# Patient Record
Sex: FEMALE | Race: BLACK OR AFRICAN AMERICAN | NOT HISPANIC OR LATINO | Employment: FULL TIME | URBAN - METROPOLITAN AREA
[De-identification: names, ages, dates, MRNs, and addresses within clinical notes are randomized per-mention and may not be internally consistent; named-entity substitution may affect disease eponyms.]

---

## 2017-01-09 ENCOUNTER — OFFICE VISIT (OUTPATIENT)
Dept: ENDOCRINOLOGY | Age: 57
End: 2017-01-09

## 2017-01-09 VITALS
WEIGHT: 183.4 LBS | SYSTOLIC BLOOD PRESSURE: 130 MMHG | DIASTOLIC BLOOD PRESSURE: 80 MMHG | OXYGEN SATURATION: 95 % | HEIGHT: 63 IN | BODY MASS INDEX: 32.5 KG/M2

## 2017-01-09 DIAGNOSIS — E55.9 VITAMIN D DEFICIENCY: ICD-10-CM

## 2017-01-09 DIAGNOSIS — I10 ESSENTIAL HYPERTENSION: ICD-10-CM

## 2017-01-09 DIAGNOSIS — E83.52 HYPERCALCEMIA: Primary | ICD-10-CM

## 2017-01-09 DIAGNOSIS — E78.5 HYPERLIPIDEMIA, UNSPECIFIED HYPERLIPIDEMIA TYPE: ICD-10-CM

## 2017-01-09 PROCEDURE — 99214 OFFICE O/P EST MOD 30 MIN: CPT | Performed by: INTERNAL MEDICINE

## 2017-01-09 NOTE — MR AVS SNAPSHOT
Kristina Kang   1/9/2017 2:30 PM   Office Visit    Dept Phone:  [phone]   Encounter #:  [id number]    Provider:  Conor Avendaño MD   Department:  [facility] ENDOCRINOLOGY                Your Full Care Plan              Today's Medication Changes          These changes are accurate as of: 1/9/17  4:04 PM.  If you have any questions, ask your nurse or doctor.               Stop taking medication(s)listed here:     estradiol 10 MCG tablet vaginal tablet   Commonly known as:  VAGIFEM   Stopped by:  Conor Avendaño MD                      Your Updated Medication List          This list is accurate as of: 1/9/17  4:04 PM.  Always use your most recent med list.                aspirin 81 MG tablet   Take 1 tablet by mouth daily.       atorvastatin 40 MG tablet   Commonly known as:  LIPITOR   Take 0.5 tablets by mouth Daily. One PO daily for cholesterol       cholecalciferol 400 UNITS tablet   Commonly known as:  VITAMIN D3       fluticasone 50 MCG/ACT nasal spray   Commonly known as:  FLONASE   2 sprays into each nostril Daily.       furosemide 20 MG tablet   Commonly known as:  LASIX   Take 1 tablet by mouth daily.       lisinopril 10 MG tablet   Commonly known as:  PRINIVIL,ZESTRIL   Take 1 tablet by mouth Daily. For BP               We Performed the Following     Calcium, Ionized     Calcium, Urine, 24 Hour     Comprehensive Metabolic Panel     Creatinine, Urine, 24 Hour     Lipid Panel     Phosphorus, 24 Hour Urine     Phosphorus     PTH, Intact     PTH-related Peptide     Vitamin D 25 Hydroxy       You Were Diagnosed With        Codes Comments    Hypercalcemia    -  Primary ICD-10-CM: E83.52  ICD-9-CM: 275.42     Hyperlipidemia, unspecified hyperlipidemia type     ICD-10-CM: E78.5  ICD-9-CM: 272.4     Vitamin D deficiency     ICD-10-CM: E55.9  ICD-9-CM: 268.9     Essential hypertension     ICD-10-CM: I10  ICD-9-CM: 401.9       Instructions     None    Patient  "Instructions History      Upcoming Appointments     Visit Type Date Time Department    OFFICE VISIT 1/9/2017  2:30 PM [facility]    OFFICE VISIT 7/10/2017  3:00 PM [facility]      [employer] Signup     Our records indicate that you have an active [facility] [employer] account.    You can view your After Visit Summary by going to [employer] and logging in with your [employer] username and password.  If you don't have a [employer] username and password but a parent or guardian has access to your record, the parent or guardian should login with their own [employer] username and password and access your record to view the After Visit Summary.    If you have questions, you can email [email or web address] or call [phone] to talk to our [employer] staff.  Remember, [employer] is NOT to be used for urgent needs.  For medical emergencies, dial 911.               Other Info from Your Visit           Your Appointments     Jul 10, 2017  3:00 PM EDT   Office Visit with Conor Avendaño MD   [facility] (--)    [address] [facility] [place] [id number] [phone]           Arrive 15 minutes prior to appointment.              Allergies     No Known Allergies      Reason for Visit     Abnormal Calcium           Vital Signs     Blood Pressure Height Weight Oxygen Saturation Body Mass Index Smoking Status    130/80 (BP Location: Right arm, Patient Position: Sitting, Cuff Size: Large Adult) 63\" (160 cm) 183 lb 6.4 oz (83.2 kg) 95% 32.49 kg/m2 Former Smoker      Problems and Diagnoses Noted     High blood pressure    Serum calcium elevated    High cholesterol or triglycerides    Vitamin D deficiency        "

## 2017-01-09 NOTE — PROGRESS NOTES
Subjective   Kristina Kang is a 56 y.o. female.     HPI Comments: Hypercalcemia.     Patient is a 56-year-old female who was has been lost to followup since 3/16. Her serum calcium has ranged from 10.7-11.2 with the upper limit of normal of 10.5 mg per DL.  She had a bone density done at [facility] in December 2015 which showed osteopenia.     She has history of vitamin D deficiency and has been taking vitamin D 800 units/day.  She has no previous history of kidney stones, peptic ulcer, or depression. She denies muscle weakness.     There is no family history of hypercalcemia or nephrolithiasis.     She has history of hypertension and has been on furosemide 20  mg once a day and lisinopril 10 mg once a day. She denies any history of heart attack or stroke.  Nice chest pain, shortness of breath or pedal edema.     She has hyperlipidemia and is on Lipitor 40 mg once a day.  She denies any myalgia.. She has no history of diabetes mellitus.    She had an episode of transient visual loss on the right eye in July 2016 thought to be due to amaurosis fugax.  Carotid ultrasound was normal.  She is on aspirin and Lipitor.    The following portions of the patient's history were reviewed and updated as appropriate: allergies, current medications, past family history, past medical history, past social history, past surgical history and problem list.    Review of Systems   Constitutional: Negative.    HENT: Negative.    Eyes: Negative.    Respiratory: Negative.    Cardiovascular: Negative.    Gastrointestinal: Negative.    Endocrine: Negative.    Genitourinary: Negative.    Musculoskeletal: Negative.    Skin: Negative.    Allergic/Immunologic: Negative.    Neurological: Negative.    Hematological: Bruises/bleeds easily.   Psychiatric/Behavioral: Negative.        Objective      Vitals:    01/09/17 1501   BP: 130/80   BP Location: Right arm   Patient Position: Sitting   Cuff Size: Large Adult   SpO2: 95%   Weight: 183 lb  "6.4 oz (83.2 kg)   Height: 63\" (160 cm)     Physical Exam   Constitutional: She is oriented to person, place, and time. She appears well-developed and well-nourished. No distress.   HENT:   Head: Normocephalic.   Nose: Nose normal.   Mouth/Throat: No oropharyngeal exudate.   Eyes: Conjunctivae and EOM are normal. Right eye exhibits no discharge. Left eye exhibits no discharge. No scleral icterus.   Neck: Normal range of motion. Neck supple. No JVD present. No tracheal deviation present. No thyromegaly present.   Cardiovascular: Normal rate, regular rhythm, normal heart sounds and intact distal pulses.  Exam reveals no friction rub.    No murmur heard.  Pulmonary/Chest: Effort normal and breath sounds normal. No respiratory distress. She has no wheezes. She has no rales. She exhibits no tenderness.   Abdominal: Soft. Bowel sounds are normal. She exhibits no distension and no mass. There is no tenderness.   Musculoskeletal: Normal range of motion. She exhibits no edema, tenderness or deformity.   Lymphadenopathy:     She has no cervical adenopathy.   Neurological: She is alert and oriented to person, place, and time. She displays normal reflexes. No cranial nerve deficit.   Skin: Skin is warm and dry. No rash noted. No erythema. No pallor.   Psychiatric: She has a normal mood and affect. Her behavior is normal.     Results Encounter on 11/09/2016   Component Date Value Ref Range Status   • Glucose 11/17/2016 90  65 - 99 mg/dL Final   • BUN 11/17/2016 12  6 - 20 mg/dL Final   • Creatinine 11/17/2016 0.89  0.57 - 1.00 mg/dL Final   • eGFR Non  Am 11/17/2016 66  >60 mL/min/1.73 Final   • eGFR African Am 11/17/2016 80  >60 mL/min/1.73 Final   • BUN/Creatinine Ratio 11/17/2016 13.5  7.0 - 25.0 Final   • Sodium 11/17/2016 144  136 - 145 mmol/L Final   • Potassium 11/17/2016 4.1  3.5 - 5.2 mmol/L Final   • Chloride 11/17/2016 101  98 - 107 mmol/L Final   • Total CO2 11/17/2016 28.6  22.0 - 29.0 mmol/L Final   • " Calcium 11/17/2016 10.8* 8.6 - 10.5 mg/dL Final   • Total Protein 11/17/2016 7.5  6.0 - 8.5 g/dL Final   • Albumin 11/17/2016 4.70  3.50 - 5.20 g/dL Final   • Globulin 11/17/2016 2.8  gm/dL Final   • A/G Ratio 11/17/2016 1.7  g/dL Final   • Total Bilirubin 11/17/2016 0.3  0.1 - 1.2 mg/dL Final   • Alkaline Phosphatase 11/17/2016 93  39 - 117 U/L Final   • AST (SGOT) 11/17/2016 15  1 - 32 U/L Final   • ALT (SGPT) 11/17/2016 14  1 - 33 U/L Final   • Total Cholesterol 11/17/2016 126  0 - 200 mg/dL Final   • Triglycerides 11/17/2016 118  0 - 150 mg/dL Final   • HDL Cholesterol 11/17/2016 40  40 - 60 mg/dL Final   • VLDL Cholesterol 11/17/2016 23.6  5 - 40 mg/dL Final   • LDL Cholesterol  11/17/2016 62  0 - 100 mg/dL Final     Assessment/Plan   Kristina was seen today for abnormal calcium.    Diagnoses and all orders for this visit:    Hypercalcemia  -     Comprehensive Metabolic Panel  -     Vitamin D 25 Hydroxy  -     Phosphorus, 24 Hour Urine  -     Calcium, Urine, 24 Hour  -     Creatinine, Urine, 24 Hour  -     PTH, Intact  -     PTH-related Peptide  -     Calcium, Ionized  -     Phosphorus    Hyperlipidemia, unspecified hyperlipidemia type  -     Lipid Panel    Vitamin D deficiency    Essential hypertension      Patient has mild hypercalcemia most likely due to primary hyperparathyroidism  Recheck PTH, PTH RP and vitamin D levels.  Obtain 24-hour urine collection for calcium, phosphorus, and creatinine.  Continue furosemide and lisinopril.  Continue vitamin D 800 units per day.  Repeat bone density later this year.  Continue Lipitor 40 mg once a day.    Send copy of my notes and labs to Joanne VARGAS.    Follow-up in 6 months

## 2017-01-09 NOTE — LETTER
January 9, 2017     Joanne Maurice PA-C  [id number] [facility]  [id number]  [facility] [address]    Patient: Kristina Kang   YOB: 1960   Date of Visit: 1/9/2017       Dear Dr. Josafat PA-C:    Thank you for referring Kristina Kang to me for evaluation. Below are the relevant portions of my assessment and plan of care.    If you have questions, please do not hesitate to call me. I look forward to following Kristina along with you.         Sincerely,        Conor Avendaño MD        CC: No Recipients  Conor Avendaño MD  1/9/2017  5:22 PM  Sign at close encounter  Subjective   Kristina Kang is a 56 y.o. female.     HPI Comments: Hypercalcemia.     Patient is a 56-year-old female who was has been lost to followup since 3/16. Her serum calcium has ranged from 10.7-11.2 with the upper limit of normal of 10.5 mg per DL.  She had a bone density done at [facility] in December 2015 which showed osteopenia.     She has history of vitamin D deficiency and has been taking vitamin D 800 units/day.  She has no previous history of kidney stones, peptic ulcer, or depression. She denies muscle weakness.     There is no family history of hypercalcemia or nephrolithiasis.     She has history of hypertension and has been on furosemide 20  mg once a day and lisinopril 10 mg once a day. She denies any history of heart attack or stroke.  Nice chest pain, shortness of breath or pedal edema.     She has hyperlipidemia and is on Lipitor 40 mg once a day.  She denies any myalgia.. She has no history of diabetes mellitus.    She had an episode of transient visual loss on the right eye in July 2016 thought to be due to amaurosis fugax.  Carotid ultrasound was normal.  She is on aspirin and Lipitor.    The following portions of the patient's history were reviewed and updated as appropriate: allergies, current medications, past family history, past medical history, past social history, past surgical history and  "problem list.    Review of Systems   Constitutional: Negative.    HENT: Negative.    Eyes: Negative.    Respiratory: Negative.    Cardiovascular: Negative.    Gastrointestinal: Negative.    Endocrine: Negative.    Genitourinary: Negative.    Musculoskeletal: Negative.    Skin: Negative.    Allergic/Immunologic: Negative.    Neurological: Negative.    Hematological: Bruises/bleeds easily.   Psychiatric/Behavioral: Negative.        Objective      Vitals:    01/09/17 1501   BP: 130/80   BP Location: Right arm   Patient Position: Sitting   Cuff Size: Large Adult   SpO2: 95%   Weight: 183 lb 6.4 oz (83.2 kg)   Height: 63\" (160 cm)     Physical Exam   Constitutional: She is oriented to person, place, and time. She appears well-developed and well-nourished. No distress.   HENT:   Head: Normocephalic.   Nose: Nose normal.   Mouth/Throat: No oropharyngeal exudate.   Eyes: Conjunctivae and EOM are normal. Right eye exhibits no discharge. Left eye exhibits no discharge. No scleral icterus.   Neck: Normal range of motion. Neck supple. No JVD present. No tracheal deviation present. No thyromegaly present.   Cardiovascular: Normal rate, regular rhythm, normal heart sounds and intact distal pulses.  Exam reveals no friction rub.    No murmur heard.  Pulmonary/Chest: Effort normal and breath sounds normal. No respiratory distress. She has no wheezes. She has no rales. She exhibits no tenderness.   Abdominal: Soft. Bowel sounds are normal. She exhibits no distension and no mass. There is no tenderness.   Musculoskeletal: Normal range of motion. She exhibits no edema, tenderness or deformity.   Lymphadenopathy:     She has no cervical adenopathy.   Neurological: She is alert and oriented to person, place, and time. She displays normal reflexes. No cranial nerve deficit.   Skin: Skin is warm and dry. No rash noted. No erythema. No pallor.   Psychiatric: She has a normal mood and affect. Her behavior is normal.     Results Encounter on " 11/09/2016   Component Date Value Ref Range Status   • Glucose 11/17/2016 90  65 - 99 mg/dL Final   • BUN 11/17/2016 12  6 - 20 mg/dL Final   • Creatinine 11/17/2016 0.89  0.57 - 1.00 mg/dL Final   • eGFR Non  Am 11/17/2016 66  >60 mL/min/1.73 Final   • eGFR African Am 11/17/2016 80  >60 mL/min/1.73 Final   • BUN/Creatinine Ratio 11/17/2016 13.5  7.0 - 25.0 Final   • Sodium 11/17/2016 144  136 - 145 mmol/L Final   • Potassium 11/17/2016 4.1  3.5 - 5.2 mmol/L Final   • Chloride 11/17/2016 101  98 - 107 mmol/L Final   • Total CO2 11/17/2016 28.6  22.0 - 29.0 mmol/L Final   • Calcium 11/17/2016 10.8* 8.6 - 10.5 mg/dL Final   • Total Protein 11/17/2016 7.5  6.0 - 8.5 g/dL Final   • Albumin 11/17/2016 4.70  3.50 - 5.20 g/dL Final   • Globulin 11/17/2016 2.8  gm/dL Final   • A/G Ratio 11/17/2016 1.7  g/dL Final   • Total Bilirubin 11/17/2016 0.3  0.1 - 1.2 mg/dL Final   • Alkaline Phosphatase 11/17/2016 93  39 - 117 U/L Final   • AST (SGOT) 11/17/2016 15  1 - 32 U/L Final   • ALT (SGPT) 11/17/2016 14  1 - 33 U/L Final   • Total Cholesterol 11/17/2016 126  0 - 200 mg/dL Final   • Triglycerides 11/17/2016 118  0 - 150 mg/dL Final   • HDL Cholesterol 11/17/2016 40  40 - 60 mg/dL Final   • VLDL Cholesterol 11/17/2016 23.6  5 - 40 mg/dL Final   • LDL Cholesterol  11/17/2016 62  0 - 100 mg/dL Final     Assessment/Plan   Kristina was seen today for abnormal calcium.    Diagnoses and all orders for this visit:    Hypercalcemia  -     Comprehensive Metabolic Panel  -     Vitamin D 25 Hydroxy  -     Phosphorus, 24 Hour Urine  -     Calcium, Urine, 24 Hour  -     Creatinine, Urine, 24 Hour  -     PTH, Intact  -     PTH-related Peptide  -     Calcium, Ionized  -     Phosphorus    Hyperlipidemia, unspecified hyperlipidemia type  -     Lipid Panel    Vitamin D deficiency    Essential hypertension      Patient has mild hypercalcemia most likely due to primary hyperparathyroidism  Recheck PTH, PTH RP and vitamin D levels.  Obtain  24-hour urine collection for calcium, phosphorus, and creatinine.  Continue furosemide and lisinopril.  Continue vitamin D 800 units per day.  Repeat bone density later this year.  Continue Lipitor 40 mg once a day.    Send copy of my notes and labs to Joanne VARGAS.    Follow-up in 6 months

## 2017-01-20 LAB
25(OH)D3+25(OH)D2 SERPL-MCNC: 28 NG/ML (ref 30–100)
ALBUMIN SERPL-MCNC: 4.9 G/DL (ref 3.5–5.2)
ALBUMIN/GLOB SERPL: 1.9 G/DL
ALP SERPL-CCNC: 89 U/L (ref 39–117)
ALT SERPL-CCNC: 11 U/L (ref 1–33)
AST SERPL-CCNC: 15 U/L (ref 1–32)
BILIRUB SERPL-MCNC: 0.4 MG/DL (ref 0.1–1.2)
BUN SERPL-MCNC: 12 MG/DL (ref 6–20)
BUN/CREAT SERPL: 12 (ref 7–25)
CA-I SERPL ISE-MCNC: 6 MG/DL (ref 4.5–5.6)
CALCIUM 24H UR-MCNC: 5.2 MG/DL
CALCIUM 24H UR-MRATE: 31.2 MG/24 HR (ref 100–300)
CALCIUM SERPL-MCNC: 11 MG/DL (ref 8.6–10.5)
CHLORIDE SERPL-SCNC: 103 MMOL/L (ref 98–107)
CHOLEST SERPL-MCNC: 144 MG/DL (ref 0–200)
CO2 SERPL-SCNC: 28.3 MMOL/L (ref 22–29)
CREAT 24H UR-MRATE: 1.1 G/24 HR (ref 0.7–1.6)
CREAT SERPL-MCNC: 1 MG/DL (ref 0.57–1)
CREAT UR-MCNC: 182.7 MG/DL
GLOBULIN SER CALC-MCNC: 2.6 GM/DL
GLUCOSE SERPL-MCNC: 121 MG/DL (ref 65–99)
HDLC SERPL-MCNC: 38 MG/DL (ref 40–60)
LDLC SERPL CALC-MCNC: 88 MG/DL (ref 0–100)
PHOSPHATE 24H UR-MRATE: 539.4 MG/24 HR (ref 400–1300)
PHOSPHATE SERPL-MCNC: 3.1 MG/DL (ref 2.5–4.5)
PHOSPHATE UR-MCNC: 89.9 MG/DL
POTASSIUM SERPL-SCNC: 4.4 MMOL/L (ref 3.5–5.2)
PROT SERPL-MCNC: 7.5 G/DL (ref 6–8.5)
PTH RELATED PROT SERPL-SCNC: <1.1 PMOL/L
PTH-INTACT SERPL-MCNC: 42 PG/ML (ref 15–65)
SODIUM SERPL-SCNC: 145 MMOL/L (ref 136–145)
TRIGL SERPL-MCNC: 91 MG/DL (ref 0–150)
VLDLC SERPL CALC-MCNC: 18.2 MG/DL (ref 5–40)

## 2017-01-25 RX ORDER — MELATONIN
1000 DAILY
Start: 2017-01-25 | End: 2017-11-28 | Stop reason: SDUPTHER

## 2017-02-28 ENCOUNTER — RESULTS ENCOUNTER (OUTPATIENT)
Dept: FAMILY MEDICINE CLINIC | Facility: CLINIC | Age: 57
End: 2017-02-28

## 2017-02-28 DIAGNOSIS — B19.20 HEPATITIS C VIRUS INFECTION, UNSPECIFIED CHRONICITY: ICD-10-CM

## 2017-02-28 DIAGNOSIS — E78.2 MIXED HYPERLIPIDEMIA: ICD-10-CM

## 2017-04-11 DIAGNOSIS — R68.89 ABNORMAL ENDOCRINE LABORATORY TEST FINDING: ICD-10-CM

## 2017-04-11 RX ORDER — FUROSEMIDE 20 MG/1
TABLET ORAL
Qty: 90 TABLET | Refills: 1 | Status: SHIPPED | OUTPATIENT
Start: 2017-04-11 | End: 2017-10-26 | Stop reason: SDUPTHER

## 2017-07-10 ENCOUNTER — OFFICE VISIT (OUTPATIENT)
Dept: ENDOCRINOLOGY | Age: 57
End: 2017-07-10

## 2017-07-10 VITALS
BODY MASS INDEX: 31.18 KG/M2 | DIASTOLIC BLOOD PRESSURE: 66 MMHG | OXYGEN SATURATION: 97 % | SYSTOLIC BLOOD PRESSURE: 120 MMHG | HEART RATE: 64 BPM | WEIGHT: 176 LBS | HEIGHT: 63 IN

## 2017-07-10 DIAGNOSIS — E83.52 HYPERCALCEMIA: Primary | ICD-10-CM

## 2017-07-10 DIAGNOSIS — E55.9 VITAMIN D DEFICIENCY: ICD-10-CM

## 2017-07-10 DIAGNOSIS — E78.5 HYPERLIPIDEMIA, UNSPECIFIED HYPERLIPIDEMIA TYPE: ICD-10-CM

## 2017-07-10 DIAGNOSIS — I10 ESSENTIAL HYPERTENSION: ICD-10-CM

## 2017-07-10 PROCEDURE — 99214 OFFICE O/P EST MOD 30 MIN: CPT | Performed by: INTERNAL MEDICINE

## 2017-07-10 NOTE — PROGRESS NOTES
Subjective   Kristina Kang is a 56 y.o. female.     HPI Comments: F/u for hyperlipidemia, hypercalcemia     Hyperlipidemia        Patient is a 56-year-old female who was has been lost to followup since 3/16. Her serum calcium has ranged from 10.7-11.2 with the upper limit of normal of 10.5 mg per DL. She had a bone density done at [facility] [place] in December 2015 which showed osteopenia.    She has history of vitamin D deficiency and has been taking vitamin D 800 units/day.  She has no previous history of kidney stones, peptic ulcer, or depression. She denies muscle weakness.      There is no family history of hypercalcemia or nephrolithiasis.    Workup done in January 2017 are as follows: Serum calcium at 11.0 mg per DL.  Intact PTH is normal at 42 pg per mL.  Undetectable PTH RP.  25-hydroxy vitamin D is 28 ng per mL.  24 urine calcium is low at 31.2 mg      She has history of hypertension and has been on furosemide 20  mg once a day and lisinopril 10 mg once a day. She denies any history of heart attack or stroke. No chest pain, shortness of breath or pedal edema.      She has hyperlipidemia and is on Lipitor 20 mg once a day. She denies any myalgia.. She has no history of diabetes mellitus.     She had an episode of transient visual loss on the right eye in July 2016 thought to be due to amaurosis fugax. Carotid ultrasound was normal. She is on aspirin and Lipitor.    The following portions of the patient's history were reviewed and updated as appropriate: allergies, current medications, past family history, past medical history, past social history, past surgical history and problem list.    Review of Systems   Constitutional: Negative.    HENT: Negative.    Eyes: Negative.    Respiratory: Negative.    Cardiovascular: Positive for leg swelling ( achy calves ).   Gastrointestinal: Negative.    Endocrine: Negative.    Genitourinary: Negative.    Musculoskeletal: Negative.    Skin: Negative.   "  Allergic/Immunologic: Negative.    Neurological: Negative.    Hematological: Bruises/bleeds easily ( bruise ).   Psychiatric/Behavioral: Negative.        Objective      Vitals:    07/10/17 1631   BP: 120/66   BP Location: Right arm   Patient Position: Sitting   Cuff Size: Large Adult   Pulse: 64   SpO2: 97%   Weight: 176 lb (79.8 kg)   Height: 63\" (160 cm)     Physical Exam  Office Visit on 01/09/2017   Component Date Value Ref Range Status   • Glucose 01/16/2017 121* 65 - 99 mg/dL Final   • BUN 01/16/2017 12  6 - 20 mg/dL Final   • Creatinine 01/16/2017 1.00  0.57 - 1.00 mg/dL Final   • eGFR Non African Am 01/16/2017 57* >60 mL/min/1.73 Final   • eGFR African Am 01/16/2017 70  >60 mL/min/1.73 Final   • BUN/Creatinine Ratio 01/16/2017 12.0  7.0 - 25.0 Final   • Sodium 01/16/2017 145  136 - 145 mmol/L Final   • Potassium 01/16/2017 4.4  3.5 - 5.2 mmol/L Final   • Chloride 01/16/2017 103  98 - 107 mmol/L Final   • Total CO2 01/16/2017 28.3  22.0 - 29.0 mmol/L Final   • Calcium 01/16/2017 11.0* 8.6 - 10.5 mg/dL Final   • Total Protein 01/16/2017 7.5  6.0 - 8.5 g/dL Final   • Albumin 01/16/2017 4.90  3.50 - 5.20 g/dL Final   • Globulin 01/16/2017 2.6  gm/dL Final   • A/G Ratio 01/16/2017 1.9  g/dL Final   • Total Bilirubin 01/16/2017 0.4  0.1 - 1.2 mg/dL Final   • Alkaline Phosphatase 01/16/2017 89  39 - 117 U/L Final   • AST (SGOT) 01/16/2017 15  1 - 32 U/L Final   • ALT (SGPT) 01/16/2017 11  1 - 33 U/L Final   • Total Cholesterol 01/16/2017 144  0 - 200 mg/dL Final   • Triglycerides 01/16/2017 91  0 - 150 mg/dL Final   • HDL Cholesterol 01/16/2017 38* 40 - 60 mg/dL Final   • VLDL Cholesterol 01/16/2017 18.2  5 - 40 mg/dL Final   • LDL Cholesterol  01/16/2017 88  0 - 100 mg/dL Final   • 25 Hydroxy, Vitamin D 01/16/2017 28.0* 30.0 - 100.0 ng/mL Final    Comment: Reference Range for Total Vitamin D 25(OH)  Deficiency    <20.0 ng/mL  Insufficiency 21-29 ng/mL  Sufficiency    ng/mL  Toxicity      >100 ng/ml      "   • PTH, Intact 01/16/2017 42  15 - 65 pg/mL Final   • PTH-Related Protein 01/16/2017 <1.1  pmol/L Final    Comment: Reference Range:  All Ages: <2.0  The PTHrP assay should not be used to exclude cancer or  screen tumor patients for humoral hypercalcemia of  malignancy (HHM). The results should always be assessed in  conjunction with the patient's medical history, clinical  examination, and other findings. If test results are  clinically discordant, please contact the laboratory.     • Ionized Calcium 01/16/2017 6.0* 4.5 - 5.6 mg/dL Final   • Phosphorus 01/16/2017 3.1  2.5 - 4.5 mg/dL Final   • Creatinine, Urine 01/16/2017 182.7  mg/dL Final   • Creatinine, 24H  01/16/2017 1.10  0.70 - 1.60 g/24 hr Final   • Phosphorus, urine 01/16/2017 89.9  Not Estab. mg/dL Final   • Phosphoserine,24hr,Urine 01/16/2017 539.4  400.0 - 1300.0 mg/24 hr Final   • Calcium, Urine 01/16/2017 5.2  Not Estab. mg/dL Final   • Calcium, Ur, Timed 01/16/2017 31.2* 100.0 - 300.0 mg/24 hr Final     Assessment/Plan   Kristina was seen today for abnormal calcium and hyperlipidemia.    Diagnoses and all orders for this visit:    Hypercalcemia  -     Comprehensive Metabolic Panel  -     Vitamin D 25 Hydroxy  -     PTH, Intact  -     Phosphorus    Vitamin D deficiency  -     Comprehensive Metabolic Panel  -     Vitamin D 25 Hydroxy  -     PTH, Intact    Hyperlipidemia, unspecified hyperlipidemia type  -     Lipid Panel    Essential hypertension      Patient has hypocalciuria despite being on furosemide and this may be related to vitamin D deficiency.  She has no family history of hypercalcemia.  Continue vitamin D 1000 units per day.  Check vitamin D levels and if normal repeat 24-hour urine collection for calcium, creatinine and phosphorus.  Continue Lipitor per PCP.  Continue lisinopril 10 mg once a day and furosemide 20 mg once a day.    Send copy of my note and labs to Joanne VARGAS    RTC 4 mos.

## 2017-07-17 LAB
25(OH)D3+25(OH)D2 SERPL-MCNC: 23.2 NG/ML (ref 30–100)
ALBUMIN SERPL-MCNC: 4.7 G/DL (ref 3.5–5.5)
ALBUMIN/GLOB SERPL: 1.8 {RATIO} (ref 1.2–2.2)
ALP SERPL-CCNC: 97 IU/L (ref 39–117)
ALT SERPL-CCNC: 12 IU/L (ref 0–32)
AST SERPL-CCNC: 12 IU/L (ref 0–40)
BILIRUB SERPL-MCNC: 0.4 MG/DL (ref 0–1.2)
BUN SERPL-MCNC: 12 MG/DL (ref 6–24)
BUN/CREAT SERPL: 14 (ref 9–23)
CALCIUM SERPL-MCNC: 10.7 MG/DL (ref 8.7–10.2)
CHLORIDE SERPL-SCNC: 105 MMOL/L (ref 96–106)
CHOLEST SERPL-MCNC: 123 MG/DL (ref 100–199)
CO2 SERPL-SCNC: 26 MMOL/L (ref 18–29)
CREAT SERPL-MCNC: 0.86 MG/DL (ref 0.57–1)
GLOBULIN SER CALC-MCNC: 2.6 G/DL (ref 1.5–4.5)
GLUCOSE SERPL-MCNC: 87 MG/DL (ref 65–99)
HDLC SERPL-MCNC: 37 MG/DL
LDLC SERPL CALC-MCNC: 65 MG/DL (ref 0–99)
PHOSPHATE SERPL-MCNC: 3.2 MG/DL (ref 2.5–4.5)
POTASSIUM SERPL-SCNC: 4 MMOL/L (ref 3.5–5.2)
PROT SERPL-MCNC: 7.3 G/DL (ref 6–8.5)
PTH-INTACT SERPL-MCNC: 56 PG/ML (ref 15–65)
SODIUM SERPL-SCNC: 145 MMOL/L (ref 134–144)
TRIGL SERPL-MCNC: 103 MG/DL (ref 0–149)
VLDLC SERPL CALC-MCNC: 21 MG/DL (ref 5–40)

## 2017-10-12 DIAGNOSIS — E55.9 VITAMIN D DEFICIENCY: ICD-10-CM

## 2017-10-12 DIAGNOSIS — I10 ESSENTIAL HYPERTENSION: Primary | ICD-10-CM

## 2017-10-12 DIAGNOSIS — B19.20 HEPATITIS C VIRUS INFECTION WITHOUT HEPATIC COMA, UNSPECIFIED CHRONICITY: ICD-10-CM

## 2017-10-12 DIAGNOSIS — E78.5 HYPERLIPIDEMIA, UNSPECIFIED HYPERLIPIDEMIA TYPE: ICD-10-CM

## 2017-10-26 ENCOUNTER — TRANSCRIBE ORDERS (OUTPATIENT)
Dept: ADMINISTRATIVE | Facility: HOSPITAL | Age: 57
End: 2017-10-26

## 2017-10-26 DIAGNOSIS — I10 ESSENTIAL HYPERTENSION: ICD-10-CM

## 2017-10-26 DIAGNOSIS — Z12.31 SCREENING MAMMOGRAM, ENCOUNTER FOR: Primary | ICD-10-CM

## 2017-10-26 DIAGNOSIS — R68.89 ABNORMAL ENDOCRINE LABORATORY TEST FINDING: ICD-10-CM

## 2017-10-26 RX ORDER — ATORVASTATIN CALCIUM 40 MG/1
20 TABLET, FILM COATED ORAL DAILY
Qty: 30 TABLET | Refills: 0 | Status: SHIPPED | OUTPATIENT
Start: 2017-10-26 | End: 2017-12-01 | Stop reason: SDUPTHER

## 2017-10-26 RX ORDER — FUROSEMIDE 20 MG/1
TABLET ORAL
Qty: 90 TABLET | Refills: 1 | Status: SHIPPED | OUTPATIENT
Start: 2017-10-26 | End: 2018-03-23 | Stop reason: SDUPTHER

## 2017-11-02 ENCOUNTER — OFFICE VISIT (OUTPATIENT)
Dept: FAMILY MEDICINE CLINIC | Facility: CLINIC | Age: 57
End: 2017-11-02

## 2017-11-02 VITALS
OXYGEN SATURATION: 98 % | DIASTOLIC BLOOD PRESSURE: 82 MMHG | HEIGHT: 63 IN | BODY MASS INDEX: 29.59 KG/M2 | TEMPERATURE: 98.5 F | SYSTOLIC BLOOD PRESSURE: 123 MMHG | WEIGHT: 167 LBS | HEART RATE: 78 BPM | RESPIRATION RATE: 16 BRPM

## 2017-11-02 DIAGNOSIS — J06.9 ACUTE URI: Primary | ICD-10-CM

## 2017-11-02 PROCEDURE — 99213 OFFICE O/P EST LOW 20 MIN: CPT | Performed by: NURSE PRACTITIONER

## 2017-11-02 RX ORDER — PREDNISONE 20 MG/1
20 TABLET ORAL 2 TIMES DAILY
Qty: 10 TABLET | Refills: 0 | Status: SHIPPED | OUTPATIENT
Start: 2017-11-02 | End: 2017-11-10

## 2017-11-02 NOTE — PROGRESS NOTES
Subjective   Kristina Kang is a 56 y.o. female.     History of Present Illness   Kristina Kang 56 y.o. female who presents for evaluation of sinus pressure and congestion, cough. Symptoms include ear pressure, congestion, nasal blockage and dry cough.  Onset of symptoms was 1 week ago, gradually worsening since that time. Patient denies shortness of breath, wheezing, fever.   Evaluation to date: none Treatment to date:  OTC oral decongestants and OTC cough suppressant.     The following portions of the patient's history were reviewed and updated as appropriate: allergies, current medications, past family history, past medical history, past social history, past surgical history and problem list.    Review of Systems   Constitutional: Negative for chills and fever.   HENT: Positive for ear pain, postnasal drip, sinus pressure and sore throat. Negative for congestion.    Respiratory: Positive for cough.        Objective   Physical Exam   Constitutional: She is oriented to person, place, and time. She appears well-developed and well-nourished.   Neck: Carotid bruit is not present. No thyroid mass present.   Cardiovascular: Normal rate and regular rhythm.    Pulmonary/Chest: Effort normal and breath sounds normal.   Neurological: She is alert and oriented to person, place, and time.   Psychiatric: She has a normal mood and affect. Judgment normal.   Nursing note and vitals reviewed.      Assessment/Plan   Kristina was seen today for sore throat, cough and earache.    Diagnoses and all orders for this visit:    Acute URI  -     predniSONE (DELTASONE) 20 MG tablet; Take 1 tablet by mouth 2 (Two) Times a Day.

## 2017-11-10 ENCOUNTER — OFFICE VISIT (OUTPATIENT)
Dept: ENDOCRINOLOGY | Age: 57
End: 2017-11-10

## 2017-11-10 VITALS
HEART RATE: 77 BPM | WEIGHT: 174 LBS | SYSTOLIC BLOOD PRESSURE: 126 MMHG | BODY MASS INDEX: 30.83 KG/M2 | OXYGEN SATURATION: 94 % | DIASTOLIC BLOOD PRESSURE: 64 MMHG | HEIGHT: 63 IN

## 2017-11-10 DIAGNOSIS — E78.5 HYPERLIPIDEMIA, UNSPECIFIED HYPERLIPIDEMIA TYPE: ICD-10-CM

## 2017-11-10 DIAGNOSIS — I10 ESSENTIAL HYPERTENSION: ICD-10-CM

## 2017-11-10 DIAGNOSIS — E55.9 VITAMIN D DEFICIENCY: ICD-10-CM

## 2017-11-10 DIAGNOSIS — E83.52 HYPERCALCEMIA: Primary | ICD-10-CM

## 2017-11-10 PROCEDURE — 99214 OFFICE O/P EST MOD 30 MIN: CPT | Performed by: INTERNAL MEDICINE

## 2017-11-10 NOTE — PROGRESS NOTES
Subjective   Kristina Kang is a 56 y.o. female.     HPI Comments: F/u for hypercalcemia hyperlipidemia     Hyperlipidemia        Patient is a 56-year-old female who came in for follow-up.   Her serum calcium has ranged from 10.7-11.2 with the upper limit of normal of 10.5 mg per DL. She had a bone density done at [facility] in December 2015 which showed osteopenia.     She has history of vitamin D deficiency and has been taking vitamin D 1000 units/day.  She has no previous history of kidney stones, peptic ulcer, or depression. She denies muscle weakness.      There is no family history of hypercalcemia or nephrolithiasis.     Workup done in January 2017 are as follows: Serum calcium at 11.0 mg per DL.  Intact PTH is normal at 42 pg per mL.  Undetectable PTH RP.  25-hydroxy vitamin D is 28 ng per mL.  24 urine calcium is low at 31.2 mg      She has history of hypertension and has been on furosemide 20  mg once a day and lisinopril 10 mg once a day. She denies any history of heart attack or stroke. No chest pain, shortness of breath or pedal edema.      She has hyperlipidemia and is on Lipitor 20 mg once a day. She denies any myalgia.. She has no history of diabetes mellitus.      She had an episode of transient visual loss on the right eye in July 2016 thought to be due to amaurosis fugax. Carotid ultrasound was normal. She is on aspirin and Lipitor.  She has no recurrence of visual loss since.      The following portions of the patient's history were reviewed and updated as appropriate: allergies, current medications, past family history, past medical history, past social history, past surgical history and problem list.    Review of Systems   Constitutional: Negative.    HENT: Negative.    Eyes: Negative.    Respiratory: Negative.    Cardiovascular: Negative.    Gastrointestinal: Negative.    Endocrine: Negative.    Genitourinary: Negative.    Musculoskeletal: Negative.    Skin: Negative.   "  Allergic/Immunologic: Negative.    Neurological: Negative.    Hematological: Bruises/bleeds easily (bruise ).   Psychiatric/Behavioral: Negative.        Objective      Vitals:    11/10/17 1421   BP: 126/64   BP Location: Right arm   Patient Position: Sitting   Cuff Size: Large Adult   Pulse: 77   SpO2: 94%   Weight: 174 lb (78.9 kg)   Height: 63\" (160 cm)     Physical Exam   Constitutional: She is oriented to person, place, and time. She appears well-developed and well-nourished. No distress.   HENT:   Head: Normocephalic.   Nose: Nose normal.   Mouth/Throat: No oropharyngeal exudate.   Eyes: Conjunctivae and EOM are normal. Right eye exhibits no discharge. Left eye exhibits no discharge. No scleral icterus.   Neck: Neck supple. No JVD present. No tracheal deviation present. No thyromegaly present.   Cardiovascular: Normal rate, regular rhythm, normal heart sounds and intact distal pulses.  Exam reveals no friction rub.    No murmur heard.  Pulmonary/Chest: Effort normal and breath sounds normal. No respiratory distress. She has no wheezes. She has no rales.   Abdominal: Soft. Bowel sounds are normal. She exhibits no distension and no mass. There is no tenderness.   Musculoskeletal: Normal range of motion. She exhibits no edema, tenderness or deformity.   Lymphadenopathy:     She has no cervical adenopathy.   Neurological: She is alert and oriented to person, place, and time. She has normal reflexes. She displays normal reflexes. No cranial nerve deficit. Coordination normal.   Skin: Skin is warm and dry. No rash noted. No erythema. No pallor.   Psychiatric: She has a normal mood and affect. Her behavior is normal.     Office Visit on 07/10/2017   Component Date Value Ref Range Status   • Glucose 07/15/2017 87  65 - 99 mg/dL Final   • BUN 07/15/2017 12  6 - 24 mg/dL Final   • Creatinine 07/15/2017 0.86  0.57 - 1.00 mg/dL Final   • eGFR Non  Am 07/15/2017 76  >59 mL/min/1.73 Final   • eGFR African Am " 07/15/2017 87  >59 mL/min/1.73 Final   • BUN/Creatinine Ratio 07/15/2017 14  9 - 23 Final   • Sodium 07/15/2017 145* 134 - 144 mmol/L Final   • Potassium 07/15/2017 4.0  3.5 - 5.2 mmol/L Final   • Chloride 07/15/2017 105  96 - 106 mmol/L Final   • Total CO2 07/15/2017 26  18 - 29 mmol/L Final   • Calcium 07/15/2017 10.7* 8.7 - 10.2 mg/dL Final   • Total Protein 07/15/2017 7.3  6.0 - 8.5 g/dL Final   • Albumin 07/15/2017 4.7  3.5 - 5.5 g/dL Final   • Globulin 07/15/2017 2.6  1.5 - 4.5 g/dL Final   • A/G Ratio 07/15/2017 1.8  1.2 - 2.2 Final   • Total Bilirubin 07/15/2017 0.4  0.0 - 1.2 mg/dL Final   • Alkaline Phosphatase 07/15/2017 97  39 - 117 IU/L Final   • AST (SGOT) 07/15/2017 12  0 - 40 IU/L Final   • ALT (SGPT) 07/15/2017 12  0 - 32 IU/L Final   • 25 Hydroxy, Vitamin D 07/15/2017 23.2* 30.0 - 100.0 ng/mL Final    Comment: Vitamin D deficiency has been defined by the [place] [employer] and an Endocrine Society practice guideline as a  level of serum 25-OH vitamin D less than 20 ng/mL (1,2).  The Endocrine Society went on to further define vitamin D  insufficiency as a level between 21 and 29 ng/mL (2).  1. IOM ([place] [employer]). 2010. Dietary reference     intakes for calcium and D. [place]: The     National Academies Press.  2. Jerod MF, Rafa NC, Eli-Kishore HILTON, et al.     Evaluation, treatment, and prevention of vitamin D     deficiency: an Endocrine Society clinical practice     guideline. JCEM. 2011 Jul; 96(7):1911-30.     • PTH, Intact 07/15/2017 56  15 - 65 pg/mL Final   • Total Cholesterol 07/15/2017 123  100 - 199 mg/dL Final   • Triglycerides 07/15/2017 103  0 - 149 mg/dL Final   • HDL Cholesterol 07/15/2017 37* >39 mg/dL Final   • VLDL Cholesterol 07/15/2017 21  5 - 40 mg/dL Final   • LDL Cholesterol  07/15/2017 65  0 - 99 mg/dL Final   • Phosphorus 07/15/2017 3.2  2.5 - 4.5 mg/dL Final     Assessment/Plan   Kristina was seen today for abnormal calcium and  hyperlipidemia.    Diagnoses and all orders for this visit:    Hypercalcemia  -     Comprehensive Metabolic Panel  -     Vitamin D 25 Hydroxy  -     PTH, Intact  -     Phosphorus    Essential hypertension  -     Comprehensive Metabolic Panel    Hyperlipidemia, unspecified hyperlipidemia type  -     Comprehensive Metabolic Panel  -     Lipid Panel  -     TSH  -     T4, Free    Vitamin D deficiency  -     Comprehensive Metabolic Panel  -     Vitamin D 25 Hydroxy      Check CMP, intact PTH, phosphorus, and vitamin D.  Consider repeating 24 urine calcium after vitamin D normalizes.  Continue furosemide and lisinopril.  Continue Lipitor 20 mg once a day and aspirin.    RTC 4 mos.    Send copy of my notes and labs to Joanne VARGAS.

## 2017-11-11 LAB
25(OH)D3+25(OH)D2 SERPL-MCNC: 25 NG/ML (ref 30–100)
ALBUMIN SERPL-MCNC: 4.4 G/DL (ref 3.5–5.2)
ALBUMIN/GLOB SERPL: 1.5 G/DL
ALP SERPL-CCNC: 101 U/L (ref 39–117)
ALT SERPL-CCNC: 10 U/L (ref 1–33)
AST SERPL-CCNC: 13 U/L (ref 1–32)
BILIRUB SERPL-MCNC: <0.2 MG/DL (ref 0.1–1.2)
BUN SERPL-MCNC: 16 MG/DL (ref 6–20)
BUN/CREAT SERPL: 16.5 (ref 7–25)
CALCIUM SERPL-MCNC: 10.4 MG/DL (ref 8.6–10.5)
CHLORIDE SERPL-SCNC: 105 MMOL/L (ref 98–107)
CHOLEST SERPL-MCNC: 134 MG/DL (ref 0–200)
CO2 SERPL-SCNC: 26.7 MMOL/L (ref 22–29)
CREAT SERPL-MCNC: 0.97 MG/DL (ref 0.57–1)
GFR SERPLBLD CREATININE-BSD FMLA CKD-EPI: 59 ML/MIN/1.73
GFR SERPLBLD CREATININE-BSD FMLA CKD-EPI: 72 ML/MIN/1.73
GLOBULIN SER CALC-MCNC: 2.9 GM/DL
GLUCOSE SERPL-MCNC: 100 MG/DL (ref 65–99)
HDLC SERPL-MCNC: 33 MG/DL (ref 40–60)
LDLC SERPL CALC-MCNC: 68 MG/DL (ref 0–100)
PHOSPHATE SERPL-MCNC: 3.3 MG/DL (ref 2.5–4.5)
POTASSIUM SERPL-SCNC: 4 MMOL/L (ref 3.5–5.2)
PROT SERPL-MCNC: 7.3 G/DL (ref 6–8.5)
PTH-INTACT SERPL-MCNC: 43 PG/ML (ref 15–65)
SODIUM SERPL-SCNC: 145 MMOL/L (ref 136–145)
T4 FREE SERPL-MCNC: 1 NG/DL (ref 0.93–1.7)
TRIGL SERPL-MCNC: 167 MG/DL (ref 0–150)
TSH SERPL DL<=0.005 MIU/L-ACNC: 0.44 MIU/ML (ref 0.27–4.2)
VLDLC SERPL CALC-MCNC: 33.4 MG/DL (ref 5–40)

## 2017-11-13 RX ORDER — LISINOPRIL 10 MG/1
TABLET ORAL
Qty: 30 TABLET | Refills: 0 | Status: SHIPPED | OUTPATIENT
Start: 2017-11-13 | End: 2017-12-01 | Stop reason: SDUPTHER

## 2017-11-28 RX ORDER — CHOLECALCIFEROL (VITAMIN D3) 125 MCG
2000 CAPSULE ORAL DAILY
Start: 2017-11-28

## 2017-12-01 ENCOUNTER — OFFICE VISIT (OUTPATIENT)
Dept: FAMILY MEDICINE CLINIC | Facility: CLINIC | Age: 57
End: 2017-12-01

## 2017-12-01 VITALS
HEART RATE: 73 BPM | HEIGHT: 63 IN | SYSTOLIC BLOOD PRESSURE: 120 MMHG | OXYGEN SATURATION: 99 % | BODY MASS INDEX: 31.18 KG/M2 | RESPIRATION RATE: 16 BRPM | TEMPERATURE: 98.8 F | DIASTOLIC BLOOD PRESSURE: 74 MMHG | WEIGHT: 176 LBS

## 2017-12-01 DIAGNOSIS — J30.2 CHRONIC SEASONAL ALLERGIC RHINITIS, UNSPECIFIED TRIGGER: ICD-10-CM

## 2017-12-01 DIAGNOSIS — G89.29 CHRONIC RIGHT-SIDED LOW BACK PAIN WITHOUT SCIATICA: ICD-10-CM

## 2017-12-01 DIAGNOSIS — E78.2 MIXED HYPERLIPIDEMIA: ICD-10-CM

## 2017-12-01 DIAGNOSIS — M54.50 CHRONIC RIGHT-SIDED LOW BACK PAIN WITHOUT SCIATICA: ICD-10-CM

## 2017-12-01 DIAGNOSIS — Z00.00 ROUTINE GENERAL MEDICAL EXAMINATION AT A HEALTH CARE FACILITY: Primary | ICD-10-CM

## 2017-12-01 DIAGNOSIS — I10 ESSENTIAL HYPERTENSION: ICD-10-CM

## 2017-12-01 DIAGNOSIS — B19.20 HEPATITIS C VIRUS INFECTION WITHOUT HEPATIC COMA, UNSPECIFIED CHRONICITY: ICD-10-CM

## 2017-12-01 DIAGNOSIS — Z78.0 POST-MENOPAUSAL: ICD-10-CM

## 2017-12-01 PROBLEM — M85.852 OSTEOPENIA OF BOTH HIPS: Status: ACTIVE | Noted: 2017-12-01

## 2017-12-01 PROBLEM — M85.851 OSTEOPENIA OF BOTH HIPS: Status: ACTIVE | Noted: 2017-12-01

## 2017-12-01 LAB
BASOPHILS # BLD AUTO: 0.03 10*3/MM3 (ref 0–0.2)
BASOPHILS NFR BLD AUTO: 0.3 % (ref 0–1.5)
EOSINOPHIL # BLD AUTO: 0.35 10*3/MM3 (ref 0–0.7)
EOSINOPHIL NFR BLD AUTO: 3.6 % (ref 0.3–6.2)
ERYTHROCYTE [DISTWIDTH] IN BLOOD BY AUTOMATED COUNT: 14.9 % (ref 11.7–13)
HCT VFR BLD AUTO: 40.3 % (ref 35.6–45.5)
HGB BLD-MCNC: 12.4 G/DL (ref 11.9–15.5)
IMM GRANULOCYTES # BLD: 0 10*3/MM3 (ref 0–0.03)
IMM GRANULOCYTES NFR BLD: 0 % (ref 0–0.5)
LYMPHOCYTES # BLD AUTO: 3.08 10*3/MM3 (ref 0.9–4.8)
LYMPHOCYTES NFR BLD AUTO: 31.5 % (ref 19.6–45.3)
MCH RBC QN AUTO: 29.3 PG (ref 26.9–32)
MCHC RBC AUTO-ENTMCNC: 30.8 G/DL (ref 32.4–36.3)
MCV RBC AUTO: 95.3 FL (ref 80.5–98.2)
MONOCYTES # BLD AUTO: 0.42 10*3/MM3 (ref 0.2–1.2)
MONOCYTES NFR BLD AUTO: 4.3 % (ref 5–12)
NEUTROPHILS # BLD AUTO: 5.9 10*3/MM3 (ref 1.9–8.1)
NEUTROPHILS NFR BLD AUTO: 60.3 % (ref 42.7–76)
PLATELET # BLD AUTO: 233 10*3/MM3 (ref 140–500)
RBC # BLD AUTO: 4.23 10*6/MM3 (ref 3.9–5.2)
WBC # BLD AUTO: 9.78 10*3/MM3 (ref 4.5–10.7)

## 2017-12-01 PROCEDURE — 99214 OFFICE O/P EST MOD 30 MIN: CPT | Performed by: PHYSICIAN ASSISTANT

## 2017-12-01 PROCEDURE — 99396 PREV VISIT EST AGE 40-64: CPT | Performed by: PHYSICIAN ASSISTANT

## 2017-12-01 PROCEDURE — 72110 X-RAY EXAM L-2 SPINE 4/>VWS: CPT | Performed by: PHYSICIAN ASSISTANT

## 2017-12-01 PROCEDURE — 93000 ELECTROCARDIOGRAM COMPLETE: CPT | Performed by: PHYSICIAN ASSISTANT

## 2017-12-01 RX ORDER — LISINOPRIL 10 MG/1
10 TABLET ORAL DAILY
Qty: 90 TABLET | Refills: 3 | Status: SHIPPED | OUTPATIENT
Start: 2017-12-01 | End: 2018-11-29 | Stop reason: SDUPTHER

## 2017-12-01 RX ORDER — ATORVASTATIN CALCIUM 40 MG/1
20 TABLET, FILM COATED ORAL DAILY
Qty: 45 TABLET | Refills: 3 | Status: SHIPPED | OUTPATIENT
Start: 2017-12-01 | End: 2018-11-29 | Stop reason: SDUPTHER

## 2017-12-01 RX ORDER — FLUTICASONE PROPIONATE 50 MCG
2 SPRAY, SUSPENSION (ML) NASAL DAILY
Qty: 3 BOTTLE | Refills: 3 | Status: SHIPPED | OUTPATIENT
Start: 2017-12-01 | End: 2018-06-07 | Stop reason: SDUPTHER

## 2017-12-01 NOTE — PROGRESS NOTES
Procedure     ECG 12 Lead  Date/Time: 12/1/2017 9:12 AM  Performed by: BRYAN PINEDA  Authorized by: BRYAN PINEDA   Comparison: compared with previous ECG from 11/20/2016  Similar to previous ECG  Rhythm: sinus rhythm  Rate: normal  ST Segments: ST segments normal  T Waves: T waves normal  QRS axis: normal  Other: no other findings  Clinical impression: normal ECG  Comments: EKG Interpretation Report    Heart rate:    72 beats/min, CA interval:  166 msec, QRS duration:  88 msec  QTu:390 msec, QTc:  427 msec

## 2017-12-01 NOTE — PATIENT INSTRUCTIONS
Low glycemic index diet  Exercise 30 minutes most days of the week  Make sure you get results on any labs or tests we ordered today  We discussed medications and how to take them as prescribed  Sleep 6-8 hours each night if possible  If you have not signed up for [facility], please activate your code ASAP from your After Visit Summary today    LDL goal <100  LDL goal if heart disease <70  HDL goal >60  Triglyceride goal <150  BP goal =<130/80  Fasting glucose <100      Piriformis Syndrome With Rehab  Piriformis syndrome is a condition the affects the nervous system in the area of the hip, and is characterized by pain and possibly a loss of feeling in the backside (posterior) thigh that may extend down the entire length of the leg. The symptoms are caused by an increase in pressure on the sciatic nerve by the piriformis muscle, which is on the back of the hip and is responsible for externally rotating the hip. The sciatic nerve and its branches connect to much of the leg. Normally the sciatic nerve runs between the piriformis muscle and other muscles. However, in certain individuals the nerve runs through the muscle, which causes an increase in pressure on the nerve and results in the symptoms of piriformis syndrome.  SYMPTOMS   · Pain, tingling, numbness, or burning in the back of the thigh that may also extend down the entire leg.  · Occasionally, tenderness in the buttock.  · Loss of function of the leg.  · Pain that worsens when using the piriformis muscle (running, jumping, or stairs).  · Pain that increases with prolonged sitting.  · Pain that is lessened by lying flat on the back.  CAUSES   · Piriformis syndrome is the result of an increase in pressure placed on the sciatic nerve. Oftentimes, piriformis syndrome is an overuse injury.  · Stress placed on the nerve from a sudden increase in the intensity, frequency, or duration of training.  · Compensation of other extremity injuries.  RISK INCREASES  WITH:  · Sports that involve the piriformis muscle (running, walking, or jumping).  · You are born with (congenital) a defect in which the sciatic nerve passes through the muscle.  PREVENTION  · Warm up and stretch properly before activity.  · Allow for adequate recovery between workouts.  · Maintain physical fitness:    Strength, flexibility, and endurance.    Cardiovascular fitness.  PROGNOSIS   If treated properly, the symptoms of piriformis syndrome usually resolve in 2 to 6 weeks.  RELATED COMPLICATIONS   · Persistent and possibly permanent pain and numbness in the lower extremity.  · Weakness of the extremity that may progress to disability and inability to compete.  TREATMENT   The most effective treatment for piriformis syndrome is rest from any activities that aggravate the symptoms. Ice and pain medication may help reduce pain and inflammation. The use of strengthening and stretching exercises may help reduce pain with activity. These exercises may be performed at home or with a therapist. A referral to a therapist may be given for further evaluation and treatment, such as ultrasound. Corticosteroid injections may be given to reduce inflammation that is causing pressure to be placed on the sciatic nerve. If nonsurgical (conservative) treatment is unsuccessful, then surgery may be recommended.   MEDICATION   · If pain medication is necessary, then nonsteroidal anti-inflammatory medications, such as aspirin and ibuprofen, or other minor pain relievers, such as acetaminophen, are often recommended.  · Do not take pain medication for 7 days before surgery.  · Prescription pain relievers may be given if deemed necessary by your caregiver. Use only as directed and only as much as you need.  · Corticosteroid injections may be given by your caregiver. These injections should be reserved for the most serious cases, because they may only be given a certain number of times.  HEAT AND COLD:   · Cold treatment (icing)  relieves pain and reduces inflammation. Cold treatment should be applied for 10 to 15 minutes every 2 to 3 hours for inflammation and pain and immediately after any activity that aggravates your symptoms. Use ice packs or massage the area with a piece of ice (ice massage).  · Heat treatment may be used prior to performing the stretching and strengthening activities prescribed by your caregiver, physical therapist, or . Use a heat pack or soak the injury in warm water.  SEEK IMMEDIATE MEDICAL CARE IF:  · Treatment seems to offer no benefit, or the condition worsens.  · Any medications produce adverse side effects.  EXERCISES  RANGE OF MOTION (ROM) AND STRETCHING EXERCISES - Piriformis Syndrome  These exercises may help you when beginning to rehabilitate your injury. Your symptoms may resolve with or without further involvement from your physician, physical therapist, or . While completing these exercises, remember:   · Restoring tissue flexibility helps normal motion to return to the joints. This allows healthier, less painful movement and activity.  · An effective stretch should be held for at least 30 seconds.  · A stretch should never be painful. You should only feel a gentle lengthening or release in the stretched tissue.  STRETCH - Hip Rotators  · Lie on your back on a firm surface. Grasp your right / left knee with your right / left hand and your ankle with your opposite hand.  · Keeping your hips and shoulders firmly planted, gently pull your right / left knee and rotate your lower leg toward your opposite shoulder until you feel a stretch in your buttocks.  · Hold this stretch for __________ seconds.  Repeat this stretch __________ times. Complete this stretch __________ times per day.  STRETCH - Iliotibial Band  · On the floor or bed, lie on your side so your right / left leg is on top. Bend your knee and grab your ankle.  · Slowly bring your knee back so that your thigh is in  line with your trunk. Keep your heel at your buttocks and gently arch your back so your head, shoulders, and hips line up.  · Slowly lower your leg so that your knee approaches the floor/bed until you feel a gentle stretch on the outside of your right / left thigh. If you do not feel a stretch and your knee will not fall farther, place the heel of your opposite foot on top of your knee and pull your thigh down farther.  · Hold this stretch for __________ seconds.  Repeat __________ times. Complete __________ times per day.  STRENGTHENING EXERCISES - Piriformis Syndrome   These are some of the caregiver again or until your symptoms are resolved. Remember:   · Strong muscles with good endurance tolerate stress better.  · Do the exercises as initially prescribed by your caregiver. Progress slowly with each exercise, gradually increasing the number of repetitions and weight used under their guidance.  STRENGTH - Hip Abductors, Straight Leg Raises  Be aware of your form throughout the entire exercise so that you exercise the correct muscles. Sloppy form means that you are not strengthening the correct muscles.  · Lie on your side so that your head, shoulders, knee, and hip line up. You may bend your lower knee to help maintain your balance. Your right / left leg should be on top.  · Roll your hips slightly forward, so that your hips are stacked directly over each other and your right / left knee is facing forward.  · Lift your top leg up 4-6 inches, leading with your heel. Be sure that your foot does not drift forward or that your knee does not roll toward the ceiling.  · Hold this position for __________ seconds. You should feel the muscles in your outer hip lifting (you may not notice this until your leg begins to tire).  · Slowly lower your leg to the starting position. Allow the muscles to fully relax before beginning the next repetition.  Repeat __________ times. Complete this exercise __________ times per day.  "  STRENGTH - Hip Abductors, Quadruped  · On a firm, lightly padded surface, position yourself on your hands and knees. Your hands should be directly below your shoulders and your knees should be directly below your hips.  · Keeping your right / left knee bent, lift your leg out to the side. Keep your legs level and in line with your shoulders.  · Position yourself on your hands and knees.  · Hold for __________ seconds.  · Keeping your trunk steady and your hips level, slowly lower your leg to the starting position.  Repeat __________ times. Complete this exercise __________ times per day.   STRENGTH - Hip Abductors, Standing  · Tie one end of a rubber exercise band/tubing to a secure surface (table, pole) and tie a loop at the other end.  · Place the loop around your right / left ankle. Keeping your ankle with the band directly opposite of the secured end, step away until there is tension in the tube/band.  · Hold onto a chair as needed for balance.  · Keeping your back upright, your shoulders over your hips, and your toes pointing forward, lift your right / left leg out to your side. Be sure to lift your leg with your hip muscles. Do not \"throw\" your leg or tip your body to lift your leg.  · Slowly and with control, return to the starting position.  Repeat exercise __________ times. Complete this exercise __________ times per day.      This information is not intended to replace advice given to you by your health care provider. Make sure you discuss any questions you have with your health care provider.     Document Released: 12/18/2006 Document Revised: 05/03/2016 Document Reviewed: 11/29/2016  [employer] Interactive Patient Education ©2017 [employer]    "

## 2017-12-01 NOTE — PROGRESS NOTES
Subjective   Kristina Kang is a 56 y.o. female.     History of Present Illness   Kristina Kang 56 y.o. female who presents for an Annual Wellness Visit.  she has a history of   Patient Active Problem List   Diagnosis   • Degeneration of lumbar intervertebral disc   • Hepatitis C   • Essential hypertension   • Hyperlipidemia   • Vitamin D deficiency   • Osteoarthritis   • Episode of syncope   • Hypercalcemia   • Amaurosis fugax of right eye   • Osteopenia of both hips   • Chronic seasonal allergic rhinitis   .  she has been feeling well.  Labs results discussed in detail with the patient.  Plan to update vaccines if needed today.  I  reviewed health maintenance with her as part of my preventative care plan.    Health Habits:  Dental Exam. up to date  Eye Exam. up to date  Exercise: 5 times/week.  Current exercise activities include: walking    Kristina Kang 56 y.o. female who presents today for routine follow up check and medication refills.  she has a history of   Patient Active Problem List   Diagnosis   • Degeneration of lumbar intervertebral disc   • Hepatitis C   • Essential hypertension   • Hyperlipidemia   • Vitamin D deficiency   • Osteoarthritis   • Episode of syncope   • Hypercalcemia   • Amaurosis fugax of right eye   • Osteopenia of both hips   • Chronic seasonal allergic rhinitis   .  Since the last visit, she has overall felt well.  She has Hypertenision and is well controlled on medication and Seasonal allergies and is doing well on their medication PRN.  she has been compliant with current medications have reviewed them.  The patient denies medication side effects.    Results for orders placed or performed in visit on 11/10/17   Comprehensive Metabolic Panel   Result Value Ref Range    Glucose 100 (H) 65 - 99 mg/dL    BUN 16 6 - 20 mg/dL    Creatinine 0.97 0.57 - 1.00 mg/dL    eGFR Non African Am 59 (L) >60 mL/min/1.73    eGFR African Am 72 >60 mL/min/1.73    BUN/Creatinine Ratio 16.5 7.0  - 25.0    Sodium 145 136 - 145 mmol/L    Potassium 4.0 3.5 - 5.2 mmol/L    Chloride 105 98 - 107 mmol/L    Total CO2 26.7 22.0 - 29.0 mmol/L    Calcium 10.4 8.6 - 10.5 mg/dL    Total Protein 7.3 6.0 - 8.5 g/dL    Albumin 4.40 3.50 - 5.20 g/dL    Globulin 2.9 gm/dL    A/G Ratio 1.5 g/dL    Total Bilirubin <0.2 0.1 - 1.2 mg/dL    Alkaline Phosphatase 101 39 - 117 U/L    AST (SGOT) 13 1 - 32 U/L    ALT (SGPT) 10 1 - 33 U/L   Lipid Panel   Result Value Ref Range    Total Cholesterol 134 0 - 200 mg/dL    Triglycerides 167 (H) 0 - 150 mg/dL    HDL Cholesterol 33 (L) 40 - 60 mg/dL    VLDL Cholesterol 33.4 5 - 40 mg/dL    LDL Cholesterol  68 0 - 100 mg/dL   TSH   Result Value Ref Range    TSH 0.437 0.270 - 4.200 mIU/mL   T4, Free   Result Value Ref Range    Free T4 1.00 0.93 - 1.70 ng/dL   Vitamin D 25 Hydroxy   Result Value Ref Range    25 Hydroxy, Vitamin D 25.0 (L) 30.0 - 100.0 ng/mL   PTH, Intact   Result Value Ref Range    PTH, Intact 43 15 - 65 pg/mL   Phosphorus   Result Value Ref Range    Phosphorus 3.3 2.5 - 4.5 mg/dL     She is off estrogen cream; hyst and has one ovary  She is seeing DR Avendaño and monitoring her calcium closely and labs with this for Parathyroid.  He did update all labs I wanted but no CBC.  I know he has her on Lasix and stopped HCTZ    Hx Hep C and normal LFTs since tx with DR Null  I will need CBC and don't see one.  Right foot pain at times and can hurt to weight bear  Can see podiatry    Kristina A Kang 56 y.o. female presents for evaluation and treatment of  low back pain. The patient first noted symptoms several years ago. It was not related to none recalled by the patient.  The pain intensity is mild and moderate , and is located right side  and piriformis area. The pain is described as aching and occurs constantly. The symptoms are progressive. Symptoms are exacerbated by light exertion and prolonged sitting, standing, and laying. Factors which relieve the pain include topical pain  therapy. Other associated symptoms include denies pain radiating down right leg, denies pain radiating down left leg, denies motor loss, denies sensory loss, denies burning in legs, denies N/T in legs, denies weakness in legs, denies loss of muscle tone and denies loss of bowel or bladder control. Previous history of symptoms: the problem is long-standing. Treatment efforts have included heat and topical pain therapy , with and without relief.    X-Ray  Interpretation report in house X-rays that I personally viewed    Relevant Clinical Issues/Diagnoses/Indications:  Right lumbar pain        Clinical Findings:  Mild DDD changes          Comparative Data:  none          Date of Previous X-ray:  Change on current X-ray      The following portions of the patient's history were reviewed and updated as appropriate: allergies, current medications, past family history, past medical history, past social history, past surgical history and problem list.    Review of Systems   Constitutional: Negative for activity change, appetite change and unexpected weight change.   HENT: Negative for nosebleeds and trouble swallowing.    Eyes: Negative for pain and visual disturbance.   Respiratory: Negative for chest tightness, shortness of breath and wheezing.    Cardiovascular: Negative for chest pain and palpitations.   Gastrointestinal: Negative for abdominal pain and blood in stool.   Endocrine: Negative.    Genitourinary: Negative for difficulty urinating and hematuria.   Musculoskeletal: Negative for joint swelling.   Skin: Negative for color change and rash.   Allergic/Immunologic: Negative.    Neurological: Negative for syncope and speech difficulty.   Hematological: Negative for adenopathy.   Psychiatric/Behavioral: Negative for agitation and confusion.   All other systems reviewed and are negative.      Objective   Physical Exam   Constitutional: She is oriented to person, place, and time. She appears well-developed and  well-nourished. No distress.   HENT:   Head: Normocephalic and atraumatic. Hair is normal.   Right Ear: Hearing, tympanic membrane, external ear and ear canal normal. No drainage. No decreased hearing is noted.   Left Ear: Hearing, tympanic membrane, external ear and ear canal normal. No decreased hearing is noted.   Nose: Nose normal. No nasal deformity.   Mouth/Throat: Oropharynx is clear and moist. No oropharyngeal exudate.   Eyes: Conjunctivae, EOM and lids are normal. Pupils are equal, round, and reactive to light. Lids are everted and swept, no foreign bodies found. Right eye exhibits no discharge. Left eye exhibits no discharge.   Fundoscopic exam:       The right eye shows red reflex.        The left eye shows red reflex.   Neck: Normal range of motion. Neck supple. No JVD present. No tracheal deviation present. No thyromegaly present.   Cardiovascular: Normal rate, regular rhythm, normal heart sounds, intact distal pulses and normal pulses.  Exam reveals no gallop and no friction rub.    No murmur heard.  Pulmonary/Chest: Effort normal and breath sounds normal. No respiratory distress. She has no wheezes. She has no rales. She exhibits no tenderness.   3rd nipple LUQ abd;  Same bilat accessory breast tissue in axilla   Abdominal: Soft. Bowel sounds are normal. She exhibits no distension and no mass. There is no tenderness. There is no rebound and no guarding. No hernia.   Musculoskeletal: Normal range of motion. She exhibits no edema, tenderness or deformity.   ROM pain right piriformis   Lymphadenopathy:     She has no cervical adenopathy.        Right: No inguinal adenopathy present.        Left: No inguinal adenopathy present.   Neurological: She is alert and oriented to person, place, and time. She has normal reflexes. She displays normal reflexes. No cranial nerve deficit (2-12). She exhibits normal muscle tone. Coordination normal.   Skin: Skin is warm and dry. No rash noted. She is not diaphoretic.  No erythema.   Sebaceous cyst right temporal area <pea size and no change; NT  Tattoos right wrist and on upper and lower back   Psychiatric: She has a normal mood and affect. Her behavior is normal. Judgment and thought content normal.   Nursing note and vitals reviewed.      Assessment/Plan   Kristina was seen today for annual exam.    Diagnoses and all orders for this visit:    Routine general medical examination at a health care facility  -     ECG 12 Lead    Essential hypertension  -     atorvastatin (LIPITOR) 40 MG tablet; Take 0.5 tablets by mouth Daily. For cholesterol    Hepatitis C virus infection without hepatic coma, unspecified chronicity    Post-menopausal  -     DEXA Bone Density Axial; Future    Mixed hyperlipidemia    Chronic seasonal allergic rhinitis, unspecified trigger    Chronic right-sided low back pain without sciatica    Other orders  -     lisinopril (PRINIVIL,ZESTRIL) 10 MG tablet; Take 1 tablet by mouth Daily. For BP  -     fluticasone (FLONASE) 50 MCG/ACT nasal spray; 2 sprays into each nostril Daily.

## 2017-12-11 ENCOUNTER — TREATMENT (OUTPATIENT)
Dept: PHYSICAL THERAPY | Facility: CLINIC | Age: 57
End: 2017-12-11

## 2017-12-11 DIAGNOSIS — M54.50 MIDLINE LOW BACK PAIN WITHOUT SCIATICA, UNSPECIFIED CHRONICITY: Primary | ICD-10-CM

## 2017-12-11 DIAGNOSIS — R26.2 DIFFICULTY WALKING: ICD-10-CM

## 2017-12-11 PROCEDURE — 97161 PT EVAL LOW COMPLEX 20 MIN: CPT | Performed by: PHYSICAL THERAPIST

## 2017-12-11 PROCEDURE — 97110 THERAPEUTIC EXERCISES: CPT | Performed by: PHYSICAL THERAPIST

## 2017-12-11 NOTE — PROGRESS NOTES
Physical Therapy Initial Evaluation and Plan of Care    Patient: Kristina Kang   : 1960  Diagnosis/ICD-10 Code:  Midline low back pain without sciatica, unspecified chronicity [M54.5]  Referring practitioner: Joanne Maurice PA-C  Past Medical History Reviewed: 2017    PLOF: Independent and working full time     Subjective Evaluation    History of Present Illness  Date of onset: 2017  Mechanism of injury: I am having some back pain. When I walk there is a pain that radiates all the way down my foot and I have to stop. I feel like it goes down the side and back of my right leg. Lying down at night bothers me the most and rotating while lying down. The pain is always there in the low back and in the bottom and it switches sides. I can't figure out what triggers it.   This has been going on for a couple years, but it is much worse now.   I have had sciatic nerve pain on the left in the past.         Patient Occupation: [employer] and pain doesn't bother me at work Pain  Current pain ratin  At worst pain ratin  Location: low back and buttocks  Relieving factors: heat and medications (heating pad seems to help, sometimes an Aleve)  Exacerbated by: lying down, lifting and on my feet a lot.  Progression: no change (very gradually getting worse)    Social Support  Lives in: multiple-level home    Diagnostic Tests  Abnormal x-ray: mild arthritic changes.             Objective       Tenderness     Additional Tenderness Details  (B) SI joint    Active Range of Motion     Lumbar   Normal active range of motion    Strength/Myotome Testing     Left Hip   Planes of Motion   Flexion: 5  Abduction: 4+    Right Hip   Planes of Motion   Flexion: 5  Abduction: 4+    Left Knee   Flexion: 5  Extension: 5    Right Knee   Flexion: 5  Extension: 5    Left Ankle/Foot   Dorsiflexion: 5    Right Ankle/Foot   Dorsiflexion: 5    Tests       Thoracic   Negative slump.     Lumbar     Left   Negative crossed SLR and passive  SLR.     Right   Negative crossed SLR and passive SLR.     Left Pelvic Girdle/Sacrum   Negative: sacrum compression and gapping.     Right Pelvic Girdle/Sacrum   Positive: sacral spring.   Negative: sacrum compression and gapping.     Additional Tests Details  (+) FABERS on (R)      Ambulation     Comments   Good gait mechanics    Functional Assessment     Single Leg Stance   Left: 8 seconds  Right: 8 seconds    General Comments     Spine Comments  (R) anterior innominate and (R) pelvic outflare         Assessment & Plan     Assessment  Impairments: abnormal gait, abnormal or restricted ROM, impaired balance, impaired physical strength and pain with function  Assessment details: Pt has SI joint dysfunction and core/hip weakness resulting in decreased ability to stand, ambulate and comfort with ADL's. Pt would benefit from PT services to address these deficits  Prognosis: good  Functional Limitations: uncomfortable because of pain  Goals  Plan Goals: SHORT TERM GOALS:    3-4 weeks   1. Pt will be compliant with HEP.   2. Pt will report no radicular sx's into R buttocks, groin, or lower extremity.  3. Pt will demonstrate 5/5 MMT or greater in (R) LE's in order to improve gait mechanics and protect lumbar spine.  4. Pt will have normal pelvic and sacral alignment and negative SI joint special test findings.    LONG TERM GOALS:   6-8 weeks  1. Pt will score 15% or less on Back Index.  2. Pt will report minimal-no tenderness over (R) SI joint  3. Pt will report pain as 3/10 or less with all functional activities.        Plan  Therapy options: will be seen for skilled physical therapy services  Planned modality interventions: cryotherapy, electrical stimulation/Russian stimulation, iontophoresis, TENS, thermotherapy (hydrocollator packs), traction and ultrasound  Planned therapy interventions: abdominal trunk stabilization, balance/weight-bearing training, body mechanics training, flexibility, functional ROM exercises,  gait training, home exercise program, joint mobilization, manual therapy, neuromuscular re-education, soft tissue mobilization, spinal/joint mobilization, strengthening, stretching, therapeutic activities and postural training  Frequency: 2x week  Duration in weeks: 5  Treatment plan discussed with: patient        Manual Therapy:    2     mins  27455;  Therapeutic Exercise:    12     mins  63966;     Neuromuscular Michael:    -    mins  68062;    Therapeutic Activity:     -     mins  74084;     Gait Training:      -     mins  63732;     Ultrasound:     -     mins  68142;    Electrical Stimulation:    -     mins  74801 ( );  Dry Needling     -     mins self-pay    Timed Treatment:   14   mins   Total Treatment:     50   mins    PT SIGNATURE: Rayna Duarte, SALMA   DATE TREATMENT INITIATED: 12/11/2017    Initial Certification  Certification Period: 3/11/2018  I certify that the therapy services are furnished while this patient is under my care.  The services outlined above are required by this patient, and will be reviewed every 90 days.     PHYSICIAN: Joanne Maurice PA-C      DATE:     Please sign and return via fax to [phone].. Thank you, [facility].

## 2017-12-15 ENCOUNTER — TREATMENT (OUTPATIENT)
Dept: PHYSICAL THERAPY | Facility: CLINIC | Age: 57
End: 2017-12-15

## 2017-12-15 DIAGNOSIS — M54.50 MIDLINE LOW BACK PAIN WITHOUT SCIATICA, UNSPECIFIED CHRONICITY: Primary | ICD-10-CM

## 2017-12-15 DIAGNOSIS — R26.2 DIFFICULTY WALKING: ICD-10-CM

## 2017-12-15 PROCEDURE — 97140 MANUAL THERAPY 1/> REGIONS: CPT | Performed by: PHYSICAL THERAPIST

## 2017-12-15 PROCEDURE — 97110 THERAPEUTIC EXERCISES: CPT | Performed by: PHYSICAL THERAPIST

## 2017-12-15 PROCEDURE — 97035 APP MDLTY 1+ULTRASOUND EA 15: CPT | Performed by: PHYSICAL THERAPIST

## 2017-12-15 NOTE — PROGRESS NOTES
Physical Therapy Daily Progress Note  Visit: 2    Kristina Kang reports: I was sore after last visit, but actually my right leg feels much better now and my left leg is bothering me a little, especially in my buttocks area    Subjective     Objective   See Exercise, Manual, and Modality Logs for complete treatment.       Assessment & Plan     Assessment  Assessment details: Pt tolerated treatment well. Added (L) PINN self correction for home if she continues to have the L SI joint pain. Reported relief after ultrasound    Plan  Plan details: Progress per POC        Manual Therapy:    8     mins  19157;  Therapeutic Exercise:    25     mins  08546;     Neuromuscular Michael:    -    mins  41569;    Therapeutic Activity:     -     mins  10665;     Gait Training:      -     mins  22480;     Ultrasound:     10     mins  06445;    Electrical Stimulation:    -     mins  03729 ( );  Dry Needling     -     mins self-pay    Timed Treatment:   43   mins   Total Treatment:     55   mins    SALMA Castañeda License #: [id number]    Physical Therapist

## 2017-12-16 ENCOUNTER — HOSPITAL ENCOUNTER (OUTPATIENT)
Dept: MAMMOGRAPHY | Facility: HOSPITAL | Age: 57
Discharge: HOME | End: 2017-12-16
Admitting: PHYSICIAN ASSISTANT

## 2017-12-16 DIAGNOSIS — Z12.31 SCREENING MAMMOGRAM, ENCOUNTER FOR: ICD-10-CM

## 2017-12-16 PROCEDURE — G0202 SCR MAMMO BI INCL CAD: HCPCS

## 2017-12-18 ENCOUNTER — TREATMENT (OUTPATIENT)
Dept: PHYSICAL THERAPY | Facility: CLINIC | Age: 57
End: 2017-12-18

## 2017-12-18 DIAGNOSIS — R26.2 DIFFICULTY WALKING: ICD-10-CM

## 2017-12-18 DIAGNOSIS — M54.50 MIDLINE LOW BACK PAIN WITHOUT SCIATICA, UNSPECIFIED CHRONICITY: Primary | ICD-10-CM

## 2017-12-18 PROCEDURE — 97035 APP MDLTY 1+ULTRASOUND EA 15: CPT | Performed by: PHYSICAL THERAPIST

## 2017-12-18 PROCEDURE — 97110 THERAPEUTIC EXERCISES: CPT | Performed by: PHYSICAL THERAPIST

## 2017-12-18 NOTE — PROGRESS NOTES
Physical Therapy Daily Progress Note  Visit: 3    Kristina Kang reports: I am having no pain on my right side, still having a little twinge of pain on the left side, but it is better    Subjective     Objective   See Exercise, Manual, and Modality Logs for complete treatment.       Assessment & Plan     Assessment  Assessment details: Pt tolerated treatment well. Pelvic alignment looked good today and pt was able to progress with standing exercises with minimal-no increase in sx's    Plan  Plan details: Progress per POC        Manual Therapy:    -     mins  76740;  Therapeutic Exercise:    29     mins  88203;     Neuromuscular Michael:    4    mins  62812;    Therapeutic Activity:     -     mins  05779;     Gait Training:      -     mins  80477;     Ultrasound:     10     mins  48726;    Electrical Stimulation:    -     mins  95142 ( );  Dry Needling     -     mins self-pay    Timed Treatment:   43   mins   Total Treatment:     60   mins    Rayna Duarte PT  [place] License #: [id number]    Physical Therapist

## 2017-12-20 ENCOUNTER — TREATMENT (OUTPATIENT)
Dept: PHYSICAL THERAPY | Facility: CLINIC | Age: 57
End: 2017-12-20

## 2017-12-20 DIAGNOSIS — M54.50 MIDLINE LOW BACK PAIN WITHOUT SCIATICA, UNSPECIFIED CHRONICITY: Primary | ICD-10-CM

## 2017-12-20 DIAGNOSIS — R26.2 DIFFICULTY WALKING: ICD-10-CM

## 2017-12-20 PROCEDURE — 97110 THERAPEUTIC EXERCISES: CPT | Performed by: PHYSICAL THERAPIST

## 2017-12-20 PROCEDURE — 97112 NEUROMUSCULAR REEDUCATION: CPT | Performed by: PHYSICAL THERAPIST

## 2017-12-20 NOTE — PROGRESS NOTES
Physical Therapy Daily Progress Note  Visit: 4    Kristina Kang reports: I am actually feeling pretty good.    Subjective     Objective   See Exercise, Manual, and Modality Logs for complete treatment.       Assessment & Plan     Assessment  Assessment details: Pt tolerated treatment well. Added standing balance activities    Plan  Plan details: Progress per POC        Manual Therapy:    2     mins  16031;  Therapeutic Exercise:    30     mins  63956;     Neuromuscular Michael:    10    mins  44514;    Therapeutic Activity:     -     mins  29310;     Gait Training:      -     mins  66884;     Ultrasound:     -     mins  03548;    Electrical Stimulation:    --     mins  86826 ( );  Dry Needling     -     mins self-pay    Timed Treatment:   42   mins   Total Treatment:     65   mins    Rayna Duarte PT  [place] License #: [id number]    Physical Therapist

## 2017-12-29 ENCOUNTER — HOSPITAL ENCOUNTER (OUTPATIENT)
Dept: BONE DENSITY | Facility: HOSPITAL | Age: 57
Discharge: HOME | End: 2017-12-29
Admitting: PHYSICIAN ASSISTANT

## 2017-12-29 ENCOUNTER — TREATMENT (OUTPATIENT)
Dept: PHYSICAL THERAPY | Facility: CLINIC | Age: 57
End: 2017-12-29

## 2017-12-29 DIAGNOSIS — M54.50 MIDLINE LOW BACK PAIN WITHOUT SCIATICA, UNSPECIFIED CHRONICITY: Primary | ICD-10-CM

## 2017-12-29 DIAGNOSIS — Z78.0 POST-MENOPAUSAL: ICD-10-CM

## 2017-12-29 DIAGNOSIS — R26.2 DIFFICULTY WALKING: ICD-10-CM

## 2017-12-29 PROCEDURE — 97112 NEUROMUSCULAR REEDUCATION: CPT | Performed by: PHYSICAL THERAPIST

## 2017-12-29 PROCEDURE — 77080 DXA BONE DENSITY AXIAL: CPT

## 2017-12-29 PROCEDURE — 97110 THERAPEUTIC EXERCISES: CPT | Performed by: PHYSICAL THERAPIST

## 2017-12-29 NOTE — PROGRESS NOTES
Physical Therapy Daily Progress Note  Visit: 5    Kristina Kang reports: Had a little bit of groin pain when I was driving and a little bit of back pain yesterday.    Subjective     Objective   See Exercise, Manual, and Modality Logs for complete treatment.       Assessment & Plan     Assessment  Assessment details: Noted R JENN moderate today and corrected with MET. Educated pt to perform self correction knee push at home on right. Discussed continuing therapy for 1x/wk for the next 2-3 weeks    Plan  Plan details: Continue 1x/wk for 2-3 weeks as needed        Manual Therapy:    5     mins  78668;  Therapeutic Exercise:    30     mins  86725;     Neuromuscular Michael:    15    mins  41103;    Therapeutic Activity:     -     mins  12567;     Gait Training:      -     mins  87208;     Ultrasound:     -     mins  73572;    Electrical Stimulation:    -     mins  49585 ( );  Dry Needling     -     mins self-pay    Timed Treatment:   50   mins   Total Treatment:     70   mins    Rayna Duarte PT  [place] License #: [id number]    Physical Therapist

## 2018-01-04 ENCOUNTER — TREATMENT (OUTPATIENT)
Dept: PHYSICAL THERAPY | Facility: CLINIC | Age: 58
End: 2018-01-04

## 2018-01-04 DIAGNOSIS — R26.2 DIFFICULTY WALKING: ICD-10-CM

## 2018-01-04 DIAGNOSIS — M54.50 MIDLINE LOW BACK PAIN WITHOUT SCIATICA, UNSPECIFIED CHRONICITY: Primary | ICD-10-CM

## 2018-01-04 PROCEDURE — 97112 NEUROMUSCULAR REEDUCATION: CPT | Performed by: PHYSICAL THERAPIST

## 2018-01-04 PROCEDURE — 97110 THERAPEUTIC EXERCISES: CPT | Performed by: PHYSICAL THERAPIST

## 2018-01-04 NOTE — PROGRESS NOTES
Physical Therapy Daily Progress Note  Visit: 6    Kristina Kang reports: Feeling good today. My left hip is a little bit sore, but I have been going to the gym and exercising    Subjective     Objective   See Exercise, Manual, and Modality Logs for complete treatment.       Assessment & Plan     Assessment  Assessment details: Pt doing very well. Able to progress with balance and strength exercises    Plan  Plan details: Progress per POC        Manual Therapy:    3     mins  28903;  Therapeutic Exercise:    30     mins  66675;     Neuromuscular Michael:    10    mins  02038;    Therapeutic Activity:     -     mins  69990;     Gait Training:      -     mins  30079;     Ultrasound:     -     mins  08394;    Electrical Stimulation:    -     mins  51492 ( );  Dry Needling     -     mins self-pay    Timed Treatment:   43   mins   Total Treatment:     60   mins    Rayna Duarte PT  [place] License #: [id number]    Physical Therapist

## 2018-01-11 ENCOUNTER — TREATMENT (OUTPATIENT)
Dept: PHYSICAL THERAPY | Facility: CLINIC | Age: 58
End: 2018-01-11

## 2018-01-11 DIAGNOSIS — M54.50 MIDLINE LOW BACK PAIN WITHOUT SCIATICA, UNSPECIFIED CHRONICITY: Primary | ICD-10-CM

## 2018-01-11 DIAGNOSIS — R26.2 DIFFICULTY WALKING: ICD-10-CM

## 2018-01-11 PROCEDURE — 97140 MANUAL THERAPY 1/> REGIONS: CPT | Performed by: PHYSICAL THERAPIST

## 2018-01-11 PROCEDURE — 97110 THERAPEUTIC EXERCISES: CPT | Performed by: PHYSICAL THERAPIST

## 2018-01-11 NOTE — PROGRESS NOTES
Physical Therapy Daily Progress Note  Visit: 7    Kristina Kang reports: My back is feeling good, but my right lateral hip and lateral lower leg is bothering me.     Subjective     Objective   See Exercise, Manual, and Modality Logs for complete treatment.       Assessment & Plan     Assessment  Assessment details: Pt had some pain and tightness in outside of hip and knee that seemed to be related to IT band. Due to her recent increase in exercise we reviewed how to increase exercises appropriately and educated her on stretches to perform to prevent tightness around the hip and lower leg    Plan  Plan details: Progress per POC        Manual Therapy:    10     mins  05418;  Therapeutic Exercise:    25     mins  75627;     Neuromuscular Michael:    -    mins  66330;    Therapeutic Activity:     -     mins  25489;     Gait Training:      -     mins  14125;     Ultrasound:     -     mins  17659;    Electrical Stimulation:    --     mins  34171 ( );  Dry Needling     -     mins self-pay    Timed Treatment:   35   mins   Total Treatment:     50   mins    Rayna Duarte PT  [place] License #: [id number]    Physical Therapist

## 2018-01-18 ENCOUNTER — TREATMENT (OUTPATIENT)
Dept: PHYSICAL THERAPY | Facility: CLINIC | Age: 58
End: 2018-01-18

## 2018-01-18 DIAGNOSIS — R26.2 DIFFICULTY WALKING: ICD-10-CM

## 2018-01-18 DIAGNOSIS — M54.50 MIDLINE LOW BACK PAIN WITHOUT SCIATICA, UNSPECIFIED CHRONICITY: Primary | ICD-10-CM

## 2018-01-18 PROCEDURE — 97112 NEUROMUSCULAR REEDUCATION: CPT | Performed by: PHYSICAL THERAPIST

## 2018-01-18 PROCEDURE — 97110 THERAPEUTIC EXERCISES: CPT | Performed by: PHYSICAL THERAPIST

## 2018-01-18 NOTE — PROGRESS NOTES
Physical Therapy Daily Progress Note  Visit: 8    Kristina Kang reports: I am feeling good. No back pain and the side of my leg pain is gone too. Pain reported as a 2/10 on average mostly when she first gets up in the morning    Subjective     Objective       Strength/Myotome Testing     Left Hip   Planes of Motion   Abduction: 5    Right Hip   Planes of Motion   Abduction: 5     See Exercise, Manual, and Modality Logs for complete treatment.       Assessment & Plan     Assessment  Assessment details: Pt did very well with therapy. Pt has good strength, core stability and controlled pain. Educated to ease back into her normal exercise routine at home. Pt has met all goals and is well educated on HEP. Pt has no further questions or concerns regarding therapy at this time.     Plan  Plan details: DC at this time         1. Pt will be compliant with HEP. (MET)  2. Pt will report no radicular sx's into R buttocks, groin, or lower extremity. (MET)  3. Pt will demonstrate 5/5 MMT or greater in (R) LE's in order to improve gait mechanics and protect lumbar spine. (MET)  4. Pt will have normal pelvic and sacral alignment and negative SI joint special test findings. (MET)    LONG TERM GOALS:   6-8 weeks  1. Pt will score 15% or less on Back Index. (MET)  2. Pt will report minimal-no tenderness over (R) SI joint (MET)  3. Pt will report pain as 3/10 or less with all functional activities. (MET)    Manual Therapy:    4     mins  45677;  Therapeutic Exercise:    30     mins  44429;     Neuromuscular Michael:    10    mins  91110;    Therapeutic Activity:     -     mins  34627;     Gait Training:      -     mins  94635;     Ultrasound:     -     mins  73993;    Electrical Stimulation:    -     mins  08280 ( );  Dry Needling     -     mins self-pay    Timed Treatment:   44   mins   Total Treatment:     55   mins    Rayna Duarte PT  [place] License #: [id number]    Physical Therapist

## 2018-03-23 ENCOUNTER — OFFICE VISIT (OUTPATIENT)
Dept: ENDOCRINOLOGY | Age: 58
End: 2018-03-23

## 2018-03-23 VITALS
WEIGHT: 174.6 LBS | OXYGEN SATURATION: 99 % | HEART RATE: 76 BPM | DIASTOLIC BLOOD PRESSURE: 80 MMHG | HEIGHT: 63 IN | BODY MASS INDEX: 30.94 KG/M2 | SYSTOLIC BLOOD PRESSURE: 136 MMHG

## 2018-03-23 DIAGNOSIS — R68.89 ABNORMAL ENDOCRINE LABORATORY TEST FINDING: ICD-10-CM

## 2018-03-23 DIAGNOSIS — E55.9 VITAMIN D DEFICIENCY: ICD-10-CM

## 2018-03-23 DIAGNOSIS — I10 ESSENTIAL HYPERTENSION: ICD-10-CM

## 2018-03-23 DIAGNOSIS — M85.851 OSTEOPENIA OF BOTH HIPS: ICD-10-CM

## 2018-03-23 DIAGNOSIS — E78.5 HYPERLIPIDEMIA, UNSPECIFIED HYPERLIPIDEMIA TYPE: ICD-10-CM

## 2018-03-23 DIAGNOSIS — E83.52 HYPERCALCEMIA: Primary | ICD-10-CM

## 2018-03-23 DIAGNOSIS — M85.852 OSTEOPENIA OF BOTH HIPS: ICD-10-CM

## 2018-03-23 PROCEDURE — 99214 OFFICE O/P EST MOD 30 MIN: CPT | Performed by: INTERNAL MEDICINE

## 2018-03-23 RX ORDER — FUROSEMIDE 20 MG/1
20 TABLET ORAL DAILY
Qty: 90 TABLET | Refills: 2 | Status: SHIPPED | OUTPATIENT
Start: 2018-03-23 | End: 2019-01-27 | Stop reason: SDUPTHER

## 2018-03-23 NOTE — PROGRESS NOTES
Luis Kang is a 57 y.o. female.     F/u for hypercalcemia, hyperlipidemia       Hyperlipidemia        Patient is a 57-year-old female who came in for follow-up.   Her serum calcium has ranged from 10.7-11.2 with the upper limit of normal of 10.5 mg per DL. She had a bone density done at [facility] in December 2015 which showed osteopenia.  Follow-up bone density done in December 2017 showed stable osteopenia compared to 2015.     She has history of vitamin D deficiency and has been taking vitamin D 2000 units/day.  She has no previous history of kidney stones, peptic ulcer, or depression. She denies muscle weakness.      There is no family history of hypercalcemia or nephrolithiasis.     Workup done in January 2017 are as follows: Serum calcium at 11.0 mg per DL.  Intact PTH is normal at 42 pg per mL.  Undetectable PTH RP.  25-hydroxy vitamin D is 28 ng per mL.  24 urine calcium is low at 31.2 mg      She has history of hypertension and has been on furosemide 20  mg once a day and lisinopril 10 mg once a day. She denies any history of heart attack or stroke. No chest pain, shortness of breath or pedal edema.      She has hyperlipidemia and is on Lipitor 20 mg once a day. She denies any myalgia.. She has no history of diabetes mellitus.      She had an episode of transient visual loss on the right eye in July 2016 thought to be due to amaurosis fugax. Carotid ultrasound was normal. She is on aspirin and Lipitor.  She has no recurrence of visual loss since.     The following portions of the patient's history were reviewed and updated as appropriate: allergies, current medications, past family history, past medical history, past social history, past surgical history and problem list.    Review of Systems   Constitutional: Negative.    HENT: Negative.    Eyes: Negative.    Respiratory: Negative.    Cardiovascular: Negative.    Gastrointestinal: Negative.    Endocrine: Negative.   "  Genitourinary: Negative.    Musculoskeletal: Negative.    Skin: Negative.    Allergic/Immunologic: Negative.    Neurological: Negative.    Hematological: Negative.    Psychiatric/Behavioral: Negative.        Objective      Vitals:    03/23/18 1444   BP: 136/80   BP Location: Right arm   Patient Position: Sitting   Cuff Size: Large Adult   Pulse: 76   SpO2: 99%   Weight: 79.2 kg (174 lb 9.6 oz)   Height: 160 cm (62.99\")     Physical Exam   Constitutional: She is oriented to person, place, and time. She appears well-developed and well-nourished. No distress.   HENT:   Head: Normocephalic.   Right Ear: External ear normal.   Left Ear: External ear normal.   Nose: Nose normal.   Mouth/Throat: No oropharyngeal exudate.   Eyes: Conjunctivae and EOM are normal. Right eye exhibits no discharge. Left eye exhibits no discharge. No scleral icterus.   Neck: Neck supple. No JVD present. No tracheal deviation present. No thyromegaly present.   Cardiovascular: Normal rate, regular rhythm, normal heart sounds and intact distal pulses.  Exam reveals no gallop and no friction rub.    No murmur heard.  Pulmonary/Chest: Effort normal and breath sounds normal. No respiratory distress. She has no wheezes. She has no rales. She exhibits no tenderness.   Abdominal: Soft. Bowel sounds are normal. She exhibits no distension and no mass. There is no tenderness. There is no guarding.   Musculoskeletal: Normal range of motion. She exhibits no edema, tenderness or deformity.   Lymphadenopathy:     She has no cervical adenopathy.   Neurological: She is alert and oriented to person, place, and time.   Skin: Skin is warm and dry. No rash noted. She is not diaphoretic. No erythema. No pallor.   Psychiatric: She has a normal mood and affect. Her behavior is normal.     Office Visit on 12/01/2017   Component Date Value Ref Range Status   • WBC 12/01/2017 9.78  4.50 - 10.70 10*3/mm3 Final   • RBC 12/01/2017 4.23  3.90 - 5.20 10*6/mm3 Final   • " Hemoglobin 12/01/2017 12.4  11.9 - 15.5 g/dL Final   • Hematocrit 12/01/2017 40.3  35.6 - 45.5 % Final   • MCV 12/01/2017 95.3  80.5 - 98.2 fL Final   • MCH 12/01/2017 29.3  26.9 - 32.0 pg Final   • MCHC 12/01/2017 30.8* 32.4 - 36.3 g/dL Final   • RDW 12/01/2017 14.9* 11.7 - 13.0 % Final   • Platelets 12/01/2017 233  140 - 500 10*3/mm3 Final   • Neutrophil Rel % 12/01/2017 60.3  42.7 - 76.0 % Final   • Lymphocyte Rel % 12/01/2017 31.5  19.6 - 45.3 % Final   • Monocyte Rel % 12/01/2017 4.3* 5.0 - 12.0 % Final   • Eosinophil Rel % 12/01/2017 3.6  0.3 - 6.2 % Final   • Basophil Rel % 12/01/2017 0.3  0.0 - 1.5 % Final   • Neutrophils Absolute 12/01/2017 5.90  1.90 - 8.10 10*3/mm3 Final   • Lymphocytes Absolute 12/01/2017 3.08  0.90 - 4.80 10*3/mm3 Final   • Monocytes Absolute 12/01/2017 0.42  0.20 - 1.20 10*3/mm3 Final   • Eosinophils Absolute 12/01/2017 0.35  0.00 - 0.70 10*3/mm3 Final   • Basophils Absolute 12/01/2017 0.03  0.00 - 0.20 10*3/mm3 Final   • Immature Granulocyte Rel % 12/01/2017 0.0  0.0 - 0.5 % Final   • Immature Grans Absolute 12/01/2017 0.00  0.00 - 0.03 10*3/mm3 Final     Assessment/Plan   Kristina was seen today for abnormal calcium and hyperlipidemia.    Diagnoses and all orders for this visit:    Hypercalcemia  -     Comprehensive Metabolic Panel  -     Vitamin D 25 Hydroxy  -     TSH  -     T4, Free  -     PTH, Intact    Vitamin D deficiency  -     Comprehensive Metabolic Panel  -     Vitamin D 25 Hydroxy  -     TSH  -     T4, Free    Osteopenia of both hips  -     Comprehensive Metabolic Panel  -     Vitamin D 25 Hydroxy  -     PTH, Intact    Essential hypertension  -     Comprehensive Metabolic Panel    Hyperlipidemia, unspecified hyperlipidemia type  -     Comprehensive Metabolic Panel  -     Lipid Panel      Check calcium, vitamin D, and PTH.    Continue vitamin D 2000 units per day.  Consider repeat 24-hour urine for calcium after vitamin D normalizes.  Continue furosemide and  lisinopril.  Continue Lipitor 20 mg once a day.  Continue aspirin.  Continue regular exercise.    RTC 4 mos.    Send copy of my note and labs to Joanne VARGAS

## 2018-03-24 LAB
25(OH)D3+25(OH)D2 SERPL-MCNC: 44.2 NG/ML (ref 30–100)
ALBUMIN SERPL-MCNC: 4.7 G/DL (ref 3.5–5.2)
ALBUMIN/GLOB SERPL: 1.6 G/DL
ALP SERPL-CCNC: 97 U/L (ref 39–117)
ALT SERPL-CCNC: 20 U/L (ref 1–33)
AST SERPL-CCNC: 22 U/L (ref 1–32)
BILIRUB SERPL-MCNC: 0.3 MG/DL (ref 0.1–1.2)
BUN SERPL-MCNC: 14 MG/DL (ref 6–20)
BUN/CREAT SERPL: 16.7 (ref 7–25)
CALCIUM SERPL-MCNC: 10.7 MG/DL (ref 8.6–10.5)
CHLORIDE SERPL-SCNC: 105 MMOL/L (ref 98–107)
CHOLEST SERPL-MCNC: 134 MG/DL (ref 0–200)
CO2 SERPL-SCNC: 25.4 MMOL/L (ref 22–29)
CREAT SERPL-MCNC: 0.84 MG/DL (ref 0.57–1)
GFR SERPLBLD CREATININE-BSD FMLA CKD-EPI: 70 ML/MIN/1.73
GFR SERPLBLD CREATININE-BSD FMLA CKD-EPI: 85 ML/MIN/1.73
GLOBULIN SER CALC-MCNC: 3 GM/DL
GLUCOSE SERPL-MCNC: 92 MG/DL (ref 65–99)
HDLC SERPL-MCNC: 39 MG/DL (ref 40–60)
INTERPRETATION: NORMAL
LDLC SERPL CALC-MCNC: 76 MG/DL (ref 0–100)
POTASSIUM SERPL-SCNC: 4.2 MMOL/L (ref 3.5–5.2)
PROT SERPL-MCNC: 7.7 G/DL (ref 6–8.5)
PTH-INTACT SERPL-MCNC: 31 PG/ML (ref 15–65)
SODIUM SERPL-SCNC: 143 MMOL/L (ref 136–145)
T4 FREE SERPL-MCNC: 1.08 NG/DL (ref 0.93–1.7)
TRIGL SERPL-MCNC: 95 MG/DL (ref 0–150)
TSH SERPL DL<=0.005 MIU/L-ACNC: 0.48 MIU/ML (ref 0.27–4.2)
VLDLC SERPL CALC-MCNC: 19 MG/DL (ref 5–40)

## 2018-03-29 DIAGNOSIS — E83.52 SERUM CALCIUM ELEVATED: Primary | ICD-10-CM

## 2018-04-04 LAB
CREAT 24H UR-MRATE: 1 G/24 HR (ref 0.7–1.6)
CREAT UR-MCNC: 100.2 MG/DL
INTERPRETATION UR IFE-IMP: NORMAL
PHOSPHATE 24H UR-MRATE: 446 MG/24 HR (ref 400–1300)
PHOSPHATE UR-MCNC: 44.6 MG/DL

## 2018-04-05 ENCOUNTER — RESULTS ENCOUNTER (OUTPATIENT)
Dept: ENDOCRINOLOGY | Age: 58
End: 2018-04-05

## 2018-04-05 DIAGNOSIS — E83.52 SERUM CALCIUM ELEVATED: ICD-10-CM

## 2018-04-05 LAB
Lab: NORMAL
Lab: NORMAL

## 2018-04-06 LAB
CALCIUM 24H UR-MCNC: 5.7 MG/DL
CALCIUM 24H UR-MRATE: 57 MG/24 HR (ref 100–300)
WRITTEN AUTHORIZATION: NORMAL

## 2018-06-07 RX ORDER — FLUTICASONE PROPIONATE 50 MCG
2 SPRAY, SUSPENSION (ML) NASAL DAILY
Qty: 48 ML | Refills: 2 | Status: SHIPPED | OUTPATIENT
Start: 2018-06-07 | End: 2019-04-08 | Stop reason: SDUPTHER

## 2018-08-03 ENCOUNTER — LAB (OUTPATIENT)
Dept: ENDOCRINOLOGY | Age: 58
End: 2018-08-03

## 2018-08-03 DIAGNOSIS — E78.49 OTHER HYPERLIPIDEMIA: Primary | ICD-10-CM

## 2018-08-03 DIAGNOSIS — R68.89 ABNORMAL ENDOCRINE LABORATORY TEST FINDING: ICD-10-CM

## 2018-08-03 DIAGNOSIS — E83.52 SERUM CALCIUM ELEVATED: ICD-10-CM

## 2018-08-03 DIAGNOSIS — E55.9 VITAMIN D DEFICIENCY: ICD-10-CM

## 2018-08-03 DIAGNOSIS — I10 ESSENTIAL HYPERTENSION: ICD-10-CM

## 2018-08-04 LAB
25(OH)D3+25(OH)D2 SERPL-MCNC: 37.1 NG/ML (ref 30–100)
ALBUMIN SERPL-MCNC: 4.6 G/DL (ref 3.5–5.2)
ALBUMIN/GLOB SERPL: 1.8 G/DL
ALP SERPL-CCNC: 92 U/L (ref 39–117)
ALT SERPL-CCNC: 12 U/L (ref 1–33)
AST SERPL-CCNC: 15 U/L (ref 1–32)
BILIRUB SERPL-MCNC: 0.3 MG/DL (ref 0.1–1.2)
BUN SERPL-MCNC: 10 MG/DL (ref 6–20)
BUN/CREAT SERPL: 10.9 (ref 7–25)
CALCIUM SERPL-MCNC: 10.8 MG/DL (ref 8.6–10.5)
CHLORIDE SERPL-SCNC: 103 MMOL/L (ref 98–107)
CO2 SERPL-SCNC: 26.4 MMOL/L (ref 22–29)
CREAT SERPL-MCNC: 0.92 MG/DL (ref 0.57–1)
GLOBULIN SER CALC-MCNC: 2.5 GM/DL
GLUCOSE SERPL-MCNC: 105 MG/DL (ref 65–99)
POTASSIUM SERPL-SCNC: 4.1 MMOL/L (ref 3.5–5.2)
PROT SERPL-MCNC: 7.1 G/DL (ref 6–8.5)
PTH-INTACT SERPL-MCNC: 50 PG/ML (ref 15–65)
SODIUM SERPL-SCNC: 142 MMOL/L (ref 136–145)

## 2018-08-10 ENCOUNTER — OFFICE VISIT (OUTPATIENT)
Dept: ENDOCRINOLOGY | Age: 58
End: 2018-08-10

## 2018-08-10 VITALS
SYSTOLIC BLOOD PRESSURE: 124 MMHG | WEIGHT: 162 LBS | HEIGHT: 64 IN | HEART RATE: 72 BPM | DIASTOLIC BLOOD PRESSURE: 70 MMHG | OXYGEN SATURATION: 99 % | BODY MASS INDEX: 27.66 KG/M2

## 2018-08-10 DIAGNOSIS — E83.52 HYPERCALCEMIA: ICD-10-CM

## 2018-08-10 DIAGNOSIS — M85.852 OSTEOPENIA OF BOTH HIPS: ICD-10-CM

## 2018-08-10 DIAGNOSIS — I10 ESSENTIAL HYPERTENSION: ICD-10-CM

## 2018-08-10 DIAGNOSIS — E78.5 HYPERLIPIDEMIA, UNSPECIFIED HYPERLIPIDEMIA TYPE: ICD-10-CM

## 2018-08-10 DIAGNOSIS — M85.851 OSTEOPENIA OF BOTH HIPS: ICD-10-CM

## 2018-08-10 DIAGNOSIS — E83.52 HYPOCALCIURIC HYPERCALCEMIA: Primary | ICD-10-CM

## 2018-08-10 DIAGNOSIS — E55.9 VITAMIN D DEFICIENCY: ICD-10-CM

## 2018-08-10 PROCEDURE — 99214 OFFICE O/P EST MOD 30 MIN: CPT | Performed by: INTERNAL MEDICINE

## 2018-08-10 NOTE — PROGRESS NOTES
Luis Kang is a 57 y.o. female.     F/u for hypercalcemia, hyperlipidemia       Abnormal Lab     Hyperlipidemia        Patient is a 57-year-old female who came in for follow-up.   Her serum calcium has ranged from 10.7-11.2 with the upper limit of normal of 10.5 mg per DL. She had a bone density done at [facility] in December 2015 which showed osteopenia.  Follow-up bone density done in December 2017 showed stable osteopenia compared to 2015.       She has history of vitamin D deficiency and has been taking vitamin D 2000 units/day.  She has no previous history of kidney stones, peptic ulcer, or depression. She denies muscle weakness.  5 hydroxy vitamin D levels done in August 2018 is normal at 37.1 ng per mL.      There is no family history of hypercalcemia or nephrolithiasis.     Workup done in January 2017 are as follows: Serum calcium at 11.0 mg per DL.  Intact PTH is normal at 42 pg per mL.  Undetectable PTH RP.  25-hydroxy vitamin D is 28 ng per mL.  24 urine calcium is low at 31.2 mg    Repeat 24-hour urine collection done after vitamin D repletion in 4/18 showed low calcium at 57 mg per 24 hours.      She has history of hypertension and has been on furosemide 20  mg once a day and lisinopril 10 mg once a day. She denies any history of heart attack or stroke. No chest pain, shortness of breath or pedal edema.      She has hyperlipidemia and is on Lipitor 20 mg once a day. She denies any myalgia.. She has no history of diabetes mellitus.      She had an episode of transient visual loss on the right eye in July 2016 thought to be due to amaurosis fugax. Carotid ultrasound was normal. She is on aspirin and Lipitor.  She has no recurrence of visual loss since.      The following portions of the patient's history were reviewed and updated as appropriate: allergies, current medications, past family history, past medical history, past social history, past surgical history and problem  list.    Review of Systems   Constitutional: Negative.    HENT: Negative.    Eyes: Negative.    Respiratory: Negative.    Cardiovascular: Negative.    Gastrointestinal: Negative.    Endocrine: Negative.    Genitourinary: Negative.    Musculoskeletal: Negative.    Skin: Negative.    Allergic/Immunologic: Negative.    Neurological: Negative.    Hematological: Bruises/bleeds easily.   Psychiatric/Behavioral: Negative.        Objective   Physical Exam   Constitutional: She is oriented to person, place, and time. She appears well-developed and well-nourished. No distress.   HENT:   Head: Normocephalic.   Mouth/Throat: No oropharyngeal exudate.   Eyes: Conjunctivae and EOM are normal. Right eye exhibits no discharge. Left eye exhibits no discharge. No scleral icterus.   Neck: Neck supple. No JVD present. No tracheal deviation present. No thyromegaly present.   Cardiovascular: Normal rate, regular rhythm, normal heart sounds and intact distal pulses.  Exam reveals no gallop and no friction rub.    No murmur heard.  Pulmonary/Chest: Effort normal and breath sounds normal. No respiratory distress. She has no wheezes. She has no rales. She exhibits no tenderness.   Abdominal: Soft. Bowel sounds are normal. She exhibits no distension and no mass. There is no tenderness. There is no guarding.   Musculoskeletal: Normal range of motion. She exhibits no edema, tenderness or deformity.   Lymphadenopathy:     She has no cervical adenopathy.   Neurological: She is alert and oriented to person, place, and time. She displays normal reflexes.   Skin: Skin is warm and dry. No erythema. No pallor.   Psychiatric: She has a normal mood and affect. Her behavior is normal.     Orders Only on 08/03/2018   Component Date Value Ref Range Status   • Glucose 08/03/2018 105* 65 - 99 mg/dL Final   • BUN 08/03/2018 10  6 - 20 mg/dL Final   • Creatinine 08/03/2018 0.92  0.57 - 1.00 mg/dL Final   • eGFR Non  Am 08/03/2018 63  >60 mL/min/1.73  Final   • eGFR  Am 08/03/2018 76  >60 mL/min/1.73 Final   • BUN/Creatinine Ratio 08/03/2018 10.9  7.0 - 25.0 Final   • Sodium 08/03/2018 142  136 - 145 mmol/L Final   • Potassium 08/03/2018 4.1  3.5 - 5.2 mmol/L Final   • Chloride 08/03/2018 103  98 - 107 mmol/L Final   • Total CO2 08/03/2018 26.4  22.0 - 29.0 mmol/L Final   • Calcium 08/03/2018 10.8* 8.6 - 10.5 mg/dL Final   • Total Protein 08/03/2018 7.1  6.0 - 8.5 g/dL Final   • Albumin 08/03/2018 4.60  3.50 - 5.20 g/dL Final   • Globulin 08/03/2018 2.5  gm/dL Final   • A/G Ratio 08/03/2018 1.8  g/dL Final   • Total Bilirubin 08/03/2018 0.3  0.1 - 1.2 mg/dL Final   • Alkaline Phosphatase 08/03/2018 92  39 - 117 U/L Final   • AST (SGOT) 08/03/2018 15  1 - 32 U/L Final   • ALT (SGPT) 08/03/2018 12  1 - 33 U/L Final   • 25 Hydroxy, Vitamin D 08/03/2018 37.1  30.0 - 100.0 ng/ml Final    Comment: Reference Range for Total Vitamin D 25(OH)  Deficiency    <20.0 ng/mL  Insufficiency 21-29 ng/mL  Sufficiency    ng/mL  Toxicity      >100 ng/ml        • PTH, Intact 08/03/2018 50  15 - 65 pg/mL Final     Assessment/Plan   Kristina was seen today for abnormal lab and hyperlipidemia.    Diagnoses and all orders for this visit:    Hypocalciuric hypercalcemia    Hypercalcemia    Vitamin D deficiency    Osteopenia of both hips    Hyperlipidemia, unspecified hyperlipidemia type    Essential hypertension      Continue vitamin D 2000 units per day.  Continue regular exercise.  Repeat bone density in December 2019.  Continue Lipitor 20 mg once a day.  Continue furosemide 20 mg once a day and lisinopril 10 mg once a period  Continue aspirin.    Send copy of my notes and labs to Joanne VARGAS    RTC 4 mos.

## 2018-09-05 ENCOUNTER — OFFICE (AMBULATORY)
Dept: URBAN - METROPOLITAN AREA CLINIC 75 | Facility: CLINIC | Age: 58
End: 2018-09-05

## 2018-09-05 VITALS
WEIGHT: 159 LBS | HEART RATE: 76 BPM | HEIGHT: 63 IN | DIASTOLIC BLOOD PRESSURE: 80 MMHG | SYSTOLIC BLOOD PRESSURE: 124 MMHG

## 2018-09-05 DIAGNOSIS — B18.2 CHRONIC VIRAL HEPATITIS C: ICD-10-CM

## 2018-09-05 DIAGNOSIS — Z86.010 PERSONAL HISTORY OF COLONIC POLYPS: ICD-10-CM

## 2018-09-05 PROCEDURE — 99203 OFFICE O/P NEW LOW 30 MIN: CPT | Performed by: INTERNAL MEDICINE

## 2018-10-04 VITALS
DIASTOLIC BLOOD PRESSURE: 52 MMHG | DIASTOLIC BLOOD PRESSURE: 84 MMHG | SYSTOLIC BLOOD PRESSURE: 132 MMHG | RESPIRATION RATE: 20 BRPM | HEART RATE: 77 BPM | RESPIRATION RATE: 21 BRPM | OXYGEN SATURATION: 93 % | SYSTOLIC BLOOD PRESSURE: 78 MMHG | SYSTOLIC BLOOD PRESSURE: 133 MMHG | OXYGEN SATURATION: 94 % | WEIGHT: 159 LBS | HEART RATE: 69 BPM | DIASTOLIC BLOOD PRESSURE: 44 MMHG | OXYGEN SATURATION: 98 % | HEART RATE: 79 BPM | TEMPERATURE: 98.4 F | DIASTOLIC BLOOD PRESSURE: 46 MMHG | HEART RATE: 75 BPM | HEART RATE: 61 BPM | SYSTOLIC BLOOD PRESSURE: 136 MMHG | RESPIRATION RATE: 13 BRPM | HEIGHT: 63 IN | OXYGEN SATURATION: 96 % | HEART RATE: 63 BPM | DIASTOLIC BLOOD PRESSURE: 66 MMHG | HEART RATE: 73 BPM | DIASTOLIC BLOOD PRESSURE: 43 MMHG | DIASTOLIC BLOOD PRESSURE: 51 MMHG | RESPIRATION RATE: 25 BRPM | SYSTOLIC BLOOD PRESSURE: 83 MMHG | SYSTOLIC BLOOD PRESSURE: 81 MMHG | SYSTOLIC BLOOD PRESSURE: 87 MMHG | HEART RATE: 70 BPM | DIASTOLIC BLOOD PRESSURE: 53 MMHG | DIASTOLIC BLOOD PRESSURE: 49 MMHG | DIASTOLIC BLOOD PRESSURE: 32 MMHG | SYSTOLIC BLOOD PRESSURE: 89 MMHG | DIASTOLIC BLOOD PRESSURE: 79 MMHG | OXYGEN SATURATION: 100 % | SYSTOLIC BLOOD PRESSURE: 91 MMHG | SYSTOLIC BLOOD PRESSURE: 90 MMHG | TEMPERATURE: 97.8 F | HEART RATE: 74 BPM | DIASTOLIC BLOOD PRESSURE: 47 MMHG | SYSTOLIC BLOOD PRESSURE: 97 MMHG | SYSTOLIC BLOOD PRESSURE: 140 MMHG | OXYGEN SATURATION: 97 % | OXYGEN SATURATION: 95 % | DIASTOLIC BLOOD PRESSURE: 41 MMHG | RESPIRATION RATE: 22 BRPM | HEART RATE: 68 BPM | RESPIRATION RATE: 9 BRPM | DIASTOLIC BLOOD PRESSURE: 60 MMHG | RESPIRATION RATE: 149 BRPM | HEART RATE: 85 BPM | RESPIRATION RATE: 23 BRPM | SYSTOLIC BLOOD PRESSURE: 79 MMHG | RESPIRATION RATE: 14 BRPM | SYSTOLIC BLOOD PRESSURE: 77 MMHG | RESPIRATION RATE: 11 BRPM

## 2018-10-08 ENCOUNTER — OFFICE (AMBULATORY)
Dept: URBAN - METROPOLITAN AREA PATHOLOGY 4 | Facility: PATHOLOGY | Age: 58
End: 2018-10-08

## 2018-10-08 ENCOUNTER — AMBULATORY SURGICAL CENTER (AMBULATORY)
Dept: URBAN - METROPOLITAN AREA SURGERY 17 | Facility: SURGERY | Age: 58
End: 2018-10-08
Payer: COMMERCIAL

## 2018-10-08 DIAGNOSIS — K63.5 POLYP OF COLON: ICD-10-CM

## 2018-10-08 DIAGNOSIS — D12.0 BENIGN NEOPLASM OF CECUM: ICD-10-CM

## 2018-10-08 DIAGNOSIS — Z86.010 PERSONAL HISTORY OF COLONIC POLYPS: ICD-10-CM

## 2018-10-08 DIAGNOSIS — D12.3 BENIGN NEOPLASM OF TRANSVERSE COLON: ICD-10-CM

## 2018-10-08 DIAGNOSIS — D12.2 BENIGN NEOPLASM OF ASCENDING COLON: ICD-10-CM

## 2018-10-08 PROBLEM — D12.5 BENIGN NEOPLASM OF SIGMOID COLON: Status: ACTIVE | Noted: 2018-10-08

## 2018-10-08 PROBLEM — Z12.11 SURVEILLANCE DUE TO PRIOR COLONIC NEOPLASIA: Status: ACTIVE | Noted: 2018-10-08

## 2018-10-08 PROBLEM — Z12.11 SCREENING FOR COLONIC NEOPLASIA: Status: ACTIVE | Noted: 2018-10-08

## 2018-10-08 LAB
GI HISTOLOGY: A. SELECT: (no result)
GI HISTOLOGY: B. SELECT: (no result)
GI HISTOLOGY: C. SELECT: (no result)
GI HISTOLOGY: D. SELECT: (no result)
GI HISTOLOGY: E. SELECT: (no result)
GI HISTOLOGY: PDF REPORT: (no result)

## 2018-10-08 PROCEDURE — 88305 TISSUE EXAM BY PATHOLOGIST: CPT | Mod: 33 | Performed by: INTERNAL MEDICINE

## 2018-10-08 PROCEDURE — 45385 COLONOSCOPY W/LESION REMOVAL: CPT | Mod: 33 | Performed by: INTERNAL MEDICINE

## 2018-11-27 DIAGNOSIS — Z00.00 LABORATORY EXAMINATION ORDERED AS PART OF A ROUTINE GENERAL MEDICAL EXAMINATION: Primary | ICD-10-CM

## 2018-11-29 DIAGNOSIS — I10 ESSENTIAL HYPERTENSION: ICD-10-CM

## 2018-11-29 RX ORDER — ATORVASTATIN CALCIUM 40 MG/1
TABLET, FILM COATED ORAL
Qty: 45 TABLET | Refills: 3 | Status: SHIPPED | OUTPATIENT
Start: 2018-11-29 | End: 2018-12-05 | Stop reason: SDUPTHER

## 2018-11-29 RX ORDER — LISINOPRIL 10 MG/1
10 TABLET ORAL DAILY
Qty: 90 TABLET | Refills: 3 | Status: SHIPPED | OUTPATIENT
Start: 2018-11-29 | End: 2019-06-27

## 2018-12-05 DIAGNOSIS — I10 ESSENTIAL HYPERTENSION: ICD-10-CM

## 2018-12-05 RX ORDER — ATORVASTATIN CALCIUM 40 MG/1
TABLET, FILM COATED ORAL
Qty: 30 TABLET | Refills: 0 | Status: SHIPPED | OUTPATIENT
Start: 2018-12-05 | End: 2019-01-22 | Stop reason: SDUPTHER

## 2019-01-15 LAB
ALBUMIN SERPL-MCNC: 4.5 G/DL (ref 3.5–5.5)
ALBUMIN/GLOB SERPL: 1.7 {RATIO} (ref 1.2–2.2)
ALP SERPL-CCNC: 82 IU/L (ref 39–117)
ALT SERPL-CCNC: 9 IU/L (ref 0–32)
APPEARANCE UR: CLEAR
AST SERPL-CCNC: 13 IU/L (ref 0–40)
BACTERIA #/AREA URNS HPF: ABNORMAL /[HPF]
BASOPHILS # BLD AUTO: 0 X10E3/UL (ref 0–0.2)
BASOPHILS NFR BLD AUTO: 0 %
BILIRUB SERPL-MCNC: 0.3 MG/DL (ref 0–1.2)
BILIRUB UR QL STRIP: NEGATIVE
BUN SERPL-MCNC: 10 MG/DL (ref 6–24)
BUN/CREAT SERPL: 11 (ref 9–23)
CALCIUM SERPL-MCNC: 10.4 MG/DL (ref 8.7–10.2)
CHLORIDE SERPL-SCNC: 105 MMOL/L (ref 96–106)
CO2 SERPL-SCNC: 25 MMOL/L (ref 20–29)
COLOR UR: YELLOW
CREAT SERPL-MCNC: 0.89 MG/DL (ref 0.57–1)
EOSINOPHIL # BLD AUTO: 0.2 X10E3/UL (ref 0–0.4)
EOSINOPHIL NFR BLD AUTO: 2 %
EPI CELLS #/AREA URNS HPF: ABNORMAL /HPF
ERYTHROCYTE [DISTWIDTH] IN BLOOD BY AUTOMATED COUNT: 13.9 % (ref 12.3–15.4)
FOLATE SERPL-MCNC: 6.8 NG/ML
GLOBULIN SER CALC-MCNC: 2.6 G/DL (ref 1.5–4.5)
GLUCOSE SERPL-MCNC: 80 MG/DL (ref 65–99)
GLUCOSE UR QL: NEGATIVE
HCT VFR BLD AUTO: 39.3 % (ref 34–46.6)
HGB BLD-MCNC: 12.3 G/DL (ref 11.1–15.9)
HGB UR QL STRIP: (no result)
IMM GRANULOCYTES # BLD AUTO: 0 X10E3/UL (ref 0–0.1)
IMM GRANULOCYTES NFR BLD AUTO: 0 %
KETONES UR QL STRIP: NEGATIVE
LEUKOCYTE ESTERASE UR QL STRIP: (no result)
LYMPHOCYTES # BLD AUTO: 4.1 X10E3/UL (ref 0.7–3.1)
LYMPHOCYTES NFR BLD AUTO: 40 %
MCH RBC QN AUTO: 28.6 PG (ref 26.6–33)
MCHC RBC AUTO-ENTMCNC: 31.3 G/DL (ref 31.5–35.7)
MCV RBC AUTO: 91 FL (ref 79–97)
MICRO URNS: (no result)
MONOCYTES # BLD AUTO: 0.3 X10E3/UL (ref 0.1–0.9)
MONOCYTES NFR BLD AUTO: 3 %
MUCOUS THREADS URNS QL MICRO: PRESENT
NEUTROPHILS # BLD AUTO: 5.7 X10E3/UL (ref 1.4–7)
NEUTROPHILS NFR BLD AUTO: 55 %
NITRITE UR QL STRIP: POSITIVE
PH UR STRIP: 5.5 [PH] (ref 5–7.5)
PLATELET # BLD AUTO: 244 X10E3/UL (ref 150–379)
POTASSIUM SERPL-SCNC: 4.1 MMOL/L (ref 3.5–5.2)
PROT SERPL-MCNC: 7.1 G/DL (ref 6–8.5)
PROT UR QL STRIP: NEGATIVE
RBC # BLD AUTO: 4.3 X10E6/UL (ref 3.77–5.28)
RBC #/AREA URNS HPF: ABNORMAL /HPF
SODIUM SERPL-SCNC: 145 MMOL/L (ref 134–144)
SP GR UR: 1.02 (ref 1–1.03)
UROBILINOGEN UR STRIP-MCNC: 0.2 MG/DL (ref 0.2–1)
VIT B12 SERPL-MCNC: 317 PG/ML (ref 232–1245)
WBC # BLD AUTO: 10.4 X10E3/UL (ref 3.4–10.8)
WBC #/AREA URNS HPF: ABNORMAL /HPF

## 2019-01-16 DIAGNOSIS — R82.90 ABNORMAL URINALYSIS: Primary | ICD-10-CM

## 2019-01-16 RX ORDER — NITROFURANTOIN 25; 75 MG/1; MG/1
100 CAPSULE ORAL EVERY 12 HOURS SCHEDULED
Qty: 14 CAPSULE | Refills: 0 | Status: SHIPPED | OUTPATIENT
Start: 2019-01-16 | End: 2019-02-15

## 2019-01-22 ENCOUNTER — OFFICE VISIT (OUTPATIENT)
Dept: FAMILY MEDICINE CLINIC | Facility: CLINIC | Age: 59
End: 2019-01-22

## 2019-01-22 VITALS
OXYGEN SATURATION: 100 % | RESPIRATION RATE: 16 BRPM | BODY MASS INDEX: 26.63 KG/M2 | SYSTOLIC BLOOD PRESSURE: 118 MMHG | DIASTOLIC BLOOD PRESSURE: 60 MMHG | HEART RATE: 68 BPM | HEIGHT: 64 IN | TEMPERATURE: 98.3 F | WEIGHT: 156 LBS

## 2019-01-22 DIAGNOSIS — E78.2 MIXED HYPERLIPIDEMIA: ICD-10-CM

## 2019-01-22 DIAGNOSIS — N95.2 VAGINAL ATROPHY: ICD-10-CM

## 2019-01-22 DIAGNOSIS — Z78.0 POST-MENOPAUSAL: ICD-10-CM

## 2019-01-22 DIAGNOSIS — M85.851 OSTEOPENIA OF BOTH HIPS: ICD-10-CM

## 2019-01-22 DIAGNOSIS — Z12.31 ENCOUNTER FOR SCREENING MAMMOGRAM FOR BREAST CANCER: ICD-10-CM

## 2019-01-22 DIAGNOSIS — I10 ESSENTIAL HYPERTENSION: ICD-10-CM

## 2019-01-22 DIAGNOSIS — M85.852 OSTEOPENIA OF BOTH HIPS: ICD-10-CM

## 2019-01-22 DIAGNOSIS — Z00.00 ROUTINE GENERAL MEDICAL EXAMINATION AT A HEALTH CARE FACILITY: Primary | ICD-10-CM

## 2019-01-22 PROCEDURE — 99396 PREV VISIT EST AGE 40-64: CPT | Performed by: PHYSICIAN ASSISTANT

## 2019-01-22 PROCEDURE — 93000 ELECTROCARDIOGRAM COMPLETE: CPT | Performed by: PHYSICIAN ASSISTANT

## 2019-01-22 PROCEDURE — 99213 OFFICE O/P EST LOW 20 MIN: CPT | Performed by: PHYSICIAN ASSISTANT

## 2019-01-22 RX ORDER — MAGNESIUM 200 MG
1000 TABLET ORAL DAILY
Qty: 90 EACH | Refills: 3 | Status: SHIPPED | OUTPATIENT
Start: 2019-01-22 | End: 2020-01-23

## 2019-01-22 RX ORDER — ATORVASTATIN CALCIUM 40 MG/1
TABLET, FILM COATED ORAL
Qty: 90 TABLET | Refills: 3 | Status: SHIPPED | OUTPATIENT
Start: 2019-01-22 | End: 2020-02-25 | Stop reason: SDUPTHER

## 2019-01-22 RX ORDER — ESTRADIOL 10 UG/1
1 INSERT VAGINAL 2 TIMES WEEKLY
Qty: 24 TABLET | Refills: 3 | Status: SHIPPED | OUTPATIENT
Start: 2019-01-24 | End: 2019-02-15

## 2019-01-22 RX ORDER — LANOLIN ALCOHOL/MO/W.PET/CERES
400 CREAM (GRAM) TOPICAL DAILY
Qty: 90 TABLET | Refills: 3 | Status: SHIPPED | OUTPATIENT
Start: 2019-01-22 | End: 2019-12-16 | Stop reason: SDUPTHER

## 2019-01-22 NOTE — PROGRESS NOTES
Subjective   Kristina Kang is a 58 y.o. female.     History of Present Illness       Kristina Kang 58 y.o. female who presents for an Annual Wellness Visit.  she has a history of   Patient Active Problem List   Diagnosis   • Degeneration of lumbar intervertebral disc   • Hepatitis C   • Essential hypertension   • Hyperlipidemia   • Vitamin D deficiency   • Osteoarthritis   • Episode of syncope   • Hypercalcemia   • Amaurosis fugax of right eye   • Osteopenia of both hips   • Chronic seasonal allergic rhinitis   • Hypocalciuric hypercalcemia   • Vaginal atrophy   .  she has been feeling well.  Labs results discussed in detail with the patient.  Plan to update vaccines if needed today.  I  reviewed health maintenance with her as part of my preventative care plan.  Lab Results   Component Value Date    BUN 10 01/14/2019    CREATININE 0.89 01/14/2019    EGFRIFNONA 72 01/14/2019    EGFRIFAFRI 83 01/14/2019    BCR 11 01/14/2019    K 4.1 01/14/2019    CO2 25 01/14/2019    CALCIUM 10.4 (H) 01/14/2019    PROTENTOTREF 7.1 01/14/2019    ALBUMIN 4.5 01/14/2019    LABIL2 1.7 01/14/2019    AST 13 01/14/2019    ALT 9 01/14/2019     Lab Results   Component Value Date    CHLPL 134 03/23/2018    TRIG 95 03/23/2018    HDL 39 (L) 03/23/2018    LDL 76 03/23/2018       I am treating her for UTI and started Rx and will get f/u urine  She wants to do Estrace cream for the dryness and is aware of the black box warming from FDA about heart attacks, strokes, breast cancer, uterine cancer, VTE risks with cream, though less likely.    Health Habits:  Dental Exam. up to date  Eye Exam. up to date  Exercise: 4 times/week.  Current exercise activities include: walking    Dr. Avendaño is seeing her and has been watching Ca and did 24 hour urine  Result Notes for Calcium, Urine, 24 Hour     Notes Recorded by Conor Avendaño MD on 4/9/2018 at 8:29 AM EDT  24 urine calcium is low and consistent with hypocalciuric hypercalcemia which is a benign  condition  Copy of labs sent to Joanne VARGAS and to patient     B12 and folic acid lower range and get OTC to boost this.    She had DEXA 2015 Osteopenia and will update    Kristina A Kang 58 y.o. female who presents today for routine follow up check and medication refills.  she has a history of   Patient Active Problem List   Diagnosis   • Degeneration of lumbar intervertebral disc   • Hepatitis C   • Essential hypertension   • Hyperlipidemia   • Vitamin D deficiency   • Osteoarthritis   • Episode of syncope   • Hypercalcemia   • Amaurosis fugax of right eye   • Osteopenia of both hips   • Chronic seasonal allergic rhinitis   • Hypocalciuric hypercalcemia   • Vaginal atrophy   .  Since the last visit, she has overall felt well.  She has Hypertenision and is well controlled on medication and Hyperlipidemia and is well controlled on medication.  she has been compliant with current medications have reviewed them.  The patient denies medication side effects.  Weight is down and great  Results for orders placed or performed in visit on 11/27/18   Comprehensive Metabolic Panel   Result Value Ref Range    Glucose 80 65 - 99 mg/dL    BUN 10 6 - 24 mg/dL    Creatinine 0.89 0.57 - 1.00 mg/dL    eGFR Non African Am 72 >59 mL/min/1.73    eGFR African Am 83 >59 mL/min/1.73    BUN/Creatinine Ratio 11 9 - 23    Sodium 145 (H) 134 - 144 mmol/L    Potassium 4.1 3.5 - 5.2 mmol/L    Chloride 105 96 - 106 mmol/L    Total CO2 25 20 - 29 mmol/L    Calcium 10.4 (H) 8.7 - 10.2 mg/dL    Total Protein 7.1 6.0 - 8.5 g/dL    Albumin 4.5 3.5 - 5.5 g/dL    Globulin 2.6 1.5 - 4.5 g/dL    A/G Ratio 1.7 1.2 - 2.2    Total Bilirubin 0.3 0.0 - 1.2 mg/dL    Alkaline Phosphatase 82 39 - 117 IU/L    AST (SGOT) 13 0 - 40 IU/L    ALT (SGPT) 9 0 - 32 IU/L   Vitamin B12   Result Value Ref Range    Vitamin B-12 317 232 - 1,245 pg/mL   Folate   Result Value Ref Range    Folate 6.8 >3.0 ng/mL   Microscopic Examination -   Result Value Ref Range    WBC, UA  6-10 (A) 0 - 5 /hpf    RBC, UA 3-10 (A) 0 - 2 /hpf    Epithelial Cells (non renal) 0-10 0 - 10 /hpf    Mucus, UA Present Not Estab.    Bacteria, UA Moderate (A) None seen/Few   CBC & Differential   Result Value Ref Range    WBC 10.4 3.4 - 10.8 x10E3/uL    RBC 4.30 3.77 - 5.28 x10E6/uL    Hemoglobin 12.3 11.1 - 15.9 g/dL    Hematocrit 39.3 34.0 - 46.6 %    MCV 91 79 - 97 fL    MCH 28.6 26.6 - 33.0 pg    MCHC 31.3 (L) 31.5 - 35.7 g/dL    RDW 13.9 12.3 - 15.4 %    Platelets 244 150 - 379 x10E3/uL    Neutrophil Rel % 55 Not Estab. %    Lymphocyte Rel % 40 Not Estab. %    Monocyte Rel % 3 Not Estab. %    Eosinophil Rel % 2 Not Estab. %    Basophil Rel % 0 Not Estab. %    Neutrophils Absolute 5.7 1.4 - 7.0 x10E3/uL    Lymphocytes Absolute 4.1 (H) 0.7 - 3.1 x10E3/uL    Monocytes Absolute 0.3 0.1 - 0.9 x10E3/uL    Eosinophils Absolute 0.2 0.0 - 0.4 x10E3/uL    Basophils Absolute 0.0 0.0 - 0.2 x10E3/uL    Immature Granulocyte Rel % 0 Not Estab. %    Immature Grans Absolute 0.0 0.0 - 0.1 x10E3/uL   Urinalysis With Microscopic - Urine, Clean Catch   Result Value Ref Range    Specific Gravity, UA 1.021 1.005 - 1.030    pH, UA 5.5 5.0 - 7.5    Color, UA Yellow Yellow    Appearance, UA Clear Clear    Leukocytes, UA 1+ (A) Negative    Protein Negative Negative/Trace    Glucose, UA Negative Negative    Ketones Negative Negative    Blood, UA 1+ (A) Negative    Bilirubin, UA Negative Negative    Urobilinogen, UA 0.2 0.2 - 1.0 mg/dL    Nitrite, UA Positive (A) Negative    Microscopic Examination See below:          The following portions of the patient's history were reviewed and updated as appropriate: allergies, current medications, past family history, past medical history, past social history, past surgical history and problem list.    Review of Systems   Constitutional: Negative for activity change, appetite change and unexpected weight change.   HENT: Negative for nosebleeds and trouble swallowing.    Eyes: Negative for pain and  visual disturbance.   Respiratory: Negative for chest tightness, shortness of breath and wheezing.    Cardiovascular: Negative for chest pain and palpitations.   Gastrointestinal: Negative for abdominal pain and blood in stool.   Endocrine: Negative.    Genitourinary: Negative for difficulty urinating and hematuria.   Musculoskeletal: Negative for joint swelling.   Skin: Negative for color change and rash.   Allergic/Immunologic: Negative.    Neurological: Negative for syncope and speech difficulty.   Hematological: Negative for adenopathy.   Psychiatric/Behavioral: Negative for agitation and confusion.   All other systems reviewed and are negative.      Objective   Physical Exam   Constitutional: She is oriented to person, place, and time. She appears well-developed and well-nourished. No distress.   HENT:   Head: Normocephalic and atraumatic. Hair is normal.   Right Ear: Hearing, tympanic membrane, external ear and ear canal normal. No drainage. No decreased hearing is noted.   Left Ear: Hearing, tympanic membrane, external ear and ear canal normal. No decreased hearing is noted.   Nose: Nose normal. No nasal deformity.   Mouth/Throat: Oropharynx is clear and moist. No oropharyngeal exudate.   bruxism   Eyes: Conjunctivae, EOM and lids are normal. Pupils are equal, round, and reactive to light. Lids are everted and swept, no foreign bodies found. Right eye exhibits no discharge. Left eye exhibits no discharge.   Neck: Normal range of motion. Neck supple. No JVD present. No tracheal deviation present. No thyromegaly present.   Cardiovascular: Normal rate, regular rhythm, normal heart sounds, intact distal pulses and normal pulses. Exam reveals no gallop and no friction rub.   No murmur heard.  Pulmonary/Chest: Effort normal and breath sounds normal. No respiratory distress. She has no wheezes. She has no rales. She exhibits no tenderness.   3rd nipple LUQ abd;  Same bilat accessory breast tissue in axilla    Abdominal: Soft. Bowel sounds are normal. She exhibits no distension and no mass. There is no tenderness. There is no rebound and no guarding. No hernia.   Musculoskeletal: Normal range of motion. She exhibits no edema, tenderness or deformity.   Lymphadenopathy:     She has no cervical adenopathy.        Right: No inguinal adenopathy present.        Left: No inguinal adenopathy present.   Neurological: She is alert and oriented to person, place, and time. She has normal reflexes. She displays normal reflexes. No cranial nerve deficit (2-12). She exhibits normal muscle tone. Coordination normal.   Skin: Skin is warm and dry. No rash noted. She is not diaphoretic. No erythema.   Sebaceous cyst right temporal area <pea size and no change; NT  Tattoos right wrist and on upper and lower back    Toenails 2-5 bilat thick irreg--fungal  Sore area left palm and see derm   Psychiatric: She has a normal mood and affect. Her behavior is normal. Judgment and thought content normal.   Nursing note and vitals reviewed.      Assessment/Plan   Problems Addressed this Visit        Cardiovascular and Mediastinum    Essential hypertension    Relevant Medications    atorvastatin (LIPITOR) 40 MG tablet    Hyperlipidemia    Relevant Medications    atorvastatin (LIPITOR) 40 MG tablet       Musculoskeletal and Integument    Osteopenia of both hips    Relevant Orders    Mammo Screening Bilateral With CAD    DEXA Bone Density Axial       Genitourinary    Vaginal atrophy      Other Visit Diagnoses     Routine general medical examination at a health care facility    -  Primary    Encounter for screening mammogram for breast cancer        Relevant Orders    Mammo Screening Bilateral With CAD    DEXA Bone Density Axial    Post-menopausal        Relevant Orders    Mammo Screening Bilateral With CAD    DEXA Bone Density Axial

## 2019-01-22 NOTE — PATIENT INSTRUCTIONS
Low glycemic index diet  Exercise 30 minutes most days of the week  Make sure you get results on any labs or tests we ordered today  We discussed medications and how to take them as prescribed  Sleep 6-8 hours each night if possible  If you have not signed up for [facility], please activate your code ASAP from your After Visit Summary today    LDL goal <100  LDL goal if heart disease <70  HDL goal >60  Triglyceride goal <150  BP goal =<130/80  Fasting glucose <100

## 2019-01-22 NOTE — PROGRESS NOTES
Procedure     ECG 12 Lead  Date/Time: 1/22/2019 9:04 AM  Performed by: Joanne Maurice PA-C  Authorized by: Joanne Maurice PA-C   Comparison: compared with previous ECG from 4/15/2017  Similar to previous ECG  Rhythm: sinus rhythm  Rate: normal  BPM: 59  Conduction: conduction normal  ST Segments: ST segments normal  T Waves: T waves normal  QRS axis: normal  Other: no other findings  Clinical impression: normal ECG  Comments: EKG Interpretation Report    Heart rate:    59 beats/min, NC interval:  172 msec, QRS duration:  104 msec  QTu:406 msec, QTc:  402 msec

## 2019-01-23 LAB
APPEARANCE UR: (no result)
BACTERIA #/AREA URNS HPF: ABNORMAL /[HPF]
BACTERIA UR CULT: NORMAL
BACTERIA UR CULT: NORMAL
BILIRUB UR QL STRIP: NEGATIVE
CASTS URNS MICRO: ABNORMAL
CASTS URNS QL MICRO: PRESENT /LPF
COLOR UR: YELLOW
EPI CELLS #/AREA URNS HPF: ABNORMAL /HPF
GLUCOSE UR QL: NEGATIVE
HGB UR QL STRIP: (no result)
KETONES UR QL STRIP: NEGATIVE
LEUKOCYTE ESTERASE UR QL STRIP: (no result)
MICRO URNS: (no result)
MUCOUS THREADS URNS QL MICRO: PRESENT
NITRITE UR QL STRIP: NEGATIVE
PH UR STRIP: 6 [PH] (ref 5–7.5)
PROT UR QL STRIP: NEGATIVE
RBC #/AREA URNS HPF: ABNORMAL /HPF
SP GR UR: 1.02 (ref 1–1.03)
UROBILINOGEN UR STRIP-MCNC: 1 MG/DL (ref 0.2–1)
WBC #/AREA URNS HPF: ABNORMAL /HPF

## 2019-01-24 DIAGNOSIS — R68.89 ABNORMAL ENDOCRINE LABORATORY TEST FINDING: ICD-10-CM

## 2019-01-24 DIAGNOSIS — I10 ESSENTIAL HYPERTENSION: Primary | ICD-10-CM

## 2019-01-24 DIAGNOSIS — E78.49 OTHER HYPERLIPIDEMIA: ICD-10-CM

## 2019-01-26 ENCOUNTER — HOSPITAL ENCOUNTER (OUTPATIENT)
Dept: MAMMOGRAPHY | Facility: HOSPITAL | Age: 59
Discharge: HOME | End: 2019-01-26
Admitting: PHYSICIAN ASSISTANT

## 2019-01-26 DIAGNOSIS — Z78.0 POST-MENOPAUSAL: ICD-10-CM

## 2019-01-26 DIAGNOSIS — Z12.31 ENCOUNTER FOR SCREENING MAMMOGRAM FOR BREAST CANCER: ICD-10-CM

## 2019-01-26 DIAGNOSIS — M85.852 OSTEOPENIA OF BOTH HIPS: ICD-10-CM

## 2019-01-26 DIAGNOSIS — M85.851 OSTEOPENIA OF BOTH HIPS: ICD-10-CM

## 2019-01-26 PROCEDURE — 77067 SCR MAMMO BI INCL CAD: CPT

## 2019-01-27 DIAGNOSIS — I10 ESSENTIAL HYPERTENSION: ICD-10-CM

## 2019-01-28 RX ORDER — FUROSEMIDE 20 MG/1
20 TABLET ORAL DAILY
Qty: 90 TABLET | Refills: 1 | Status: SHIPPED | OUTPATIENT
Start: 2019-01-28 | End: 2019-08-03 | Stop reason: SDUPTHER

## 2019-02-07 LAB
ALBUMIN SERPL-MCNC: 4.6 G/DL (ref 3.5–5.2)
ALBUMIN/GLOB SERPL: 1.7 G/DL
ALP SERPL-CCNC: 78 U/L (ref 39–117)
ALT SERPL-CCNC: 10 U/L (ref 1–33)
AST SERPL-CCNC: 12 U/L (ref 1–32)
BILIRUB SERPL-MCNC: 0.3 MG/DL (ref 0.1–1.2)
BUN SERPL-MCNC: 8 MG/DL (ref 6–20)
BUN/CREAT SERPL: 9.4 (ref 7–25)
CALCIUM SERPL-MCNC: 10.8 MG/DL (ref 8.6–10.5)
CHLORIDE SERPL-SCNC: 107 MMOL/L (ref 98–107)
CHOLEST SERPL-MCNC: 129 MG/DL (ref 0–200)
CO2 SERPL-SCNC: 26.2 MMOL/L (ref 22–29)
CREAT SERPL-MCNC: 0.85 MG/DL (ref 0.57–1)
GLOBULIN SER CALC-MCNC: 2.7 GM/DL
GLUCOSE SERPL-MCNC: 103 MG/DL (ref 65–99)
HDLC SERPL-MCNC: 35 MG/DL (ref 40–60)
INTERPRETATION: NORMAL
LDLC SERPL CALC-MCNC: 71 MG/DL (ref 0–100)
POTASSIUM SERPL-SCNC: 4 MMOL/L (ref 3.5–5.2)
PROT SERPL-MCNC: 7.3 G/DL (ref 6–8.5)
SODIUM SERPL-SCNC: 147 MMOL/L (ref 136–145)
TRIGL SERPL-MCNC: 114 MG/DL (ref 0–150)
TSH SERPL DL<=0.005 MIU/L-ACNC: 0.47 MIU/ML (ref 0.27–4.2)
VLDLC SERPL CALC-MCNC: 22.8 MG/DL (ref 5–40)

## 2019-02-08 ENCOUNTER — RESULTS ENCOUNTER (OUTPATIENT)
Dept: ENDOCRINOLOGY | Age: 59
End: 2019-02-08

## 2019-02-08 DIAGNOSIS — I10 ESSENTIAL HYPERTENSION: ICD-10-CM

## 2019-02-08 DIAGNOSIS — E78.49 OTHER HYPERLIPIDEMIA: ICD-10-CM

## 2019-02-08 DIAGNOSIS — R68.89 ABNORMAL ENDOCRINE LABORATORY TEST FINDING: ICD-10-CM

## 2019-02-14 NOTE — PROGRESS NOTES
Luis Kang is a 58 y.o. female.     F/u for hypercalcemia, hyperlipidemia / flu vaccine @ [facility]       Patient is a 58-year-old female who came in for follow-up.   Her serum calcium has ranged from 10.7-11.2 with the upper limit of normal of 10.5 mg per DL. She had a bone density done at [facility] in December 2015 which showed osteopenia.  Follow-up bone density done in December 2017 showed stable osteopenia compared to 2015.       She has history of vitamin D deficiency and has been taking vitamin D 2000 units/day.  She has no previous history of kidney stones, peptic ulcer, or depression. She denies muscle weakness.  5 hydroxy vitamin D levels done in August 2018 is normal at 37.1 ng per mL.      There is no family history of hypercalcemia or nephrolithiasis.     Workup done in January 2017 are as follows: Serum calcium at 11.0 mg per DL.  Intact PTH is normal at 42 pg per mL.  Undetectable PTH RP.  25-hydroxy vitamin D is 28 ng per mL.  24 urine calcium is low at 31.2 mg     Repeat 24-hour urine collection done after vitamin D repletion in 4/18 showed low calcium at 57 mg per 24 hours.      She has history of hypertension and has been on furosemide 20  mg once a day and lisinopril 10 mg once a day. She denies any history of heart attack or stroke. No chest pain, shortness of breath or pedal edema.      She has hyperlipidemia and is on Lipitor 20 mg once a day. She denies any myalgia.. She has no history of diabetes mellitus.  She has intentionally lost 9 pounds since August 2018.      She had an episode of transient visual loss on the right eye in July 2016 thought to be due to amaurosis fugax. Carotid ultrasound was normal. She is on aspirin and Lipitor.  She has no recurrence of visual loss since.    The following portions of the patient's history were reviewed and updated as appropriate: allergies, current medications, past family history, past medical history, past social history,  "past surgical history and problem list.    Review of Systems   Constitutional: Negative.    HENT: Negative.    Eyes: Negative.    Respiratory: Negative.    Cardiovascular: Negative.    Gastrointestinal: Negative.    Endocrine: Negative.    Genitourinary: Negative.    Musculoskeletal: Negative.    Skin: Negative.    Allergic/Immunologic: Negative.    Neurological: Negative.    Hematological: Bruises/bleeds easily.   Psychiatric/Behavioral: Negative.        Objective      Vitals:    02/15/19 1334   BP: 116/74   BP Location: Right arm   Patient Position: Sitting   Cuff Size: Large Adult   Pulse: 71   SpO2: 96%   Weight: 69.8 kg (153 lb 12.8 oz)   Height: 162 cm (63.78\")     Physical Exam  Results Encounter on 02/08/2019   Component Date Value Ref Range Status   • Glucose 02/07/2019 103* 65 - 99 mg/dL Final   • BUN 02/07/2019 8  6 - 20 mg/dL Final   • Creatinine 02/07/2019 0.85  0.57 - 1.00 mg/dL Final   • eGFR Non  Am 02/07/2019 69  >60 mL/min/1.73 Final   • eGFR African Am 02/07/2019 83  >60 mL/min/1.73 Final   • BUN/Creatinine Ratio 02/07/2019 9.4  7.0 - 25.0 Final   • Sodium 02/07/2019 147* 136 - 145 mmol/L Final   • Potassium 02/07/2019 4.0  3.5 - 5.2 mmol/L Final   • Chloride 02/07/2019 107  98 - 107 mmol/L Final   • Total CO2 02/07/2019 26.2  22.0 - 29.0 mmol/L Final   • Calcium 02/07/2019 10.8* 8.6 - 10.5 mg/dL Final   • Total Protein 02/07/2019 7.3  6.0 - 8.5 g/dL Final   • Albumin 02/07/2019 4.60  3.50 - 5.20 g/dL Final   • Globulin 02/07/2019 2.7  gm/dL Final   • A/G Ratio 02/07/2019 1.7  g/dL Final   • Total Bilirubin 02/07/2019 0.3  0.1 - 1.2 mg/dL Final   • Alkaline Phosphatase 02/07/2019 78  39 - 117 U/L Final   • AST (SGOT) 02/07/2019 12  1 - 32 U/L Final   • ALT (SGPT) 02/07/2019 10  1 - 33 U/L Final   • Total Cholesterol 02/07/2019 129  0 - 200 mg/dL Final   • Triglycerides 02/07/2019 114  0 - 150 mg/dL Final   • HDL Cholesterol 02/07/2019 35* 40 - 60 mg/dL Final   • VLDL Cholesterol 02/07/2019 " 22.8  5 - 40 mg/dL Final   • LDL Cholesterol  02/07/2019 71  0 - 100 mg/dL Final   • TSH 02/07/2019 0.469  0.270 - 4.200 mIU/mL Final   • Interpretation 02/07/2019 Note   Final    Supplemental report is available.     Assessment/Plan   Kristina was seen today for hyperlipidemia and abnormal calcium.    Diagnoses and all orders for this visit:    Hypocalciuric hypercalcemia    Essential hypertension    Hyperlipidemia, unspecified hyperlipidemia type    Osteopenia of both hips    Vitamin D deficiency      Continue vitamin D 2000 units/day.  Repeat bone density after December 2019  Continue furosemide 20 mg once a day and lisinopril 10 mg once a day.  Continue Lipitor 20 mg/day.  Continue aspirin.    Send copy my note to Joanne VARGAS    RTC 6 mos.

## 2019-02-15 ENCOUNTER — OFFICE VISIT (OUTPATIENT)
Dept: ENDOCRINOLOGY | Age: 59
End: 2019-02-15

## 2019-02-15 VITALS
HEART RATE: 71 BPM | SYSTOLIC BLOOD PRESSURE: 116 MMHG | WEIGHT: 153.8 LBS | BODY MASS INDEX: 26.26 KG/M2 | HEIGHT: 64 IN | DIASTOLIC BLOOD PRESSURE: 74 MMHG | OXYGEN SATURATION: 96 %

## 2019-02-15 DIAGNOSIS — E55.9 VITAMIN D DEFICIENCY: ICD-10-CM

## 2019-02-15 DIAGNOSIS — M85.852 OSTEOPENIA OF BOTH HIPS: ICD-10-CM

## 2019-02-15 DIAGNOSIS — I10 ESSENTIAL HYPERTENSION: ICD-10-CM

## 2019-02-15 DIAGNOSIS — M85.851 OSTEOPENIA OF BOTH HIPS: ICD-10-CM

## 2019-02-15 DIAGNOSIS — E78.5 HYPERLIPIDEMIA, UNSPECIFIED HYPERLIPIDEMIA TYPE: ICD-10-CM

## 2019-02-15 DIAGNOSIS — E83.52 HYPOCALCIURIC HYPERCALCEMIA: Primary | ICD-10-CM

## 2019-02-15 PROCEDURE — 99214 OFFICE O/P EST MOD 30 MIN: CPT | Performed by: INTERNAL MEDICINE

## 2019-03-17 RX ORDER — HYDROXYZINE HYDROCHLORIDE 25 MG/1
25 TABLET, FILM COATED ORAL 3 TIMES DAILY PRN
Qty: 45 TABLET | Refills: 1 | Status: SHIPPED | OUTPATIENT
Start: 2019-03-17 | End: 2019-05-13 | Stop reason: SDUPTHER

## 2019-04-08 RX ORDER — FLUTICASONE PROPIONATE 50 MCG
2 SPRAY, SUSPENSION (ML) NASAL DAILY
Qty: 48 ML | Refills: 2 | Status: SHIPPED | OUTPATIENT
Start: 2019-04-08 | End: 2020-07-11

## 2019-04-25 VITALS
SYSTOLIC BLOOD PRESSURE: 76 MMHG | HEART RATE: 60 BPM | HEART RATE: 62 BPM | SYSTOLIC BLOOD PRESSURE: 82 MMHG | DIASTOLIC BLOOD PRESSURE: 38 MMHG | RESPIRATION RATE: 20 BRPM | SYSTOLIC BLOOD PRESSURE: 77 MMHG | DIASTOLIC BLOOD PRESSURE: 43 MMHG | DIASTOLIC BLOOD PRESSURE: 68 MMHG | SYSTOLIC BLOOD PRESSURE: 72 MMHG | OXYGEN SATURATION: 98 % | DIASTOLIC BLOOD PRESSURE: 47 MMHG | DIASTOLIC BLOOD PRESSURE: 37 MMHG | HEART RATE: 61 BPM | RESPIRATION RATE: 19 BRPM | HEART RATE: 70 BPM | OXYGEN SATURATION: 99 % | DIASTOLIC BLOOD PRESSURE: 45 MMHG | HEIGHT: 63 IN | DIASTOLIC BLOOD PRESSURE: 35 MMHG | OXYGEN SATURATION: 100 % | HEART RATE: 75 BPM | HEART RATE: 68 BPM | DIASTOLIC BLOOD PRESSURE: 86 MMHG | RESPIRATION RATE: 17 BRPM | RESPIRATION RATE: 22 BRPM | HEART RATE: 63 BPM | OXYGEN SATURATION: 97 % | HEART RATE: 66 BPM | SYSTOLIC BLOOD PRESSURE: 145 MMHG | RESPIRATION RATE: 11 BRPM | SYSTOLIC BLOOD PRESSURE: 70 MMHG | SYSTOLIC BLOOD PRESSURE: 87 MMHG | RESPIRATION RATE: 18 BRPM | SYSTOLIC BLOOD PRESSURE: 79 MMHG | SYSTOLIC BLOOD PRESSURE: 81 MMHG | DIASTOLIC BLOOD PRESSURE: 50 MMHG | DIASTOLIC BLOOD PRESSURE: 42 MMHG | HEART RATE: 71 BPM | TEMPERATURE: 96.4 F | SYSTOLIC BLOOD PRESSURE: 121 MMHG | DIASTOLIC BLOOD PRESSURE: 70 MMHG | SYSTOLIC BLOOD PRESSURE: 80 MMHG | SYSTOLIC BLOOD PRESSURE: 123 MMHG | DIASTOLIC BLOOD PRESSURE: 55 MMHG | WEIGHT: 153 LBS | HEART RATE: 65 BPM | RESPIRATION RATE: 16 BRPM | TEMPERATURE: 97.4 F | DIASTOLIC BLOOD PRESSURE: 33 MMHG | DIASTOLIC BLOOD PRESSURE: 32 MMHG | HEART RATE: 67 BPM | RESPIRATION RATE: 23 BRPM | DIASTOLIC BLOOD PRESSURE: 39 MMHG | SYSTOLIC BLOOD PRESSURE: 112 MMHG

## 2019-04-29 ENCOUNTER — OFFICE (AMBULATORY)
Dept: URBAN - METROPOLITAN AREA PATHOLOGY 4 | Facility: PATHOLOGY | Age: 59
End: 2019-04-29
Payer: COMMERCIAL

## 2019-04-29 ENCOUNTER — AMBULATORY SURGICAL CENTER (AMBULATORY)
Dept: URBAN - METROPOLITAN AREA SURGERY 17 | Facility: SURGERY | Age: 59
End: 2019-04-29
Payer: COMMERCIAL

## 2019-04-29 DIAGNOSIS — D12.4 BENIGN NEOPLASM OF DESCENDING COLON: ICD-10-CM

## 2019-04-29 DIAGNOSIS — Z86.010 PERSONAL HISTORY OF COLONIC POLYPS: ICD-10-CM

## 2019-04-29 DIAGNOSIS — D12.3 BENIGN NEOPLASM OF TRANSVERSE COLON: ICD-10-CM

## 2019-04-29 DIAGNOSIS — K63.5 POLYP OF COLON: ICD-10-CM

## 2019-04-29 DIAGNOSIS — D12.2 BENIGN NEOPLASM OF ASCENDING COLON: ICD-10-CM

## 2019-04-29 DIAGNOSIS — K62.1 RECTAL POLYP: ICD-10-CM

## 2019-04-29 LAB
GI HISTOLOGY: A. UNSPECIFIED: (no result)
GI HISTOLOGY: B. UNSPECIFIED: (no result)
GI HISTOLOGY: C. UNSPECIFIED: (no result)
GI HISTOLOGY: D. UNSPECIFIED: (no result)
GI HISTOLOGY: E. UNSPECIFIED: (no result)
GI HISTOLOGY: F. UNSPECIFIED: (no result)
GI HISTOLOGY: G. UNSPECIFIED: (no result)
GI HISTOLOGY: H. UNSPECIFIED: (no result)
GI HISTOLOGY: PDF REPORT: (no result)

## 2019-04-29 PROCEDURE — 88305 TISSUE EXAM BY PATHOLOGIST: CPT | Performed by: INTERNAL MEDICINE

## 2019-04-29 PROCEDURE — 45380 COLONOSCOPY AND BIOPSY: CPT | Mod: 33,59 | Performed by: INTERNAL MEDICINE

## 2019-04-29 PROCEDURE — 45385 COLONOSCOPY W/LESION REMOVAL: CPT | Mod: 33 | Performed by: INTERNAL MEDICINE

## 2019-05-13 RX ORDER — HYDROXYZINE HYDROCHLORIDE 25 MG/1
TABLET, FILM COATED ORAL
Qty: 45 TABLET | Refills: 1 | Status: SHIPPED | OUTPATIENT
Start: 2019-05-13 | End: 2021-03-04

## 2019-06-27 ENCOUNTER — OFFICE VISIT (OUTPATIENT)
Dept: FAMILY MEDICINE CLINIC | Facility: CLINIC | Age: 59
End: 2019-06-27

## 2019-06-27 VITALS
OXYGEN SATURATION: 98 % | DIASTOLIC BLOOD PRESSURE: 68 MMHG | SYSTOLIC BLOOD PRESSURE: 110 MMHG | RESPIRATION RATE: 16 BRPM | HEIGHT: 64 IN | TEMPERATURE: 98.4 F | WEIGHT: 142 LBS | BODY MASS INDEX: 24.24 KG/M2 | HEART RATE: 69 BPM

## 2019-06-27 DIAGNOSIS — R05.9 COUGH: Primary | ICD-10-CM

## 2019-06-27 DIAGNOSIS — F41.1 GENERALIZED ANXIETY DISORDER: ICD-10-CM

## 2019-06-27 DIAGNOSIS — I10 ESSENTIAL HYPERTENSION: ICD-10-CM

## 2019-06-27 DIAGNOSIS — E78.2 MIXED HYPERLIPIDEMIA: ICD-10-CM

## 2019-06-27 PROCEDURE — 71046 X-RAY EXAM CHEST 2 VIEWS: CPT | Performed by: PHYSICIAN ASSISTANT

## 2019-06-27 PROCEDURE — 99214 OFFICE O/P EST MOD 30 MIN: CPT | Performed by: PHYSICIAN ASSISTANT

## 2019-06-27 RX ORDER — VALSARTAN 160 MG/1
160 TABLET ORAL DAILY
Qty: 90 TABLET | Refills: 1 | Status: SHIPPED | OUTPATIENT
Start: 2019-06-27 | End: 2019-12-21

## 2019-06-27 NOTE — PATIENT INSTRUCTIONS

## 2019-06-27 NOTE — PROGRESS NOTES
Subjective   Kristina Kang is a 58 y.o. female.     History of Present Illness     Kristina Kang 58 y.o. female who presents for evaluation of cough. Symptoms include tickle cough and is on ACE.  No head congestion. No GERD.  I need to change her ACE to ARB.  This can take a few weeks..  Onset of symptoms was a few weeks ago, unchanged since that time. Patient denies shortness of breath, wheezing, pleuritic chest pain, fever.   Evaluation to date: none Treatment to date:  nasal steroid sprary and cough drops.     X-Ray  Interpretation report in house X-rays that I personally viewed    Relevant Clinical Issues/Diagnoses/Indications:  cough        Clinical Findings:  No mass, no infiltrate, no cardiomegaly          Comparative Data:  yes          Date of Previous X-ray:  6-23-13  Change on current X-ray:  no    Sees DR Avendaño for the calcium ----had stopped her HCTZ and on Lasix;  Also know has had Hep C tx with DR Null and in remission.    Kristina Kang female 58 y.o. who presents today for follow up of Anxiety.  She reports medication is working well, patient desires to continue on Rx, and needs refill. Onset of symptoms was approximately several months ago.  She denies current suicidal and homicidal ideation. Risk factors are family situation/stress.  Previous treatment includes current Rx.  She complains of the following medication side effects: none.  The patient has had no previous counseling..    No GERD  Refill Zoloft  The following portions of the patient's history were reviewed and updated as appropriate: allergies, current medications, past family history, past medical history, past social history, past surgical history and problem list.    Review of Systems   Constitutional: Negative for activity change, appetite change, chills, fever and unexpected weight change.   HENT: Positive for ear pain and postnasal drip. Negative for nosebleeds, rhinorrhea, sore throat and trouble swallowing.    Eyes:  Negative for pain and visual disturbance.   Respiratory: Positive for cough. Negative for chest tightness, shortness of breath and wheezing.    Cardiovascular: Negative for chest pain and palpitations.   Gastrointestinal: Negative for abdominal pain and blood in stool.   Endocrine: Negative.    Genitourinary: Negative for difficulty urinating and hematuria.   Musculoskeletal: Negative for joint swelling and myalgias.   Skin: Negative for color change and rash.   Allergic/Immunologic: Negative.    Neurological: Negative for syncope, speech difficulty and headaches.   Hematological: Negative for adenopathy.   Psychiatric/Behavioral: Negative for agitation and confusion.   All other systems reviewed and are negative.      Objective   Physical Exam   Constitutional: She is oriented to person, place, and time. She appears well-developed and well-nourished. No distress.   HENT:   Head: Normocephalic and atraumatic.   Right Ear: External ear normal.   Left Ear: External ear normal.   Nose: Nose normal.   Mouth/Throat: Oropharynx is clear and moist.   Just mild cobblestoning   Eyes: Conjunctivae and EOM are normal. Pupils are equal, round, and reactive to light. Right eye exhibits no discharge. Left eye exhibits no discharge. No scleral icterus.   Neck: Normal range of motion. Neck supple. No tracheal deviation present. No thyromegaly present.   Cardiovascular: Normal rate, regular rhythm, normal heart sounds, intact distal pulses and normal pulses. Exam reveals no gallop.   No murmur heard.  Pulmonary/Chest: Effort normal and breath sounds normal. No respiratory distress. She has no wheezes. She has no rales.   Abdominal: Soft. There is no tenderness.   Musculoskeletal: Normal range of motion.   Lymphadenopathy:     She has no cervical adenopathy.   Neurological: She is alert and oriented to person, place, and time. She exhibits normal muscle tone. Coordination normal.   Skin: Skin is warm. No rash noted. No erythema. No  pallor.   Psychiatric: She has a normal mood and affect. Her behavior is normal. Judgment and thought content normal.   Nursing note and vitals reviewed.      Assessment/Plan   Kristina was seen today for cough.    Diagnoses and all orders for this visit:    Cough    refill Zoloft and working well  DR Avendaño for Ca  BP controlled on Rx, lipids controlled on Rx  Stop ACE--change ARB--see if cough stops in few weeks; if not then PPI then eval asthma; will get CXR today

## 2019-08-02 DIAGNOSIS — R68.89 ABNORMAL ENDOCRINE LABORATORY TEST FINDING: ICD-10-CM

## 2019-08-02 DIAGNOSIS — E78.2 MIXED HYPERLIPIDEMIA: ICD-10-CM

## 2019-08-02 DIAGNOSIS — I10 ESSENTIAL HYPERTENSION: Primary | ICD-10-CM

## 2019-08-02 DIAGNOSIS — E55.9 VITAMIN D DEFICIENCY: ICD-10-CM

## 2019-08-03 DIAGNOSIS — I10 ESSENTIAL HYPERTENSION: ICD-10-CM

## 2019-08-05 RX ORDER — FUROSEMIDE 20 MG/1
20 TABLET ORAL DAILY
Qty: 90 TABLET | Refills: 0 | Status: SHIPPED | OUTPATIENT
Start: 2019-08-05 | End: 2019-10-29 | Stop reason: SDUPTHER

## 2019-08-08 ENCOUNTER — RESULTS ENCOUNTER (OUTPATIENT)
Dept: ENDOCRINOLOGY | Age: 59
End: 2019-08-08

## 2019-08-08 DIAGNOSIS — R68.89 ABNORMAL ENDOCRINE LABORATORY TEST FINDING: ICD-10-CM

## 2019-08-08 DIAGNOSIS — I10 ESSENTIAL HYPERTENSION: ICD-10-CM

## 2019-08-08 DIAGNOSIS — E78.2 MIXED HYPERLIPIDEMIA: ICD-10-CM

## 2019-08-08 DIAGNOSIS — E55.9 VITAMIN D DEFICIENCY: ICD-10-CM

## 2019-08-09 LAB
25(OH)D3+25(OH)D2 SERPL-MCNC: 50.4 NG/ML (ref 30–100)
ALBUMIN SERPL-MCNC: 4.3 G/DL (ref 3.5–5.2)
ALBUMIN/GLOB SERPL: 1.5 G/DL
ALP SERPL-CCNC: 81 U/L (ref 39–117)
ALT SERPL-CCNC: 10 U/L (ref 1–33)
AST SERPL-CCNC: 13 U/L (ref 1–32)
BILIRUB SERPL-MCNC: 0.3 MG/DL (ref 0.2–1.2)
BUN SERPL-MCNC: 7 MG/DL (ref 6–20)
BUN/CREAT SERPL: 9 (ref 7–25)
CALCIUM SERPL-MCNC: 10.3 MG/DL (ref 8.6–10.5)
CHLORIDE SERPL-SCNC: 105 MMOL/L (ref 98–107)
CHOLEST SERPL-MCNC: 131 MG/DL (ref 0–200)
CO2 SERPL-SCNC: 27.3 MMOL/L (ref 22–29)
CREAT SERPL-MCNC: 0.78 MG/DL (ref 0.57–1)
GLOBULIN SER CALC-MCNC: 2.8 GM/DL
GLUCOSE SERPL-MCNC: 109 MG/DL (ref 65–99)
HDLC SERPL-MCNC: 42 MG/DL (ref 40–60)
INTERPRETATION: NORMAL
LDLC SERPL CALC-MCNC: 72 MG/DL (ref 0–100)
POTASSIUM SERPL-SCNC: 3.6 MMOL/L (ref 3.5–5.2)
PROT SERPL-MCNC: 7.1 G/DL (ref 6–8.5)
SODIUM SERPL-SCNC: 144 MMOL/L (ref 136–145)
TRIGL SERPL-MCNC: 85 MG/DL (ref 0–150)
TSH SERPL DL<=0.005 MIU/L-ACNC: 0.6 MIU/ML (ref 0.27–4.2)
VLDLC SERPL CALC-MCNC: 17 MG/DL

## 2019-08-15 NOTE — PROGRESS NOTES
Luis Kang is a 58 y.o. female.     F.u for hypercalcemia, hyperlipidemia           Patient is a 58-year-old female who came in for follow-up.     Her serum calcium has ranged from 10.7-11.2 with the upper limit of normal of 10.5 mg per DL. She had a bone density done at [facility] in December 2015 which showed osteopenia.  Follow-up bone density done in December 2017 showed stable osteopenia compared to 2015.       She has history of vitamin D deficiency and has been taking vitamin D 2000 units/day.  She has no previous history of kidney stones, peptic ulcer, or depression. She denies muscle weakness.  25 hydroxyvitamin D done in August 2019 is normal at 50.4 ng/mL.      There is no family history of hypercalcemia or nephrolithiasis.     Workup done in January 2017 are as follows: Serum calcium at 11.0 mg per DL.  Intact PTH is normal at 42 pg per mL.  Undetectable PTH RP.  25-hydroxy vitamin D is 28 ng per mL.  24 urine calcium is low at 31.2 mg     Repeat 24-hour urine collection done after vitamin D repletion in 4/18 showed low calcium at 57 mg per 24 hours.      She has history of hypertension and has been on furosemide 20  mg once a day and valsartan 160 mg/day.  Lisinopril was discontinued because of a dry cough.  The cough persisted.  She was started on Prilosec and cough has improved.  No chest pain, shortness of breath or pedal edema.      She has hyperlipidemia and is on Lipitor 20 mg once a day. She denies any myalgia.. She has no history of diabetes mellitus.  She has lost 5 pounds since February 2019.      She had an episode of transient visual loss on the right eye in July 2016 thought to be due to amaurosis fugax. Carotid ultrasound was normal. She is on aspirin and Lipitor.  She has no recurrence of visual loss since.  She had an eye examination this year and there is no blind spot.    She had colon polyps removed by Dr. Malone in April 2019.  She was advised repeat  "colonoscopy in 1 year.    The following portions of the patient's history were reviewed and updated as appropriate: allergies, current medications, past family history, past medical history, past social history, past surgical history and problem list.    Review of Systems   Constitutional: Negative.    HENT: Negative.    Eyes: Negative.    Respiratory: Negative.    Cardiovascular: Negative.    Gastrointestinal: Negative for abdominal distention (cramping ).   Endocrine: Negative.    Musculoskeletal: Negative.    Skin: Negative.    Allergic/Immunologic: Negative.    Neurological: Negative.    Hematological: Does not bruise/bleed easily (on asprirn ).   Psychiatric/Behavioral: Negative.        Objective      Vitals:    08/16/19 1440   BP: 128/84   BP Location: Right arm   Patient Position: Sitting   Cuff Size: Small Adult   Pulse: 60   SpO2: 95%   Weight: 67.5 kg (148 lb 12.8 oz)   Height: 162 cm (63.78\")     Physical Exam   Constitutional: She is oriented to person, place, and time. She appears well-developed and well-nourished. No distress.   HENT:   Head: Normocephalic.   Right Ear: External ear normal.   Left Ear: External ear normal.   Nose: Nose normal.   Mouth/Throat: Oropharynx is clear and moist. No oropharyngeal exudate.   Eyes: Conjunctivae and EOM are normal. Right eye exhibits no discharge. Left eye exhibits no discharge. No scleral icterus.   Neck: Neck supple. No JVD present. No tracheal deviation present. No thyromegaly present.   Cardiovascular: Normal rate, regular rhythm, normal heart sounds and intact distal pulses. Exam reveals no gallop and no friction rub.   No murmur heard.  Pulmonary/Chest: Effort normal and breath sounds normal. No stridor. No respiratory distress. She has no wheezes. She has no rales. She exhibits no tenderness.   Abdominal: Soft. Bowel sounds are normal. She exhibits no distension and no mass. There is no tenderness. There is no rebound and no guarding.   Musculoskeletal: " Normal range of motion. She exhibits no edema, tenderness or deformity.   Lymphadenopathy:     She has no cervical adenopathy.   Neurological: She is alert and oriented to person, place, and time. She displays normal reflexes. She exhibits normal muscle tone. Coordination normal.   Skin: Skin is warm and dry. She is not diaphoretic. No pallor.   Psychiatric: She has a normal mood and affect. Her behavior is normal.     Results Encounter on 08/08/2019   Component Date Value Ref Range Status   • 25 Hydroxy, Vitamin D 08/08/2019 50.4  30.0 - 100.0 ng/ml Final    Comment: Reference Range for Total Vitamin D 25(OH)  Deficiency <20.0 ng/mL  Insufficiency 21-29 ng/mL  Sufficiency  ng/mL  Toxicity >100 ng/ml     • Glucose 08/08/2019 109* 65 - 99 mg/dL Final   • BUN 08/08/2019 7  6 - 20 mg/dL Final   • Creatinine 08/08/2019 0.78  0.57 - 1.00 mg/dL Final   • eGFR Non  Am 08/08/2019 76  >60 mL/min/1.73 Final   • eGFR African Am 08/08/2019 92  >60 mL/min/1.73 Final   • BUN/Creatinine Ratio 08/08/2019 9.0  7.0 - 25.0 Final   • Sodium 08/08/2019 144  136 - 145 mmol/L Final   • Potassium 08/08/2019 3.6  3.5 - 5.2 mmol/L Final   • Chloride 08/08/2019 105  98 - 107 mmol/L Final   • Total CO2 08/08/2019 27.3  22.0 - 29.0 mmol/L Final   • Calcium 08/08/2019 10.3  8.6 - 10.5 mg/dL Final   • Total Protein 08/08/2019 7.1  6.0 - 8.5 g/dL Final   • Albumin 08/08/2019 4.30  3.50 - 5.20 g/dL Final   • Globulin 08/08/2019 2.8  gm/dL Final   • A/G Ratio 08/08/2019 1.5  g/dL Final   • Total Bilirubin 08/08/2019 0.3  0.2 - 1.2 mg/dL Final   • Alkaline Phosphatase 08/08/2019 81  39 - 117 U/L Final   • AST (SGOT) 08/08/2019 13  1 - 32 U/L Final   • ALT (SGPT) 08/08/2019 10  1 - 33 U/L Final   • Total Cholesterol 08/08/2019 131  0 - 200 mg/dL Final   • Triglycerides 08/08/2019 85  0 - 150 mg/dL Final   • HDL Cholesterol 08/08/2019 42  40 - 60 mg/dL Final   • VLDL Cholesterol 08/08/2019 17  mg/dL Final   • LDL Cholesterol  08/08/2019 72   0 - 100 mg/dL Final   • TSH 08/08/2019 0.596  0.270 - 4.200 mIU/mL Final   • Interpretation 08/08/2019 Note   Final    Supplemental report is available.     Assessment/Plan   Kristina was seen today for abnormal calcium and hyperlipidemia.    Diagnoses and all orders for this visit:    Hypocalciuric hypercalcemia    Essential hypertension    Hyperlipidemia, unspecified hyperlipidemia type    Vitamin D deficiency    Osteopenia of both hips      Continue vitamin D 2000 units/day.  Repeat bone density after December 2017.  Continue Lasix and valsartan.  Continue with Lipitor 40 mg/day.  Continue aspirin 81 mg/day.    Copy of my note sent to SAM Mahoney, Dr. De La Garza and Dr. Malone    RTC 6 mos.

## 2019-08-16 ENCOUNTER — OFFICE VISIT (OUTPATIENT)
Dept: ENDOCRINOLOGY | Age: 59
End: 2019-08-16

## 2019-08-16 VITALS
BODY MASS INDEX: 25.4 KG/M2 | OXYGEN SATURATION: 95 % | HEART RATE: 60 BPM | WEIGHT: 148.8 LBS | DIASTOLIC BLOOD PRESSURE: 84 MMHG | SYSTOLIC BLOOD PRESSURE: 128 MMHG | HEIGHT: 64 IN

## 2019-08-16 DIAGNOSIS — E55.9 VITAMIN D DEFICIENCY: ICD-10-CM

## 2019-08-16 DIAGNOSIS — I10 ESSENTIAL HYPERTENSION: ICD-10-CM

## 2019-08-16 DIAGNOSIS — M85.851 OSTEOPENIA OF BOTH HIPS: ICD-10-CM

## 2019-08-16 DIAGNOSIS — E78.5 HYPERLIPIDEMIA, UNSPECIFIED HYPERLIPIDEMIA TYPE: ICD-10-CM

## 2019-08-16 DIAGNOSIS — E83.52 HYPOCALCIURIC HYPERCALCEMIA: Primary | ICD-10-CM

## 2019-08-16 DIAGNOSIS — M85.852 OSTEOPENIA OF BOTH HIPS: ICD-10-CM

## 2019-08-16 PROCEDURE — 99214 OFFICE O/P EST MOD 30 MIN: CPT | Performed by: INTERNAL MEDICINE

## 2019-08-16 RX ORDER — OMEPRAZOLE 20 MG/1
20 TABLET, DELAYED RELEASE ORAL DAILY
COMMUNITY
End: 2020-02-13 | Stop reason: ALTCHOICE

## 2019-10-29 DIAGNOSIS — I10 ESSENTIAL HYPERTENSION: ICD-10-CM

## 2019-10-29 RX ORDER — FUROSEMIDE 20 MG/1
20 TABLET ORAL DAILY
Qty: 90 TABLET | Refills: 0 | Status: SHIPPED | OUTPATIENT
Start: 2019-10-29 | End: 2020-01-23

## 2019-10-31 ENCOUNTER — TRANSCRIBE ORDERS (OUTPATIENT)
Dept: ADMINISTRATIVE | Facility: HOSPITAL | Age: 59
End: 2019-10-31

## 2019-10-31 DIAGNOSIS — Z12.31 VISIT FOR SCREENING MAMMOGRAM: Primary | ICD-10-CM

## 2019-12-16 RX ORDER — LANOLIN ALCOHOL/MO/W.PET/CERES
CREAM (GRAM) TOPICAL
Qty: 100 TABLET | Refills: 3 | Status: SHIPPED | OUTPATIENT
Start: 2019-12-16 | End: 2020-12-09

## 2019-12-21 RX ORDER — VALSARTAN 160 MG/1
TABLET ORAL
Qty: 90 TABLET | Refills: 0 | Status: SHIPPED | OUTPATIENT
Start: 2019-12-21 | End: 2020-02-25 | Stop reason: SDUPTHER

## 2019-12-23 DIAGNOSIS — Z00.00 LABORATORY EXAMINATION ORDERED AS PART OF A ROUTINE GENERAL MEDICAL EXAMINATION: Primary | ICD-10-CM

## 2019-12-30 ENCOUNTER — HOSPITAL ENCOUNTER (OUTPATIENT)
Dept: BONE DENSITY | Facility: HOSPITAL | Age: 59
Discharge: HOME | End: 2019-12-30
Admitting: PHYSICIAN ASSISTANT

## 2019-12-30 DIAGNOSIS — Z12.31 ENCOUNTER FOR SCREENING MAMMOGRAM FOR BREAST CANCER: ICD-10-CM

## 2019-12-30 DIAGNOSIS — Z78.0 POST-MENOPAUSAL: ICD-10-CM

## 2019-12-30 DIAGNOSIS — M85.851 OSTEOPENIA OF BOTH HIPS: ICD-10-CM

## 2019-12-30 DIAGNOSIS — M85.852 OSTEOPENIA OF BOTH HIPS: ICD-10-CM

## 2019-12-30 PROCEDURE — 77080 DXA BONE DENSITY AXIAL: CPT

## 2020-01-23 DIAGNOSIS — I10 ESSENTIAL HYPERTENSION: ICD-10-CM

## 2020-01-23 RX ORDER — FUROSEMIDE 20 MG/1
20 TABLET ORAL DAILY
Qty: 90 TABLET | Refills: 0 | Status: SHIPPED | OUTPATIENT
Start: 2020-01-23 | End: 2020-04-14

## 2020-01-23 RX ORDER — MAGNESIUM 200 MG
1000 TABLET ORAL DAILY
Qty: 90 EACH | Refills: 3 | Status: SHIPPED | OUTPATIENT
Start: 2020-01-23 | End: 2021-02-24

## 2020-01-28 DIAGNOSIS — E55.9 VITAMIN D DEFICIENCY: ICD-10-CM

## 2020-01-28 DIAGNOSIS — R68.89 ABNORMAL ENDOCRINE LABORATORY TEST FINDING: ICD-10-CM

## 2020-01-28 DIAGNOSIS — I10 ESSENTIAL HYPERTENSION: Primary | ICD-10-CM

## 2020-01-28 DIAGNOSIS — E78.5 HYPERLIPIDEMIA, UNSPECIFIED HYPERLIPIDEMIA TYPE: ICD-10-CM

## 2020-01-31 ENCOUNTER — HOSPITAL ENCOUNTER (OUTPATIENT)
Dept: MAMMOGRAPHY | Facility: HOSPITAL | Age: 60
Discharge: HOME | End: 2020-01-31
Admitting: PHYSICIAN ASSISTANT

## 2020-01-31 ENCOUNTER — RESULTS ENCOUNTER (OUTPATIENT)
Dept: ENDOCRINOLOGY | Age: 60
End: 2020-01-31

## 2020-01-31 DIAGNOSIS — E55.9 VITAMIN D DEFICIENCY: ICD-10-CM

## 2020-01-31 DIAGNOSIS — I10 ESSENTIAL HYPERTENSION: ICD-10-CM

## 2020-01-31 DIAGNOSIS — Z12.31 VISIT FOR SCREENING MAMMOGRAM: ICD-10-CM

## 2020-01-31 DIAGNOSIS — R68.89 ABNORMAL ENDOCRINE LABORATORY TEST FINDING: ICD-10-CM

## 2020-01-31 DIAGNOSIS — E78.5 HYPERLIPIDEMIA, UNSPECIFIED HYPERLIPIDEMIA TYPE: ICD-10-CM

## 2020-01-31 PROCEDURE — 77067 SCR MAMMO BI INCL CAD: CPT

## 2020-01-31 PROCEDURE — 77063 BREAST TOMOSYNTHESIS BI: CPT

## 2020-02-01 LAB
25(OH)D3+25(OH)D2 SERPL-MCNC: 42.6 NG/ML (ref 30–100)
ALBUMIN SERPL-MCNC: 4.6 G/DL (ref 3.5–5.2)
ALBUMIN/GLOB SERPL: 2 G/DL
ALP SERPL-CCNC: 81 U/L (ref 39–117)
ALT SERPL-CCNC: 13 U/L (ref 1–33)
AST SERPL-CCNC: 17 U/L (ref 1–32)
BILIRUB SERPL-MCNC: 0.2 MG/DL (ref 0.2–1.2)
BUN SERPL-MCNC: 20 MG/DL (ref 6–20)
BUN/CREAT SERPL: 21.1 (ref 7–25)
CALCIUM SERPL-MCNC: 10.5 MG/DL (ref 8.6–10.5)
CHLORIDE SERPL-SCNC: 104 MMOL/L (ref 98–107)
CHOLEST SERPL-MCNC: 141 MG/DL (ref 0–200)
CO2 SERPL-SCNC: 26.2 MMOL/L (ref 22–29)
CREAT SERPL-MCNC: 0.95 MG/DL (ref 0.57–1)
GLOBULIN SER CALC-MCNC: 2.3 GM/DL
GLUCOSE SERPL-MCNC: 104 MG/DL (ref 65–99)
HDLC SERPL-MCNC: 45 MG/DL (ref 40–60)
INTERPRETATION: NORMAL
LDLC SERPL CALC-MCNC: 79 MG/DL (ref 0–100)
POTASSIUM SERPL-SCNC: 4.3 MMOL/L (ref 3.5–5.2)
PROT SERPL-MCNC: 6.9 G/DL (ref 6–8.5)
SODIUM SERPL-SCNC: 142 MMOL/L (ref 136–145)
TRIGL SERPL-MCNC: 84 MG/DL (ref 0–150)
TSH SERPL DL<=0.005 MIU/L-ACNC: 0.72 UIU/ML (ref 0.27–4.2)
VLDLC SERPL CALC-MCNC: 16.8 MG/DL

## 2020-02-06 NOTE — PROGRESS NOTES
Subjective   Kristina Kang is a 59 y.o. female.     F/u for hypercalcemia, hyperlipidemia / flu vaccine @ work      Patient is a 58-year-old female who came in for follow-up.      Her serum calcium has ranged from 10.7-11.2 with the upper limit of normal of 10.5 mg per DL. She had a bone density done at [facility] in December 2015 which showed osteopenia.  Bone density done in December 2017 showed stable osteopenia compared to 2015.  Serum calcium was at 10.5 mg per DL in January 2020    Bone density done in December 2019 showed osteopenia with a decrease compared to 2017.  FRAX score is 4.2% for major osteoporotic fracture and 0.5% for hip fractures.     She has history of vitamin D deficiency and has been taking vitamin D 2000 units/day.  She has no previous history of kidney stones, peptic ulcer, or depression. She denies muscle weakness.  25 hydroxyvitamin D done in January 2020 is normal at 42.6 ng/mL.      There is no family history of hypercalcemia or nephrolithiasis.     Workup done in January 2017 are as follows: Serum calcium at 11.0 mg per DL.  Intact PTH is normal at 42 pg per mL.  Undetectable PTH RP.  25-hydroxy vitamin D is 28 ng per mL.  24 urine calcium is low at 31.2 mg     Repeat 24-hour urine collection done after vitamin D repletion in 4/18 showed low calcium at 57 mg per 24 hours.      She has history of hypertension and has been on furosemide 20  mg once a day and valsartan 160 mg/day.  Lisinopril was discontinued because of a dry cough.  The cough resolved after she was treated with Prilosec for acid reflux.  No chest pain, shortness of breath or pedal edema.      She has hyperlipidemia and is on Lipitor 20 mg once a day. She denies any myalgia.. She has no history of diabetes mellitus.  She has gained 4 lbs since 8/19.  Lipid profile done in January 2020 are as follows: Cholesterol 141.  HDL 45.  LDL 79.  Triglycerides 84.      She had an episode of transient visual loss on the  "right eye in July 2016 thought to be due to amaurosis fugax. Carotid ultrasound was normal. She is on aspirin and Lipitor.  She has no recurrence of visual loss since.  She had an eye examination in 2019 and there is no blind spot.     She had colon polyps removed by Dr. Malone in April 2019.  She was advised repeat colonoscopy in 1 year.    The following portions of the patient's history were reviewed and updated as appropriate: allergies, current medications, past family history, past medical history, past social history, past surgical history and problem list.    Review of Systems   Constitutional: Negative.    HENT: Negative.    Eyes: Negative.    Respiratory: Negative.    Cardiovascular: Negative.    Gastrointestinal: Negative.    Genitourinary: Negative.    Musculoskeletal: Positive for arthralgias. Negative for myalgias.   Neurological: Negative for numbness and headaches.     Objective      Vitals:    02/13/20 1536   BP: 124/62   BP Location: Right arm   Patient Position: Sitting   Cuff Size: Small Adult   Pulse: 67   SpO2: 97%   Weight: 68.9 kg (152 lb)   Height: 162 cm (63.78\")     Physical Exam   Constitutional: She is oriented to person, place, and time. She appears well-developed and well-nourished. No distress.   HENT:   Head: Normocephalic.   Right Ear: External ear normal.   Left Ear: External ear normal.   Nose: Nose normal.   Mouth/Throat: Oropharynx is clear and moist. No oropharyngeal exudate.   Eyes: Conjunctivae and EOM are normal. Right eye exhibits no discharge. Left eye exhibits no discharge. No scleral icterus.   Neck: Neck supple. No JVD present. No tracheal deviation present. No thyromegaly present.   Cardiovascular: Normal rate, regular rhythm, normal heart sounds and intact distal pulses. Exam reveals no gallop and no friction rub.   No murmur heard.  Pulmonary/Chest: Effort normal and breath sounds normal. No stridor. No respiratory distress. She has no wheezes. She has no rales. " She exhibits no tenderness.   Abdominal: Soft. Bowel sounds are normal. She exhibits no distension and no mass. There is no tenderness. There is no rebound and no guarding.   Musculoskeletal: Normal range of motion. She exhibits no edema, tenderness or deformity.   Lymphadenopathy:     She has no cervical adenopathy.   Neurological: She is alert and oriented to person, place, and time. She displays normal reflexes.   Skin: Skin is warm and dry. No rash noted. She is not diaphoretic. No erythema. No pallor.   Psychiatric: She has a normal mood and affect. Her behavior is normal.     Results Encounter on 01/31/2020   Component Date Value Ref Range Status   • Glucose 01/31/2020 104* 65 - 99 mg/dL Final   • BUN 01/31/2020 20  6 - 20 mg/dL Final   • Creatinine 01/31/2020 0.95  0.57 - 1.00 mg/dL Final   • eGFR Non African Am 01/31/2020 60* >60 mL/min/1.73 Final   • eGFR African Am 01/31/2020 73  >60 mL/min/1.73 Final   • BUN/Creatinine Ratio 01/31/2020 21.1  7.0 - 25.0 Final   • Sodium 01/31/2020 142  136 - 145 mmol/L Final   • Potassium 01/31/2020 4.3  3.5 - 5.2 mmol/L Final   • Chloride 01/31/2020 104  98 - 107 mmol/L Final   • Total CO2 01/31/2020 26.2  22.0 - 29.0 mmol/L Final   • Calcium 01/31/2020 10.5  8.6 - 10.5 mg/dL Final   • Total Protein 01/31/2020 6.9  6.0 - 8.5 g/dL Final   • Albumin 01/31/2020 4.60  3.50 - 5.20 g/dL Final   • Globulin 01/31/2020 2.3  gm/dL Final   • A/G Ratio 01/31/2020 2.0  g/dL Final   • Total Bilirubin 01/31/2020 0.2  0.2 - 1.2 mg/dL Final   • Alkaline Phosphatase 01/31/2020 81  39 - 117 U/L Final   • AST (SGOT) 01/31/2020 17  1 - 32 U/L Final   • ALT (SGPT) 01/31/2020 13  1 - 33 U/L Final   • Total Cholesterol 01/31/2020 141  0 - 200 mg/dL Final   • Triglycerides 01/31/2020 84  0 - 150 mg/dL Final   • HDL Cholesterol 01/31/2020 45  40 - 60 mg/dL Final   • VLDL Cholesterol 01/31/2020 16.8  mg/dL Final   • LDL Cholesterol  01/31/2020 79  0 - 100 mg/dL Final   • 25 Hydroxy, Vitamin D  01/31/2020 42.6  30.0 - 100.0 ng/ml Final    Comment: Results may be falsely increased if patient taking Biotin.  Reference Range for Total Vitamin D 25(OH)  Deficiency <20.0 ng/mL  Insufficiency 21-29 ng/mL  Sufficiency  ng/mL  Toxicity >100 ng/ml     • TSH 01/31/2020 0.718  0.270 - 4.200 uIU/mL Final   • Interpretation 01/31/2020 Note   Final    Supplemental report is available.     Assessment/Plan   Kristina was seen today for hyperlipidemia and hypercalcemia.    Diagnoses and all orders for this visit:    Hypocalciuric hypercalcemia    Essential hypertension    Hyperlipidemia, unspecified hyperlipidemia type    Vitamin D deficiency    Osteopenia of both hips      Patient has osteopenia and does not require biphosphonate yet..    Continue vitamin D 3000 units/day and regular exercise.  Continue furosemide and valsartan.  Continue Lipitor 40 mg/day    Copy of my note sent to SAM Mahoney    RTC 6 mos.

## 2020-02-13 ENCOUNTER — OFFICE VISIT (OUTPATIENT)
Dept: ENDOCRINOLOGY | Age: 60
End: 2020-02-13

## 2020-02-13 VITALS
DIASTOLIC BLOOD PRESSURE: 62 MMHG | HEART RATE: 67 BPM | BODY MASS INDEX: 25.95 KG/M2 | WEIGHT: 152 LBS | HEIGHT: 64 IN | OXYGEN SATURATION: 97 % | SYSTOLIC BLOOD PRESSURE: 124 MMHG

## 2020-02-13 DIAGNOSIS — E55.9 VITAMIN D DEFICIENCY: ICD-10-CM

## 2020-02-13 DIAGNOSIS — M85.852 OSTEOPENIA OF BOTH HIPS: ICD-10-CM

## 2020-02-13 DIAGNOSIS — M85.851 OSTEOPENIA OF BOTH HIPS: ICD-10-CM

## 2020-02-13 DIAGNOSIS — I10 ESSENTIAL HYPERTENSION: ICD-10-CM

## 2020-02-13 DIAGNOSIS — E83.52 HYPOCALCIURIC HYPERCALCEMIA: Primary | ICD-10-CM

## 2020-02-13 DIAGNOSIS — E78.5 HYPERLIPIDEMIA, UNSPECIFIED HYPERLIPIDEMIA TYPE: ICD-10-CM

## 2020-02-13 PROCEDURE — 99214 OFFICE O/P EST MOD 30 MIN: CPT | Performed by: INTERNAL MEDICINE

## 2020-02-25 ENCOUNTER — OFFICE VISIT (OUTPATIENT)
Dept: FAMILY MEDICINE CLINIC | Facility: CLINIC | Age: 60
End: 2020-02-25

## 2020-02-25 VITALS
HEIGHT: 64 IN | SYSTOLIC BLOOD PRESSURE: 120 MMHG | BODY MASS INDEX: 25.61 KG/M2 | TEMPERATURE: 98.3 F | HEART RATE: 71 BPM | RESPIRATION RATE: 16 BRPM | OXYGEN SATURATION: 100 % | WEIGHT: 150 LBS | DIASTOLIC BLOOD PRESSURE: 80 MMHG

## 2020-02-25 DIAGNOSIS — E78.2 MIXED HYPERLIPIDEMIA: ICD-10-CM

## 2020-02-25 DIAGNOSIS — F41.1 GENERALIZED ANXIETY DISORDER: ICD-10-CM

## 2020-02-25 DIAGNOSIS — Z00.00 ROUTINE GENERAL MEDICAL EXAMINATION AT A HEALTH CARE FACILITY: Primary | ICD-10-CM

## 2020-02-25 DIAGNOSIS — N95.2 VAGINAL ATROPHY: ICD-10-CM

## 2020-02-25 DIAGNOSIS — E55.9 VITAMIN D DEFICIENCY: ICD-10-CM

## 2020-02-25 DIAGNOSIS — B19.20 HEPATITIS C VIRUS INFECTION WITHOUT HEPATIC COMA, UNSPECIFIED CHRONICITY: ICD-10-CM

## 2020-02-25 DIAGNOSIS — I10 ESSENTIAL HYPERTENSION: ICD-10-CM

## 2020-02-25 PROCEDURE — 99396 PREV VISIT EST AGE 40-64: CPT | Performed by: PHYSICIAN ASSISTANT

## 2020-02-25 PROCEDURE — 90750 HZV VACC RECOMBINANT IM: CPT | Performed by: PHYSICIAN ASSISTANT

## 2020-02-25 PROCEDURE — 90471 IMMUNIZATION ADMIN: CPT | Performed by: PHYSICIAN ASSISTANT

## 2020-02-25 PROCEDURE — 99214 OFFICE O/P EST MOD 30 MIN: CPT | Performed by: PHYSICIAN ASSISTANT

## 2020-02-25 PROCEDURE — 93000 ELECTROCARDIOGRAM COMPLETE: CPT | Performed by: PHYSICIAN ASSISTANT

## 2020-02-25 RX ORDER — VALSARTAN 160 MG/1
160 TABLET ORAL DAILY
Qty: 90 TABLET | Refills: 1 | Status: SHIPPED | OUTPATIENT
Start: 2020-02-25 | End: 2020-08-27

## 2020-02-25 RX ORDER — ATORVASTATIN CALCIUM 40 MG/1
TABLET, FILM COATED ORAL
Qty: 90 TABLET | Refills: 3 | Status: SHIPPED | OUTPATIENT
Start: 2020-02-25 | End: 2021-03-04 | Stop reason: SDUPTHER

## 2020-02-25 RX ORDER — ESTRADIOL 10 UG/1
1 INSERT VAGINAL 2 TIMES WEEKLY
Qty: 24 TABLET | Refills: 3 | Status: SHIPPED | OUTPATIENT
Start: 2020-02-27 | End: 2021-03-04 | Stop reason: SDUPTHER

## 2020-02-25 NOTE — PROGRESS NOTES
Subjective   Kristina Kang is a 59 y.o. female.     History of Present Illness     Kristina Kang 59 y.o. female who presents for an Annual Wellness Visit.  she has a history of   Patient Active Problem List   Diagnosis   • Degeneration of lumbar intervertebral disc   • Hepatitis C   • Essential hypertension   • Hyperlipidemia   • Vitamin D deficiency   • Osteoarthritis   • Episode of syncope   • Amaurosis fugax of right eye   • Osteopenia of both hips   • Chronic seasonal allergic rhinitis   • Hypocalciuric hypercalcemia   • Vaginal atrophy   • Generalized anxiety disorder   .  she has been feeling well.  Labs results discussed in detail with the patient.  I do still want her to get the CBC B12 folic acid and urine.  plan to update vaccines if needed today. --Shingrix if we have it in stock I  reviewed health maintenance with her as part of my preventative care plan.    Health Habits:  Dental Exam. up to date  Eye Exam. up to date  Exercise: 7 times/week.  Current exercise activities include: walking        Dr Avendaño--monitors her calcium level and I do want to make a note that he changed her HCTZ to Lasix due to the calcium, has history of hep C which is in remission and has been to Dr. Null in the past.  The following portions of the patient's history were reviewed and updated as appropriate: allergies, current medications, past family history, past medical history, past social history, past surgical history and problem list.    Review of Systems   Constitutional: Negative for activity change, appetite change and unexpected weight change.   HENT: Negative for nosebleeds and trouble swallowing.    Eyes: Negative for pain and visual disturbance.   Respiratory: Negative for chest tightness, shortness of breath and wheezing.    Cardiovascular: Negative for chest pain and palpitations.   Gastrointestinal: Negative for abdominal pain and blood in stool.   Endocrine: Negative.    Genitourinary: Negative for  difficulty urinating and hematuria.   Musculoskeletal: Negative for joint swelling.   Skin: Negative for color change and rash.   Allergic/Immunologic: Negative.    Neurological: Negative for syncope and speech difficulty.   Hematological: Negative for adenopathy.   Psychiatric/Behavioral: Negative for agitation and confusion.   All other systems reviewed and are negative.      Objective   Physical Exam   Constitutional: She is oriented to person, place, and time. She appears well-developed and well-nourished. No distress.   HENT:   Head: Normocephalic and atraumatic. Hair is normal.   Right Ear: Hearing, tympanic membrane, external ear and ear canal normal. No drainage. No decreased hearing is noted.   Left Ear: Hearing, tympanic membrane, external ear and ear canal normal. No decreased hearing is noted.   Nose: Nose normal. No nasal deformity.   Mouth/Throat: Oropharynx is clear and moist. No oropharyngeal exudate.   bruxism   Eyes: Pupils are equal, round, and reactive to light. Conjunctivae, EOM and lids are normal. Lids are everted and swept, no foreign bodies found. Right eye exhibits no discharge. Left eye exhibits no discharge.   Neck: Normal range of motion. Neck supple. No JVD present. No tracheal deviation present. No thyromegaly present.   Cardiovascular: Normal rate, regular rhythm, normal heart sounds, intact distal pulses and normal pulses. Exam reveals no gallop and no friction rub.   No murmur heard.  Pulmonary/Chest: Effort normal and breath sounds normal. No respiratory distress. She has no wheezes. She has no rales. She exhibits no tenderness.   3rd nipple LUQ abd;  Same bilat accessory breast tissue in axilla   Abdominal: Soft. Bowel sounds are normal. She exhibits no distension and no mass. There is no tenderness. There is no rebound and no guarding. No hernia.   Musculoskeletal: Normal range of motion. She exhibits no edema, tenderness or deformity.   Lymphadenopathy:     She has no cervical  adenopathy.        Right: No inguinal adenopathy present.        Left: No inguinal adenopathy present.   Neurological: She is alert and oriented to person, place, and time. She has normal reflexes. She displays normal reflexes. No cranial nerve deficit (2-12). She exhibits normal muscle tone. Coordination normal.   Skin: Skin is warm and dry. No rash noted. She is not diaphoretic. No erythema.   Sebaceous cyst right temporal area <pea size and no change; NT  Tattoos right wrist and on upper and lower back    Toenails 2-5 bilat thick irreg--fungal     Psychiatric: She has a normal mood and affect. Her behavior is normal. Judgment and thought content normal.   Nursing note and vitals reviewed.      Assessment/Plan   Kristina was seen today for annual exam.    Diagnoses and all orders for this visit:    Routine general medical examination at a health care facility  -     ECG 12 Lead      Try Metatarsal pad

## 2020-02-25 NOTE — PROGRESS NOTES
"Subjective   Kristina Kang is a 59 y.o. female.     History of Present Illness    Since the last visit, she has overall felt well.  She has Essential Hypertension and well controlled on current medication, Hyperlipidemia with goals met with current Rx and Vitamin D deficiency and labs are at goal >30 ng/mL.  she has been compliant with current medications have reviewed them.  The patient denies medication side effects.  Will refill medications. /80 (BP Location: Left arm, Patient Position: Sitting, Cuff Size: Adult)   Pulse 71   Temp 98.3 °F (36.8 °C) (Oral)   Resp 16   Ht 162 cm (63.78\")   Wt 68 kg (150 lb)   LMP  (LMP Unknown)   SpO2 100%   BMI 25.93 kg/m²    ; hyst; did ask her about vaginal dryness and she is reporting this is a problem.  We talked about topical estrogen and there is still warnings just like oral estrogen but her risk of breast cancer in VTE is minimal.  We talked about how it takes a few weeks for results to start happening with replenishing the tissues.  No history or family history of VTE.  No history of breast cancer  Has colonoscopy scheduled with Dr. Malone  Results for orders placed or performed in visit on 20   Comprehensive Metabolic Panel   Result Value Ref Range    Glucose 104 (H) 65 - 99 mg/dL    BUN 20 6 - 20 mg/dL    Creatinine 0.95 0.57 - 1.00 mg/dL    eGFR Non African Am 60 (L) >60 mL/min/1.73    eGFR African Am 73 >60 mL/min/1.73    BUN/Creatinine Ratio 21.1 7.0 - 25.0    Sodium 142 136 - 145 mmol/L    Potassium 4.3 3.5 - 5.2 mmol/L    Chloride 104 98 - 107 mmol/L    Total CO2 26.2 22.0 - 29.0 mmol/L    Calcium 10.5 8.6 - 10.5 mg/dL    Total Protein 6.9 6.0 - 8.5 g/dL    Albumin 4.60 3.50 - 5.20 g/dL    Globulin 2.3 gm/dL    A/G Ratio 2.0 g/dL    Total Bilirubin 0.2 0.2 - 1.2 mg/dL    Alkaline Phosphatase 81 39 - 117 U/L    AST (SGOT) 17 1 - 32 U/L    ALT (SGPT) 13 1 - 33 U/L   Lipid Panel   Result Value Ref Range    Total Cholesterol 141 0 - 200 " "mg/dL    Triglycerides 84 0 - 150 mg/dL    HDL Cholesterol 45 40 - 60 mg/dL    VLDL Cholesterol 16.8 mg/dL    LDL Cholesterol  79 0 - 100 mg/dL   Vitamin D 25 Hydroxy   Result Value Ref Range    25 Hydroxy, Vitamin D 42.6 30.0 - 100.0 ng/ml   TSH   Result Value Ref Range    TSH 0.718 0.270 - 4.200 uIU/mL   Cardiovascular Risk Assessment   Result Value Ref Range    Interpretation Note    And continue her folic acid and B12 with labs to follow as well  Shingrix vaccine today and again in 2 to 6 months    Dr Avendaño--- took her off HCTZ due to her calcium level and has her on Lasix  He does monitor her calcium levels.  I see on his last note he commented on her osteopenia and said that she was not ready yet for a bisphosphonate.  Will be seeing dermatology  History of hep C in remission has been to Dr. Chaka Acevedo A Kang female 59 y.o., /80 (BP Location: Left arm, Patient Position: Sitting, Cuff Size: Adult)   Pulse 71   Temp 98.3 °F (36.8 °C) (Oral)   Resp 16   Ht 162 cm (63.78\")   Wt 68 kg (150 lb)   LMP  (LMP Unknown)   SpO2 100%   BMI 25.93 kg/m²   who presents today for follow up of Anxiety.  She reports medication is working well, patient desires to continue on Rx, and needs refill. Onset of symptoms was approximately several years ago.  She denies current suicidal and homicidal ideation. Risk factors are lifestyle of multiple roles.  Previous treatment includes current Rx.  She complains of the following medication side effects: none.  The patient declines to go to counseling..    He is describing metatarsalgia in her second metatarsal phalangeal area plantar.  This can make it feel like she is stepping on a rock and have numb tingling.  I asked I will make sure she wears a wide toe box shoe and print her information on Virgilio's neuroma.  Do try the metatarsal pad and refer to podiatry if no help  I do have her on B12 and folic acid I have labs ordered to follow-up on this with her CBC in her " urine  The following portions of the patient's history were reviewed and updated as appropriate: allergies, current medications, past family history, past medical history, past social history, past surgical history and problem list.    Review of Systems   Constitutional: Negative for activity change, appetite change and unexpected weight change.   HENT: Negative for nosebleeds and trouble swallowing.    Eyes: Negative for pain and visual disturbance.   Respiratory: Negative for chest tightness, shortness of breath and wheezing.    Cardiovascular: Negative for chest pain and palpitations.   Gastrointestinal: Negative for abdominal pain and blood in stool.   Endocrine: Negative.    Genitourinary: Negative for difficulty urinating and hematuria.   Musculoskeletal: Negative for joint swelling.   Skin: Negative for color change and rash.   Allergic/Immunologic: Negative.    Neurological: Negative for syncope and speech difficulty.   Hematological: Negative for adenopathy.   Psychiatric/Behavioral: Negative for agitation and confusion.   All other systems reviewed and are negative.      Objective   Physical Exam   Constitutional: She is oriented to person, place, and time. She appears well-developed and well-nourished. No distress.   HENT:   Head: Normocephalic and atraumatic.   Eyes: Pupils are equal, round, and reactive to light. Conjunctivae and EOM are normal. Right eye exhibits no discharge. Left eye exhibits no discharge. No scleral icterus.   Neck: Normal range of motion. Neck supple. No tracheal deviation present. No thyromegaly present.   Cardiovascular: Normal rate, regular rhythm, normal heart sounds, intact distal pulses and normal pulses. Exam reveals no gallop.   No murmur heard.  Pulmonary/Chest: Effort normal and breath sounds normal. No respiratory distress. She has no wheezes. She has no rales.   Musculoskeletal: Normal range of motion.   Neurological: She is alert and oriented to person, place, and  time. She exhibits normal muscle tone. Coordination normal.   Skin: Skin is warm. No rash noted. No erythema. No pallor.   Psychiatric: She has a normal mood and affect. Her behavior is normal. Judgment and thought content normal.   Nursing note and vitals reviewed.      Assessment/Plan   Kristina was seen today for annual exam.    Diagnoses and all orders for this visit:    Routine general medical examination at a health care facility  -     ECG 12 Lead    Essential hypertension    Hepatitis C virus infection without hepatic coma, unspecified chronicity    Vitamin D deficiency    Mixed hyperlipidemia    Generalized anxiety disorder    Other orders  -     Shingrix Vaccine    Plan, Kristina WILKS Kang, was seen today.  she was seen for HTN and continue medication, Hyperlipidemia and will continue current medication and Vitamin D deficiency and supplemented.  Continue Zoloft working well for anxiety at this dose   continue B12 and folic acid we will check labs  Follow-up with endocrine  We will add Vagifem for the atrophy

## 2020-02-25 NOTE — PATIENT INSTRUCTIONS
Poole Neuralgia    Poole neuralgia is foot pain that affects the ball of the foot and the area near the toes. Poole neuralgia occurs when part of a nerve in the foot (digital nerve) is under too much pressure (compressed). When this happens over a long period of time, the nerve can thicken (neuroma) and cause pain. Pain usually occurs between the third and fourth toes.   Poole neuralgia can come and go but may get worse over time.  What are the causes?  This condition is caused by doing the same things over and over with your foot, such as:  · Activities such as running or jumping.  · Wearing shoes that are too tight.  What increases the risk?  You may be at higher risk for Poole neuralgia if you:  · Are female.  · Wear high heels.  · Wear shoes that are narrow or tight.  · Do activities that repeatedly stretch your toes, such as:  ? Running.  ? Ballet.  ? Long-distance walking.  What are the signs or symptoms?  The first symptom of Poole neuralgia is pain that spreads from the ball of the foot to the toes. It may feel like you are walking on a marble. Pain usually gets worse with walking and goes away at night. Other symptoms may include numbness and cramping of your toes. Both feet are equally affected, but rarely at the same time.  How is this diagnosed?  This condition is diagnosed based on your symptoms, your medical history, and a physical exam. Your health care provider may:  · Squeeze your foot just behind your toe.  · Ask you to move your toes to check for pain.  · Ask about your physical activity level.  You also may have imaging tests, such as an X-ray, ultrasound, or MRI.  How is this treated?  Treatment depends on how severe your condition is and what causes it. Treatment may involve:  · Wearing different shoes that are not too tight, are low-heeled, and provide good support. For some people, this is the only treatment needed.  · Wearing an over-the-counter or custom supportive pad (orthotic)  under the front of your foot.  · Getting injections of numbing medicine and anti-inflammatory medicine (steroid) in the nerve.  · Having surgery to remove part of the thickened nerve.  Follow these instructions at home:  Managing pain, stiffness, and swelling    · Massage your foot as needed.  · Wear orthotics as told by your health care provider.  · If directed, put ice on your foot:  ? Put ice in a plastic bag.  ? Place a towel between your skin and the bag.  ? Leave the ice on for 20 minutes, 2-3 times a day.  · Avoid activities that cause pain or make pain worse. If you play sports, ask your health care provider when it is safe for you to return to sports.  · Raise (elevate) your foot above the level of your heart while lying down and, when possible, while sitting.  General instructions  · Take over-the-counter and prescription medicines only as told by your health care provider.  · Do not drive or use heavy machinery while taking prescription pain medicine.  · Wear shoes that:  ? Have soft soles.  ? Have a wide toe area.  ? Provide arch support.  ? Do not pinch or squeeze your feet.  ? Have room for your orthotics, if applicable.  · Keep all follow-up visits as told by your health care provider. This is important.  Contact a health care provider if:  · Your symptoms get worse or do not get better with treatment and home care.  Summary  · Poole neuralgia is foot pain that affects the ball of the foot and the area near the toes. Pain usually occurs between the third and fourth toes, gets worse with walking, and goes away at night.  · Poole neuralgia occurs when part of a nerve in the foot (digital nerve) is under too much pressure. When this happens over a long period of time, the nerve can thicken (neuroma) and cause pain.  · This condition is caused by doing the same things over and over with your foot, such as running or jumping, wearing shoes that are too tight, or wearing high heels.  · Treatment may  "involve wearing low-heeled shoes that are not too tight, wearing a supportive pad (orthotic) under the front of your foot, getting injections in the nerve, or having surgery to remove part of the thickened nerve.  This information is not intended to replace advice given to you by your health care provider. Make sure you discuss any questions you have with your health care provider.  Document Released: 03/26/2002 Document Revised: 01/01/2019 Document Reviewed: 01/01/2019  [employer] Patient Education © 2020 [employer]    Try metatarsal pad        Hypertension, Adult  High blood pressure (hypertension) is when the force of blood pumping through the arteries is too strong. The arteries are the blood vessels that carry blood from the heart throughout the body. Hypertension forces the heart to work harder to pump blood and may cause arteries to become narrow or stiff. Untreated or uncontrolled hypertension can cause a heart attack, heart failure, a stroke, kidney disease, and other problems.  A blood pressure reading consists of a higher number over a lower number. Ideally, your blood pressure should be below 120/80. The first (\"top\") number is called the systolic pressure. It is a measure of the pressure in your arteries as your heart beats. The second (\"bottom\") number is called the diastolic pressure. It is a measure of the pressure in your arteries as the heart relaxes.  What are the causes?  The exact cause of this condition is not known. There are some conditions that result in or are related to high blood pressure.  What increases the risk?  Some risk factors for high blood pressure are under your control. The following factors may make you more likely to develop this condition:  · Smoking.  · Having type 2 diabetes mellitus, high cholesterol, or both.  · Not getting enough exercise or physical activity.  · Being overweight.  · Having too much fat, sugar, calories, or salt (sodium) in your " diet.  · Drinking too much alcohol.  Some risk factors for high blood pressure may be difficult or impossible to change. Some of these factors include:  · Having chronic kidney disease.  · Having a family history of high blood pressure.  · Age. Risk increases with age.  · Race. You may be at higher risk if you are .  · Gender. Men are at higher risk than women before age 45. After age 65, women are at higher risk than men.  · Having obstructive sleep apnea.  · Stress.  What are the signs or symptoms?  High blood pressure may not cause symptoms. Very high blood pressure (hypertensive crisis) may cause:  · Headache.  · Anxiety.  · Shortness of breath.  · Nosebleed.  · Nausea and vomiting.  · Vision changes.  · Severe chest pain.  · Seizures.  How is this diagnosed?  This condition is diagnosed by measuring your blood pressure while you are seated, with your arm resting on a flat surface, your legs uncrossed, and your feet flat on the floor. The cuff of the blood pressure monitor will be placed directly against the skin of your upper arm at the level of your heart. It should be measured at least twice using the same arm. Certain conditions can cause a difference in blood pressure between your right and left arms.  Certain factors can cause blood pressure readings to be lower or higher than normal for a short period of time:  · When your blood pressure is higher when you are in a health care provider's office than when you are at home, this is called white coat hypertension. Most people with this condition do not need medicines.  · When your blood pressure is higher at home than when you are in a health care provider's office, this is called masked hypertension. Most people with this condition may need medicines to control blood pressure.  If you have a high blood pressure reading during one visit or you have normal blood pressure with other risk factors, you may be asked to:  · Return on a different day  to have your blood pressure checked again.  · Monitor your blood pressure at home for 1 week or longer.  If you are diagnosed with hypertension, you may have other blood or imaging tests to help your health care provider understand your overall risk for other conditions.  How is this treated?  This condition is treated by making healthy lifestyle changes, such as eating healthy foods, exercising more, and reducing your alcohol intake. Your health care provider may prescribe medicine if lifestyle changes are not enough to get your blood pressure under control, and if:  · Your systolic blood pressure is above 130.  · Your diastolic blood pressure is above 80.  Your personal target blood pressure may vary depending on your medical conditions, your age, and other factors.  Follow these instructions at home:  Eating and drinking    · Eat a diet that is high in fiber and potassium, and low in sodium, added sugar, and fat. An example eating plan is called the DASH (Dietary Approaches to Stop Hypertension) diet. To eat this way:  ? Eat plenty of fresh fruits and vegetables. Try to fill one half of your plate at each meal with fruits and vegetables.  ? Eat whole grains, such as whole-wheat pasta, brown rice, or whole-grain bread. Fill about one fourth of your plate with whole grains.  ? Eat or drink low-fat dairy products, such as skim milk or low-fat yogurt.  ? Avoid fatty cuts of meat, processed or cured meats, and poultry with skin. Fill about one fourth of your plate with lean proteins, such as fish, chicken without skin, beans, eggs, or tofu.  ? Avoid pre-made and processed foods. These tend to be higher in sodium, added sugar, and fat.  · Reduce your daily sodium intake. Most people with hypertension should eat less than 1,500 mg of sodium a day.  · Do not drink alcohol if:  ? Your health care provider tells you not to drink.  ? You are pregnant, may be pregnant, or are planning to become pregnant.  · If you drink  alcohol:  ? Limit how much you use to:  § 0-1 drink a day for women.  § 0-2 drinks a day for men.  ? Be aware of how much alcohol is in your drink. In the [place] one drink equals one 12 oz bottle of beer (355 mL), one 5 oz glass of wine (148 mL), or one 1½ oz glass of hard liquor (44 mL).  Lifestyle    · Work with your health care provider to maintain a healthy body weight or to lose weight. Ask what an ideal weight is for you.  · Get at least 30 minutes of exercise most days of the week. Activities may include walking, swimming, or biking.  · Include exercise to strengthen your muscles (resistance exercise), such as Pilates or lifting weights, as part of your weekly exercise routine. Try to do these types of exercises for 30 minutes at least 3 days a week.  · Do not use any products that contain nicotine or tobacco, such as cigarettes, e-cigarettes, and chewing tobacco. If you need help quitting, ask your health care provider.  · Monitor your blood pressure at home as told by your health care provider.  · Keep all follow-up visits as told by your health care provider. This is important.  Medicines  · Take over-the-counter and prescription medicines only as told by your health care provider. Follow directions carefully. Blood pressure medicines must be taken as prescribed.  · Do not skip doses of blood pressure medicine. Doing this puts you at risk for problems and can make the medicine less effective.  · Ask your health care provider about side effects or reactions to medicines that you should watch for.  Contact a health care provider if you:  · Think you are having a reaction to a medicine you are taking.  · Have headaches that keep coming back (recurring).  · Feel dizzy.  · Have swelling in your ankles.  · Have trouble with your vision.  Get help right away if you:  · Develop a severe headache or confusion.  · Have unusual weakness or numbness.  · Feel faint.  · Have severe pain in your chest or abdomen.  · Vomit  repeatedly.  · Have trouble breathing.  Summary  · Hypertension is when the force of blood pumping through your arteries is too strong. If this condition is not controlled, it may put you at risk for serious complications.  · Your personal target blood pressure may vary depending on your medical conditions, your age, and other factors. For most people, a normal blood pressure is less than 120/80.  · Hypertension is treated with lifestyle changes, medicines, or a combination of both. Lifestyle changes include losing weight, eating a healthy, low-sodium diet, exercising more, and limiting alcohol.  This information is not intended to replace advice given to you by your health care provider. Make sure you discuss any questions you have with your health care provider.  Document Released: 12/18/2006 Document Revised: 08/28/2019 Document Reviewed: 08/28/2019  [employer] Interactive Patient Education © 2020 [employer]

## 2020-02-26 DIAGNOSIS — N30.00 ACUTE CYSTITIS WITHOUT HEMATURIA: Primary | ICD-10-CM

## 2020-02-26 LAB
APPEARANCE UR: CLEAR
BACTERIA #/AREA URNS HPF: ABNORMAL /HPF
BASOPHILS # BLD AUTO: 0.04 10*3/MM3 (ref 0–0.2)
BASOPHILS NFR BLD AUTO: 0.5 % (ref 0–1.5)
BILIRUB UR QL STRIP: NEGATIVE
CASTS URNS MICRO: ABNORMAL
COLOR UR: YELLOW
EOSINOPHIL # BLD AUTO: 0.22 10*3/MM3 (ref 0–0.4)
EOSINOPHIL NFR BLD AUTO: 2.8 % (ref 0.3–6.2)
EPI CELLS #/AREA URNS HPF: ABNORMAL /HPF
ERYTHROCYTE [DISTWIDTH] IN BLOOD BY AUTOMATED COUNT: 12.2 % (ref 12.3–15.4)
FOLATE SERPL-MCNC: >20 NG/ML (ref 4.78–24.2)
GLUCOSE UR QL: NEGATIVE
HCT VFR BLD AUTO: 38 % (ref 34–46.6)
HGB BLD-MCNC: 12.2 G/DL (ref 12–15.9)
HGB UR QL STRIP: ABNORMAL
IMM GRANULOCYTES # BLD AUTO: 0.02 10*3/MM3 (ref 0–0.05)
IMM GRANULOCYTES NFR BLD AUTO: 0.3 % (ref 0–0.5)
KETONES UR QL STRIP: NEGATIVE
LEUKOCYTE ESTERASE UR QL STRIP: ABNORMAL
LYMPHOCYTES # BLD AUTO: 3.14 10*3/MM3 (ref 0.7–3.1)
LYMPHOCYTES NFR BLD AUTO: 40.4 % (ref 19.6–45.3)
MCH RBC QN AUTO: 28.8 PG (ref 26.6–33)
MCHC RBC AUTO-ENTMCNC: 32.1 G/DL (ref 31.5–35.7)
MCV RBC AUTO: 89.8 FL (ref 79–97)
MONOCYTES # BLD AUTO: 0.28 10*3/MM3 (ref 0.1–0.9)
MONOCYTES NFR BLD AUTO: 3.6 % (ref 5–12)
NEUTROPHILS # BLD AUTO: 4.07 10*3/MM3 (ref 1.7–7)
NEUTROPHILS NFR BLD AUTO: 52.4 % (ref 42.7–76)
NITRITE UR QL STRIP: POSITIVE
NRBC BLD AUTO-RTO: 0 /100 WBC (ref 0–0.2)
PH UR STRIP: 6 [PH] (ref 5–8)
PLATELET # BLD AUTO: 195 10*3/MM3 (ref 140–450)
PROT UR QL STRIP: NEGATIVE
RBC # BLD AUTO: 4.23 10*6/MM3 (ref 3.77–5.28)
RBC #/AREA URNS HPF: ABNORMAL /HPF
SP GR UR: 1.02 (ref 1–1.03)
UROBILINOGEN UR STRIP-MCNC: ABNORMAL MG/DL
VIT B12 SERPL-MCNC: >2000 PG/ML (ref 211–946)
WBC # BLD AUTO: 7.77 10*3/MM3 (ref 3.4–10.8)
WBC #/AREA URNS HPF: ABNORMAL /HPF

## 2020-02-26 RX ORDER — NITROFURANTOIN 25; 75 MG/1; MG/1
100 CAPSULE ORAL EVERY 12 HOURS SCHEDULED
Qty: 14 CAPSULE | Refills: 0 | Status: SHIPPED | OUTPATIENT
Start: 2020-02-26 | End: 2020-08-13

## 2020-03-09 LAB
BACTERIA UR CULT: ABNORMAL
BACTERIA UR CULT: ABNORMAL
OTHER ANTIBIOTIC SUSC ISLT: ABNORMAL

## 2020-03-09 RX ORDER — CIPROFLOXACIN 500 MG/1
500 TABLET, FILM COATED ORAL EVERY 12 HOURS SCHEDULED
Qty: 14 TABLET | Refills: 0 | Status: SHIPPED | OUTPATIENT
Start: 2020-03-09 | End: 2020-08-13

## 2020-04-14 DIAGNOSIS — I10 ESSENTIAL HYPERTENSION: ICD-10-CM

## 2020-04-14 RX ORDER — FUROSEMIDE 20 MG/1
TABLET ORAL
Qty: 90 TABLET | Refills: 1 | Status: SHIPPED | OUTPATIENT
Start: 2020-04-14 | End: 2020-10-01

## 2020-05-28 ENCOUNTER — TELEMEDICINE (OUTPATIENT)
Dept: FAMILY MEDICINE CLINIC | Facility: CLINIC | Age: 60
End: 2020-05-28

## 2020-05-28 ENCOUNTER — E-VISIT (OUTPATIENT)
Dept: FAMILY MEDICINE CLINIC | Facility: CLINIC | Age: 60
End: 2020-05-28

## 2020-05-28 DIAGNOSIS — N30.00 ACUTE CYSTITIS WITHOUT HEMATURIA: Primary | ICD-10-CM

## 2020-05-28 PROCEDURE — 99213 OFFICE O/P EST LOW 20 MIN: CPT | Performed by: PHYSICIAN ASSISTANT

## 2020-05-28 RX ORDER — FLUCONAZOLE 150 MG/1
150 TABLET ORAL ONCE
Qty: 1 TABLET | Refills: 2 | Status: SHIPPED | OUTPATIENT
Start: 2020-05-28 | End: 2020-05-28

## 2020-05-28 RX ORDER — DOXYCYCLINE HYCLATE 100 MG/1
100 CAPSULE ORAL 2 TIMES DAILY
Qty: 14 CAPSULE | Refills: 0 | Status: SHIPPED | OUTPATIENT
Start: 2020-05-28 | End: 2020-08-13

## 2020-05-28 NOTE — PROGRESS NOTES
Subjective   Kristina Kang is a 59 y.o. female.   You have chosen to receive care through a telehealth visit.  Do you consent to use a video/audio connection for your medical care today? Yes    History of Present Illness   Kristina Kang 59 y.o.female complains of urinary symptoms. she complains of voiding pressure--feels UTI starting. She has had symptoms for a few days. The symptoms are mild.  Patient denies fever, gross blood in urine, vomiting and abdominal pain.  Patient has tried  nothing for relief of symptoms.  Patient does not have a history of recurrent UTI. Patient does not have a history of pyelonephritis.---was concerned last episode         Had UTI 2 mos ago; took Cipro  The following portions of the patient's history were reviewed and updated as appropriate: allergies, current medications, past family history, past medical history, past social history, past surgical history and problem list.    Review of Systems   Constitutional: Negative for activity change, fever and unexpected weight change.   Respiratory: Negative for shortness of breath and wheezing.    Cardiovascular: Negative for chest pain.   Gastrointestinal: Negative for abdominal pain.   Genitourinary: Positive for frequency.   Musculoskeletal: Negative for joint swelling.   Neurological: Negative for speech difficulty.   Psychiatric/Behavioral: Negative for confusion and suicidal ideas.       Objective   Physical Exam   Constitutional: She is oriented to person, place, and time. She appears well-developed and well-nourished. No distress.   HENT:   Head: Normocephalic and atraumatic.   Right Ear: External ear normal.   Left Ear: External ear normal.   Eyes: Conjunctivae are normal. Right eye exhibits no discharge. Left eye exhibits no discharge. No scleral icterus.   Neck: Normal range of motion.   Pulmonary/Chest: Effort normal. No stridor. No respiratory distress.   Abdominal: Soft. There is no tenderness. There is no guarding.    Musculoskeletal: Normal range of motion.   Neurological: She is alert and oriented to person, place, and time. Coordination normal.   Skin: Skin is dry. She is not diaphoretic.   Psychiatric: She has a normal mood and affect. Her behavior is normal. Judgment and thought content normal.       Assessment/Plan   Kristina was seen today for urinary tract infection.    Diagnoses and all orders for this visit:    Acute cystitis without hematuria    Other orders  -     doxycycline (VIBRAMYCIN) 100 MG capsule; Take 1 capsule by mouth 2 (Two) Times a Day. For infection  -     fluconazole (Diflucan) 150 MG tablet; Take 1 tablet by mouth 1 (One) Time for 1 dose. One PO X 1 for yeast infection          Time spent 15 minutes

## 2020-06-03 ENCOUNTER — CLINICAL SUPPORT (OUTPATIENT)
Dept: FAMILY MEDICINE CLINIC | Facility: CLINIC | Age: 60
End: 2020-06-03

## 2020-06-03 DIAGNOSIS — Z23 IMMUNIZATION DUE: Primary | ICD-10-CM

## 2020-06-03 PROCEDURE — 90750 HZV VACC RECOMBINANT IM: CPT | Performed by: PHYSICIAN ASSISTANT

## 2020-06-03 PROCEDURE — 90471 IMMUNIZATION ADMIN: CPT | Performed by: PHYSICIAN ASSISTANT

## 2020-06-23 VITALS
DIASTOLIC BLOOD PRESSURE: 35 MMHG | HEART RATE: 78 BPM | DIASTOLIC BLOOD PRESSURE: 58 MMHG | SYSTOLIC BLOOD PRESSURE: 79 MMHG | SYSTOLIC BLOOD PRESSURE: 80 MMHG | DIASTOLIC BLOOD PRESSURE: 67 MMHG | SYSTOLIC BLOOD PRESSURE: 95 MMHG | HEART RATE: 69 BPM | HEART RATE: 72 BPM | SYSTOLIC BLOOD PRESSURE: 126 MMHG | RESPIRATION RATE: 13 BRPM | HEART RATE: 67 BPM | DIASTOLIC BLOOD PRESSURE: 54 MMHG | DIASTOLIC BLOOD PRESSURE: 44 MMHG | HEART RATE: 63 BPM | DIASTOLIC BLOOD PRESSURE: 59 MMHG | HEART RATE: 64 BPM | DIASTOLIC BLOOD PRESSURE: 61 MMHG | RESPIRATION RATE: 24 BRPM | TEMPERATURE: 98 F | RESPIRATION RATE: 19 BRPM | SYSTOLIC BLOOD PRESSURE: 96 MMHG | TEMPERATURE: 97.1 F | HEART RATE: 66 BPM | SYSTOLIC BLOOD PRESSURE: 89 MMHG | SYSTOLIC BLOOD PRESSURE: 92 MMHG | DIASTOLIC BLOOD PRESSURE: 47 MMHG | SYSTOLIC BLOOD PRESSURE: 88 MMHG | DIASTOLIC BLOOD PRESSURE: 51 MMHG | HEART RATE: 60 BPM | DIASTOLIC BLOOD PRESSURE: 56 MMHG | DIASTOLIC BLOOD PRESSURE: 78 MMHG | HEART RATE: 73 BPM | HEART RATE: 65 BPM | RESPIRATION RATE: 21 BRPM | OXYGEN SATURATION: 97 % | SYSTOLIC BLOOD PRESSURE: 127 MMHG | HEART RATE: 57 BPM | SYSTOLIC BLOOD PRESSURE: 108 MMHG | HEART RATE: 77 BPM | SYSTOLIC BLOOD PRESSURE: 115 MMHG | SYSTOLIC BLOOD PRESSURE: 132 MMHG | DIASTOLIC BLOOD PRESSURE: 77 MMHG | RESPIRATION RATE: 17 BRPM | OXYGEN SATURATION: 99 % | SYSTOLIC BLOOD PRESSURE: 123 MMHG | HEIGHT: 63 IN | HEART RATE: 58 BPM | RESPIRATION RATE: 14 BRPM | SYSTOLIC BLOOD PRESSURE: 97 MMHG | RESPIRATION RATE: 15 BRPM | RESPIRATION RATE: 22 BRPM | RESPIRATION RATE: 16 BRPM | OXYGEN SATURATION: 98 % | DIASTOLIC BLOOD PRESSURE: 52 MMHG | SYSTOLIC BLOOD PRESSURE: 100 MMHG | SYSTOLIC BLOOD PRESSURE: 83 MMHG | OXYGEN SATURATION: 100 % | SYSTOLIC BLOOD PRESSURE: 101 MMHG | DIASTOLIC BLOOD PRESSURE: 39 MMHG | WEIGHT: 153 LBS | DIASTOLIC BLOOD PRESSURE: 42 MMHG | RESPIRATION RATE: 18 BRPM | SYSTOLIC BLOOD PRESSURE: 81 MMHG | RESPIRATION RATE: 11 BRPM | RESPIRATION RATE: 10 BRPM | SYSTOLIC BLOOD PRESSURE: 84 MMHG | HEART RATE: 68 BPM | HEART RATE: 70 BPM | DIASTOLIC BLOOD PRESSURE: 41 MMHG

## 2020-06-29 ENCOUNTER — OFFICE (AMBULATORY)
Dept: URBAN - METROPOLITAN AREA PATHOLOGY 4 | Facility: PATHOLOGY | Age: 60
End: 2020-06-29

## 2020-06-29 ENCOUNTER — AMBULATORY SURGICAL CENTER (AMBULATORY)
Dept: URBAN - METROPOLITAN AREA SURGERY 17 | Facility: SURGERY | Age: 60
End: 2020-06-29
Payer: COMMERCIAL

## 2020-06-29 DIAGNOSIS — K63.5 POLYP OF COLON: ICD-10-CM

## 2020-06-29 DIAGNOSIS — D12.3 BENIGN NEOPLASM OF TRANSVERSE COLON: ICD-10-CM

## 2020-06-29 DIAGNOSIS — D12.5 BENIGN NEOPLASM OF SIGMOID COLON: ICD-10-CM

## 2020-06-29 DIAGNOSIS — D12.8 BENIGN NEOPLASM OF RECTUM: ICD-10-CM

## 2020-06-29 DIAGNOSIS — Z86.010 PERSONAL HISTORY OF COLONIC POLYPS: ICD-10-CM

## 2020-06-29 DIAGNOSIS — K62.1 RECTAL POLYP: ICD-10-CM

## 2020-06-29 LAB
GI HISTOLOGY: A. UNSPECIFIED: (no result)
GI HISTOLOGY: B. UNSPECIFIED: (no result)
GI HISTOLOGY: C. UNSPECIFIED: (no result)
GI HISTOLOGY: D. UNSPECIFIED: (no result)
GI HISTOLOGY: E. UNSPECIFIED: (no result)
GI HISTOLOGY: F. UNSPECIFIED: (no result)
GI HISTOLOGY: PDF REPORT: (no result)

## 2020-06-29 PROCEDURE — 88305 TISSUE EXAM BY PATHOLOGIST: CPT | Performed by: INTERNAL MEDICINE

## 2020-06-29 PROCEDURE — 45385 COLONOSCOPY W/LESION REMOVAL: CPT | Mod: 33 | Performed by: INTERNAL MEDICINE

## 2020-07-11 RX ORDER — FLUTICASONE PROPIONATE 50 MCG
2 SPRAY, SUSPENSION (ML) NASAL DAILY
Qty: 48 ML | Refills: 2 | Status: SHIPPED | OUTPATIENT
Start: 2020-07-11 | End: 2021-06-02

## 2020-07-31 DIAGNOSIS — E55.9 VITAMIN D DEFICIENCY: ICD-10-CM

## 2020-07-31 DIAGNOSIS — I10 ESSENTIAL HYPERTENSION: Primary | ICD-10-CM

## 2020-07-31 DIAGNOSIS — R68.89 ABNORMAL ENDOCRINE LABORATORY TEST FINDING: ICD-10-CM

## 2020-07-31 DIAGNOSIS — E78.5 HYPERLIPIDEMIA, UNSPECIFIED HYPERLIPIDEMIA TYPE: ICD-10-CM

## 2020-08-01 LAB
25(OH)D3+25(OH)D2 SERPL-MCNC: 48.7 NG/ML (ref 30–100)
ALBUMIN SERPL-MCNC: 4.7 G/DL (ref 3.5–5.2)
ALBUMIN/GLOB SERPL: 2 G/DL
ALP SERPL-CCNC: 93 U/L (ref 39–117)
ALT SERPL-CCNC: 11 U/L (ref 1–33)
AST SERPL-CCNC: 14 U/L (ref 1–32)
BILIRUB SERPL-MCNC: 0.4 MG/DL (ref 0–1.2)
BUN SERPL-MCNC: 9 MG/DL (ref 6–20)
BUN/CREAT SERPL: 10.2 (ref 7–25)
CALCIUM SERPL-MCNC: 10.5 MG/DL (ref 8.6–10.5)
CHLORIDE SERPL-SCNC: 104 MMOL/L (ref 98–107)
CHOLEST SERPL-MCNC: 124 MG/DL (ref 0–200)
CO2 SERPL-SCNC: 25.6 MMOL/L (ref 22–29)
CREAT SERPL-MCNC: 0.88 MG/DL (ref 0.57–1)
GLOBULIN SER CALC-MCNC: 2.3 GM/DL
GLUCOSE SERPL-MCNC: 75 MG/DL (ref 65–99)
HDLC SERPL-MCNC: 37 MG/DL (ref 40–60)
INTERPRETATION: NORMAL
LDLC SERPL CALC-MCNC: 68 MG/DL (ref 0–100)
POTASSIUM SERPL-SCNC: 3.9 MMOL/L (ref 3.5–5.2)
PROT SERPL-MCNC: 7 G/DL (ref 6–8.5)
SODIUM SERPL-SCNC: 140 MMOL/L (ref 136–145)
TRIGL SERPL-MCNC: 97 MG/DL (ref 0–150)
TSH SERPL DL<=0.005 MIU/L-ACNC: 0.62 UIU/ML (ref 0.27–4.2)
VLDLC SERPL CALC-MCNC: 19.4 MG/DL

## 2020-08-05 NOTE — PROGRESS NOTES
Luis Kang is a 59 y.o. female.     F/u for hypercalcemia, hyperlipidemia     Patient is a 58-year-old female who came in for follow-up.      Her serum calcium has ranged from 10.7-11.2 with the upper limit of normal of 10.5 mg per DL. She had a bone density done at [facility] in December 2015 which showed osteopenia.  Bone density done in December 2017 showed stable osteopenia compared to 2015.  Serum calcium was at 10.5 mg per DL in January 2020     Bone density done in December 2019 showed osteopenia with a decrease compared to 2017.  FRAX score is 4.2% for major osteoporotic fracture and 0.5% for hip fractures.     She has history of vitamin D deficiency and has been taking vitamin D 2000 units/day.  She has no previous history of kidney stones, peptic ulcer, or depression. She denies muscle weakness.  25 hydroxyvitamin D done in July 2020 is normal at 48.7 ng/mL.      There is no family history of hypercalcemia or nephrolithiasis.     Workup done in January 2017 are as follows: Serum calcium at 11.0 mg per DL.  Intact PTH is normal at 42 pg per mL.  Undetectable PTH RP.  25-hydroxy vitamin D is 28 ng per mL.  24 urine calcium is low at 31.2 mg     Repeat 24-hour urine collection done after vitamin D repletion in 4/18 showed low calcium at 57 mg per 24 hours.      She has history of hypertension and has been on furosemide 20  mg once a day and valsartan 160 mg/day.  Lisinopril was discontinued because of a dry cough.  The cough resolved after she was treated with Prilosec for acid reflux.  No chest pain, shortness of breath or pedal edema.      She has hyperlipidemia and is on Lipitor 20 mg once a day. She denies any myalgia.. She has no history of diabetes mellitus.  She has lost 3 pounds since February 2020.  Lipid profile done in July 2020 are as follows: Cholesterol 124.  HDL 37.  LDL 68.  Triglycerides 97.      She had an episode of transient visual loss on the right eye in July  "2016 thought to be due to amaurosis fugax. Carotid ultrasound was normal. She is on aspirin and Lipitor.  She has no recurrence of visual loss since.  She had an eye examination in 2019 and there is no blind spot.     She had colon polyps removed by Dr. Malone in April 2019.  She had colonoscopy in May 2020 and 20 polyps were removed by Dr. Malone.  She will have a follow-up colonoscopy in 2021     The following portions of the patient's history were reviewed and updated as appropriate: allergies, current medications, past family history, past medical history, past social history, past surgical history and problem list.    Review of Systems   Constitutional: Negative.  Negative for chills, fatigue and fever.   HENT: Negative.    Eyes: Negative.    Respiratory: Negative.  Negative for chest tightness, shortness of breath and wheezing.    Cardiovascular: Negative.  Negative for chest pain, palpitations and leg swelling.   Gastrointestinal: Positive for abdominal pain (cramping ). Negative for abdominal distention, constipation, diarrhea and nausea.   Endocrine: Negative.  Negative for cold intolerance, heat intolerance and polyuria.   Genitourinary: Negative.  Negative for difficulty urinating, frequency and urgency.   Musculoskeletal: Negative.  Negative for back pain, joint swelling and neck pain.   Neurological: Negative.  Negative for dizziness, tremors, seizures, light-headedness, numbness and headaches.   Hematological: Bruises/bleeds easily (bruise ).   Psychiatric/Behavioral: Negative.  Negative for agitation and confusion. The patient is not nervous/anxious.      Objective      Vitals:    08/13/20 1441   BP: 142/88   BP Location: Right arm   Patient Position: Sitting   Cuff Size: Small Adult   Pulse: 74   SpO2: 95%   Weight: 67.8 kg (149 lb 6.4 oz)   Height: 162 cm (63.78\")     Physical Exam   Constitutional: She is oriented to person, place, and time. She appears well-developed and well-nourished. No " distress.   HENT:   Head: Normocephalic.   Right Ear: External ear normal.   Left Ear: External ear normal.   Nose: Nose normal.   Mouth/Throat: Oropharynx is clear and moist. No oropharyngeal exudate.   Eyes: Conjunctivae and EOM are normal. Right eye exhibits no discharge. Left eye exhibits no discharge. No scleral icterus.   Neck: Normal range of motion. Neck supple. No JVD present. No tracheal deviation present. No thyromegaly present.   Cardiovascular: Normal rate, regular rhythm, normal heart sounds and intact distal pulses. Exam reveals no gallop and no friction rub.   No murmur heard.  Pulmonary/Chest: Effort normal and breath sounds normal. No stridor. No respiratory distress. She has no wheezes. She has no rales. She exhibits no tenderness.   Abdominal: Soft. Bowel sounds are normal. She exhibits no distension and no mass. There is no tenderness. There is no rebound and no guarding.   Musculoskeletal: Normal range of motion. She exhibits no edema, tenderness or deformity.   Lymphadenopathy:     She has no cervical adenopathy.   Neurological: She is alert and oriented to person, place, and time. She displays normal reflexes. She exhibits normal muscle tone.   Skin: Skin is warm and dry. No rash noted. No erythema. No pallor.   Psychiatric: She has a normal mood and affect. Her behavior is normal.     Orders Only on 07/31/2020   Component Date Value Ref Range Status   • 25 Hydroxy, Vitamin D 07/31/2020 48.7  30.0 - 100.0 ng/ml Final    Comment: Results may be falsely increased if patient taking Biotin.  Reference Range for Total Vitamin D 25(OH)  Deficiency <20.0 ng/mL  Insufficiency 21-29 ng/mL  Sufficiency  ng/mL  Toxicity >100 ng/ml     • Glucose 07/31/2020 75  65 - 99 mg/dL Final   • BUN 07/31/2020 9  6 - 20 mg/dL Final   • Creatinine 07/31/2020 0.88  0.57 - 1.00 mg/dL Final   • eGFR Non African Am 07/31/2020 66  >60 mL/min/1.73 Final   • eGFR African Am 07/31/2020 80  >60 mL/min/1.73 Final   •  BUN/Creatinine Ratio 07/31/2020 10.2  7.0 - 25.0 Final   • Sodium 07/31/2020 140  136 - 145 mmol/L Final   • Potassium 07/31/2020 3.9  3.5 - 5.2 mmol/L Final   • Chloride 07/31/2020 104  98 - 107 mmol/L Final   • Total CO2 07/31/2020 25.6  22.0 - 29.0 mmol/L Final   • Calcium 07/31/2020 10.5  8.6 - 10.5 mg/dL Final   • Total Protein 07/31/2020 7.0  6.0 - 8.5 g/dL Final   • Albumin 07/31/2020 4.70  3.50 - 5.20 g/dL Final   • Globulin 07/31/2020 2.3  gm/dL Final   • A/G Ratio 07/31/2020 2.0  g/dL Final   • Total Bilirubin 07/31/2020 0.4  0.0 - 1.2 mg/dL Final   • Alkaline Phosphatase 07/31/2020 93  39 - 117 U/L Final   • AST (SGOT) 07/31/2020 14  1 - 32 U/L Final   • ALT (SGPT) 07/31/2020 11  1 - 33 U/L Final   • Total Cholesterol 07/31/2020 124  0 - 200 mg/dL Final   • Triglycerides 07/31/2020 97  0 - 150 mg/dL Final   • HDL Cholesterol 07/31/2020 37* 40 - 60 mg/dL Final   • VLDL Cholesterol 07/31/2020 19.4  mg/dL Final   • LDL Cholesterol  07/31/2020 68  0 - 100 mg/dL Final   • TSH 07/31/2020 0.625  0.270 - 4.200 uIU/mL Final   • Interpretation 07/31/2020 Note   Final    Supplemental report is available.     Assessment/Plan   Kristina was seen today for hyperlipidemia and hypercalcemia.    Diagnoses and all orders for this visit:    Hypocalciuric hypercalcemia    Essential hypertension    Mixed hyperlipidemia    Vitamin D deficiency    Osteopenia of both hips      Continue vitamin D3 2000 units/day.  Continue regular exercise.  Repeat bone density after December 2021.  Continue furosemide and valsartan.  Continue Lipitor 40 mg/day.  Restart aspirin 81 mg/day due to previous history of amaurosis fugax  Follow-up with Dr. Malone.  Flu vaccine this fall    Copy of my note sent to Dr. Malone and SAM Mahoney.    RTC 6 mos.

## 2020-08-13 ENCOUNTER — OFFICE VISIT (OUTPATIENT)
Dept: ENDOCRINOLOGY | Age: 60
End: 2020-08-13

## 2020-08-13 VITALS
OXYGEN SATURATION: 95 % | HEIGHT: 64 IN | DIASTOLIC BLOOD PRESSURE: 88 MMHG | WEIGHT: 149.4 LBS | BODY MASS INDEX: 25.51 KG/M2 | SYSTOLIC BLOOD PRESSURE: 142 MMHG | HEART RATE: 74 BPM

## 2020-08-13 DIAGNOSIS — E78.2 MIXED HYPERLIPIDEMIA: ICD-10-CM

## 2020-08-13 DIAGNOSIS — I10 ESSENTIAL HYPERTENSION: ICD-10-CM

## 2020-08-13 DIAGNOSIS — E83.52 HYPOCALCIURIC HYPERCALCEMIA: Primary | ICD-10-CM

## 2020-08-13 DIAGNOSIS — M85.851 OSTEOPENIA OF BOTH HIPS: ICD-10-CM

## 2020-08-13 DIAGNOSIS — M85.852 OSTEOPENIA OF BOTH HIPS: ICD-10-CM

## 2020-08-13 DIAGNOSIS — E55.9 VITAMIN D DEFICIENCY: ICD-10-CM

## 2020-08-13 PROCEDURE — 99214 OFFICE O/P EST MOD 30 MIN: CPT | Performed by: INTERNAL MEDICINE

## 2020-08-27 RX ORDER — VALSARTAN 160 MG/1
TABLET ORAL
Qty: 90 TABLET | Refills: 0 | Status: SHIPPED | OUTPATIENT
Start: 2020-08-27 | End: 2020-11-30

## 2020-10-01 DIAGNOSIS — I10 ESSENTIAL HYPERTENSION: ICD-10-CM

## 2020-10-01 RX ORDER — FUROSEMIDE 20 MG/1
TABLET ORAL
Qty: 90 TABLET | Refills: 1 | Status: SHIPPED | OUTPATIENT
Start: 2020-10-01 | End: 2021-03-31

## 2020-11-30 RX ORDER — VALSARTAN 160 MG/1
TABLET ORAL
Qty: 90 TABLET | Refills: 0 | Status: SHIPPED | OUTPATIENT
Start: 2020-11-30 | End: 2021-02-24

## 2020-12-07 ENCOUNTER — TRANSCRIBE ORDERS (OUTPATIENT)
Dept: ADMINISTRATIVE | Facility: HOSPITAL | Age: 60
End: 2020-12-07

## 2020-12-07 DIAGNOSIS — Z12.31 VISIT FOR SCREENING MAMMOGRAM: Primary | ICD-10-CM

## 2020-12-09 RX ORDER — LANOLIN ALCOHOL/MO/W.PET/CERES
CREAM (GRAM) TOPICAL
Qty: 90 TABLET | Refills: 4 | Status: SHIPPED | OUTPATIENT
Start: 2020-12-09 | End: 2021-03-04 | Stop reason: SDUPTHER

## 2021-01-26 DIAGNOSIS — R68.89 ABNORMAL ENDOCRINE LABORATORY TEST FINDING: ICD-10-CM

## 2021-01-26 DIAGNOSIS — E78.2 MIXED HYPERLIPIDEMIA: ICD-10-CM

## 2021-01-26 DIAGNOSIS — E78.5 HYPERLIPIDEMIA, UNSPECIFIED HYPERLIPIDEMIA TYPE: ICD-10-CM

## 2021-01-26 DIAGNOSIS — E55.9 VITAMIN D DEFICIENCY: ICD-10-CM

## 2021-01-26 DIAGNOSIS — I10 ESSENTIAL HYPERTENSION: Primary | ICD-10-CM

## 2021-02-08 NOTE — PROGRESS NOTES
Subjective   Kristina Kang is a 60 y.o. female.     F/u for hypercalcemia, hyperlipidemia         Patient is a 60-year-old female who consented to video visit     Her serum calcium has ranged from 10.7-11.2 with the upper limit of normal of 10.5 mg per DL. She had a bone density done at [facility] in December 2015 which showed osteopenia.  Bone density done in December 2017 showed stable osteopenia compared to 2015.  Serum calcium was at 10.5 mg per DL in February 2021     Bone density done in December 2019 showed osteopenia with a decrease compared to 2017.  FRAX score is 4.2% for major osteoporotic fracture and 0.5% for hip fractures.     She has history of vitamin D deficiency and has been taking vitamin D 2000 units/day.  She has no previous history of kidney stones, peptic ulcer, or depression. She denies muscle weakness.  25 hydroxyvitamin D done in February 2021  is normal at 49.5 ng/mL.      There is no family history of hypercalcemia or nephrolithiasis.     Workup done in January 2017 are as follows: Serum calcium at 11.0 mg per DL.  Intact PTH is normal at 42 pg per mL.  Undetectable PTH RP.  25-hydroxy vitamin D is 28 ng per mL.  24 urine calcium is low at 31.2 mg     Repeat 24-hour urine collection done after vitamin D repletion in 4/18 showed low calcium at 57 mg per 24 hours.      She has history of hypertension and has been on furosemide 20  mg once a day and valsartan 160 mg/day.  Lisinopril was discontinued because of a dry cough.  The cough resolved after she was treated with Prilosec for acid reflux.  No chest pain, shortness of breath or pedal edema.      She has hyperlipidemia and is on Lipitor 20 mg once a day. She denies any myalgia.. She has no history of diabetes mellitus.  She denies significant weight change.  Lipid panel done in February 2021 are as follows: Cholesterol 129.  HDL 42.  LDL 69.      She had an episode of transient visual loss on the right eye in July 2016 thought  to be due to amaurosis fugax. Carotid ultrasound was normal. She is on aspirin and Lipitor.  She has no recurrence of visual loss since.  She had an eye examination in February 12, 2021 and there is no blind spot.     She had colon polyps removed by Dr. Malone in April 2019.  She had colonoscopy in May 2020 and 20 polyps were removed by Dr. Malone.  She will have a follow-up colonoscopy in 2021.  She denies bowel complaints  The following portions of the patient's history were reviewed and updated as appropriate: allergies, current medications, past family history, past medical history, past social history, past surgical history and problem list.    Review of Systems   Cardiovascular: Negative.    Endocrine: Negative for cold intolerance and heat intolerance.   Genitourinary: Negative.    Musculoskeletal: Negative for myalgias.   Neurological: Negative for numbness.     Objective      There were no vitals filed for this visit.  Physical Exam  Constitutional:       General: She is not in acute distress.     Appearance: She is not ill-appearing, toxic-appearing or diaphoretic.   Pulmonary:      Effort: Pulmonary effort is normal.   Neurological:      Mental Status: She is alert.   Psychiatric:         Behavior: Behavior normal.         Thought Content: Thought content normal.       Results Encounter on 01/31/2021   Component Date Value Ref Range Status   • Glucose 02/01/2021 84  65 - 99 mg/dL Final   • BUN 02/01/2021 9  8 - 23 mg/dL Final   • Creatinine 02/01/2021 0.77  0.57 - 1.00 mg/dL Final   • eGFR Non  Am 02/01/2021 76  >60 mL/min/1.73 Final    Comment: GFR Normal >60  Chronic Kidney Disease <60  Kidney Failure <15     • eGFR  Am 02/01/2021 93  >60 mL/min/1.73 Final   • BUN/Creatinine Ratio 02/01/2021 11.7  7.0 - 25.0 Final   • Sodium 02/01/2021 143  136 - 145 mmol/L Final   • Potassium 02/01/2021 3.9  3.5 - 5.2 mmol/L Final   • Chloride 02/01/2021 106  98 - 107 mmol/L Final   • Total CO2  02/01/2021 27.5  22.0 - 29.0 mmol/L Final   • Calcium 02/01/2021 10.5  8.6 - 10.5 mg/dL Final   • Total Protein 02/01/2021 7.1  6.0 - 8.5 g/dL Final   • Albumin 02/01/2021 4.60  3.50 - 5.20 g/dL Final   • Globulin 02/01/2021 2.5  gm/dL Final   • A/G Ratio 02/01/2021 1.8  g/dL Final   • Total Bilirubin 02/01/2021 0.3  0.0 - 1.2 mg/dL Final   • Alkaline Phosphatase 02/01/2021 95  39 - 117 U/L Final   • AST (SGOT) 02/01/2021 15  1 - 32 U/L Final   • ALT (SGPT) 02/01/2021 9  1 - 33 U/L Final   • Total Cholesterol 02/01/2021 129  0 - 200 mg/dL Final    Comment: Cholesterol Reference Ranges  (U.S. Department of Health and Human Services ATP III  Classifications)  Desirable          <200 mg/dL  Borderline High    200-239 mg/dL  High Risk          >240 mg/dL  Triglyceride Reference Ranges  (U.S. Department of Health and Human Services ATP III  Classifications)  Normal           <150 mg/dL  Borderline High  150-199 mg/dL  High             200-499 mg/dL  Very High        >500 mg/dL  HDL Reference Ranges  (U.S. Department of Health and Human Services ATP III  Classifcations)  Low     <40 mg/dl (major risk factor for CHD)  High    >60 mg/dl ('negative' risk factor for CHD)  LDL Reference Ranges  (U.S. Department of Health and Human Services ATP III  Classifcations)  Optimal          <100 mg/dL  Near Optimal     100-129 mg/dL  Borderline High  130-159 mg/dL  High             160-189 mg/dL  Very High        >189 mg/dL     • Triglycerides 02/01/2021 92  0 - 150 mg/dL Final   • HDL Cholesterol 02/01/2021 42  40 - 60 mg/dL Final   • VLDL Cholesterol Alexis 02/01/2021 18  5 - 40 mg/dL Final   • LDL Chol Calc ([facility]) 02/01/2021 69  0 - 100 mg/dL Final   • 25 Hydroxy, Vitamin D 02/01/2021 49.5  30.0 - 100.0 ng/ml Final    Comment: Results may be falsely increased if patient taking Biotin.  Reference Range for Total Vitamin D 25(OH)  Deficiency <20.0 ng/mL  Insufficiency 21-29 ng/mL  Sufficiency  ng/mL  Toxicity >100 ng/ml     • TSH  02/01/2021 0.743  0.270 - 4.200 uIU/mL Final   • Interpretation 02/01/2021 Note   Final    Supplemental report is available.     Assessment/Plan   Diagnoses and all orders for this visit:    1. Osteopenia of both hips (Primary)    2. Vitamin D deficiency    3. Hypocalciuric hypercalcemia    4. Essential hypertension    5. Hyperlipidemia, unspecified hyperlipidemia type      Continue vitamin D 2000 units/day.  Repeat bone density in December 2021.  Continue Lipitor 20 mg/day  Continue valsartan and furosemide    Copy of my note sent to [facility]    Follow-up in 4 to 6 months

## 2021-02-15 ENCOUNTER — TELEMEDICINE (OUTPATIENT)
Dept: ENDOCRINOLOGY | Age: 61
End: 2021-02-15

## 2021-02-15 DIAGNOSIS — M85.852 OSTEOPENIA OF BOTH HIPS: Primary | ICD-10-CM

## 2021-02-15 DIAGNOSIS — E83.52 HYPOCALCIURIC HYPERCALCEMIA: ICD-10-CM

## 2021-02-15 DIAGNOSIS — E55.9 VITAMIN D DEFICIENCY: ICD-10-CM

## 2021-02-15 DIAGNOSIS — M85.851 OSTEOPENIA OF BOTH HIPS: Primary | ICD-10-CM

## 2021-02-15 DIAGNOSIS — I10 ESSENTIAL HYPERTENSION: ICD-10-CM

## 2021-02-15 DIAGNOSIS — E78.5 HYPERLIPIDEMIA, UNSPECIFIED HYPERLIPIDEMIA TYPE: ICD-10-CM

## 2021-02-15 PROCEDURE — 99214 OFFICE O/P EST MOD 30 MIN: CPT | Performed by: INTERNAL MEDICINE

## 2021-02-22 ENCOUNTER — TELEPHONE (OUTPATIENT)
Dept: FAMILY MEDICINE CLINIC | Facility: CLINIC | Age: 61
End: 2021-02-22

## 2021-02-22 DIAGNOSIS — Z00.00 LABORATORY EXAMINATION ORDERED AS PART OF A ROUTINE GENERAL MEDICAL EXAMINATION: Primary | ICD-10-CM

## 2021-02-22 NOTE — TELEPHONE ENCOUNTER
Patient would like her lab orders placed so she can have labs drawn on Thursday.    Patient can be reached at [phone].

## 2021-02-24 RX ORDER — VALSARTAN 160 MG/1
TABLET ORAL
Qty: 90 TABLET | Refills: 0 | Status: SHIPPED | OUTPATIENT
Start: 2021-02-24 | End: 2021-03-04 | Stop reason: SDUPTHER

## 2021-02-24 RX ORDER — MAGNESIUM 200 MG
TABLET ORAL
Qty: 90 EACH | Refills: 3 | Status: SHIPPED | OUTPATIENT
Start: 2021-02-24 | End: 2021-08-16

## 2021-02-26 LAB
APPEARANCE UR: CLEAR
BACTERIA #/AREA URNS HPF: NORMAL /[HPF]
BASOPHILS # BLD AUTO: 0 X10E3/UL (ref 0–0.2)
BASOPHILS NFR BLD AUTO: 0 %
BILIRUB UR QL STRIP: NEGATIVE
COLOR UR: YELLOW
EOSINOPHIL # BLD AUTO: 0.2 X10E3/UL (ref 0–0.4)
EOSINOPHIL NFR BLD AUTO: 2 %
EPI CELLS #/AREA URNS HPF: NORMAL /HPF (ref 0–10)
ERYTHROCYTE [DISTWIDTH] IN BLOOD BY AUTOMATED COUNT: 12.5 % (ref 11.7–15.4)
FOLATE SERPL-MCNC: >20 NG/ML
GLUCOSE UR QL: NEGATIVE
HCT VFR BLD AUTO: 40.5 % (ref 34–46.6)
HGB BLD-MCNC: 13.2 G/DL (ref 11.1–15.9)
HGB UR QL STRIP: NEGATIVE
IMM GRANULOCYTES # BLD AUTO: 0 X10E3/UL (ref 0–0.1)
IMM GRANULOCYTES NFR BLD AUTO: 0 %
KETONES UR QL STRIP: NEGATIVE
LEUKOCYTE ESTERASE UR QL STRIP: ABNORMAL
LYMPHOCYTES # BLD AUTO: 3.2 X10E3/UL (ref 0.7–3.1)
LYMPHOCYTES NFR BLD AUTO: 34 %
MCH RBC QN AUTO: 29.9 PG (ref 26.6–33)
MCHC RBC AUTO-ENTMCNC: 32.6 G/DL (ref 31.5–35.7)
MCV RBC AUTO: 92 FL (ref 79–97)
MICRO URNS: ABNORMAL
MONOCYTES # BLD AUTO: 0.4 X10E3/UL (ref 0.1–0.9)
MONOCYTES NFR BLD AUTO: 4 %
MUCOUS THREADS URNS QL MICRO: PRESENT
NEUTROPHILS # BLD AUTO: 5.7 X10E3/UL (ref 1.4–7)
NEUTROPHILS NFR BLD AUTO: 60 %
NITRITE UR QL STRIP: NEGATIVE
PH UR STRIP: 5.5 [PH] (ref 5–7.5)
PLATELET # BLD AUTO: 257 X10E3/UL (ref 150–450)
PROT UR QL STRIP: NEGATIVE
RBC # BLD AUTO: 4.42 X10E6/UL (ref 3.77–5.28)
RBC #/AREA URNS HPF: NORMAL /HPF (ref 0–2)
SP GR UR: 1.02 (ref 1–1.03)
UROBILINOGEN UR STRIP-MCNC: 1 MG/DL (ref 0.2–1)
VIT B12 SERPL-MCNC: >2000 PG/ML (ref 232–1245)
WBC # BLD AUTO: 9.5 X10E3/UL (ref 3.4–10.8)
WBC #/AREA URNS HPF: NORMAL /HPF (ref 0–5)

## 2021-03-04 ENCOUNTER — OFFICE VISIT (OUTPATIENT)
Dept: FAMILY MEDICINE CLINIC | Facility: CLINIC | Age: 61
End: 2021-03-04

## 2021-03-04 VITALS
RESPIRATION RATE: 16 BRPM | SYSTOLIC BLOOD PRESSURE: 110 MMHG | OXYGEN SATURATION: 96 % | DIASTOLIC BLOOD PRESSURE: 68 MMHG | WEIGHT: 148 LBS | HEIGHT: 64 IN | BODY MASS INDEX: 25.27 KG/M2 | HEART RATE: 74 BPM | TEMPERATURE: 97.5 F

## 2021-03-04 DIAGNOSIS — E55.9 VITAMIN D DEFICIENCY: ICD-10-CM

## 2021-03-04 DIAGNOSIS — M85.852 OSTEOPENIA OF BOTH HIPS: ICD-10-CM

## 2021-03-04 DIAGNOSIS — G45.3 AMAUROSIS FUGAX OF RIGHT EYE: ICD-10-CM

## 2021-03-04 DIAGNOSIS — F41.1 GENERALIZED ANXIETY DISORDER: ICD-10-CM

## 2021-03-04 DIAGNOSIS — R79.89 ABNORMAL CBC: ICD-10-CM

## 2021-03-04 DIAGNOSIS — I10 ESSENTIAL HYPERTENSION: ICD-10-CM

## 2021-03-04 DIAGNOSIS — M85.851 OSTEOPENIA OF BOTH HIPS: ICD-10-CM

## 2021-03-04 DIAGNOSIS — N95.2 VAGINAL ATROPHY: ICD-10-CM

## 2021-03-04 DIAGNOSIS — E78.2 MIXED HYPERLIPIDEMIA: ICD-10-CM

## 2021-03-04 DIAGNOSIS — Z00.00 ROUTINE GENERAL MEDICAL EXAMINATION AT A HEALTH CARE FACILITY: Primary | ICD-10-CM

## 2021-03-04 PROCEDURE — 99213 OFFICE O/P EST LOW 20 MIN: CPT | Performed by: PHYSICIAN ASSISTANT

## 2021-03-04 PROCEDURE — 99396 PREV VISIT EST AGE 40-64: CPT | Performed by: PHYSICIAN ASSISTANT

## 2021-03-04 RX ORDER — ASPIRIN 81 MG/1
81 TABLET ORAL DAILY
COMMUNITY

## 2021-03-04 RX ORDER — CHOLECALCIFEROL (VITAMIN D3) 125 MCG
CAPSULE ORAL
COMMUNITY
Start: 2021-02-24 | End: 2021-08-16

## 2021-03-04 RX ORDER — VALSARTAN 160 MG/1
160 TABLET ORAL DAILY
Qty: 90 TABLET | Refills: 1 | Status: SHIPPED | OUTPATIENT
Start: 2021-03-04 | End: 2021-09-09 | Stop reason: SDUPTHER

## 2021-03-04 RX ORDER — ATORVASTATIN CALCIUM 40 MG/1
TABLET, FILM COATED ORAL
Qty: 90 TABLET | Refills: 3 | Status: SHIPPED | OUTPATIENT
Start: 2021-03-04 | End: 2022-03-22

## 2021-03-04 RX ORDER — LANOLIN ALCOHOL/MO/W.PET/CERES
400 CREAM (GRAM) TOPICAL DAILY
Qty: 90 TABLET | Refills: 4 | Status: SHIPPED | OUTPATIENT
Start: 2021-03-04 | End: 2022-05-10 | Stop reason: SDUPTHER

## 2021-03-04 RX ORDER — ESTRADIOL 10 UG/1
1 INSERT VAGINAL 2 TIMES WEEKLY
Qty: 24 TABLET | Refills: 3 | Status: SHIPPED | OUTPATIENT
Start: 2021-03-04 | End: 2021-05-12

## 2021-03-04 NOTE — PROGRESS NOTES
Subjective   Kristina Kang is a 60 y.o. female.     History of Present Illness     Kristina Kang 60 y.o. female who presents for an Annual Wellness Visit.  she has a history of   Patient Active Problem List   Diagnosis   • Degeneration of lumbar intervertebral disc   • Hepatitis C   • Essential hypertension   • Hyperlipidemia   • Vitamin D deficiency   • Osteoarthritis   • Episode of syncope   • Amaurosis fugax of right eye   • Osteopenia of both hips   • Chronic seasonal allergic rhinitis   • Hypocalciuric hypercalcemia   • Vaginal atrophy   • Generalized anxiety disorder   .  she has been feeling great.  Labs results discussed in detail with the patient.  Plan to update vaccines if needed today.  I  reviewed health maintenance with her as part of my preventative care plan.    Health Habits:  Dental Exam. up to date  Eye Exam. up to date  Exercise: 4 times/week.  Current exercise activities include: walking    COVID shot tomorrow  She wants to do Estrace cream for the dryness and is aware of the black box warming from FDA about heart attacks, strokes, breast cancer, uterine cancer, VTE risks with cream, though less likely.  Results for orders placed or performed in visit on 02/22/21   Vitamin B12    Specimen: Blood   Result Value Ref Range    Vitamin B-12 >2000 (H) 232 - 1245 pg/mL   Folate    Specimen: Blood   Result Value Ref Range    Folate >20.0 >3.0 ng/mL   Microscopic Examination -   Result Value Ref Range    WBC, UA 0-5 0 - 5 /hpf    RBC, UA 0-2 0 - 2 /hpf    Epithelial Cells (non renal) 0-10 0 - 10 /hpf    Mucus, UA Present Not Estab.    Bacteria, UA Few None seen/Few   CBC & Differential    Specimen: Blood   Result Value Ref Range    WBC 9.5 3.4 - 10.8 x10E3/uL    RBC 4.42 3.77 - 5.28 x10E6/uL    Hemoglobin 13.2 11.1 - 15.9 g/dL    Hematocrit 40.5 34.0 - 46.6 %    MCV 92 79 - 97 fL    MCH 29.9 26.6 - 33.0 pg    MCHC 32.6 31.5 - 35.7 g/dL    RDW 12.5 11.7 - 15.4 %    Platelets 257 150 - 450 x10E3/uL     Neutrophil Rel % 60 Not Estab. %    Lymphocyte Rel % 34 Not Estab. %    Monocyte Rel % 4 Not Estab. %    Eosinophil Rel % 2 Not Estab. %    Basophil Rel % 0 Not Estab. %    Neutrophils Absolute 5.7 1.4 - 7.0 x10E3/uL    Lymphocytes Absolute 3.2 (H) 0.7 - 3.1 x10E3/uL    Monocytes Absolute 0.4 0.1 - 0.9 x10E3/uL    Eosinophils Absolute 0.2 0.0 - 0.4 x10E3/uL    Basophils Absolute 0.0 0.0 - 0.2 x10E3/uL    Immature Granulocyte Rel % 0 Not Estab. %    Immature Grans Absolute 0.0 0.0 - 0.1 x10E3/uL   Urinalysis With Microscopic - Urine, Clean Catch    Specimen: Urine, Clean Catch   Result Value Ref Range    Specific Gravity, UA 1.020 1.005 - 1.030    pH, UA 5.5 5.0 - 7.5    Color, UA Yellow Yellow    Appearance, UA Clear Clear    Leukocytes, UA Trace (A) Negative    Protein Negative Negative/Trace    Glucose, UA Negative Negative    Ketones Negative Negative    Blood, UA Negative Negative    Bilirubin, UA Negative Negative    Urobilinogen, UA 1.0 0.2 - 1.0 mg/dL    Nitrite, UA Negative Negative    Microscopic Examination See below:          Dr Avendaño--endocrine---monitors Ca+;  Hx hypercalcemia-----now off HCTZ and watch Ca+;  Also watch K+  DEXA FRAX score 4.2%, 0.5%---DEXA 1-1-22 due  Has mammo appt    GI Dr Malone  July 2016---Amaurosis Fugax---carotid doppler neg--neg work up---not reoccured.  I want her LDL <70 d/t this and on ASA 81mg    She is taking B12 and folate--labs great    Watching lymphocytes---plan f/u 6 mos;  If higher or abn diff---refer hematology  Hep C in remission---watch LFTs  Taking Zoloft for YOLY and works great; not depressed; no break through  No adverse effects    The following portions of the patient's history were reviewed and updated as appropriate: allergies, current medications, past family history, past medical history, past social history, past surgical history and problem list.    Review of Systems   Constitutional: Negative for activity change, appetite change, fatigue and  unexpected weight change.   HENT: Negative for nosebleeds and trouble swallowing.    Eyes: Negative for pain and visual disturbance.   Respiratory: Negative for chest tightness, shortness of breath and wheezing.    Cardiovascular: Negative for chest pain and palpitations.   Gastrointestinal: Negative for abdominal pain and blood in stool.   Endocrine: Negative.    Genitourinary: Negative for difficulty urinating and hematuria.   Musculoskeletal: Negative for joint swelling.   Skin: Negative for color change and rash.   Allergic/Immunologic: Negative.    Neurological: Negative for syncope and speech difficulty.   Hematological: Negative for adenopathy.   Psychiatric/Behavioral: Negative for agitation, confusion, dysphoric mood and sleep disturbance. The patient is not nervous/anxious.    All other systems reviewed and are negative.      Objective   Physical Exam  Vitals signs and nursing note reviewed.   Constitutional:       General: She is not in acute distress.     Appearance: Normal appearance. She is well-developed. She is not ill-appearing or toxic-appearing.   HENT:      Head: Normocephalic.      Right Ear: Ear canal and external ear normal. There is no impacted cerumen.      Left Ear: Ear canal and external ear normal. There is no impacted cerumen.      Nose: Nose normal.      Mouth/Throat:      Pharynx: Oropharynx is clear. No oropharyngeal exudate.   Eyes:      General: No scleral icterus.     Conjunctiva/sclera: Conjunctivae normal.      Pupils: Pupils are equal, round, and reactive to light.   Neck:      Musculoskeletal: Normal range of motion and neck supple.      Thyroid: No thyromegaly.      Vascular: No carotid bruit.   Cardiovascular:      Rate and Rhythm: Normal rate and regular rhythm.      Pulses: Normal pulses.      Heart sounds: Normal heart sounds. No murmur.   Pulmonary:      Effort: Pulmonary effort is normal. No respiratory distress.      Breath sounds: Normal breath sounds. No rhonchi or  rales.   Abdominal:      General: Bowel sounds are normal.      Palpations: Abdomen is soft.      Tenderness: There is no abdominal tenderness.   Musculoskeletal: Normal range of motion.         General: No deformity.      Right lower leg: No edema.      Left lower leg: No edema.   Lymphadenopathy:      Cervical: No cervical adenopathy.   Skin:     General: Skin is warm and dry.      Coloration: Skin is not jaundiced.      Findings: No lesion or rash.   Neurological:      General: No focal deficit present.      Mental Status: She is alert and oriented to person, place, and time. Mental status is at baseline.      Cranial Nerves: No cranial nerve deficit.      Sensory: No sensory deficit.      Motor: No weakness.      Coordination: Coordination normal.      Gait: Gait normal.      Deep Tendon Reflexes: Reflexes normal.   Psychiatric:         Mood and Affect: Mood normal.         Behavior: Behavior normal.         Thought Content: Thought content normal.         Judgment: Judgment normal.         Assessment/Plan   Diagnoses and all orders for this visit:    1. Routine general medical examination at a health care facility (Primary)    2. Mixed hyperlipidemia    3. Generalized anxiety disorder    4. Amaurosis fugax of right eye    5. Vitamin D deficiency    6. Essential hypertension  -     atorvastatin (LIPITOR) 40 MG tablet; Take 1/2 tablet by mouth daily for cholesterol  Dispense: 90 tablet; Refill: 3    7. Osteopenia of both hips    8. Vaginal atrophy    Other orders  -     valsartan (DIOVAN) 160 MG tablet; Take 1 tablet by mouth Daily. for blood pressure.  Dispense: 90 tablet; Refill: 1  -     folic acid (FOLVITE) 400 MCG tablet; Take 1 tablet by mouth Daily.  Dispense: 90 tablet; Refill: 4  -     sertraline (Zoloft) 50 MG tablet; Take 1 tablet by mouth Daily. for stress  Dispense: 90 tablet; Refill: 3  -     estradiol (VAGIFEM) 10 MCG tablet vaginal tablet; Insert 1 tablet into the vagina 2 (Two) Times a Week.   Dispense: 24 tablet; Refill: 3      Ok B12, folate----just had labs===cont.  Note Dr Avendaño has her on Lasix  Want LDL <70 and keep ASA 81mg---d/t hx Amaurosis Fugax  Endocrine has her on Lasix and watches k+ and her Ca+---work up neg  DEXA Jan 2022  Zoloft for anxiety working well  Cont Vagifem for atrophy---helps; less UTIs also  CBC in August--f/u lymph count---borderline up

## 2021-03-16 ENCOUNTER — BULK ORDERING (OUTPATIENT)
Dept: CASE MANAGEMENT | Facility: OTHER | Age: 61
End: 2021-03-16

## 2021-03-16 DIAGNOSIS — Z23 IMMUNIZATION DUE: ICD-10-CM

## 2021-03-23 ENCOUNTER — HOSPITAL ENCOUNTER (OUTPATIENT)
Dept: MAMMOGRAPHY | Facility: HOSPITAL | Age: 61
Discharge: HOME | End: 2021-03-23
Admitting: PHYSICIAN ASSISTANT

## 2021-03-23 DIAGNOSIS — Z12.31 VISIT FOR SCREENING MAMMOGRAM: ICD-10-CM

## 2021-03-23 PROCEDURE — 77063 BREAST TOMOSYNTHESIS BI: CPT

## 2021-03-23 PROCEDURE — 77067 SCR MAMMO BI INCL CAD: CPT

## 2021-03-31 DIAGNOSIS — I10 ESSENTIAL HYPERTENSION: ICD-10-CM

## 2021-03-31 RX ORDER — FUROSEMIDE 20 MG/1
TABLET ORAL
Qty: 90 TABLET | Refills: 1 | Status: SHIPPED | OUTPATIENT
Start: 2021-03-31 | End: 2021-10-08

## 2021-05-12 RX ORDER — ESTRADIOL 10 UG/1
TABLET VAGINAL
Qty: 24 TABLET | Refills: 0 | Status: SHIPPED | OUTPATIENT
Start: 2021-05-12 | End: 2021-08-04

## 2021-06-02 RX ORDER — FLUTICASONE PROPIONATE 50 MCG
SPRAY, SUSPENSION (ML) NASAL
Qty: 48 ML | Refills: 2 | Status: SHIPPED | OUTPATIENT
Start: 2021-06-02 | End: 2022-04-28

## 2021-07-15 VITALS
DIASTOLIC BLOOD PRESSURE: 50 MMHG | HEART RATE: 64 BPM | RESPIRATION RATE: 13 BRPM | HEART RATE: 66 BPM | TEMPERATURE: 97.7 F | HEART RATE: 78 BPM | HEART RATE: 67 BPM | OXYGEN SATURATION: 100 % | SYSTOLIC BLOOD PRESSURE: 137 MMHG | SYSTOLIC BLOOD PRESSURE: 115 MMHG | DIASTOLIC BLOOD PRESSURE: 57 MMHG | DIASTOLIC BLOOD PRESSURE: 59 MMHG | HEART RATE: 75 BPM | TEMPERATURE: 97 F | SYSTOLIC BLOOD PRESSURE: 110 MMHG | RESPIRATION RATE: 21 BRPM | DIASTOLIC BLOOD PRESSURE: 61 MMHG | RESPIRATION RATE: 14 BRPM | SYSTOLIC BLOOD PRESSURE: 100 MMHG | DIASTOLIC BLOOD PRESSURE: 46 MMHG | SYSTOLIC BLOOD PRESSURE: 105 MMHG | RESPIRATION RATE: 16 BRPM | SYSTOLIC BLOOD PRESSURE: 107 MMHG | RESPIRATION RATE: 5 BRPM | HEART RATE: 65 BPM | OXYGEN SATURATION: 97 % | SYSTOLIC BLOOD PRESSURE: 132 MMHG | HEIGHT: 63 IN | SYSTOLIC BLOOD PRESSURE: 103 MMHG | DIASTOLIC BLOOD PRESSURE: 56 MMHG | HEART RATE: 69 BPM | HEART RATE: 70 BPM | SYSTOLIC BLOOD PRESSURE: 138 MMHG | RESPIRATION RATE: 17 BRPM | SYSTOLIC BLOOD PRESSURE: 158 MMHG | OXYGEN SATURATION: 99 % | DIASTOLIC BLOOD PRESSURE: 51 MMHG | SYSTOLIC BLOOD PRESSURE: 108 MMHG | HEART RATE: 68 BPM | SYSTOLIC BLOOD PRESSURE: 94 MMHG | SYSTOLIC BLOOD PRESSURE: 99 MMHG | DIASTOLIC BLOOD PRESSURE: 58 MMHG | SYSTOLIC BLOOD PRESSURE: 102 MMHG | RESPIRATION RATE: 18 BRPM | DIASTOLIC BLOOD PRESSURE: 48 MMHG | SYSTOLIC BLOOD PRESSURE: 97 MMHG | HEART RATE: 74 BPM | DIASTOLIC BLOOD PRESSURE: 62 MMHG | SYSTOLIC BLOOD PRESSURE: 114 MMHG | WEIGHT: 149 LBS | HEART RATE: 71 BPM | DIASTOLIC BLOOD PRESSURE: 47 MMHG | RESPIRATION RATE: 8 BRPM | DIASTOLIC BLOOD PRESSURE: 64 MMHG | DIASTOLIC BLOOD PRESSURE: 54 MMHG | RESPIRATION RATE: 20 BRPM | SYSTOLIC BLOOD PRESSURE: 95 MMHG | RESPIRATION RATE: 19 BRPM | OXYGEN SATURATION: 98 % | DIASTOLIC BLOOD PRESSURE: 43 MMHG

## 2021-07-19 ENCOUNTER — AMBULATORY SURGICAL CENTER (AMBULATORY)
Dept: URBAN - METROPOLITAN AREA SURGERY 17 | Facility: SURGERY | Age: 61
End: 2021-07-19
Payer: COMMERCIAL

## 2021-07-19 ENCOUNTER — OFFICE (AMBULATORY)
Dept: URBAN - METROPOLITAN AREA PATHOLOGY 4 | Facility: PATHOLOGY | Age: 61
End: 2021-07-19

## 2021-07-19 DIAGNOSIS — K62.1 RECTAL POLYP: ICD-10-CM

## 2021-07-19 DIAGNOSIS — D12.4 BENIGN NEOPLASM OF DESCENDING COLON: ICD-10-CM

## 2021-07-19 DIAGNOSIS — Z86.010 PERSONAL HISTORY OF COLONIC POLYPS: ICD-10-CM

## 2021-07-19 DIAGNOSIS — K63.89 OTHER SPECIFIED DISEASES OF INTESTINE: ICD-10-CM

## 2021-07-19 DIAGNOSIS — K57.30 DIVERTICULOSIS OF LARGE INTESTINE WITHOUT PERFORATION OR ABS: ICD-10-CM

## 2021-07-19 DIAGNOSIS — D12.3 BENIGN NEOPLASM OF TRANSVERSE COLON: ICD-10-CM

## 2021-07-19 LAB
GI HISTOLOGY: A. SELECT: (no result)
GI HISTOLOGY: B. SELECT: (no result)
GI HISTOLOGY: C. SELECT: (no result)
GI HISTOLOGY: D. SELECT: (no result)
GI HISTOLOGY: E. SELECT: (no result)
GI HISTOLOGY: F. SELECT: (no result)
GI HISTOLOGY: G. SELECT: (no result)
GI HISTOLOGY: PDF REPORT: (no result)

## 2021-07-19 PROCEDURE — 45388 COLONOSCOPY W/ABLATION: CPT | Performed by: INTERNAL MEDICINE

## 2021-07-19 PROCEDURE — 45385 COLONOSCOPY W/LESION REMOVAL: CPT | Mod: 59 | Performed by: INTERNAL MEDICINE

## 2021-07-19 PROCEDURE — 88305 TISSUE EXAM BY PATHOLOGIST: CPT | Mod: 33 | Performed by: INTERNAL MEDICINE

## 2021-07-21 ENCOUNTER — TELEPHONE (OUTPATIENT)
Dept: ENDOCRINOLOGY | Age: 61
End: 2021-07-21

## 2021-07-21 NOTE — TELEPHONE ENCOUNTER
Patient left a voicemail    Patient has an upcoming appointment with Dr. Avendaño and she is wondering if she needs to have labs before the appointment.    Please call patient back at [phone]

## 2021-08-04 RX ORDER — ESTRADIOL 10 UG/1
INSERT VAGINAL
Qty: 24 TABLET | Refills: 0 | Status: SHIPPED | OUTPATIENT
Start: 2021-08-04 | End: 2021-09-09

## 2021-08-16 ENCOUNTER — OFFICE VISIT (OUTPATIENT)
Dept: ENDOCRINOLOGY | Age: 61
End: 2021-08-16

## 2021-08-16 VITALS
BODY MASS INDEX: 24.48 KG/M2 | DIASTOLIC BLOOD PRESSURE: 68 MMHG | HEART RATE: 72 BPM | HEIGHT: 64 IN | WEIGHT: 143.36 LBS | SYSTOLIC BLOOD PRESSURE: 112 MMHG | OXYGEN SATURATION: 94 %

## 2021-08-16 DIAGNOSIS — E55.9 VITAMIN D DEFICIENCY: ICD-10-CM

## 2021-08-16 DIAGNOSIS — M85.852 OSTEOPENIA OF BOTH HIPS: ICD-10-CM

## 2021-08-16 DIAGNOSIS — I10 ESSENTIAL HYPERTENSION: ICD-10-CM

## 2021-08-16 DIAGNOSIS — M85.851 OSTEOPENIA OF BOTH HIPS: ICD-10-CM

## 2021-08-16 DIAGNOSIS — E83.52 HYPOCALCIURIC HYPERCALCEMIA: Primary | ICD-10-CM

## 2021-08-16 DIAGNOSIS — E78.2 MIXED HYPERLIPIDEMIA: ICD-10-CM

## 2021-08-16 PROCEDURE — 99214 OFFICE O/P EST MOD 30 MIN: CPT | Performed by: INTERNAL MEDICINE

## 2021-08-16 RX ORDER — CLINDAMYCIN HYDROCHLORIDE 150 MG/1
CAPSULE ORAL
COMMUNITY
Start: 2021-05-30 | End: 2021-08-16

## 2021-09-03 LAB
25(OH)D3+25(OH)D2 SERPL-MCNC: 47.7 NG/ML (ref 30–100)
ALBUMIN SERPL-MCNC: 4.5 G/DL (ref 3.5–5.2)
ALBUMIN/GLOB SERPL: 1.9 G/DL
ALP SERPL-CCNC: 85 U/L (ref 39–117)
ALT SERPL-CCNC: 7 U/L (ref 1–33)
AST SERPL-CCNC: 15 U/L (ref 1–32)
BILIRUB SERPL-MCNC: 0.3 MG/DL (ref 0–1.2)
BUN SERPL-MCNC: 12 MG/DL (ref 8–23)
BUN/CREAT SERPL: 14 (ref 7–25)
CALCIUM SERPL-MCNC: 10.8 MG/DL (ref 8.6–10.5)
CHLORIDE SERPL-SCNC: 110 MMOL/L (ref 98–107)
CHOLEST SERPL-MCNC: 137 MG/DL (ref 0–200)
CO2 SERPL-SCNC: 26.8 MMOL/L (ref 22–29)
CREAT SERPL-MCNC: 0.86 MG/DL (ref 0.57–1)
GLOBULIN SER CALC-MCNC: 2.4 GM/DL
GLUCOSE SERPL-MCNC: 86 MG/DL (ref 65–99)
HDLC SERPL-MCNC: 40 MG/DL (ref 40–60)
IMP & REVIEW OF LAB RESULTS: NORMAL
LDLC SERPL CALC-MCNC: 82 MG/DL (ref 0–100)
POTASSIUM SERPL-SCNC: 4.1 MMOL/L (ref 3.5–5.2)
PROT SERPL-MCNC: 6.9 G/DL (ref 6–8.5)
PTH-INTACT SERPL-MCNC: 36 PG/ML (ref 15–65)
SODIUM SERPL-SCNC: 144 MMOL/L (ref 136–145)
T4 FREE SERPL-MCNC: 0.99 NG/DL (ref 0.93–1.7)
TRIGL SERPL-MCNC: 73 MG/DL (ref 0–150)
TSH SERPL DL<=0.005 MIU/L-ACNC: 0.85 UIU/ML (ref 0.27–4.2)
VLDLC SERPL CALC-MCNC: 15 MG/DL (ref 5–40)

## 2021-09-09 ENCOUNTER — OFFICE VISIT (OUTPATIENT)
Dept: FAMILY MEDICINE CLINIC | Facility: CLINIC | Age: 61
End: 2021-09-09

## 2021-09-09 VITALS
TEMPERATURE: 97.5 F | DIASTOLIC BLOOD PRESSURE: 79 MMHG | HEIGHT: 64 IN | SYSTOLIC BLOOD PRESSURE: 128 MMHG | WEIGHT: 139.6 LBS | BODY MASS INDEX: 23.83 KG/M2 | OXYGEN SATURATION: 96 % | RESPIRATION RATE: 16 BRPM | HEART RATE: 75 BPM

## 2021-09-09 DIAGNOSIS — F41.1 GENERALIZED ANXIETY DISORDER: ICD-10-CM

## 2021-09-09 DIAGNOSIS — I10 ESSENTIAL HYPERTENSION: Primary | ICD-10-CM

## 2021-09-09 DIAGNOSIS — E78.2 MIXED HYPERLIPIDEMIA: ICD-10-CM

## 2021-09-09 DIAGNOSIS — R82.90 URINE ABNORMALITY: ICD-10-CM

## 2021-09-09 DIAGNOSIS — E53.8 LOW FOLIC ACID: ICD-10-CM

## 2021-09-09 DIAGNOSIS — J30.2 CHRONIC SEASONAL ALLERGIC RHINITIS: ICD-10-CM

## 2021-09-09 DIAGNOSIS — N95.2 VAGINAL ATROPHY: ICD-10-CM

## 2021-09-09 LAB
BILIRUB BLD-MCNC: NEGATIVE MG/DL
CLARITY, POC: CLEAR
COLOR UR: YELLOW
GLUCOSE UR STRIP-MCNC: NEGATIVE MG/DL
KETONES UR QL: NEGATIVE
LEUKOCYTE EST, POC: NEGATIVE
NITRITE UR-MCNC: NEGATIVE MG/ML
PH UR: 6 [PH] (ref 5–8)
PROT UR STRIP-MCNC: NEGATIVE MG/DL
RBC # UR STRIP: ABNORMAL /UL
SP GR UR: 1.02 (ref 1–1.03)
UROBILINOGEN UR QL: NORMAL

## 2021-09-09 PROCEDURE — 99214 OFFICE O/P EST MOD 30 MIN: CPT | Performed by: PHYSICIAN ASSISTANT

## 2021-09-09 PROCEDURE — 81003 URINALYSIS AUTO W/O SCOPE: CPT | Performed by: PHYSICIAN ASSISTANT

## 2021-09-09 RX ORDER — ESTRADIOL 0.1 MG/G
0.5 CREAM VAGINAL 2 TIMES WEEKLY
Qty: 42.5 G | Refills: 12 | Status: SHIPPED | OUTPATIENT
Start: 2021-09-09 | End: 2021-11-16

## 2021-09-09 RX ORDER — VALSARTAN 160 MG/1
160 TABLET ORAL DAILY
Qty: 90 TABLET | Refills: 1 | Status: SHIPPED | OUTPATIENT
Start: 2021-09-09 | End: 2022-05-10 | Stop reason: SDUPTHER

## 2021-09-11 LAB
APPEARANCE UR: CLEAR
BACTERIA #/AREA URNS HPF: ABNORMAL /[HPF]
BACTERIA UR CULT: NORMAL
BACTERIA UR CULT: NORMAL
BILIRUB UR QL STRIP: NEGATIVE
CASTS URNS QL MICRO: ABNORMAL /LPF
COLOR UR: YELLOW
EPI CELLS #/AREA URNS HPF: ABNORMAL /HPF (ref 0–10)
GLUCOSE UR QL: NEGATIVE
HGB UR QL STRIP: ABNORMAL
KETONES UR QL STRIP: NEGATIVE
LEUKOCYTE ESTERASE UR QL STRIP: NEGATIVE
MICRO URNS: ABNORMAL
NITRITE UR QL STRIP: NEGATIVE
PH UR STRIP: 6 [PH] (ref 5–7.5)
PROT UR QL STRIP: NEGATIVE
RBC #/AREA URNS HPF: ABNORMAL /HPF (ref 0–2)
SP GR UR: 1.02 (ref 1–1.03)
UROBILINOGEN UR STRIP-MCNC: 0.2 MG/DL (ref 0.2–1)
WBC #/AREA URNS HPF: ABNORMAL /HPF (ref 0–5)

## 2021-09-17 LAB
APPEARANCE UR: CLEAR
BACTERIA #/AREA URNS HPF: NORMAL /HPF
BASOPHILS # BLD AUTO: 0.05 10*3/MM3 (ref 0–0.2)
BASOPHILS NFR BLD AUTO: 0.5 % (ref 0–1.5)
BILIRUB UR QL STRIP: NEGATIVE
CASTS URNS MICRO: NORMAL
COLOR UR: YELLOW
EOSINOPHIL # BLD AUTO: 0.2 10*3/MM3 (ref 0–0.4)
EOSINOPHIL NFR BLD AUTO: 2.1 % (ref 0.3–6.2)
EPI CELLS #/AREA URNS HPF: NORMAL /HPF
ERYTHROCYTE [DISTWIDTH] IN BLOOD BY AUTOMATED COUNT: 11.8 % (ref 12.3–15.4)
FOLATE SERPL-MCNC: 18.6 NG/ML (ref 4.78–24.2)
GLUCOSE UR QL: NEGATIVE
HCT VFR BLD AUTO: 40.2 % (ref 34–46.6)
HGB BLD-MCNC: 12.8 G/DL (ref 12–15.9)
HGB UR QL STRIP: NEGATIVE
IMM GRANULOCYTES # BLD AUTO: 0.02 10*3/MM3 (ref 0–0.05)
IMM GRANULOCYTES NFR BLD AUTO: 0.2 % (ref 0–0.5)
KETONES UR QL STRIP: NEGATIVE
LEUKOCYTE ESTERASE UR QL STRIP: NEGATIVE
LYMPHOCYTES # BLD AUTO: 2.84 10*3/MM3 (ref 0.7–3.1)
LYMPHOCYTES NFR BLD AUTO: 29.5 % (ref 19.6–45.3)
MCH RBC QN AUTO: 29.4 PG (ref 26.6–33)
MCHC RBC AUTO-ENTMCNC: 31.8 G/DL (ref 31.5–35.7)
MCV RBC AUTO: 92.4 FL (ref 79–97)
MONOCYTES # BLD AUTO: 0.37 10*3/MM3 (ref 0.1–0.9)
MONOCYTES NFR BLD AUTO: 3.8 % (ref 5–12)
NEUTROPHILS # BLD AUTO: 6.15 10*3/MM3 (ref 1.7–7)
NEUTROPHILS NFR BLD AUTO: 63.9 % (ref 42.7–76)
NITRITE UR QL STRIP: NEGATIVE
NRBC BLD AUTO-RTO: 0 /100 WBC (ref 0–0.2)
PH UR STRIP: 6 [PH] (ref 5–8)
PLATELET # BLD AUTO: 230 10*3/MM3 (ref 140–450)
PROT UR QL STRIP: NEGATIVE
RBC # BLD AUTO: 4.35 10*6/MM3 (ref 3.77–5.28)
RBC #/AREA URNS HPF: NORMAL /HPF
SP GR UR: 1.01 (ref 1–1.03)
UROBILINOGEN UR STRIP-MCNC: NORMAL MG/DL
VIT B12 SERPL-MCNC: 980 PG/ML (ref 211–946)
WBC # BLD AUTO: 9.63 10*3/MM3 (ref 3.4–10.8)
WBC #/AREA URNS HPF: NORMAL /HPF

## 2021-10-08 DIAGNOSIS — I10 ESSENTIAL HYPERTENSION: ICD-10-CM

## 2021-10-08 RX ORDER — FUROSEMIDE 20 MG/1
TABLET ORAL
Qty: 90 TABLET | Refills: 1 | Status: SHIPPED | OUTPATIENT
Start: 2021-10-08 | End: 2022-04-27

## 2021-11-11 RX ORDER — ESTRADIOL 10 UG/1
INSERT VAGINAL
Qty: 24 TABLET | Refills: 0 | OUTPATIENT
Start: 2021-11-11

## 2021-11-16 ENCOUNTER — TELEPHONE (OUTPATIENT)
Dept: FAMILY MEDICINE CLINIC | Facility: CLINIC | Age: 61
End: 2021-11-16

## 2021-11-16 RX ORDER — ESTRADIOL 10 UG/1
1 INSERT VAGINAL 2 TIMES WEEKLY
Qty: 24 TABLET | Refills: 3 | Status: SHIPPED | OUTPATIENT
Start: 2021-11-18 | End: 2022-09-23

## 2021-11-16 NOTE — TELEPHONE ENCOUNTER
----- Message from Kristina Kang sent at 11/15/2021  6:23 PM EST -----  Regarding: Refill request  I tried the Estrace and would like to switch back to the Vagifem if possible.

## 2022-01-31 ENCOUNTER — TRANSCRIBE ORDERS (OUTPATIENT)
Dept: ADMINISTRATIVE | Facility: HOSPITAL | Age: 62
End: 2022-01-31

## 2022-01-31 DIAGNOSIS — Z12.31 SCREENING MAMMOGRAM FOR HIGH-RISK PATIENT: Primary | ICD-10-CM

## 2022-02-16 ENCOUNTER — TELEPHONE (OUTPATIENT)
Dept: FAMILY MEDICINE CLINIC | Facility: CLINIC | Age: 62
End: 2022-02-16

## 2022-02-16 DIAGNOSIS — E53.8 LOW FOLIC ACID: Primary | ICD-10-CM

## 2022-02-17 DIAGNOSIS — I10 ESSENTIAL HYPERTENSION: Primary | ICD-10-CM

## 2022-02-17 DIAGNOSIS — E83.52 HYPOCALCIURIC HYPERCALCEMIA: ICD-10-CM

## 2022-02-17 DIAGNOSIS — R68.89 ABNORMAL ENDOCRINE LABORATORY TEST FINDING: ICD-10-CM

## 2022-02-17 DIAGNOSIS — E78.2 MIXED HYPERLIPIDEMIA: ICD-10-CM

## 2022-03-01 ENCOUNTER — OFFICE VISIT (OUTPATIENT)
Dept: FAMILY MEDICINE CLINIC | Facility: CLINIC | Age: 62
End: 2022-03-01

## 2022-03-01 VITALS
HEIGHT: 64 IN | HEART RATE: 85 BPM | WEIGHT: 136 LBS | OXYGEN SATURATION: 97 % | RESPIRATION RATE: 16 BRPM | BODY MASS INDEX: 23.22 KG/M2 | TEMPERATURE: 97.5 F | SYSTOLIC BLOOD PRESSURE: 110 MMHG | DIASTOLIC BLOOD PRESSURE: 78 MMHG

## 2022-03-01 DIAGNOSIS — T16.1XXA FOREIGN BODY OF RIGHT EAR, INITIAL ENCOUNTER: Primary | ICD-10-CM

## 2022-03-01 PROCEDURE — 99212 OFFICE O/P EST SF 10 MIN: CPT | Performed by: NURSE PRACTITIONER

## 2022-03-01 NOTE — PROGRESS NOTES
Subjective   Kristina Kang is a 61 y.o. female.     History of Present Illness   Kristina Kang 61 y.o. female who presents for evaluation of right ear pain and feeling like she is hearing water sloshing.  She reports sharp pain intermittently but that it comes and goes quickly. She denies sinus pain or pressure.        The following portions of the patient's history were reviewed and updated as appropriate: allergies, current medications, past family history, past medical history, past social history, past surgical history and problem list.    Review of Systems   Constitutional: Negative for chills, fever and unexpected weight change.   HENT: Positive for ear pain. Negative for congestion, ear discharge, facial swelling, sinus pressure, sinus pain and sore throat.    Respiratory: Negative for shortness of breath.    Cardiovascular: Negative for chest pain and palpitations.   Psychiatric/Behavioral: Negative for behavioral problems.       Objective   Physical Exam  Vitals and nursing note reviewed.   Constitutional:       Appearance: Normal appearance. She is well-developed.   HENT:      Right Ear: Tympanic membrane and external ear normal. A foreign body is present.      Left Ear: Tympanic membrane, ear canal and external ear normal.      Ears:      Comments: Dark, thick strand of animal hair noted in canal abutting TM   Cardiovascular:      Rate and Rhythm: Normal rate and regular rhythm.   Pulmonary:      Effort: Pulmonary effort is normal.      Breath sounds: Normal breath sounds.   Neurological:      Mental Status: She is alert and oriented to person, place, and time.   Psychiatric:         Mood and Affect: Mood normal.         Behavior: Behavior normal.         Thought Content: Thought content normal.         Judgment: Judgment normal.         Assessment/Plan   Diagnoses and all orders for this visit:    1. Foreign body of right ear, initial encounter (Primary)             dog hair removed from ear and  patient reported some improvement in symptoms. She will use Flonase daily also.

## 2022-03-08 ENCOUNTER — LAB (OUTPATIENT)
Dept: ENDOCRINOLOGY | Age: 62
End: 2022-03-08

## 2022-03-08 DIAGNOSIS — R68.89 ABNORMAL ENDOCRINE LABORATORY TEST FINDING: ICD-10-CM

## 2022-03-08 DIAGNOSIS — E83.52 HYPOCALCIURIC HYPERCALCEMIA: ICD-10-CM

## 2022-03-08 DIAGNOSIS — I10 ESSENTIAL HYPERTENSION: ICD-10-CM

## 2022-03-08 DIAGNOSIS — E78.2 MIXED HYPERLIPIDEMIA: ICD-10-CM

## 2022-03-09 LAB
ALBUMIN SERPL-MCNC: 4.8 G/DL (ref 3.8–4.8)
ALBUMIN/GLOB SERPL: 1.7 {RATIO} (ref 1.2–2.2)
ALP SERPL-CCNC: 87 IU/L (ref 44–121)
ALT SERPL-CCNC: 8 IU/L (ref 0–32)
AST SERPL-CCNC: 16 IU/L (ref 0–40)
BILIRUB SERPL-MCNC: 0.3 MG/DL (ref 0–1.2)
BUN SERPL-MCNC: 9 MG/DL (ref 8–27)
BUN/CREAT SERPL: 11 (ref 12–28)
CALCIUM SERPL-MCNC: 10.8 MG/DL (ref 8.7–10.3)
CHLORIDE SERPL-SCNC: 107 MMOL/L (ref 96–106)
CHOLEST SERPL-MCNC: 147 MG/DL (ref 100–199)
CO2 SERPL-SCNC: 24 MMOL/L (ref 20–29)
CREAT SERPL-MCNC: 0.84 MG/DL (ref 0.57–1)
EGFR GENE MUT ANL BLD/T: 79 ML/MIN/1.73
GLOBULIN SER CALC-MCNC: 2.8 G/DL (ref 1.5–4.5)
GLUCOSE SERPL-MCNC: 106 MG/DL (ref 65–99)
HDLC SERPL-MCNC: 49 MG/DL
IMP & REVIEW OF LAB RESULTS: NORMAL
LDLC SERPL CALC-MCNC: 83 MG/DL (ref 0–99)
POTASSIUM SERPL-SCNC: 4.2 MMOL/L (ref 3.5–5.2)
PROT SERPL-MCNC: 7.6 G/DL (ref 6–8.5)
PTH-INTACT SERPL-MCNC: 50 PG/ML (ref 15–65)
REPORT: NORMAL
SODIUM SERPL-SCNC: 144 MMOL/L (ref 134–144)
T4 FREE SERPL-MCNC: 1.03 NG/DL (ref 0.82–1.77)
TRIGL SERPL-MCNC: 74 MG/DL (ref 0–149)
TSH SERPL DL<=0.005 MIU/L-ACNC: 0.77 UIU/ML (ref 0.45–4.5)
VLDLC SERPL CALC-MCNC: 15 MG/DL (ref 5–40)

## 2022-03-21 NOTE — PROGRESS NOTES
Luis Kang is a 61 y.o. female.     F/u for hypercalcemia, hyperlipidemia, osteopenia, vitamin d def, hypertension        Her serum calcium has ranged from 10.7-11.2 with the upper limit of normal of 10.5 mg per DL. She had a bone density done at [facility] in December 2015 which showed osteopenia.  Bone density done in December 2017 showed stable osteopenia compared to 2015.  Serum calcium was at 10.5 mg per DL in February 2021     Bone density done in December 2019 showed osteopenia with a decrease compared to 2017.  FRAX score is 4.2% for major osteoporotic fracture and 0.5% for hip fractures.     She has history of vitamin D deficiency and has been taking vitamin D 2000 units/day.  She has no previous history of kidney stones, peptic ulcer, or depression. She denies muscle weakness.       There is no family history of hypercalcemia or nephrolithiasis.     Workup done in January 2017 are as follows: Serum calcium at 11.0 mg per DL.  Intact PTH is normal at 42 pg per mL.  Undetectable PTH RP.  25-hydroxy vitamin D is 28 ng per mL.  24 urine calcium is low at 31.2 mg     Repeat 24-hour urine collection done after vitamin D repletion in 4/18 showed low calcium at 57 mg per 24 hours.      She has history of hypertension and has been on furosemide 20  mg once a day and valsartan 160 mg/day.  Lisinopril was discontinued because of a dry cough.  The cough resolved after she was treated with Prilosec for acid reflux.  No chest pain, shortness of breath or pedal edema.      She has hyperlipidemia and is on Lipitor 20 mg once a day. She denies any myalgia.. She has no history of diabetes mellitus.  She lost 5 pounds since August 2021.    Lipid panel done in March 2022 are as follows: Cholesterol 147.  HDL 49.  LDL 83.  Triglycerides 74.      She had an episode of transient visual loss on the right eye in July 2016 thought to be due to amaurosis fugax. Carotid ultrasound was normal. She is on  "aspirin and Lipitor.  She has no recurrence of visual loss since.  She had an eye examination in February 12, 2022 and there is no blind spot.     She had colon polyps removed by Dr. Malone in April 2019.  She had colonoscopy in May 2020 and 20 polyps were removed by Dr. Malone.  She had colonoscopy in June 2021 and had multiple polyps removed.  She was advised follow-up colonoscopy in 1 year.  She is adopted and does not know her family history.  Her children are age 40 and younger and have not had a colonoscopy.  She denies bowel complaints.     The following portions of the patient's history were reviewed and updated as appropriate: allergies, current medications, past family history, past medical history, past social history, past surgical history and problem list.    Review of Systems   Respiratory: Negative for shortness of breath.    Cardiovascular: Negative for chest pain and palpitations.   Gastrointestinal: Negative.    Endocrine: Negative for cold intolerance and heat intolerance.   Genitourinary: Negative.    Musculoskeletal: Negative.  Negative for myalgias.   Neurological: Negative for numbness.     Objective      Vitals:    03/22/22 1536   BP: 115/85   Pulse: 89   SpO2: 98%   Weight: 62.7 kg (138 lb 3.2 oz)   Height: 162 cm (63.78\")     Physical Exam  Constitutional:       Appearance: Normal appearance. She is not ill-appearing, toxic-appearing or diaphoretic.   Eyes:      General: No scleral icterus.        Right eye: No discharge.         Left eye: No discharge.      Extraocular Movements: Extraocular movements intact.      Conjunctiva/sclera: Conjunctivae normal.   Neck:      Vascular: No carotid bruit.   Cardiovascular:      Rate and Rhythm: Normal rate and regular rhythm.      Pulses: Normal pulses.      Heart sounds: Normal heart sounds.   Pulmonary:      Effort: Pulmonary effort is normal. No respiratory distress.      Breath sounds: Normal breath sounds. No stridor. No rales.   Chest:    "   Chest wall: No tenderness.   Abdominal:      Palpations: Abdomen is soft.      Tenderness: There is no right CVA tenderness or left CVA tenderness.   Musculoskeletal:         General: Normal range of motion.      Cervical back: Normal range of motion.   Lymphadenopathy:      Cervical: No cervical adenopathy.   Skin:     General: Skin is warm and dry.   Neurological:      General: No focal deficit present.      Mental Status: She is alert and oriented to person, place, and time.   Psychiatric:         Mood and Affect: Mood normal.         Behavior: Behavior normal.       Lab on 03/08/2022   Component Date Value Ref Range Status   • TSH 03/08/2022 0.767  0.450 - 4.500 uIU/mL Final   • Free T4 03/08/2022 1.03  0.82 - 1.77 ng/dL Final   • PTH, Intact 03/08/2022 50  15 - 65 pg/mL Final   • Glucose 03/08/2022 106 (A) 65 - 99 mg/dL Final   • BUN 03/08/2022 9  8 - 27 mg/dL Final   • Creatinine 03/08/2022 0.84  0.57 - 1.00 mg/dL Final   • EGFR Result 03/08/2022 79  >59 mL/min/1.73 Final   • BUN/Creatinine Ratio 03/08/2022 11 (A) 12 - 28 Final   • Sodium 03/08/2022 144  134 - 144 mmol/L Final   • Potassium 03/08/2022 4.2  3.5 - 5.2 mmol/L Final   • Chloride 03/08/2022 107 (A) 96 - 106 mmol/L Final   • Total CO2 03/08/2022 24  20 - 29 mmol/L Final   • Calcium 03/08/2022 10.8 (A) 8.7 - 10.3 mg/dL Final   • Total Protein 03/08/2022 7.6  6.0 - 8.5 g/dL Final   • Albumin 03/08/2022 4.8  3.8 - 4.8 g/dL Final   • Globulin 03/08/2022 2.8  1.5 - 4.5 g/dL Final   • A/G Ratio 03/08/2022 1.7  1.2 - 2.2 Final   • Total Bilirubin 03/08/2022 0.3  0.0 - 1.2 mg/dL Final   • Alkaline Phosphatase 03/08/2022 87  44 - 121 IU/L Final   • AST (SGOT) 03/08/2022 16  0 - 40 IU/L Final   • ALT (SGPT) 03/08/2022 8  0 - 32 IU/L Final   • Total Cholesterol 03/08/2022 147  100 - 199 mg/dL Final   • Triglycerides 03/08/2022 74  0 - 149 mg/dL Final   • HDL Cholesterol 03/08/2022 49  >39 mg/dL Final   • VLDL Cholesterol Alexis 03/08/2022 15  5 - 40 mg/dL Final    • LDL Chol Calc ([facility]) 03/08/2022 83  0 - 99 mg/dL Final   • Interpretation 03/08/2022 Note   Final    Supplemental report is available.   • Interpretation 03/08/2022 Note   Final    Comment: Medical Director's Note: Effective 2/28/2022, [facility] uses  the 2021 CKD-EPI creatinine equation without a race factor  to calculate and report eGFR results. eGFR results reported  prior to this date using the 2009 CKD-EPI equation are no  longer included in this report interpretation.  -------------------------------  CHRONIC KIDNEY DISEASE:  EGFR, BLOOD PRESSURE, AND PROTEINURIA ASSESSMENT  Estimated GFR is 60 mL/min/1.73mE2 or above; this patient is  not presently in CKD stage 3 or higher, therefore we are  unable to provide suggestions for treatment or follow-up.  EGFR, BLOOD PRESSURE, AND PROTEINURIA TREATMENT SUGGESTIONS  -  Assessment of albuminuria (urine albumin:creatinine ratio or  urine protein:creatinine ratio preferred) is recommended at  least annually in CKD patients for staging and disease  prognosis.  -------------------------------  DISCLAIMER  These assessments and treatment suggestions are provided as  a convenience in support of the physician-patient  relationship                            and are not intended to replace the physician's  clinical judgment. They are derived from national guidelines  in addition to other evidence and expert opinion. The  clinician should consider this information within the  context of clinical opinion and the individual patient.  SEE GUIDANCE FOR CHRONIC KIDNEY DISEASE PROGRAM: Kidney  Disease Improving Global Outcomes (KDIGO) clinical practice  guidelines are at http://kdigo.org/home/guidelines/.  National Kidney Foundation Kidney Disease Outcomes Quality  Initiative (KDOQI (TM)), with its limitations and  disclaimers, are at www.kidney.org/professionals/KDOQI. This  program is intended for patients who have been diagnosed  with stages 3, 4, or pre-dialysis 5 CKD. It is  not intended  for children, pregnant patients, or transplant patients.       Assessment/Plan   Diagnoses and all orders for this visit:    1. Hypocalciuric hypercalcemia (Primary)    2. Osteopenia, unspecified location  -     DEXA Bone Density Axial    3. Essential hypertension  -     atorvastatin (LIPITOR) 20 MG tablet; 1 tablet daily  Dispense: 90 tablet; Refill: 2    4. Mixed hyperlipidemia      Schedule bone density at [facility].  Continue vitamin D3 2000 units/day.  Continue valsartan 160 mg/day and Lasix 20 mg/day.  Continue Lipitor 20 mg/day.    Copy of my note sent to Joanne VARGAS.    RTC 6 mos

## 2022-03-22 ENCOUNTER — OFFICE VISIT (OUTPATIENT)
Dept: ENDOCRINOLOGY | Age: 62
End: 2022-03-22

## 2022-03-22 VITALS
HEIGHT: 64 IN | SYSTOLIC BLOOD PRESSURE: 115 MMHG | BODY MASS INDEX: 23.6 KG/M2 | OXYGEN SATURATION: 98 % | HEART RATE: 89 BPM | DIASTOLIC BLOOD PRESSURE: 85 MMHG | WEIGHT: 138.2 LBS

## 2022-03-22 DIAGNOSIS — E78.2 MIXED HYPERLIPIDEMIA: ICD-10-CM

## 2022-03-22 DIAGNOSIS — I10 ESSENTIAL HYPERTENSION: ICD-10-CM

## 2022-03-22 DIAGNOSIS — E83.52 HYPOCALCIURIC HYPERCALCEMIA: Primary | ICD-10-CM

## 2022-03-22 DIAGNOSIS — M85.80 OSTEOPENIA, UNSPECIFIED LOCATION: ICD-10-CM

## 2022-03-22 PROCEDURE — 99214 OFFICE O/P EST MOD 30 MIN: CPT | Performed by: INTERNAL MEDICINE

## 2022-03-22 RX ORDER — ATORVASTATIN CALCIUM 20 MG/1
TABLET, FILM COATED ORAL
Qty: 90 TABLET | Refills: 2 | Status: SHIPPED | OUTPATIENT
Start: 2022-03-22 | End: 2022-10-05 | Stop reason: SDUPTHER

## 2022-04-18 ENCOUNTER — HOSPITAL ENCOUNTER (OUTPATIENT)
Dept: MAMMOGRAPHY | Facility: HOSPITAL | Age: 62
Discharge: HOME | End: 2022-04-18
Admitting: PHYSICIAN ASSISTANT

## 2022-04-18 DIAGNOSIS — Z12.31 SCREENING MAMMOGRAM FOR HIGH-RISK PATIENT: ICD-10-CM

## 2022-04-18 PROCEDURE — 77067 SCR MAMMO BI INCL CAD: CPT

## 2022-04-18 PROCEDURE — 77063 BREAST TOMOSYNTHESIS BI: CPT

## 2022-04-25 DIAGNOSIS — I10 ESSENTIAL HYPERTENSION: ICD-10-CM

## 2022-04-27 RX ORDER — FUROSEMIDE 20 MG/1
TABLET ORAL
Qty: 90 TABLET | Refills: 1 | Status: SHIPPED | OUTPATIENT
Start: 2022-04-27 | End: 2022-10-05 | Stop reason: SDUPTHER

## 2022-04-28 RX ORDER — FLUTICASONE PROPIONATE 50 MCG
SPRAY, SUSPENSION (ML) NASAL
Qty: 48 ML | Refills: 2 | Status: SHIPPED | OUTPATIENT
Start: 2022-04-28

## 2022-05-09 ENCOUNTER — HOSPITAL ENCOUNTER (EMERGENCY)
Facility: HOSPITAL | Age: 62
Discharge: LEFT BEFORE DISCHARGE | End: 2022-05-09

## 2022-05-09 VITALS
WEIGHT: 143 LBS | BODY MASS INDEX: 25.34 KG/M2 | RESPIRATION RATE: 16 BRPM | OXYGEN SATURATION: 99 % | HEART RATE: 65 BPM | TEMPERATURE: 97 F | DIASTOLIC BLOOD PRESSURE: 63 MMHG | HEIGHT: 63 IN | SYSTOLIC BLOOD PRESSURE: 112 MMHG

## 2022-05-09 PROCEDURE — 99211 OFF/OP EST MAY X REQ PHY/QHP: CPT

## 2022-05-09 NOTE — ED TRIAGE NOTES
Patient reported the pain is better in her face and she will come back if it started back. Instructed patient the need to stay and be seen. Instructed to patient we are working on cleaning rooms to get patients back. Patient once again informed triage staff she is going to leave and come back if it comes  Back.

## 2022-05-09 NOTE — ED NOTES
Pt ambulatory to triage with c/o right sided facial pain upon palpation that has been intermittent for 1 month. No injury reported. No swelling noted. No neuro deficits noted at this time.     Pt given mask in triage, staff in PPE.

## 2022-05-10 ENCOUNTER — OFFICE VISIT (OUTPATIENT)
Dept: FAMILY MEDICINE CLINIC | Facility: CLINIC | Age: 62
End: 2022-05-10

## 2022-05-10 VITALS
TEMPERATURE: 97.5 F | HEART RATE: 88 BPM | BODY MASS INDEX: 23.22 KG/M2 | OXYGEN SATURATION: 99 % | DIASTOLIC BLOOD PRESSURE: 82 MMHG | WEIGHT: 136 LBS | HEIGHT: 64 IN | SYSTOLIC BLOOD PRESSURE: 112 MMHG | RESPIRATION RATE: 16 BRPM

## 2022-05-10 DIAGNOSIS — E55.9 VITAMIN D DEFICIENCY: ICD-10-CM

## 2022-05-10 DIAGNOSIS — G45.3 AMAUROSIS FUGAX OF RIGHT EYE: ICD-10-CM

## 2022-05-10 DIAGNOSIS — E78.2 MIXED HYPERLIPIDEMIA: ICD-10-CM

## 2022-05-10 DIAGNOSIS — I10 ESSENTIAL HYPERTENSION: Primary | ICD-10-CM

## 2022-05-10 DIAGNOSIS — G50.0 TRIGEMINAL NEURALGIA: ICD-10-CM

## 2022-05-10 PROCEDURE — 99214 OFFICE O/P EST MOD 30 MIN: CPT | Performed by: PHYSICIAN ASSISTANT

## 2022-05-10 RX ORDER — LANOLIN ALCOHOL/MO/W.PET/CERES
400 CREAM (GRAM) TOPICAL DAILY
Qty: 90 TABLET | Refills: 4 | Status: SHIPPED | OUTPATIENT
Start: 2022-05-10

## 2022-05-10 RX ORDER — VALSARTAN 160 MG/1
160 TABLET ORAL DAILY
Qty: 90 TABLET | Refills: 1 | Status: SHIPPED | OUTPATIENT
Start: 2022-05-10 | End: 2022-09-23

## 2022-05-10 RX ORDER — CARBAMAZEPINE 200 MG/1
200 TABLET ORAL 2 TIMES DAILY
Qty: 60 TABLET | Refills: 1 | Status: SHIPPED | OUTPATIENT
Start: 2022-05-10 | End: 2022-05-25 | Stop reason: SDUPTHER

## 2022-05-10 NOTE — PROGRESS NOTES
"Subjective   Kristina Kang is a 61 y.o. female.     History of Present Illness    Since the last visit, she has overall felt well.  She has Primary Hypertension and well controlled on current medication, Hyperlipidemia with goals met with current Rx and Vitamin D deficiency and labs are at goal >30 ng/mL.  she has been compliant with current medications have reviewed them.  The patient denies medication side effects.  Will refill medications. /80 (BP Location: Left arm, Patient Position: Sitting, Cuff Size: Adult)   Pulse 88   Temp 97.5 °F (36.4 °C)   Resp 16   Ht 162 cm (63.78\")   Wt 61.7 kg (136 lb)   LMP  (LMP Unknown)   SpO2 99%   BMI 23.51 kg/m²   July 2016---Amaurosis Fugax---carotid doppler neg--neg work up---not reoccured.  I want her LDL <70 d/t this and on ASA 81mg  Hep C in remission---watch LFTs  Dr Avendaño--endocrine---monitors Ca+;  Hx hypercalcemia-----now off HCTZ and watch Ca+;  Also watch K+  Doing well off Zoloft and not having any anxiety issues  GI Dr Malone---polyps --colon--f/u scope 1 yr----to do in June  Estrace crm for atrophy  Watching lymph count--and folate  Results for orders placed or performed in visit on 03/08/22   TSH    Specimen: Blood   Result Value Ref Range    TSH 0.767 0.450 - 4.500 uIU/mL   T4, Free    Specimen: Blood   Result Value Ref Range    Free T4 1.03 0.82 - 1.77 ng/dL   PTH, Intact    Specimen: Blood   Result Value Ref Range    PTH, Intact 50 15 - 65 pg/mL   Comprehensive Metabolic Panel    Specimen: Blood   Result Value Ref Range    Glucose 106 (H) 65 - 99 mg/dL    BUN 9 8 - 27 mg/dL    Creatinine 0.84 0.57 - 1.00 mg/dL    EGFR Result 79 >59 mL/min/1.73    BUN/Creatinine Ratio 11 (L) 12 - 28    Sodium 144 134 - 144 mmol/L    Potassium 4.2 3.5 - 5.2 mmol/L    Chloride 107 (H) 96 - 106 mmol/L    Total CO2 24 20 - 29 mmol/L    Calcium 10.8 (H) 8.7 - 10.3 mg/dL    Total Protein 7.6 6.0 - 8.5 g/dL    Albumin 4.8 3.8 - 4.8 g/dL    Globulin 2.8 1.5 - 4.5 " g/dL    A/G Ratio 1.7 1.2 - 2.2    Total Bilirubin 0.3 0.0 - 1.2 mg/dL    Alkaline Phosphatase 87 44 - 121 IU/L    AST (SGOT) 16 0 - 40 IU/L    ALT (SGPT) 8 0 - 32 IU/L   Lipid Panel    Specimen: Blood   Result Value Ref Range    Total Cholesterol 147 100 - 199 mg/dL    Triglycerides 74 0 - 149 mg/dL    HDL Cholesterol 49 >39 mg/dL    VLDL Cholesterol Alexis 15 5 - 40 mg/dL    LDL Chol Calc ([facility]) 83 0 - 99 mg/dL   Cardiovascular Risk Assessment   Result Value Ref Range    Interpretation Note    [facility] CKD Program   Result Value Ref Range    Interpretation Note        Saw DR Avendaño 3-22-22 for Hypocalciuric hypercalcemia ---cont same meds    Pulsation jolt occas right cheek; then in last 2 days so much worse----lasted 5 minutes---goes up eye-onset was 4 weeks ago and worse with intensity and more frequent since yesterday.  It searing pain that just takes her breath away in the middle of her cheek and radiates up her right eye is always on the right side.  Can be triggered by talking or even brushing her teeth.  Also touching to wash her face.  Is very concerning for trigeminal neuralgia.  No prior shingles    The following portions of the patient's history were reviewed and updated as appropriate: allergies, current medications, past family history, past medical history, past social history, past surgical history and problem list.    Review of Systems   Constitutional: Negative for activity change, appetite change and unexpected weight change.   HENT: Negative for nosebleeds and trouble swallowing.    Eyes: Negative for pain and visual disturbance.   Respiratory: Negative for chest tightness, shortness of breath and wheezing.    Cardiovascular: Negative for chest pain and palpitations.   Gastrointestinal: Negative for abdominal pain and blood in stool.   Endocrine: Negative.    Genitourinary: Negative for difficulty urinating and hematuria.   Musculoskeletal: Negative for joint swelling.   Skin: Negative for color  change and rash.   Allergic/Immunologic: Negative.    Neurological: Negative for syncope and speech difficulty.   Hematological: Negative for adenopathy.   Psychiatric/Behavioral: Negative for agitation and confusion.   All other systems reviewed and are negative.      Objective   Physical Exam  Vitals and nursing note reviewed.   Constitutional:       General: She is not in acute distress.     Appearance: Normal appearance. She is well-developed. She is not ill-appearing or toxic-appearing.   HENT:      Head: Normocephalic.      Right Ear: External ear normal.      Left Ear: External ear normal.      Nose: Nose normal.      Mouth/Throat:      Pharynx: Oropharynx is clear.   Eyes:      General: No scleral icterus.     Conjunctiva/sclera: Conjunctivae normal.      Pupils: Pupils are equal, round, and reactive to light.   Neck:      Thyroid: No thyromegaly.      Vascular: No carotid bruit.   Cardiovascular:      Rate and Rhythm: Normal rate and regular rhythm.      Pulses: Normal pulses.      Heart sounds: Normal heart sounds. No murmur heard.  Pulmonary:      Effort: Pulmonary effort is normal. No respiratory distress.      Breath sounds: Normal breath sounds. No rales.   Musculoskeletal:         General: No deformity. Normal range of motion.      Cervical back: Normal range of motion and neck supple.   Skin:     General: Skin is warm and dry.      Findings: No rash.   Neurological:      General: No focal deficit present.      Mental Status: She is alert and oriented to person, place, and time. Mental status is at baseline.   Psychiatric:         Mood and Affect: Mood normal.         Behavior: Behavior normal.         Thought Content: Thought content normal.         Judgment: Judgment normal.         [unfilled]  Diagnoses and all orders for this visit:    1. Essential hypertension (Primary)    2. Vitamin D deficiency    3. Mixed hyperlipidemia    4. Amaurosis fugax of right eye  Comments:  2016    5.  Trigeminal neuralgia  -     MRI Brain With & Without Contrast; Future  -     MRI Angiogram Head Without Contrast; Future  -     Ambulatory Referral to Neurology    Other orders  -     carBAMazepine (TEGretol) 200 MG tablet; Take 1 tablet by mouth 2 (Two) Times a Day. For trigeminal neuralgia  Dispense: 60 tablet; Refill: 1  -     valsartan (DIOVAN) 160 MG tablet; Take 1 tablet by mouth Daily. for blood pressure.  Dispense: 90 tablet; Refill: 1  -     folic acid (FOLVITE) 400 MCG tablet; Take 1 tablet by mouth Daily.  Dispense: 90 tablet; Refill: 4       Plan, Kristina Kang, was seen today.  she was seen for HTN and continue medication, Hyperlipidemia and will continue current medication and Vitamin D deficiency and supplemented.  Doing well off Zoloft and not having any anxiety issues  July 2016---Amaurosis Fugax---carotid doppler neg--neg work up---not reoccured.  I want her LDL <70 d/t this and on ASA 81mg  Hep C in remission---watch LFTs  Dr Avendaño--endocrine---monitors Ca+;  Hx hypercalcemia-----now off HCTZ and watch Ca+;  Also watch K+  Dose Zoloft 50mg great--anxiety controlled  GI Dr Malone---polyps --colon--f/u scope 1 yr----to do in June  Estrace crm for atrophy  Watching lymph count--and folate  Bone density is scheduled next months to screen for osteoporosis  Suspect trigeminal neuralgia discussed with Dr. Coreas in person.  Will order MRI with and without contrast brain MRA with and without contrast brain to look for any identifying lesions of the brain that could cause this and start trial of Tegretol 200 mg twice daily and follow-up in a few weeks may need to increase dose to control pain and seeing neurology in consult ordered       Answers for HPI/ROS submitted by the patient on 5/10/2022  Please describe your symptoms.: Pain on right side of face. Feels like electrical shocks. Sometimes when washing face, touching of face, brushing teeth. Yesterday pain was more intense from talking on  call.  Have you had these symptoms before?: Yes  How long have you been having these symptoms?: Greater than 2 weeks  Please list any medications you are currently taking for this condition.: None  Please describe any probable cause for these symptoms. : None  What is the primary reason for your visit?: Other

## 2022-05-18 DIAGNOSIS — I10 ESSENTIAL HYPERTENSION: ICD-10-CM

## 2022-05-18 RX ORDER — ATORVASTATIN CALCIUM 40 MG/1
TABLET, FILM COATED ORAL
Qty: 45 TABLET | Refills: 0 | OUTPATIENT
Start: 2022-05-18

## 2022-05-23 ENCOUNTER — HOSPITAL ENCOUNTER (OUTPATIENT)
Dept: MRI IMAGING | Facility: HOSPITAL | Age: 62
Discharge: HOME | End: 2022-05-23
Admitting: PHYSICIAN ASSISTANT

## 2022-05-23 DIAGNOSIS — G50.0 TRIGEMINAL NEURALGIA: ICD-10-CM

## 2022-05-23 PROCEDURE — 82565 ASSAY OF CREATININE: CPT

## 2022-05-23 PROCEDURE — A9577 INJ MULTIHANCE: HCPCS | Performed by: PHYSICIAN ASSISTANT

## 2022-05-23 PROCEDURE — 70553 MRI BRAIN STEM W/O & W/DYE: CPT

## 2022-05-23 PROCEDURE — 0 GADOBENATE DIMEGLUMINE 529 MG/ML SOLUTION: Performed by: PHYSICIAN ASSISTANT

## 2022-05-23 RX ADMIN — GADOBENATE DIMEGLUMINE 12 ML: 529 INJECTION, SOLUTION INTRAVENOUS at 22:36

## 2022-05-25 ENCOUNTER — OFFICE VISIT (OUTPATIENT)
Dept: FAMILY MEDICINE CLINIC | Facility: CLINIC | Age: 62
End: 2022-05-25

## 2022-05-25 VITALS
OXYGEN SATURATION: 99 % | TEMPERATURE: 97.5 F | WEIGHT: 136 LBS | SYSTOLIC BLOOD PRESSURE: 115 MMHG | HEART RATE: 92 BPM | HEIGHT: 64 IN | RESPIRATION RATE: 16 BRPM | DIASTOLIC BLOOD PRESSURE: 62 MMHG | BODY MASS INDEX: 23.22 KG/M2

## 2022-05-25 DIAGNOSIS — G50.0 TRIGEMINAL NEURALGIA: Primary | ICD-10-CM

## 2022-05-25 DIAGNOSIS — R93.89 ABNORMAL MRI: ICD-10-CM

## 2022-05-25 LAB — CREAT BLDA-MCNC: 0.8 MG/DL (ref 0.6–1.3)

## 2022-05-25 PROCEDURE — 99213 OFFICE O/P EST LOW 20 MIN: CPT | Performed by: PHYSICIAN ASSISTANT

## 2022-05-25 RX ORDER — CARBAMAZEPINE 200 MG/1
200 TABLET ORAL 2 TIMES DAILY
Qty: 60 TABLET | Refills: 5 | Status: SHIPPED | OUTPATIENT
Start: 2022-05-25 | End: 2022-06-08 | Stop reason: SDUPTHER

## 2022-05-25 NOTE — PROGRESS NOTES
"Subjective   Kristina Kang is a 61 y.o. female.     History of Present Illness   Kristina Kang 61 y.o. female /62 (BP Location: Left arm, Patient Position: Sitting, Cuff Size: Adult)   Pulse 92   Temp 97.5 °F (36.4 °C)   Resp 16   Ht 162 cm (63.78\")   Wt 61.7 kg (136 lb)   LMP  (LMP Unknown)   SpO2 99%   BMI 23.51 kg/m²  who presents today for trigeminal neuralgia   she has a history of   Patient Active Problem List   Diagnosis   • Degeneration of lumbar intervertebral disc   • Hepatitis C   • Essential hypertension   • Hyperlipidemia   • Vitamin D deficiency   • Osteoarthritis   • Episode of syncope   • Amaurosis fugax of right eye   • Osteopenia of both hips   • Chronic seasonal allergic rhinitis   • Hypocalciuric hypercalcemia   • Vaginal atrophy   • Generalized anxiety disorder   .  My last note 5-10-22  Pulsation jolt occas right cheek; then in last 2 days so much worse----lasted 5 minutes---goes up eye-onset was 4 weeks ago and worse with intensity and more frequent since yesterday.  It searing pain that just takes her breath away in the middle of her cheek and radiates up her right eye is always on the right side.  Can be triggered by talking or even brushing her teeth.  Also touching to wash her face.  Is very concerning for trigeminal neuralgia.  No prior shingles  Saw DR Avendaño 3-22-22 for Hypocalciuric hypercalcemia ---cont same meds  July 2016---Amaurosis Fugax---carotid doppler neg--neg work up---not reoccured.  I want her LDL <70 d/t this and on ASA 81mg  Hep C in remission---watch LFTs  Dr Avendaño--endocrine---monitors Ca+;  Hx hypercalcemia-----now off HCTZ and watch Ca+;  Also watch K+  Doing well off Zoloft and not having any anxiety issues  GI Dr Malone---polyps --colon--f/u scope 1 yr----to do in June  Estrace crm for atrophy  Watching lymph count--and folate  The following portions of the patient's history were reviewed and updated as appropriate: allergies, current " medications, past family history, past medical history, past social history, past surgical history and problem list.    Review of Systems   Constitutional: Negative for fever.   HENT: Negative for nosebleeds and trouble swallowing.    Eyes: Negative for visual disturbance.   Respiratory: Negative for choking and stridor.    Cardiovascular: Negative for chest pain.   Gastrointestinal: Negative for blood in stool.   Endocrine: Negative for polydipsia.   Genitourinary: Negative for genital sores and hematuria.   Musculoskeletal: Negative for joint swelling.   Skin: Negative for color change and rash.   Allergic/Immunologic: Negative for immunocompromised state.   Neurological: Negative for seizures, facial asymmetry and speech difficulty.   Hematological: Negative for adenopathy.   Psychiatric/Behavioral: Negative for behavioral problems, self-injury and suicidal ideas.       Objective   Physical Exam  Vitals and nursing note reviewed.   Constitutional:       General: She is not in acute distress.     Appearance: Normal appearance. She is well-developed. She is not ill-appearing or toxic-appearing.   HENT:      Head: Normocephalic.      Right Ear: External ear normal.      Left Ear: External ear normal.      Nose: Nose normal.      Mouth/Throat:      Pharynx: Oropharynx is clear.   Eyes:      General: No scleral icterus.     Conjunctiva/sclera: Conjunctivae normal.      Pupils: Pupils are equal, round, and reactive to light.   Neck:      Thyroid: No thyromegaly.      Vascular: No carotid bruit.   Cardiovascular:      Rate and Rhythm: Normal rate and regular rhythm.      Pulses: Normal pulses.      Heart sounds: Normal heart sounds. No murmur heard.  Pulmonary:      Effort: Pulmonary effort is normal. No respiratory distress.      Breath sounds: Normal breath sounds. No rales.   Musculoskeletal:         General: No deformity. Normal range of motion.      Cervical back: Normal range of motion and neck supple.   Skin:      General: Skin is warm and dry.      Findings: No rash.   Neurological:      General: No focal deficit present.      Mental Status: She is alert and oriented to person, place, and time. Mental status is at baseline.      Sensory: No sensory deficit.   Psychiatric:         Mood and Affect: Mood normal.         Behavior: Behavior normal.         Thought Content: Thought content normal.         Judgment: Judgment normal.         Assessment & Plan   Diagnoses and all orders for this visit:    1. Trigeminal neuralgia (Primary)  Comments:  MRI---5-23-22A branch of the superior cerebellar artery on the right  abuts and mildly flattens the medial aspect of the right trigeminal  nerve posteriorly.       2. Abnormal MRI  Comments:  A branch of the superior cerebellar artery on the right  abuts and mildly flattens the medial aspect of the right trigeminal  nerve posteriorly.           Being again that her colonoscopy is due with Dr. Malone July  Continue carbamazepine for the trigeminal neuralgia pain and is helping had 1 day of breakthrough pain yesterday but does not want to go up on dose is aware she can do to makes her sleepy  Urgent referral to neurosurgery in process  Patient followed by Dr. Broussard want to monitor hypercalcemia  Primary hypertension controlled with her blood pressure medication and mixed hyperlipidemia also controlled       Answers for HPI/ROS submitted by the patient on 5/18/2022  Please describe your symptoms.: Follow-up visit  Have you had these symptoms before?: Yes  How long have you been having these symptoms?: Greater than 2 weeks  Please list any medications you are currently taking for this condition.: Carbamazepine  What is the primary reason for your visit?: Other

## 2022-05-26 NOTE — PROGRESS NOTES
Spoke with Dr. Bhardwaj who informed me that he forwarded this message to Dr. Morse's . The patent has an appointment on June 20th, 2022.

## 2022-06-08 ENCOUNTER — TELEPHONE (OUTPATIENT)
Dept: FAMILY MEDICINE CLINIC | Facility: CLINIC | Age: 62
End: 2022-06-08

## 2022-06-08 RX ORDER — CARBAMAZEPINE 200 MG/1
TABLET ORAL
Qty: 90 TABLET | Refills: 5 | Status: SHIPPED | OUTPATIENT
Start: 2022-06-08 | End: 2022-08-08 | Stop reason: SDUPTHER

## 2022-06-08 NOTE — TELEPHONE ENCOUNTER
----- Message from Kristina Knag sent at 6/8/2022 11:27 AM EDT -----  Regarding: Carbamazepine  I am having some breakthrough sensations in between the 2 doses I'm currently taking. I'm taking at 8am & 8pm currently and was wondering if I can take another pill in between?

## 2022-06-17 NOTE — PROGRESS NOTES
Subjective   History of Present Illness: Kristina Kang is a 61 y.o. female is being seen for consultation today at the request of Joanne Maurice PA-C  for trigeminal neuralgia. MRI Brain 22.  She reports that she has had severe pain in her right lower face over the past 3 to 4 months.  She reports that the symptoms for intermittent and rare.  4 months ago and now are becoming more frequent and more severe.  The pain is triggered by washing her face, chewing food, talking, showering, brushing her teeth and occasionally is spontaneous.  The episodes are now at least every other day.  A couple weeks ago she started carbamazepine.  She has not consistently been taking carbamazepine.  She has not yet noticed any improvement.  She denies any other neurologic complaints.  No changes in strength or sensation..        History of Present Illness    The following portions of the patient's history were reviewed and updated as appropriate: allergies, past family history, past medical history, past social history, past surgical history and problem list.    Past Medical History:   Diagnosis Date   • Allergic    • Anemia    • Colon polyp    • Degeneration of lumbar intervertebral disc    • Essential hypertension    • Hepatitis C 2008    Dr. Coleman Null   • History of bone density study     Normal   • History of chest x-ray 2010    normal   • History of echocardiogram 2015   • History of EKG 2013    normal   • History of Holter monitoring 2015    occas PVC   • History of nuclear stress test 10/06/2015    LCG; ischemia   • Hypercalcemia    • Hyperlipidemia    • Leiomyoma of uterus     and fibroids   • Osteoarthritis    • Osteopenia    • Postmenopausal    • Pyelonephritis    • Vitamin D deficiency         Past Surgical History:   Procedure Laterality Date   •  SECTION     • COLONOSCOPY     • COLONOSCOPY W/ POLYPECTOMY      Benign polyps.  Dr. Malone   • HYSTERECTOMY  2004     took one ovary    • LIVER BIOPSY     • OOPHORECTOMY Bilateral age 33    left 1/4 of the right           Current Outpatient Medications:   •  aspirin 81 MG EC tablet, Take 81 mg by mouth Daily., Disp: , Rfl:   •  atorvastatin (LIPITOR) 20 MG tablet, 1 tablet daily, Disp: 90 tablet, Rfl: 2  •  carBAMazepine (TEGretol) 200 MG tablet, 1 tab p.o. in AM and 2 tabs in p.m. for trigeminal neuralgia, Disp: 90 tablet, Rfl: 5  •  cholecalciferol 2000 units tablet, Take 2,000 Units by mouth Daily., Disp: , Rfl:   •  estradiol (Yuvafem) 10 MCG tablet vaginal tablet, Insert 1 tablet into the vagina 2 (Two) Times a Week., Disp: 24 tablet, Rfl: 3  •  fluticasone (FLONASE) 50 MCG/ACT nasal spray, SPRAY 2 SPRAYS INTO EACH NOSTRIL EVERY DAY, Disp: 48 mL, Rfl: 2  •  folic acid (FOLVITE) 400 MCG tablet, Take 1 tablet by mouth Daily., Disp: 90 tablet, Rfl: 4  •  furosemide (LASIX) 20 MG tablet, TAKE 1 TABLET BY MOUTH EVERY DAY, Disp: 90 tablet, Rfl: 1  •  valsartan (DIOVAN) 160 MG tablet, Take 1 tablet by mouth Daily. for blood pressure., Disp: 90 tablet, Rfl: 1     No Known Allergies     Social History     Socioeconomic History   • Marital status:    Tobacco Use   • Smoking status: Former Smoker     Packs/day: 0.50     Years: 10.00     Pack years: 5.00   • Smokeless tobacco: Former User     Quit date: 2011   Substance and Sexual Activity   • Alcohol use: Not Currently     Comment: Seldom   • Drug use: No   • Sexual activity: Yes     Partners: Male     Birth control/protection: None     Comment: Hysterectomy        Family History   Adopted: Yes        Review of Systems   HENT: Negative for hearing loss and tinnitus.    Eyes: Negative for visual disturbance.   Gastrointestinal: Negative for diarrhea, nausea and vomiting.   Genitourinary: Negative for difficulty urinating and urgency.   Musculoskeletal: Negative for neck pain and neck stiffness.   Neurological: Positive for dizziness. Negative for seizures, facial asymmetry, speech  difficulty, light-headedness and headaches.        R facial pain    Psychiatric/Behavioral: Negative for confusion and decreased concentration.       Objective     There were no vitals filed for this visit.  There is no height or weight on file to calculate BMI.      Physical Exam  Neurologic Exam  Awake, alert, oriented x3  Pupils equal round reactive to light  Extraocular muscles intact  Face symmetric  Speech is fluent and clear  No pronator drift  Motor exam  Bilateral deltoids 5/5, bilateral biceps 5/5, bilateral triceps 5/5, bilateral wrist extension 5/5 bilateral hand  5/5  Bilateral hip flexion 5/5, bilateral knee extension 5/5, bilateral DF/PF 5/5  No clonus  No Etrry's reflex  Steady normal gait  Able to detect  light touch in all 4 extremities          Assessment & Plan   Independent Review of Radiographic Studies:      I personally reviewed the images from the following studies.  MRI brain with and without contrast from May 23, 2022  The MRI brain was reviewed and shows no evidence of mass lesions.  There is a possible SCA loop compressing and distorting the right trigeminal nerve.    Medical Decision Makin-year-old female with a 3 to 4-month history of right-sided trigeminal neuralgia  -She has had severe intermittent lancinating pain in the V2 V3 distribution for the past 3 to 4 months.  Her symptoms are progressive and have become more severe and more frequent.  -She just recently has started carbamazepine and has not yet seen any improvement however is taking it consistently.  I have asked her to start taking it twice daily as prescribed.  She has a follow-up with neurology.  I will plan to see her back in 4 months.  We talked for a long time about the treatment strategies for trigeminal neuralgia.  We talked about the risks and benefits of a craniotomy for microvascular decompression versus stereotactic radiosurgery.  If her pain worsens or progress despite maximal medical management  we will consider proceeding with surgery.  -I will plan to see her back in 4 months to evaluate for any changes in her symptoms    There are no diagnoses linked to this encounter.  No follow-ups on file.    I spent 45 minutes reviewing the medical record, discussing treatment strategies for trigeminal neuralgia, discussing risks and benefits of microvascular decompression, balloon compression, stereotactic radiosurgery, and reviewing her MRI images

## 2022-06-20 ENCOUNTER — OFFICE VISIT (OUTPATIENT)
Dept: NEUROSURGERY | Facility: CLINIC | Age: 62
End: 2022-06-20

## 2022-06-20 VITALS
WEIGHT: 141.4 LBS | SYSTOLIC BLOOD PRESSURE: 132 MMHG | HEART RATE: 76 BPM | HEIGHT: 63 IN | OXYGEN SATURATION: 99 % | TEMPERATURE: 98 F | DIASTOLIC BLOOD PRESSURE: 78 MMHG | BODY MASS INDEX: 25.05 KG/M2

## 2022-06-20 DIAGNOSIS — G50.0 TRIGEMINAL NEURALGIA: Primary | ICD-10-CM

## 2022-06-20 PROCEDURE — 99204 OFFICE O/P NEW MOD 45 MIN: CPT | Performed by: NEUROLOGICAL SURGERY

## 2022-06-24 ENCOUNTER — PATIENT ROUNDING (BHMG ONLY) (OUTPATIENT)
Dept: NEUROSURGERY | Facility: CLINIC | Age: 62
End: 2022-06-24

## 2022-08-02 VITALS
SYSTOLIC BLOOD PRESSURE: 97 MMHG | DIASTOLIC BLOOD PRESSURE: 43 MMHG | DIASTOLIC BLOOD PRESSURE: 55 MMHG | HEART RATE: 59 BPM | DIASTOLIC BLOOD PRESSURE: 58 MMHG | RESPIRATION RATE: 24 BRPM | HEART RATE: 69 BPM | OXYGEN SATURATION: 100 % | DIASTOLIC BLOOD PRESSURE: 47 MMHG | RESPIRATION RATE: 18 BRPM | RESPIRATION RATE: 22 BRPM | RESPIRATION RATE: 19 BRPM | TEMPERATURE: 97.2 F | HEART RATE: 60 BPM | RESPIRATION RATE: 17 BRPM | SYSTOLIC BLOOD PRESSURE: 111 MMHG | SYSTOLIC BLOOD PRESSURE: 100 MMHG | SYSTOLIC BLOOD PRESSURE: 136 MMHG | HEART RATE: 66 BPM | SYSTOLIC BLOOD PRESSURE: 99 MMHG | HEIGHT: 63 IN | DIASTOLIC BLOOD PRESSURE: 63 MMHG | DIASTOLIC BLOOD PRESSURE: 44 MMHG | DIASTOLIC BLOOD PRESSURE: 50 MMHG | RESPIRATION RATE: 15 BRPM | OXYGEN SATURATION: 96 % | DIASTOLIC BLOOD PRESSURE: 78 MMHG | HEART RATE: 63 BPM | SYSTOLIC BLOOD PRESSURE: 92 MMHG | OXYGEN SATURATION: 99 % | RESPIRATION RATE: 23 BRPM | SYSTOLIC BLOOD PRESSURE: 103 MMHG | SYSTOLIC BLOOD PRESSURE: 88 MMHG | RESPIRATION RATE: 13 BRPM | HEART RATE: 70 BPM | RESPIRATION RATE: 16 BRPM | HEART RATE: 62 BPM | DIASTOLIC BLOOD PRESSURE: 48 MMHG | DIASTOLIC BLOOD PRESSURE: 73 MMHG | HEART RATE: 67 BPM | WEIGHT: 136 LBS | SYSTOLIC BLOOD PRESSURE: 150 MMHG | HEART RATE: 61 BPM | OXYGEN SATURATION: 98 % | TEMPERATURE: 97.8 F | HEART RATE: 65 BPM

## 2022-08-05 ENCOUNTER — AMBULATORY SURGICAL CENTER (AMBULATORY)
Dept: URBAN - METROPOLITAN AREA SURGERY 17 | Facility: SURGERY | Age: 62
End: 2022-08-05
Payer: COMMERCIAL

## 2022-08-05 ENCOUNTER — OFFICE (AMBULATORY)
Dept: URBAN - METROPOLITAN AREA PATHOLOGY 4 | Facility: PATHOLOGY | Age: 62
End: 2022-08-05
Payer: COMMERCIAL

## 2022-08-05 DIAGNOSIS — Z86.010 PERSONAL HISTORY OF COLONIC POLYPS: ICD-10-CM

## 2022-08-05 DIAGNOSIS — K63.5 POLYP OF COLON: ICD-10-CM

## 2022-08-05 DIAGNOSIS — K62.1 RECTAL POLYP: ICD-10-CM

## 2022-08-05 DIAGNOSIS — D12.8 BENIGN NEOPLASM OF RECTUM: ICD-10-CM

## 2022-08-05 LAB
GI HISTOLOGY: A. UNSPECIFIED: (no result)
GI HISTOLOGY: B. UNSPECIFIED: (no result)
GI HISTOLOGY: PDF REPORT: (no result)

## 2022-08-05 PROCEDURE — 45385 COLONOSCOPY W/LESION REMOVAL: CPT | Mod: 33 | Performed by: INTERNAL MEDICINE

## 2022-08-05 PROCEDURE — 88305 TISSUE EXAM BY PATHOLOGIST: CPT | Performed by: INTERNAL MEDICINE

## 2022-08-08 RX ORDER — CARBAMAZEPINE 200 MG/1
TABLET ORAL
Qty: 90 TABLET | Refills: 0 | Status: SHIPPED | OUTPATIENT
Start: 2022-08-08 | End: 2022-08-11

## 2022-08-11 ENCOUNTER — OFFICE VISIT (OUTPATIENT)
Dept: FAMILY MEDICINE CLINIC | Facility: CLINIC | Age: 62
End: 2022-08-11

## 2022-08-11 VITALS
BODY MASS INDEX: 24.8 KG/M2 | SYSTOLIC BLOOD PRESSURE: 118 MMHG | HEIGHT: 63 IN | HEART RATE: 78 BPM | DIASTOLIC BLOOD PRESSURE: 70 MMHG | OXYGEN SATURATION: 98 % | RESPIRATION RATE: 16 BRPM | WEIGHT: 140 LBS | TEMPERATURE: 97.5 F

## 2022-08-11 DIAGNOSIS — E55.9 VITAMIN D DEFICIENCY: ICD-10-CM

## 2022-08-11 DIAGNOSIS — G50.0 TRIGEMINAL NEURALGIA: Primary | ICD-10-CM

## 2022-08-11 DIAGNOSIS — I10 ESSENTIAL HYPERTENSION: ICD-10-CM

## 2022-08-11 DIAGNOSIS — Z00.00 LABORATORY EXAMINATION ORDERED AS PART OF A ROUTINE GENERAL MEDICAL EXAMINATION: ICD-10-CM

## 2022-08-11 DIAGNOSIS — E53.8 LOW FOLIC ACID: ICD-10-CM

## 2022-08-11 PROCEDURE — 99214 OFFICE O/P EST MOD 30 MIN: CPT | Performed by: PHYSICIAN ASSISTANT

## 2022-08-11 RX ORDER — CARBAMAZEPINE 400 MG/1
400 TABLET, EXTENDED RELEASE ORAL 2 TIMES DAILY
Qty: 180 TABLET | Refills: 3 | Status: SHIPPED | OUTPATIENT
Start: 2022-08-11 | End: 2022-10-05

## 2022-08-11 NOTE — PROGRESS NOTES
"Subjective   Kristina Kang is a 61 y.o. female.     History of Present Illness   Kristina Kang 61 y.o. female /62 (BP Location: Left arm, Patient Position: Sitting, Cuff Size: Adult)   Pulse 78   Temp 97.5 °F (36.4 °C)   Resp 16   Ht 160 cm (62.99\")   Wt 63.5 kg (140 lb)   LMP  (LMP Unknown)   SpO2 98%   BMI 24.81 kg/m²  who presents today for trigeminal neuralgia and follow-up with me since we adjusted the dose by MyChart.  Also to discuss her visit with neurosurgery.  she has a history of   Patient Active Problem List   Diagnosis   • Degeneration of lumbar intervertebral disc   • Hepatitis C   • Essential hypertension   • Hyperlipidemia   • Vitamin D deficiency   • Osteoarthritis   • Episode of syncope   • Amaurosis fugax of right eye   • Osteopenia of both hips   • Chronic seasonal allergic rhinitis   • Hypocalciuric hypercalcemia   • Vaginal atrophy   • Generalized anxiety disorder   • Abnormal MRI   • Trigeminal neuralgia   .          July 2016---Amaurosis Fugax---carotid doppler neg--neg work up---not reoccured.  I want her LDL <70 d/t this and on ASA 81mg  Hep C in remission---watch LFTs  Dr Avendaño--endocrine---monitors Ca+;  Hx hypercalcemia-----now off HCTZ and watch Ca+;  Also watch K+  Doing well off Zoloft and not having any anxiety issues  GI Dr Malone---polyps --colon--f/u scope 1 yr----to do in June  Estrace crm for atrophy  Watching lymph count--folate     Saw DR Avendaño 3-22-22 for Hypocalciuric hypercalcemia ---cont same meds  MRI brain with and without contrast    IMPRESSION:  A branch of the superior cerebellar artery on the right  abuts and mildly flattens the medial aspect of the right trigeminal  nerve posteriorly.      Regards to trigeminal neuralgia she did see neurosurgeon Dr. Morse--- he discussed surgical intervention as a possible treatment strategy if oral medication fails or her pain gets worse despite maximum medical management.  She does have an appointment to " see neurology and will see Dr. Layton Izaguirre on 8/25/2022.,  I also recently increased her dose of carbamazepine.  She recently message me and I increase her dose of the Tegretol due to breakthrough pain to 200 mg in the morning and 400 mg in the evening and were discussing this today is follow-up and she is still noticing pain when she turns over in on her right side and when she barely brushes anything on her face to touch--this will set off the pain--my plan today is to raise the Tegretol dose to 400 mg twice daily and keep the neurology appointment.  Also want to update her labs my routine screening labs and update that folic acid that I have her taking and update vitamin D that she is also taking.  We will make sure electrolytes are Comparable to Last  The following portions of the patient's history were reviewed and updated as appropriate: allergies, current medications, past family history, past medical history, past social history, past surgical history and problem list.    Review of Systems   Constitutional: Negative for activity change, appetite change and unexpected weight change.   HENT: Negative for nosebleeds and trouble swallowing.    Eyes: Negative for pain and visual disturbance.   Respiratory: Negative for chest tightness, shortness of breath and wheezing.    Cardiovascular: Negative for chest pain and palpitations.   Gastrointestinal: Negative for abdominal pain and blood in stool.   Endocrine: Negative.    Genitourinary: Negative for difficulty urinating and hematuria.   Skin: Negative for color change and rash.   Allergic/Immunologic: Negative.    Neurological: Negative for syncope and speech difficulty.   Hematological: Negative for adenopathy.   Psychiatric/Behavioral: Negative for agitation and confusion.   All other systems reviewed and are negative.      Objective   Physical Exam  Vitals and nursing note reviewed.   Constitutional:       General: She is not in acute distress.     Appearance:  She is well-developed. She is not ill-appearing or toxic-appearing.   HENT:      Head: Normocephalic.      Right Ear: External ear normal.      Left Ear: External ear normal.      Nose: Nose normal.      Mouth/Throat:      Pharynx: Oropharynx is clear.   Eyes:      General: No scleral icterus.     Conjunctiva/sclera: Conjunctivae normal.      Pupils: Pupils are equal, round, and reactive to light.   Neck:      Thyroid: No thyromegaly.      Vascular: No carotid bruit.   Cardiovascular:      Rate and Rhythm: Normal rate and regular rhythm.      Pulses: Normal pulses.      Heart sounds: Normal heart sounds. No murmur heard.  Pulmonary:      Effort: Pulmonary effort is normal. No respiratory distress.      Breath sounds: Normal breath sounds. No rales.   Musculoskeletal:         General: No deformity. Normal range of motion.      Cervical back: Normal range of motion and neck supple.      Right lower leg: No edema.      Left lower leg: No edema.   Skin:     General: Skin is warm and dry.      Findings: No rash.   Neurological:      General: No focal deficit present.      Mental Status: She is alert and oriented to person, place, and time. Mental status is at baseline.   Psychiatric:         Mood and Affect: Mood normal.         Behavior: Behavior normal.         Thought Content: Thought content normal.         Judgment: Judgment normal.         Assessment & Plan   Diagnoses and all orders for this visit:    1. Trigeminal neuralgia (Primary)  -     Comprehensive Metabolic Panel  -     CBC & Differential  -     Urinalysis With Microscopic - Urine, Clean Catch  -     Vitamin B12  -     Folate  -     Vitamin D 25 Hydroxy    2. Essential hypertension  -     Comprehensive Metabolic Panel  -     CBC & Differential  -     Urinalysis With Microscopic - Urine, Clean Catch  -     Vitamin B12  -     Folate  -     Vitamin D 25 Hydroxy    3. Vitamin D deficiency  -     Comprehensive Metabolic Panel  -     CBC & Differential  -      Urinalysis With Microscopic - Urine, Clean Catch  -     Vitamin B12  -     Folate  -     Vitamin D 25 Hydroxy    4. Low folic acid  -     Comprehensive Metabolic Panel  -     CBC & Differential  -     Urinalysis With Microscopic - Urine, Clean Catch  -     Vitamin B12  -     Folate  -     Vitamin D 25 Hydroxy    5. Laboratory examination ordered as part of a routine general medical examination  -     Comprehensive Metabolic Panel  -     CBC & Differential  -     Urinalysis With Microscopic - Urine, Clean Catch  -     Vitamin B12  -     Folate  -     Vitamin D 25 Hydroxy    Other orders  -     carBAMazepine XR (TEGretol  XR) 400 MG 12 hr tablet; Take 1 tablet by mouth 2 (Two) Times a Day. New dose for trigeminal neuralgia  Dispense: 180 tablet; Refill: 3        Doing well off Zoloft and not having any anxiety issues  July 2016---Amaurosis Fugax---carotid doppler neg--neg work up---not reoccured.  I want her LDL <70 d/t this and on ASA 81mg  Hep C in remission---watch LFTs  Dr Avendaño--endocrine---monitors Ca+;  Hx hypercalcemia-----now off HCTZ and watch Ca+;  Also watch K+  Dose Zoloft 50mg great--anxiety controlled  GI Dr Malone---polyps --colon--f/u scope 1 yr----to do in June  Estrace crm for atrophy  Watching lymph count--and folate  I will update labs since adding the carbamazepine and also want routine labs per prevention to look at her blood count, urine to make sure micro is negative, B12 folic acid and vitamin D and she is taking the folic acid and vitamin D

## 2022-08-12 LAB
25(OH)D3+25(OH)D2 SERPL-MCNC: 35.2 NG/ML (ref 30–100)
ALBUMIN SERPL-MCNC: 4.3 G/DL (ref 3.8–4.8)
ALBUMIN/GLOB SERPL: 1.6 {RATIO} (ref 1.2–2.2)
ALP SERPL-CCNC: 92 IU/L (ref 44–121)
ALT SERPL-CCNC: 14 IU/L (ref 0–32)
APPEARANCE UR: CLEAR
AST SERPL-CCNC: 19 IU/L (ref 0–40)
BACTERIA #/AREA URNS HPF: NORMAL /[HPF]
BASOPHILS # BLD AUTO: 0 X10E3/UL (ref 0–0.2)
BASOPHILS NFR BLD AUTO: 0 %
BILIRUB SERPL-MCNC: <0.2 MG/DL (ref 0–1.2)
BILIRUB UR QL STRIP: NEGATIVE
BUN SERPL-MCNC: 19 MG/DL (ref 8–27)
BUN/CREAT SERPL: 19 (ref 12–28)
CALCIUM SERPL-MCNC: 10.6 MG/DL (ref 8.7–10.3)
CASTS URNS QL MICRO: NORMAL /LPF
CHLORIDE SERPL-SCNC: 108 MMOL/L (ref 96–106)
CO2 SERPL-SCNC: 23 MMOL/L (ref 20–29)
COLOR UR: YELLOW
CREAT SERPL-MCNC: 1 MG/DL (ref 0.57–1)
EGFRCR SERPLBLD CKD-EPI 2021: 64 ML/MIN/1.73
EOSINOPHIL # BLD AUTO: 0.2 X10E3/UL (ref 0–0.4)
EOSINOPHIL NFR BLD AUTO: 3 %
EPI CELLS #/AREA URNS HPF: NORMAL /HPF (ref 0–10)
ERYTHROCYTE [DISTWIDTH] IN BLOOD BY AUTOMATED COUNT: 12.5 % (ref 11.7–15.4)
FOLATE SERPL-MCNC: 18.2 NG/ML
GLOBULIN SER CALC-MCNC: 2.7 G/DL (ref 1.5–4.5)
GLUCOSE SERPL-MCNC: 91 MG/DL (ref 65–99)
GLUCOSE UR QL STRIP: NEGATIVE
HCT VFR BLD AUTO: 39.4 % (ref 34–46.6)
HGB BLD-MCNC: 12.5 G/DL (ref 11.1–15.9)
HGB UR QL STRIP: NEGATIVE
IMM GRANULOCYTES # BLD AUTO: 0 X10E3/UL (ref 0–0.1)
IMM GRANULOCYTES NFR BLD AUTO: 0 %
KETONES UR QL STRIP: NEGATIVE
LEUKOCYTE ESTERASE UR QL STRIP: NEGATIVE
LYMPHOCYTES # BLD AUTO: 2.3 X10E3/UL (ref 0.7–3.1)
LYMPHOCYTES NFR BLD AUTO: 27 %
MCH RBC QN AUTO: 29.3 PG (ref 26.6–33)
MCHC RBC AUTO-ENTMCNC: 31.7 G/DL (ref 31.5–35.7)
MCV RBC AUTO: 93 FL (ref 79–97)
MICRO URNS: NORMAL
MICRO URNS: NORMAL
MONOCYTES # BLD AUTO: 0.4 X10E3/UL (ref 0.1–0.9)
MONOCYTES NFR BLD AUTO: 5 %
NEUTROPHILS # BLD AUTO: 5.6 X10E3/UL (ref 1.4–7)
NEUTROPHILS NFR BLD AUTO: 65 %
NITRITE UR QL STRIP: NEGATIVE
PH UR STRIP: 5.5 [PH] (ref 5–7.5)
PLATELET # BLD AUTO: 219 X10E3/UL (ref 150–450)
POTASSIUM SERPL-SCNC: 4.4 MMOL/L (ref 3.5–5.2)
PROT SERPL-MCNC: 7 G/DL (ref 6–8.5)
PROT UR QL STRIP: NEGATIVE
RBC # BLD AUTO: 4.26 X10E6/UL (ref 3.77–5.28)
RBC #/AREA URNS HPF: NORMAL /HPF (ref 0–2)
SODIUM SERPL-SCNC: 145 MMOL/L (ref 134–144)
SP GR UR STRIP: 1.01 (ref 1–1.03)
UROBILINOGEN UR STRIP-MCNC: 0.2 MG/DL (ref 0.2–1)
VIT B12 SERPL-MCNC: 520 PG/ML (ref 232–1245)
WBC # BLD AUTO: 8.6 X10E3/UL (ref 3.4–10.8)
WBC #/AREA URNS HPF: NORMAL /HPF (ref 0–5)

## 2022-08-25 ENCOUNTER — OFFICE VISIT (OUTPATIENT)
Dept: NEUROLOGY | Facility: CLINIC | Age: 62
End: 2022-08-25

## 2022-08-25 VITALS
BODY MASS INDEX: 24.8 KG/M2 | WEIGHT: 139.99 LBS | RESPIRATION RATE: 16 BRPM | DIASTOLIC BLOOD PRESSURE: 70 MMHG | SYSTOLIC BLOOD PRESSURE: 118 MMHG | HEIGHT: 63 IN | HEART RATE: 60 BPM

## 2022-08-25 DIAGNOSIS — G50.0 TRIGEMINAL NEURALGIA OF RIGHT SIDE OF FACE: Primary | ICD-10-CM

## 2022-08-25 PROCEDURE — 99203 OFFICE O/P NEW LOW 30 MIN: CPT | Performed by: STUDENT IN AN ORGANIZED HEALTH CARE EDUCATION/TRAINING PROGRAM

## 2022-08-25 RX ORDER — GABAPENTIN 300 MG/1
300 CAPSULE ORAL 2 TIMES DAILY
Qty: 60 CAPSULE | Refills: 2 | Status: SHIPPED | OUTPATIENT
Start: 2022-08-25 | End: 2022-10-05

## 2022-08-25 NOTE — PROGRESS NOTES
Chief Complaint   Patient presents with   • trigeminal neurolgia     Since two month  only right side of face ,She is on Tegretol 400mg BID       Patient ID: Kristina Kang is a 61 y.o. female.    HPI:    The following portions of the patient's history were reviewed and updated as appropriate: allergies, current medications, past family history, past medical history, past social history, past surgical history and problem list.    Review of Systems   Allergic/Immunologic: Negative for food allergies.   Neurological: Positive for speech difficulty. Negative for dizziness, tremors, light-headedness and headaches.   Hematological: Does not bruise/bleed easily.   Psychiatric/Behavioral: Negative for confusion, decreased concentration and sleep disturbance. The patient is not nervous/anxious.    Ms. Kang is a 61-year-old female with trigeminal neuralgia who comes to neurology clinic for further evaluation and management.  Onset June 2022, got worse  Location right side of the face V2/V1 but now more V1  Duration - a minute  Frequency - not every day, possibly every 2-3 days.  Character - excruciating lightning bolt  Putting pressure on it in bed can provoke it in the last couple of weeks. Brushing teeth used to cause it but no longer, chewing it is no longer a thing. Water hitting her face can cause it.  Timing - any time of day    It gets worse with certain activities  Does not have suicidal ideation with pain.  She is a  for [employer]    Tegretol makes her feel like she has brain fog that she can notice at work. Can have trouble with speaking and finding a word. With increases did not have intermittent pain. Has not tried other agents. Can make her drowsy. Increasing the dose did not increase the brain fog.    Vitals:    08/25/22 0901   BP: 118/70   Pulse: 60   Resp: 16       Neurologic Exam    Physical Exam    Procedures    Assessment/Plan:    The patient has symptoms which appear consistent with trigeminal  neuralgia affecting her most in the V1 distribution on the right side.  Plan  -gabapentin 300mg twice daily to be started.  Discussed that she could take a nighttime dose for 3 days before going up to the full dose.  We discussed a wide variety of side effects including blurred vision, limb swelling, unusual swelling reaction including tongue swelling which would be very rare, suicidal ideation which would be rare, and common side effects such as tiredness, dizziness, and weight gain.  -Continue carbamazepine extended release 400 mg twice daily for now.  If she is doing well on the gabapentin potentially we could switch to monotherapy.  -If she is refractory to several medications and desiring surgery she is already established with neurosurgery.  However I recommended that we try medications first as surgery is not without potential risk and benefit.  Patient appears agreeable to this and this appears in accordance with the last neurosurgery note which I reviewed.  We did discuss that there is a vessel that is touching her trigeminal nerve on the right side.    Return in about 2 months (around 10/25/2022).  I spent 30 minutes caring for this patient on this date of service. This time includes time spent by me in the following activities as necessary: preparing for the visit, reviewing tests, medical records and previous visits, obtaining and/or reviewing a separately obtained history, performing a medically appropriate exam and/or evaluation, counseling and educating the patient, and/or communicating with other healthcare professionals, documenting information in the medical record, independently interpreting results and communicating that information with the patient, and developing a medically appropriate treatment plan with consideration of other conditions, medications, and treatments.       Diagnoses and all orders for this visit:    1. Trigeminal neuralgia of right side of face (Primary)  -     gabapentin  (NEURONTIN) 300 MG capsule; Take 1 capsule by mouth 2 (Two) Times a Day.  Dispense: 60 capsule; Refill: 2           Layton Izaguirre MD

## 2022-09-01 ENCOUNTER — PATIENT ROUNDING (BHMG ONLY) (OUTPATIENT)
Dept: NEUROLOGY | Facility: CLINIC | Age: 62
End: 2022-09-01

## 2022-09-02 ENCOUNTER — TELEPHONE (OUTPATIENT)
Dept: NEUROLOGY | Facility: CLINIC | Age: 62
End: 2022-09-02
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
## 2022-09-06 NOTE — TELEPHONE ENCOUNTER
Spoke with patient and she states that Gabapentin was not helping her pain.  She will continue with her Tegretol as prescribed and start weaning off Gabapentin as instructed

## 2022-09-16 NOTE — PROGRESS NOTES
Subjective   History of Present Illness: Kristina Kang is a 61 y.o. female is here today for follow-up regarding her right-sided trigeminal neuralgia after seeing Neurology.  She is doing well today.  She has been taking carbamazepine 400 mg twice daily with some relief in her symptoms.  She reports the severe pain has resolved however she sometimes has mild to moderate pain on the right side of her face.  She no longer has pain in the V3 distribution.  Her pain is now mostly localized to the V2 and possibly even the V1 distribution.  She doing well.  Denies any headaches.  Denies any neurologic deficits.  Denies any changes in strength or sensation.  Denies any changes in vision.  The pain was previously triggered by brushing her teeth and eating.  It is no longer triggered by these things.  It is instead triggered by washing her face and showering.  She previously tried gabapentin in addition to carbamazepine however developed tremor in her hands and instability while walking.  The symptoms have resolved since stopping the gabapentin    History of Present Illness    The following portions of the patient's history were reviewed and updated as appropriate: allergies, past family history, past medical history, past social history, past surgical history and problem list.    Past Medical History:   Diagnosis Date   • Allergic    • Anemia    • Colon polyp    • Degeneration of lumbar intervertebral disc    • Essential hypertension    • Hepatitis C 12/17/2008    Dr. Coleman Null   • History of bone density study 2015    Normal   • History of chest x-ray 11/01/2010    normal   • History of echocardiogram 09/26/2015   • History of EKG 06/26/2013    normal   • History of Holter monitoring 09/2015    occas PVC   • History of nuclear stress test 10/06/2015    LCG; ischemia   • Hypercalcemia    • Hyperlipidemia    • Leiomyoma of uterus     and fibroids   • Osteoarthritis    • Osteopenia    • Postmenopausal    •  Pyelonephritis    • Vitamin D deficiency         Past Surgical History:   Procedure Laterality Date   •  SECTION     • COLONOSCOPY     • COLONOSCOPY W/ POLYPECTOMY      Benign polyps.  Dr. Malone   • HYSTERECTOMY  2004    took one ovary    • LIVER BIOPSY     • OOPHORECTOMY Bilateral age 33    left 1/4 of the right           Current Outpatient Medications:   •  aspirin 81 MG EC tablet, Take 81 mg by mouth Daily., Disp: , Rfl:   •  atorvastatin (LIPITOR) 20 MG tablet, 1 tablet daily, Disp: 90 tablet, Rfl: 2  •  carBAMazepine XR (TEGretol  XR) 400 MG 12 hr tablet, Take 1 tablet by mouth 2 (Two) Times a Day. New dose for trigeminal neuralgia, Disp: 180 tablet, Rfl: 3  •  cholecalciferol 2000 units tablet, Take 2,000 Units by mouth Daily., Disp: , Rfl:   •  estradiol (Yuvafem) 10 MCG tablet vaginal tablet, Insert 1 tablet into the vagina 2 (Two) Times a Week., Disp: 24 tablet, Rfl: 3  •  fluticasone (FLONASE) 50 MCG/ACT nasal spray, SPRAY 2 SPRAYS INTO EACH NOSTRIL EVERY DAY, Disp: 48 mL, Rfl: 2  •  folic acid (FOLVITE) 400 MCG tablet, Take 1 tablet by mouth Daily., Disp: 90 tablet, Rfl: 4  •  furosemide (LASIX) 20 MG tablet, TAKE 1 TABLET BY MOUTH EVERY DAY, Disp: 90 tablet, Rfl: 1  •  valsartan (DIOVAN) 160 MG tablet, Take 1 tablet by mouth Daily. for blood pressure., Disp: 90 tablet, Rfl: 1  •  gabapentin (NEURONTIN) 300 MG capsule, Take 1 capsule by mouth 2 (Two) Times a Day., Disp: 60 capsule, Rfl: 2     No Known Allergies     Social History     Socioeconomic History   • Marital status:    Tobacco Use   • Smoking status: Former Smoker     Packs/day: 0.50     Years: 10.00     Pack years: 5.00   • Smokeless tobacco: Former User     Quit date:    Vaping Use   • Vaping Use: Never used   Substance and Sexual Activity   • Alcohol use: Not Currently     Comment: Seldom   • Drug use: No   • Sexual activity: Yes     Partners: Male     Birth control/protection: None     Comment: Hysterectomy     "    Family History   Adopted: Yes        Review of Systems    Objective     Vitals:    22 1436   BP: 114/80   Pulse: 70   Temp: 98 °F (36.7 °C)   SpO2: 99%   Weight: 65.4 kg (144 lb 3.2 oz)   Height: 160 cm (63\")     Body mass index is 25.54 kg/m².      Physical Exam  Neurologic Exam  Awake, alert, oriented x3  Pupils equal round reactive to light  Extraocular muscles intact  Face symmetric  Speech is fluent and clear  No pronator drift  Motor exam  Bilateral deltoids 5/5, bilateral biceps 5/5, bilateral triceps 5/5, bilateral wrist extension 5/5 bilateral hand  5/5  Bilateral hip flexion 5/5, bilateral knee extension 5/5, bilateral DF/PF 5/5  No clonus  No Terry's reflex  Steady normal gait  Able to detect  light touch in all 4 extremities  Normal sensation V1 through V3 bilaterally  Tongue is midline    Assessment & Plan       Medical Decision Makin-year-old female with right sided trigeminal neuralgia mostly V2 distribution  -She reports that her symptoms have significantly improved while taking carbamazepine.  She briefly tried carbamazepine and gabapentin together however developed significant side effects including tremor, dizziness, instability while walking.  She reports that she still has mild to moderate symptoms however it is much more tolerable.  The pain episodes are no longer triggered by chewing or eating or brushing her teeth.  The V3 distribution symptoms have resolved.  She still has occasional V2 distribution symptoms from washing her face and touching her face  -We had a long discussion over the risks and benefits of surgery.  I have offered her a microvascular decompression to help alleviate her symptoms.  I think she would be a good candidate if her symptoms do not improve with additional medical management.  She reports that she has follow-up with neurology in 2 months.  I have offered her the surgery if her symptoms improve.  Have asked her to call back at any time if she " would like to proceed with a right-sided microvascular decompression    Diagnoses and all orders for this visit:    1. Trigeminal neuralgia (Primary)      Return if symptoms worsen or fail to improve.    I spent 30 minutes reviewing the medical record, reviewing her previous MRI images, discussing management of trigeminal neuralgia, discussing the risks and benefits of a microvascular decompression

## 2022-09-19 ENCOUNTER — TELEPHONE (OUTPATIENT)
Dept: ENDOCRINOLOGY | Age: 62
End: 2022-09-19

## 2022-09-19 ENCOUNTER — OFFICE VISIT (OUTPATIENT)
Dept: NEUROSURGERY | Facility: CLINIC | Age: 62
End: 2022-09-19

## 2022-09-19 VITALS
OXYGEN SATURATION: 99 % | HEIGHT: 63 IN | WEIGHT: 144.2 LBS | HEART RATE: 70 BPM | BODY MASS INDEX: 25.55 KG/M2 | SYSTOLIC BLOOD PRESSURE: 114 MMHG | DIASTOLIC BLOOD PRESSURE: 80 MMHG | TEMPERATURE: 98 F

## 2022-09-19 DIAGNOSIS — E78.2 MIXED HYPERLIPIDEMIA: Primary | ICD-10-CM

## 2022-09-19 DIAGNOSIS — E55.9 VITAMIN D DEFICIENCY: ICD-10-CM

## 2022-09-19 DIAGNOSIS — E83.52 HYPOCALCIURIC HYPERCALCEMIA: ICD-10-CM

## 2022-09-19 DIAGNOSIS — G50.0 TRIGEMINAL NEURALGIA: Primary | ICD-10-CM

## 2022-09-19 PROCEDURE — 99214 OFFICE O/P EST MOD 30 MIN: CPT | Performed by: NEUROLOGICAL SURGERY

## 2022-09-20 ENCOUNTER — LAB (OUTPATIENT)
Dept: ENDOCRINOLOGY | Age: 62
End: 2022-09-20

## 2022-09-20 DIAGNOSIS — E83.52 HYPOCALCIURIC HYPERCALCEMIA: ICD-10-CM

## 2022-09-20 DIAGNOSIS — E55.9 VITAMIN D DEFICIENCY: ICD-10-CM

## 2022-09-20 DIAGNOSIS — E78.2 MIXED HYPERLIPIDEMIA: ICD-10-CM

## 2022-09-21 LAB
25(OH)D3+25(OH)D2 SERPL-MCNC: 47.7 NG/ML (ref 30–100)
ALBUMIN SERPL-MCNC: 4.6 G/DL (ref 3.5–5.2)
ALBUMIN/GLOB SERPL: 1.7 G/DL
ALP SERPL-CCNC: 90 U/L (ref 39–117)
ALT SERPL-CCNC: 10 U/L (ref 1–33)
AST SERPL-CCNC: 14 U/L (ref 1–32)
BILIRUB SERPL-MCNC: <0.2 MG/DL (ref 0–1.2)
BUN SERPL-MCNC: 18 MG/DL (ref 8–23)
BUN/CREAT SERPL: 18.6 (ref 7–25)
CALCIUM SERPL-MCNC: 10.9 MG/DL (ref 8.6–10.5)
CHLORIDE SERPL-SCNC: 103 MMOL/L (ref 98–107)
CHOLEST SERPL-MCNC: 161 MG/DL (ref 0–200)
CO2 SERPL-SCNC: 27.3 MMOL/L (ref 22–29)
CREAT SERPL-MCNC: 0.97 MG/DL (ref 0.57–1)
EGFRCR SERPLBLD CKD-EPI 2021: 66.6 ML/MIN/1.73
GLOBULIN SER CALC-MCNC: 2.7 GM/DL
GLUCOSE SERPL-MCNC: 97 MG/DL (ref 65–99)
HDLC SERPL-MCNC: 55 MG/DL (ref 40–60)
IMP & REVIEW OF LAB RESULTS: NORMAL
LDLC SERPL CALC-MCNC: 91 MG/DL (ref 0–100)
POTASSIUM SERPL-SCNC: 4.8 MMOL/L (ref 3.5–5.2)
PROT SERPL-MCNC: 7.3 G/DL (ref 6–8.5)
PTH-INTACT SERPL-MCNC: 40 PG/ML (ref 15–65)
SODIUM SERPL-SCNC: 141 MMOL/L (ref 136–145)
TRIGL SERPL-MCNC: 77 MG/DL (ref 0–150)
TSH SERPL DL<=0.005 MIU/L-ACNC: 0.75 UIU/ML (ref 0.27–4.2)
VLDLC SERPL CALC-MCNC: 15 MG/DL (ref 5–40)

## 2022-09-23 RX ORDER — CARBAMAZEPINE 200 MG/1
200 TABLET ORAL 2 TIMES DAILY
Qty: 180 TABLET | Refills: 1 | Status: SHIPPED | OUTPATIENT
Start: 2022-09-23 | End: 2022-10-05

## 2022-09-23 RX ORDER — VALSARTAN 160 MG/1
160 TABLET ORAL DAILY
Qty: 90 TABLET | Refills: 1 | Status: SHIPPED | OUTPATIENT
Start: 2022-09-23

## 2022-09-23 RX ORDER — ESTRADIOL 10 UG/1
INSERT VAGINAL
Qty: 24 TABLET | Refills: 3 | Status: SHIPPED | OUTPATIENT
Start: 2022-09-23

## 2022-09-28 ENCOUNTER — HOSPITAL ENCOUNTER (OUTPATIENT)
Dept: BONE DENSITY | Facility: HOSPITAL | Age: 62
Discharge: HOME | End: 2022-09-28
Admitting: INTERNAL MEDICINE

## 2022-09-28 PROCEDURE — 77080 DXA BONE DENSITY AXIAL: CPT

## 2022-10-04 NOTE — PROGRESS NOTES
Luis Kang is a 61 y.o. female.     History of Present Illness   F/u for hypercalcemia, hyperlipidemia, osteopenia, vitamin d def, hypertension      Her serum calcium has ranged from 10.7-11.2 with the upper limit of normal of 10.5 mg per DL. She had a bone density done at [facility] in December 2015 which showed osteopenia.  Bone density done in December 2017 showed stable osteopenia compared to 2015.  Serum calcium was at 10.5 mg per DL in February 2021     Bone density done in September 2022 showed osteopenia without significant change since December 2019.  Her 10-year risk of major osteoporotic fracture is 4.8% and of hip fracture is 0.7%.     She has history of vitamin D deficiency and has been taking vitamin D 2000 units/day.  She has no previous history of kidney stones, peptic ulcer, or depression. She denies muscle weakness.  25 hydroxy vitamin D done in September 2022 is normal at 47.7 ng/mL.      There is no family history of hypercalcemia or nephrolithiasis.     Workup done in January 2017 are as follows: Serum calcium at 11.0 mg per DL.  Intact PTH is normal at 42 pg per mL.  Undetectable PTH RP.  25-hydroxy vitamin D is 28 ng per mL.  24 urine calcium is low at 31.2 mg     Repeat 24-hour urine collection done after vitamin D repletion in 4/18 showed low calcium at 57 mg per 24 hours.  Calcium to creatinine clearance ratio is low at 0.004.      She has history of hypertension and has been on furosemide 20  mg once a day and valsartan 160 mg/day.  Lisinopril was discontinued because of a dry cough.  No chest pain, shortness of breath or pedal edema.      She has hyperlipidemia and is on Lipitor 20 mg once a day. She denies any myalgia.. She has no history of diabetes mellitus.  She has gained 4 pounds since March 2022.  Lipid panel done in September 2022 are as follows: Cholesterol 161.  HDL 55.  LDL 91.  Triglycerides 77.      She had an episode of transient visual loss on the  "right eye in July 2016 thought to be due to amaurosis fugax. Carotid ultrasound was normal. She is on aspirin and Lipitor.  She has no recurrence of visual loss since.  She had an eye examination in February 12, 2022 and there is no blind spot.    She has right trigeminal neuralgia and is on carbamazepine prescribed by [facility] Mahoney.  Symptom has improved since.     She had colon polyps removed by Dr. Malone in April 2019.  She had colonoscopy in May 2020 and 20 polyps were removed by Dr. Malone.  She had colonoscopy in June 2021 and had multiple polyps removed.  She had 8 polyps removed by colonoscopy in June 2022 by Dr. Malone.  She was advised to have a colonoscopy in 2024.      She is adopted and does not know her family history.  Her children are age 40 and younger and have not had a colonoscopy.  She denies bowel complaints.       The following portions of the patient's history were reviewed and updated as appropriate: allergies, current medications, past family history, past medical history, past social history, past surgical history and problem list.    Review of Systems   Eyes: Negative for visual disturbance.   Respiratory: Negative for shortness of breath.    Cardiovascular: Negative for chest pain and palpitations.   Gastrointestinal: Negative.    Endocrine: Negative for cold intolerance and heat intolerance.   Genitourinary: Negative.    Musculoskeletal: Negative for myalgias.   Neurological: Negative for numbness.     Vitals:    10/05/22 1104   BP: 118/62   Pulse: 72   Temp: 96.9 °F (36.1 °C)   SpO2: 96%   Weight: 64.6 kg (142 lb 6.4 oz)   Height: 158.8 cm (62.5\")      Objective   Physical Exam  Constitutional:       General: She is not in acute distress.     Appearance: Normal appearance. She is not ill-appearing or toxic-appearing.   Eyes:      General: No scleral icterus.        Right eye: No discharge.         Left eye: No discharge.      Extraocular Movements: Extraocular movements " intact.      Conjunctiva/sclera: Conjunctivae normal.   Neck:      Vascular: No carotid bruit.   Cardiovascular:      Rate and Rhythm: Normal rate and regular rhythm.      Pulses: Normal pulses.      Heart sounds: Normal heart sounds.   Pulmonary:      Effort: Pulmonary effort is normal. No respiratory distress.      Breath sounds: Normal breath sounds. No stridor.   Chest:      Chest wall: No tenderness.   Abdominal:      General: Bowel sounds are normal.      Palpations: Abdomen is soft.      Tenderness: There is no right CVA tenderness or left CVA tenderness.   Musculoskeletal:      Cervical back: No rigidity or tenderness.   Lymphadenopathy:      Cervical: No cervical adenopathy.   Neurological:      General: No focal deficit present.      Mental Status: She is alert and oriented to person, place, and time.       Lab on 09/20/2022   Component Date Value Ref Range Status   • TSH 09/20/2022 0.752  0.270 - 4.200 uIU/mL Final   • Total Cholesterol 09/20/2022 161  0 - 200 mg/dL Final    Comment: Cholesterol Reference Ranges  (U.S. Department of Health and Human Services ATP III  Classifications)  Desirable          <200 mg/dL  Borderline High    200-239 mg/dL  High Risk          >240 mg/dL  Triglyceride Reference Ranges  (U.S. Department of Health and Human Services ATP III  Classifications)  Normal           <150 mg/dL  Borderline High  150-199 mg/dL  High             200-499 mg/dL  Very High        >500 mg/dL  HDL Reference Ranges  (U.S. Department of Health and Human Services ATP III  Classifications)  Low     <40 mg/dl (major risk factor for CHD)  High    >60 mg/dl ('negative' risk factor for CHD)  LDL Reference Ranges  (U.S. Department of Health and Human Services ATP III  Classifications)  Optimal          <100 mg/dL  Near Optimal     100-129 mg/dL  Borderline High  130-159 mg/dL  High             160-189 mg/dL  Very High        >189 mg/dL     • Triglycerides 09/20/2022 77  0 - 150 mg/dL Final   • HDL  Cholesterol 09/20/2022 55  40 - 60 mg/dL Final   • VLDL Cholesterol Alexis 09/20/2022 15  5 - 40 mg/dL Final   • LDL Chol Calc (NIH) 09/20/2022 91  0 - 100 mg/dL Final   • 25 Hydroxy, Vitamin D 09/20/2022 47.7  30.0 - 100.0 ng/ml Final    Comment: Results may be falsely increased if patient taking Biotin.  Reference Range for Total Vitamin D 25(OH)  Deficiency <20.0 ng/mL  Insufficiency 21-29 ng/mL  Sufficiency  ng/mL  Toxicity >100 ng/ml     • PTH, Intact 09/20/2022 40  15 - 65 pg/mL Final   • Glucose 09/20/2022 97  65 - 99 mg/dL Final   • BUN 09/20/2022 18  8 - 23 mg/dL Final   • Creatinine 09/20/2022 0.97  0.57 - 1.00 mg/dL Final   • EGFR Result 09/20/2022 66.6  >60.0 mL/min/1.73 Final    Comment: National Kidney Foundation and American Society of  Nephrology (ASN) Task Force recommended calculation based  on the Chronic Kidney Disease Epidemiology Collaboration  (CKD-EPI) equation refit without adjustment for race.  GFR Normal >60  Chronic Kidney Disease <60  Kidney Failure <15     • BUN/Creatinine Ratio 09/20/2022 18.6  7.0 - 25.0 Final   • Sodium 09/20/2022 141  136 - 145 mmol/L Final   • Potassium 09/20/2022 4.8  3.5 - 5.2 mmol/L Final   • Chloride 09/20/2022 103  98 - 107 mmol/L Final   • Total CO2 09/20/2022 27.3  22.0 - 29.0 mmol/L Final   • Calcium 09/20/2022 10.9 (A) 8.6 - 10.5 mg/dL Final   • Total Protein 09/20/2022 7.3  6.0 - 8.5 g/dL Final   • Albumin 09/20/2022 4.60  3.50 - 5.20 g/dL Final   • Globulin 09/20/2022 2.7  gm/dL Final   • A/G Ratio 09/20/2022 1.7  g/dL Final   • Total Bilirubin 09/20/2022 <0.2  0.0 - 1.2 mg/dL Final   • Alkaline Phosphatase 09/20/2022 90  39 - 117 U/L Final   • AST (SGOT) 09/20/2022 14  1 - 32 U/L Final   • ALT (SGPT) 09/20/2022 10  1 - 33 U/L Final   • Interpretation 09/20/2022 Note   Final    Supplemental report is available.     Assessment & Plan   Diagnoses and all orders for this visit:    1. Hypocalciuric hypercalcemia (Primary)    2. Osteopenia of both  hips    3. Vitamin D deficiency    4. Essential hypertension  -     atorvastatin (LIPITOR) 20 MG tablet; 1 tablet daily  Dispense: 90 tablet; Refill: 2  -     furosemide (LASIX) 20 MG tablet; Take 1 tablet by mouth Daily.  Dispense: 90 tablet; Refill: 2    5. Mixed hyperlipidemia      Continue vitamin D3 2000 units/day.  Continue regular exercise.  Continue Lipitor 20 mg/day.  Continue valsartan and furosemide per PCP.  Flu vaccine this fall.    Copy my note sent to [facility] Mahoney.    RTC 6 mos.

## 2022-10-05 ENCOUNTER — OFFICE VISIT (OUTPATIENT)
Dept: ENDOCRINOLOGY | Age: 62
End: 2022-10-05

## 2022-10-05 VITALS
OXYGEN SATURATION: 96 % | HEART RATE: 72 BPM | SYSTOLIC BLOOD PRESSURE: 118 MMHG | WEIGHT: 142.4 LBS | DIASTOLIC BLOOD PRESSURE: 62 MMHG | BODY MASS INDEX: 25.23 KG/M2 | TEMPERATURE: 96.9 F | HEIGHT: 63 IN

## 2022-10-05 DIAGNOSIS — M85.851 OSTEOPENIA OF BOTH HIPS: ICD-10-CM

## 2022-10-05 DIAGNOSIS — M85.852 OSTEOPENIA OF BOTH HIPS: ICD-10-CM

## 2022-10-05 DIAGNOSIS — E83.52 HYPOCALCIURIC HYPERCALCEMIA: Primary | ICD-10-CM

## 2022-10-05 DIAGNOSIS — I10 ESSENTIAL HYPERTENSION: ICD-10-CM

## 2022-10-05 DIAGNOSIS — E55.9 VITAMIN D DEFICIENCY: ICD-10-CM

## 2022-10-05 DIAGNOSIS — E78.2 MIXED HYPERLIPIDEMIA: ICD-10-CM

## 2022-10-05 PROCEDURE — 99214 OFFICE O/P EST MOD 30 MIN: CPT | Performed by: INTERNAL MEDICINE

## 2022-10-05 RX ORDER — FUROSEMIDE 20 MG/1
20 TABLET ORAL DAILY
Qty: 90 TABLET | Refills: 2 | Status: SHIPPED | OUTPATIENT
Start: 2022-10-05

## 2022-10-05 RX ORDER — CARBAMAZEPINE 200 MG/1
400 TABLET, EXTENDED RELEASE ORAL 2 TIMES DAILY
COMMUNITY

## 2022-10-05 RX ORDER — ATORVASTATIN CALCIUM 20 MG/1
TABLET, FILM COATED ORAL
Qty: 90 TABLET | Refills: 2 | Status: SHIPPED | OUTPATIENT
Start: 2022-10-05

## 2022-11-01 ENCOUNTER — OFFICE VISIT (OUTPATIENT)
Dept: NEUROLOGY | Facility: CLINIC | Age: 62
End: 2022-11-01

## 2022-11-01 VITALS
BODY MASS INDEX: 26.13 KG/M2 | RESPIRATION RATE: 16 BRPM | HEART RATE: 68 BPM | HEIGHT: 62 IN | DIASTOLIC BLOOD PRESSURE: 78 MMHG | SYSTOLIC BLOOD PRESSURE: 116 MMHG | WEIGHT: 142 LBS

## 2022-11-01 DIAGNOSIS — G50.0 TRIGEMINAL NEURALGIA OF RIGHT SIDE OF FACE: Primary | ICD-10-CM

## 2022-11-01 PROCEDURE — 99213 OFFICE O/P EST LOW 20 MIN: CPT | Performed by: STUDENT IN AN ORGANIZED HEALTH CARE EDUCATION/TRAINING PROGRAM

## 2022-11-01 RX ORDER — CARBAMAZEPINE 200 MG/1
TABLET ORAL
COMMUNITY
Start: 2022-10-19 | End: 2022-11-01

## 2022-11-01 NOTE — PROGRESS NOTES
"Chief Complaint   Patient presents with   • Trigeminal neuralgia  right side of face      Patient feeling much better has non of the sharp pains anymore        Patient ID: Kristina Kang is a 61 y.o. female.    HPI:  Kristina Kang is a 61-year-old female who presents for follow-up of trigeminal neuralgia. She was on gabapentin 300 mg twice daily, and she is now on carbamazepine extended release 400 mg b.i.d.    Today, she reports she is doing well overall. She states the gabapentin was \"too much\" in conjunction with the carbamazepine; however, now that the carbamazepine has \"cycled through\" more, she is not experiencing any flareups. She is now exclusively taking the carbamazepine 400 mg twice a day. She states she was previously experiencing minor symptoms of neuralgia when rolling over onto her right side. She notes these symptoms are not necessarily flare ups, as they did not \"fire off\" as hard as they have in the past. She states these symptoms have now subsided. She reports she will occasionally touch her face and will experience the same symptoms, but notes the sensation is not like a \"lightning bolt\" like it was in the past. She confirms her symptoms are not as severe, and are much more tolerable now compared to before. She states she occasionally has to touch her face in order to know it is there. She states the only time her neuralgia symptoms were severe was when her symptoms would flare-up, and she would have to stop and let them resolve on their own. She confirms the carbamazepine has improved her pain since she has started taking this on its own. She confirms she is not experiencing a significant amount of side effects from the carbamazepine. She states she was previously being prescribed carbamazepine by her primary care provider, prior to coming to neurology clinic.     She states she is experiencing word-finding difficulties and difficulties with concentration, and believes this may be related " "to the carbamazepine. She reports she experiences difficulty concentrating when in meetings. She states she would previously concentrate on what was being said, and be able to retain the information, but she is now experiencing difficulty with this. She states she is experiencing word-finding difficulties during her visit today. She notes her manager has informed her she is still doing well at work, but she confirms she has notified her manager of her difficulties with concentration. She reports she began to experience difficulties with concentration prior to her neuralgia symptoms occurring. She states her difficulties with concentration have lessened, but notes she continues to experience word-finding difficulties, which is what bothers her the most.     She previously experienced side effects while taking gabapentin, and states she felt \"off balance\" while taking this. She reports while taking gabapentin, she was crossing the street one day at work and fell. She states she experienced contusions on her bilateral knees after her fall. She notes she decided against taking the gabapentin before driving to work, to potentially avoid experiencing any side effects while driving. She states she began taking the gabapentin once she got to work, in hopes that by time she took her lunch break at 12:00 p.m., it would have \"cycled through.\" Unfortunately, this was not effective, as she states her fall occurred when she left on her lunch break. She reports her pain did not improve while taking the gabapentin.     She reports she regularly completes bone density scans with her endocrinologist due to her hypocalciuric hypercalcemia.    She confirms she is currently taking Vagifem.     The following portions of the patient's history were reviewed and updated as appropriate: allergies, current medications, past family history, past medical history, past social history, past surgical history and problem list.         Review of " Systems   Allergic/Immunologic: Negative for food allergies.   Neurological: Positive for numbness. Negative for dizziness, tremors, light-headedness and headaches.   Psychiatric/Behavioral: Negative for confusion, decreased concentration and sleep disturbance. The patient is not nervous/anxious.         Vitals:    22 0833   BP: 116/78   Pulse: 68   Resp: 16       Neurologic Exam     Mental Status   Speech: speech is normal   Level of consciousness: alert    Cranial Nerves     CN II   Visual fields full to confrontation.     CN III, IV, VI   Pupils are equal, round, and reactive to light.  Extraocular motions are normal.     CN V   Facial sensation intact.     CN VII   Facial expression full, symmetric.     CN VIII   Hearing: intact    CN IX, X   Palate: symmetric    CN XI   Right trapezius strength: normal  Left trapezius strength: normal    CN XII   Tongue: not atrophic  Fasciculations: absent  Tongue deviation: none    Motor Exam   Muscle bulk: normal    Strength   Right deltoid: 5/5  Left deltoid: 5/5  Right biceps: 5/5  Left biceps: 5/5  Right triceps: 5/5  Left triceps: 5/5  Right iliopsoas: 5/5  Left iliopsoas: 5/5  Right quadriceps: 5/5  Left quadriceps: 5/5  Right hamstrin/5  Left hamstrin/5  Right anterior tibial: 5/5  Left anterior tibial: 5/5  Right gastroc: 5/5  Left gastroc: 5/5Grip 5 out of 5 bilaterally     Sensory Exam   Right arm light touch: normal  Left arm light touch: normal  Right leg light touch: normal  Left leg light touch: normal    Gait, Coordination, and Reflexes     Coordination   Finger to nose coordination: normal  Heel to shin coordination: normal      Physical Exam  Eyes:      Extraocular Movements: EOM normal.      Pupils: Pupils are equal, round, and reactive to light.   Neurological:      Coordination: Finger-Nose-Finger Test and Heel to Shin Test normal.   Psychiatric:         Speech: Speech normal.     Recent liver function, thyroid function, and blood count lab  work is normal.    Procedures    Assessment/Plan:         Diagnoses and all orders for this visit:    1. Trigeminal neuralgia of right side of face (Primary)           Layton Izaguirre MD    1. Trigeminal neuralgia.  - She is going to continue carbamazepine extended release 400 mg twice a day. She will call when she needs a refill of this. If needed, dose can be adjusted in the future.  - Addressed the patient's concerns of long-term use of carbamazepine. She was advised carbamazepine can interact with other medications, so when starting a new medicine, it is important for her to inform her other healthcare providers that she is taking this.  - Discussed other potential treatment options, including gabapentin alone, duloxetine, increasing the carbamazepine, and Lyrica. She previously experienced side effects from gabapentin, and was advised Lyrica works in a similar way, but it is possible she would tolerate Lyrica well. She was advised duloxetine is an antidepressant.   - Discussed potential side effects of carbamazepine including dizziness, low sodium levels, thyroid issues, and long-term bone health concerns or potential bone density issues. She was advised studies have shown that bone density issues are less common with carbamazepine compared to other medicines including Depakote, primidone, and phenytoin.   - In regards to her word finding difficulties, she will continue to monitor this. She is able to carry on a functional conversation and continue working. She was advised her word finding difficulties may be related to the carbamazepine; however, this continues to be the best course of treatment, as she is tolerating this well without large adverse side effects, and it is effective for managing her pain.    In regards to her Vagifem, she was advised if there is estrogen in this medication, it can increase the risk of heart attack and stroke post-menopause. Recommended she discuss risks and benefits with her  prescriber. The patient was advised from a neurological standpoint, the use of estrogen is not recommended after menopause, due to the risk of stroke.    She is going to follow up in 6 months or sooner if needed. If anything comes up beforehand, she will call the clinic and let us know, and we can discuss other options, or make adjustments over the phone as needed.   I spent at least 20 minutes caring for this patient on this date of service. This time includes time spent by me in the following activities as necessary: preparing for the visit, reviewing tests, medical records and previous visits, obtaining and/or reviewing a separately obtained history, performing a medically appropriate exam and/or evaluation, counseling and educating the patient, and/or communicating with other healthcare professionals, documenting information in the medical record, independently interpreting results and communicating that information with the patient, and developing a medically appropriate treatment plan with consideration of other conditions, medications, and treatments.    Transcribed from ambient dictation for Layton Izaguirre MD by Edna Ryan.  11/01/22   14:00 EDT    Patient or patient representative verbalized consent to the visit recording.  I have personally performed the services described in this document as transcribed by the above individual, and it is both accurate and complete.  Layton Izaguirre MD  11/15/2022  07:58 EST

## 2022-11-28 ENCOUNTER — TELEMEDICINE (OUTPATIENT)
Dept: FAMILY MEDICINE CLINIC | Facility: CLINIC | Age: 62
End: 2022-11-28

## 2022-11-28 DIAGNOSIS — M54.42 ACUTE BILATERAL LOW BACK PAIN WITH BILATERAL SCIATICA: Primary | ICD-10-CM

## 2022-11-28 DIAGNOSIS — M54.41 ACUTE BILATERAL LOW BACK PAIN WITH BILATERAL SCIATICA: Primary | ICD-10-CM

## 2022-11-28 PROCEDURE — 99213 OFFICE O/P EST LOW 20 MIN: CPT | Performed by: NURSE PRACTITIONER

## 2022-11-28 RX ORDER — PREDNISONE 20 MG/1
TABLET ORAL
Qty: 20 TABLET | Refills: 0 | Status: SHIPPED | OUTPATIENT
Start: 2022-11-28

## 2022-11-28 RX ORDER — CYCLOBENZAPRINE HCL 5 MG
5 TABLET ORAL NIGHTLY PRN
Qty: 30 TABLET | Refills: 0 | Status: SHIPPED | OUTPATIENT
Start: 2022-11-28

## 2022-11-28 NOTE — PROGRESS NOTES
Subjective   Kristina Kang is a 61 y.o. female.   You have chosen to receive care through a telehealth visit.  Do you consent to use a video/audio connection for your medical care today? Yes  History of Present Illness    Back Pain (Low back all the way across, R > L, goes to bottom of R buttock.  Started over the weekend but got much worse this AM)    The following portions of the patient's history were reviewed and updated as appropriate: allergies, current medications, past family history, past medical history, past social history, past surgical history and problem list.    Review of Systems   Constitutional: Negative for unexpected weight change.   Respiratory: Negative for shortness of breath.    Musculoskeletal: Positive for back pain. Negative for gait problem.   Psychiatric/Behavioral: Negative for behavioral problems.       Objective   Physical Exam  Vitals and nursing note reviewed.   Constitutional:       Appearance: Normal appearance. She is well-developed.   Pulmonary:      Effort: Pulmonary effort is normal.   Neurological:      Mental Status: She is alert and oriented to person, place, and time.   Psychiatric:         Mood and Affect: Mood normal.         Behavior: Behavior normal.         Thought Content: Thought content normal.         Judgment: Judgment normal.         Assessment & Plan   Diagnoses and all orders for this visit:    1. Acute bilateral low back pain with bilateral sciatica (Primary)  -     predniSONE (DELTASONE) 20 MG tablet; Take 3 tabs QDPC x 3 days then 2 tabs QDPC x 4 days then 1 tab QDPC x 3 days  Dispense: 20 tablet; Refill: 0  -     cyclobenzaprine (FLEXERIL) 5 MG tablet; Take 1 tablet by mouth At Night As Needed for Muscle Spasms.  Dispense: 30 tablet; Refill: 0          This visit has been rescheduled as a video visit to comply with patient safety concerns in accordance with CDC recommendations. Total time of encounter was 10 minutes.

## 2023-02-27 ENCOUNTER — TELEPHONE (OUTPATIENT)
Dept: ENDOCRINOLOGY | Age: 63
End: 2023-02-27
Payer: COMMERCIAL

## 2023-02-27 DIAGNOSIS — E83.52 HYPOCALCIURIC HYPERCALCEMIA: ICD-10-CM

## 2023-02-27 DIAGNOSIS — E55.9 VITAMIN D DEFICIENCY: ICD-10-CM

## 2023-02-27 DIAGNOSIS — E78.2 MIXED HYPERLIPIDEMIA: Primary | ICD-10-CM

## 2023-03-15 ENCOUNTER — TRANSCRIBE ORDERS (OUTPATIENT)
Dept: ADMINISTRATIVE | Facility: HOSPITAL | Age: 63
End: 2023-03-15
Payer: COMMERCIAL

## 2023-03-15 DIAGNOSIS — Z12.31 VISIT FOR SCREENING MAMMOGRAM: Primary | ICD-10-CM

## 2023-04-12 RX ORDER — CARBAMAZEPINE 200 MG/1
TABLET, EXTENDED RELEASE ORAL
Qty: 150 TABLET | Refills: 5 | Status: SHIPPED | OUTPATIENT
Start: 2023-04-12

## 2023-04-25 ENCOUNTER — LAB (OUTPATIENT)
Dept: ENDOCRINOLOGY | Age: 63
End: 2023-04-25
Payer: COMMERCIAL

## 2023-04-25 DIAGNOSIS — E83.52 HYPOCALCIURIC HYPERCALCEMIA: ICD-10-CM

## 2023-04-25 DIAGNOSIS — E78.2 MIXED HYPERLIPIDEMIA: ICD-10-CM

## 2023-04-25 DIAGNOSIS — E55.9 VITAMIN D DEFICIENCY: ICD-10-CM

## 2023-04-26 ENCOUNTER — OFFICE VISIT (OUTPATIENT)
Dept: FAMILY MEDICINE CLINIC | Facility: CLINIC | Age: 63
End: 2023-04-26
Payer: COMMERCIAL

## 2023-04-26 VITALS
BODY MASS INDEX: 25.91 KG/M2 | DIASTOLIC BLOOD PRESSURE: 65 MMHG | HEART RATE: 65 BPM | WEIGHT: 140.8 LBS | TEMPERATURE: 97.5 F | SYSTOLIC BLOOD PRESSURE: 110 MMHG | RESPIRATION RATE: 16 BRPM | HEIGHT: 62 IN | OXYGEN SATURATION: 98 %

## 2023-04-26 DIAGNOSIS — R05.8 PRODUCTIVE COUGH: ICD-10-CM

## 2023-04-26 DIAGNOSIS — J06.9 UPPER RESPIRATORY TRACT INFECTION, UNSPECIFIED TYPE: Primary | ICD-10-CM

## 2023-04-26 LAB
25(OH)D3+25(OH)D2 SERPL-MCNC: 41.7 NG/ML (ref 30–100)
ALBUMIN SERPL-MCNC: 4.7 G/DL (ref 3.8–4.8)
ALBUMIN/GLOB SERPL: 1.8 {RATIO} (ref 1.2–2.2)
ALP SERPL-CCNC: 105 IU/L (ref 44–121)
ALT SERPL-CCNC: 8 IU/L (ref 0–32)
AST SERPL-CCNC: 15 IU/L (ref 0–40)
BILIRUB SERPL-MCNC: 0.3 MG/DL (ref 0–1.2)
BUN SERPL-MCNC: 24 MG/DL (ref 8–27)
BUN/CREAT SERPL: 22 (ref 12–28)
CALCIUM SERPL-MCNC: 10.5 MG/DL (ref 8.7–10.3)
CHLORIDE SERPL-SCNC: 102 MMOL/L (ref 96–106)
CHOLEST SERPL-MCNC: 157 MG/DL (ref 100–199)
CO2 SERPL-SCNC: 27 MMOL/L (ref 20–29)
CREAT SERPL-MCNC: 1.08 MG/DL (ref 0.57–1)
EGFRCR SERPLBLD CKD-EPI 2021: 58 ML/MIN/1.73
GLOBULIN SER CALC-MCNC: 2.6 G/DL (ref 1.5–4.5)
GLUCOSE SERPL-MCNC: 108 MG/DL (ref 70–99)
HBA1C MFR BLD: 5.8 % (ref 4.8–5.6)
HDLC SERPL-MCNC: 48 MG/DL
IMP & REVIEW OF LAB RESULTS: NORMAL
LDLC SERPL CALC-MCNC: 93 MG/DL (ref 0–99)
POTASSIUM SERPL-SCNC: 4.5 MMOL/L (ref 3.5–5.2)
PROT SERPL-MCNC: 7.3 G/DL (ref 6–8.5)
PTH-INTACT SERPL-MCNC: 42 PG/ML (ref 15–65)
REPORT: NORMAL
SODIUM SERPL-SCNC: 142 MMOL/L (ref 134–144)
TRIGL SERPL-MCNC: 82 MG/DL (ref 0–149)
VLDLC SERPL CALC-MCNC: 16 MG/DL (ref 5–40)

## 2023-04-26 PROCEDURE — 99213 OFFICE O/P EST LOW 20 MIN: CPT

## 2023-04-26 RX ORDER — AMOXICILLIN AND CLAVULANATE POTASSIUM 875; 125 MG/1; MG/1
1 TABLET, FILM COATED ORAL 2 TIMES DAILY
Qty: 20 TABLET | Refills: 0 | Status: SHIPPED | OUTPATIENT
Start: 2023-04-26 | End: 2023-05-06

## 2023-04-26 RX ORDER — BENZONATATE 100 MG/1
100 CAPSULE ORAL 3 TIMES DAILY PRN
Qty: 30 CAPSULE | Refills: 1 | Status: SHIPPED | OUTPATIENT
Start: 2023-04-26

## 2023-04-26 NOTE — PROGRESS NOTES
"Chief Complaint  Cough and URI (Pt thinks she has sinus infection. Has a lot of mucus in her chest in the morning. Pt states she does not have covid.)    Subjective          History of Present Illness    Kristina A Kang 62 y.o. female presents for evaluation of upper respiratory congestion, cough. Symptoms include congestion, cough described as productive of brown sputum, runny nose and clear nasal discharge.  Onset of symptoms was 10 days ago, stable since that time. Patient denies shortness of breath, wheezing, hemoptysis, pleuritic chest pain, fever, dyspnea on exertion, ear drainage, eye discharge, diarrhea, vomiting, vertigo, sneezing, chills, sweats, sinus pain, epistaxis, high fever.   Evaluation to date: none. Treatment to date:  none.       Review of Systems   Constitutional: Negative for chills, fatigue and fever.   HENT: Positive for congestion and rhinorrhea. Negative for ear pain, postnasal drip, sinus pressure, sore throat and trouble swallowing.    Eyes: Negative for visual disturbance.   Respiratory: Positive for cough. Negative for chest tightness, shortness of breath and wheezing.    Cardiovascular: Negative for chest pain and palpitations.   Gastrointestinal: Negative for abdominal pain, constipation, diarrhea, nausea and vomiting.   Genitourinary: Negative for dysuria, frequency and urgency.   Musculoskeletal: Negative for back pain.   Skin: Negative for rash.   Neurological: Negative for dizziness, syncope and light-headedness.   Psychiatric/Behavioral: Negative for behavioral problems and sleep disturbance.        Objective   Vital Signs:   /65 (BP Location: Left arm, Patient Position: Sitting, Cuff Size: Adult)   Pulse 65   Temp 97.5 °F (36.4 °C) (Oral)   Resp 16   Ht 158.6 cm (62.44\")   Wt 63.9 kg (140 lb 12.8 oz)   SpO2 98%   BMI 25.39 kg/m²      BMI is >= 25 and <30. (Overweight) The following options were offered after discussion;: exercise counseling/recommendations and " nutrition counseling/recommendations        Physical Exam  Vitals and nursing note reviewed.   Constitutional:       General: She is not in acute distress.     Appearance: She is well-developed and overweight. She is not toxic-appearing or diaphoretic.   HENT:      Head: Normocephalic and atraumatic. Hair is normal.      Right Ear: External ear normal. No drainage, swelling or tenderness. Tympanic membrane is not retracted.      Left Ear: External ear normal. No drainage, swelling or tenderness. Tympanic membrane is not retracted.      Nose: Rhinorrhea present. No mucosal edema or congestion.      Mouth/Throat:      Mouth: Mucous membranes are moist. No oral lesions.      Pharynx: Uvula midline. Posterior oropharyngeal erythema present. No pharyngeal swelling, oropharyngeal exudate or uvula swelling.      Tonsils: No tonsillar exudate or tonsillar abscesses.   Eyes:      General: No scleral icterus.        Right eye: No discharge.         Left eye: No discharge.      Conjunctiva/sclera: Conjunctivae normal.      Pupils: Pupils are equal, round, and reactive to light.   Cardiovascular:      Rate and Rhythm: Normal rate and regular rhythm.      Heart sounds: Normal heart sounds. No murmur heard.    No gallop.   Pulmonary:      Effort: No respiratory distress.      Breath sounds: Normal breath sounds. No stridor or decreased air movement. No decreased breath sounds, wheezing, rhonchi or rales.   Chest:      Chest wall: No tenderness.   Abdominal:      Palpations: Abdomen is soft.      Tenderness: There is no abdominal tenderness.   Musculoskeletal:      Cervical back: Normal range of motion and neck supple.   Lymphadenopathy:      Cervical: No cervical adenopathy.   Skin:     General: Skin is warm and dry.      Findings: No rash.   Neurological:      Mental Status: She is alert and oriented to person, place, and time.      Motor: No abnormal muscle tone.   Psychiatric:         Behavior: Behavior normal.         Thought  Content: Thought content normal.         Judgment: Judgment normal.                         Assessment and Plan      Diagnoses and all orders for this visit:    1. Upper respiratory tract infection, unspecified type (Primary)  -     amoxicillin-clavulanate (Augmentin) 875-125 MG per tablet; Take 1 tablet by mouth 2 (Two) Times a Day for 10 days.  Dispense: 20 tablet; Refill: 0  -     benzonatate (Tessalon Perles) 100 MG capsule; Take 1 capsule by mouth 3 (Three) Times a Day As Needed for Cough.  Dispense: 30 capsule; Refill: 1    2. Productive cough  -     amoxicillin-clavulanate (Augmentin) 875-125 MG per tablet; Take 1 tablet by mouth 2 (Two) Times a Day for 10 days.  Dispense: 20 tablet; Refill: 0  -     benzonatate (Tessalon Perles) 100 MG capsule; Take 1 capsule by mouth 3 (Three) Times a Day As Needed for Cough.  Dispense: 30 capsule; Refill: 1            Follow Up     Return if symptoms worsen or fail to improve.    Patient was given instructions and counseling regarding her condition or for health maintenance advice. Please see specific information pulled into the AVS if appropriate.     -Take medication as prescribed.  -Monitor for fever and take Tylenol as needed.  Drink plenty of fluids and get plenty of rest.  -Use cool-mist humidifier as needed.  -Seek immediate medical attention for fever unrelieved by Tylenol, chest pain, shortness of breath, decreased oxygen saturations, sharp pain with breathing, or any other worsening signs or symptoms.  -Return to the office is symptoms worsen or do not improve.

## 2023-05-02 ENCOUNTER — OFFICE VISIT (OUTPATIENT)
Dept: ENDOCRINOLOGY | Age: 63
End: 2023-05-02
Payer: COMMERCIAL

## 2023-05-02 VITALS
OXYGEN SATURATION: 96 % | TEMPERATURE: 97.1 F | SYSTOLIC BLOOD PRESSURE: 114 MMHG | WEIGHT: 140.6 LBS | DIASTOLIC BLOOD PRESSURE: 70 MMHG | BODY MASS INDEX: 25.88 KG/M2 | HEART RATE: 75 BPM | HEIGHT: 62 IN

## 2023-05-02 DIAGNOSIS — E55.9 VITAMIN D DEFICIENCY: ICD-10-CM

## 2023-05-02 DIAGNOSIS — E78.2 MIXED HYPERLIPIDEMIA: ICD-10-CM

## 2023-05-02 DIAGNOSIS — I10 ESSENTIAL HYPERTENSION: ICD-10-CM

## 2023-05-02 DIAGNOSIS — R73.09 ELEVATED HEMOGLOBIN A1C: ICD-10-CM

## 2023-05-02 DIAGNOSIS — E83.52 HYPOCALCIURIC HYPERCALCEMIA: Primary | ICD-10-CM

## 2023-05-02 PROCEDURE — 99214 OFFICE O/P EST MOD 30 MIN: CPT | Performed by: INTERNAL MEDICINE

## 2023-05-02 NOTE — PROGRESS NOTES
Luis Kang is a 62 y.o. female.     History of Present Illness     Her serum calcium has ranged from 10.7-11.2 with the upper limit of normal of 10.5 mg per DL. She had a bone density done at [facility] in December 2015 which showed osteopenia.  Bone density done in December 2017 showed stable osteopenia compared to 2015.  Serum calcium was at 10.5 mg per DL in February 2021     Bone density done in September 2022 showed osteopenia without significant change since December 2019.  Her 10-year risk of major osteoporotic fracture is 4.8% and of hip fracture is 0.7%.     She has history of vitamin D deficiency and has been taking vitamin D 2000 units/day.  She has no previous history of kidney stones, peptic ulcer, or depression. She denies muscle weakness.    25 hydroxy vitamin D done in April 2023 is normal at 41.7 ng/mL.      There is no family history of hypercalcemia or nephrolithiasis.     Workup done in January 2017 are as follows: Serum calcium at 11.0 mg per DL.  Intact PTH is normal at 42 pg per mL.  Undetectable PTH RP.  25-hydroxy vitamin D is 28 ng per mL.  24 urine calcium is low at 31.2 mg     Repeat 24-hour urine collection done after vitamin D repletion in 4/18 showed low calcium at 57 mg per 24 hours.  Calcium to creatinine clearance ratio is low at 0.004.    She has elevated hemoglobin A1c since 2023.  She has no history of gestational diabetes.  Hemoglobin A1c done in April 2023 is 5.8% with an elevated fasting glucose of 108 mg per DL.      She has history of hypertension and has been on furosemide 20  mg once a day and valsartan 160 mg/day.  Lisinopril was discontinued because of a dry cough.  No chest pain, shortness of breath or pedal edema.      She has hyperlipidemia and is on Lipitor 20 mg once a day. She denies any myalgia.. She has no history of diabetes mellitus.  She has lost 2 pounds since October 2022.  Lipid panel done in April 2023 are as follows: Cholesterol  "157.  HDL 48.  LDL 93.  Triglycerides 82.      She had an episode of transient visual loss on the right eye in July 2016 thought to be due to amaurosis fugax. Carotid ultrasound was normal. She is on aspirin and Lipitor.  She has no recurrence of visual loss since.  She had an eye examination in 3/23.     She has right trigeminal neuralgia and is on carbamazepine prescribed by [facility].  Symptom has improved since.     She had colon polyps removed by Dr. Malone in April 2019.  She had colonoscopy in May 2020 and 20 polyps were removed by Dr. Malone.  She had colonoscopy in June 2021 and had multiple polyps removed.  She had 8 polyps removed by colonoscopy in June 2022 by Dr. Malone.  She was advised to have a colonoscopy in 2024.       She is adopted and does not know her family history.  Her children are age 40 and younger and have not had a colonoscopy.  She denies bowel complaints.    The following portions of the patient's history were reviewed and updated as appropriate: allergies, current medications, past family history, past medical history, past social history, past surgical history and problem list.    Review of Systems  Vitals:    05/02/23 1118   BP: 114/70   Pulse: 75   Temp: 97.1 °F (36.2 °C)   TempSrc: Temporal   SpO2: 96%   Weight: 63.8 kg (140 lb 9.6 oz)   Height: 158.6 cm (62.44\")      Objective   Physical Exam  Lab on 04/25/2023   Component Date Value Ref Range Status   • Hemoglobin A1C 04/25/2023 5.8 (H)  4.8 - 5.6 % Final    Comment:          Prediabetes: 5.7 - 6.4           Diabetes: >6.4           Glycemic control for adults with diabetes: <7.0     • Total Cholesterol 04/25/2023 157  100 - 199 mg/dL Final   • Triglycerides 04/25/2023 82  0 - 149 mg/dL Final   • HDL Cholesterol 04/25/2023 48  >39 mg/dL Final   • VLDL Cholesterol Alexis 04/25/2023 16  5 - 40 mg/dL Final   • LDL Chol Calc ([facility]) 04/25/2023 93  0 - 99 mg/dL Final   • 25 Hydroxy, Vitamin D 04/25/2023 41.7  30.0 - 100.0 " ng/mL Final    Comment: Vitamin D deficiency has been defined by the [place] of  Medicine and an Endocrine Society practice guideline as a  level of serum 25-OH vitamin D less than 20 ng/mL (1,2).  The Endocrine Society went on to further define vitamin D  insufficiency as a level between 21 and 29 ng/mL (2).  1. IOM ([place] of Medicine). 2010. Dietary reference     intakes for calcium and D. [place]: The     National Academies Press.  2. Jerod MF, Rafa HIGGINS, Qi HILTON, et al.     Evaluation, treatment, and prevention of vitamin D     deficiency: an Endocrine Society clinical practice     guideline. JCEM. 2011 Jul; 96(7):1911-30.     • PTH, Intact 04/25/2023 42  15 - 65 pg/mL Final   • Glucose 04/25/2023 108 (H)  70 - 99 mg/dL Final   • BUN 04/25/2023 24  8 - 27 mg/dL Final   • Creatinine 04/25/2023 1.08 (H)  0.57 - 1.00 mg/dL Final   • EGFR Result 04/25/2023 58 (L)  >59 mL/min/1.73 Final   • BUN/Creatinine Ratio 04/25/2023 22  12 - 28 Final   • Sodium 04/25/2023 142  134 - 144 mmol/L Final   • Potassium 04/25/2023 4.5  3.5 - 5.2 mmol/L Final   • Chloride 04/25/2023 102  96 - 106 mmol/L Final   • Total CO2 04/25/2023 27  20 - 29 mmol/L Final   • Calcium 04/25/2023 10.5 (H)  8.7 - 10.3 mg/dL Final   • Total Protein 04/25/2023 7.3  6.0 - 8.5 g/dL Final   • Albumin 04/25/2023 4.7  3.8 - 4.8 g/dL Final   • Globulin 04/25/2023 2.6  1.5 - 4.5 g/dL Final   • A/G Ratio 04/25/2023 1.8  1.2 - 2.2 Final   • Total Bilirubin 04/25/2023 0.3  0.0 - 1.2 mg/dL Final   • Alkaline Phosphatase 04/25/2023 105  44 - 121 IU/L Final   • AST (SGOT) 04/25/2023 15  0 - 40 IU/L Final   • ALT (SGPT) 04/25/2023 8  0 - 32 IU/L Final   • Interpretation 04/25/2023 Note   Final    Supplemental report is available.   • Interpretation 04/25/2023 Note   Final    Comment: -------------------------------  CHRONIC KIDNEY DISEASE:  EGFR, BLOOD PRESSURE, AND PROTEINURIA ASSESSMENT  Patient's eGFR was previously outside the scope of  the  program and now is 58 mL/min/1.73mE2 corresponding to CKD  stage 3a. Potassium is within goal and has not changed  significantly, was 4.8 and now is 4.5 mmol/L. Hemoglobin A1C  is 5.8 %; diabetic status is unknown. Refer to ADA  guidelines for clinical practice recommendations.  EGFR, BLOOD PRESSURE, AND PROTEINURIA TREATMENT SUGGESTIONS  -  Guidelines recommend a target blood pressure of 120/80 mmHg  or less to reduce cardiovascular risk and CKD progression.  Assessment of albuminuria (urine albumin:creatinine ratio or  urine protein:creatinine ratio preferred) is recommended at  least annually in CKD patients for staging and disease  prognosis.  EGFR, BLOOD PRESSURE, AND PROTEINURIA FOLLOW-UP  -  Spot Urine Panel is recommended by guidelines, at least  yearly; fasting Renal Panel within 1 month;  BONE and MINERAL ASSESSMEN                           T  Intact PTH is within goal and has decreased, was 50 and now  is 42 pg/mL. Calcium is above goal and has decreased, was  10.9 and now is 10.5 mg/dL. Carbon Dioxide is within goal  and has not changed significantly, was 27 and now is 27  mmol/L. Vitamin D is adequate (was 47.7 and now is 41.7  ng/mL).  BONE and MINERAL TREATMENT SUGGESTIONS  -  Plasma PTH cannot be interpreted without simultaneous  measurements of serum calcium and phosphorus. Stop calcium  supplements if in use.  BONE and MINERAL FOLLOW-UP  -  25-Hydroxy Vitamin D within 12 months; fasting Renal Panel  within 1 month; fasting PTH with Renal Panel within 6  months;  LIPIDS ASSESSMENT  LDL-C is normal and has not changed significantly, was 91  and now is 93 mg/dL. Triglyceride is normal and has not  changed significantly, was 77 and now is 82 mg/dL. Non-HDL  Cholesterol is normal and has not changed significantly, was  106 and now is 109 mg/dL. HDL-C is normal and has decreased,  was 55 and now is 48 mg/dL.  LIPIDS KELLIE                           ATMENT SUGGESTIONS  -  Therapeutic lifestyle changes  are always valuable to  maintain optimal blood lipid status (diet, exercise, weight  management). If at least a 50% LDL reduction from baseline  has not been achieved, begin or increase statin. Consider  measurement of LDL particle number or Apo B to adjudicate  need for further LDL lowering therapy. If statin cannot be  tolerated or increased, alternatives include use of an  intestinal agent (ezetimibe or bile acid sequestrant) or  niacin.  LIPIDS FOLLOW-UP  -  fasting Lipid Panel within 12 months;  ANEMIA ASSESSMENT  Most recent order does not include a CBC Panel or iron  studies.  ANEMIA FOLLOW-UP  -  CBC is recommended by guidelines, at least yearly;  -------------------------------  DISCLAIMER  These assessments and treatment suggestions are provided as  a convenience in support of the physician-patient  relationship and are not intended to replace the physician's  clinical judgment. They are derived from national guidelines  in                            addition to other evidence and expert opinion. The  clinician should consider this information within the  context of clinical opinion and the individual patient.  SEE GUIDANCE FOR CHRONIC KIDNEY DISEASE PROGRAM: Kidney  Disease Improving Global Outcomes (KDIGO) clinical practice  guidelines are at http://kdigo.org/home/guidelines/.  National Kidney Foundation Kidney Disease Outcomes Quality  Initiative (KDOQI (TM)), with its limitations and  disclaimers, are at www.kidney.org/professionals/KDOQI. This  program is intended for patients who have been diagnosed  with stages 3, 4, or pre-dialysis 5 CKD. It is not intended  for children, pregnant patients, or transplant patients.       Assessment & Plan   Diagnoses and all orders for this visit:    1. Hypocalciuric hypercalcemia (Primary)    2. Essential hypertension    3. Mixed hyperlipidemia    4. Vitamin D deficiency    5. Elevated hemoglobin A1c      Obtain calcium sensing receptor gene sequencing  analysis.  Continue vitamin D3 2000 units/day.  Continue Lipitor 20 mg/day.  Continue furosemide and valsartan.  Follow-up with Dr. Malone as scheduled.    Copy of my note sent to [facility].    RTC 6 mos.

## 2023-05-04 ENCOUNTER — OFFICE VISIT (OUTPATIENT)
Dept: NEUROLOGY | Facility: CLINIC | Age: 63
End: 2023-05-04
Payer: COMMERCIAL

## 2023-05-04 VITALS
HEIGHT: 62 IN | HEART RATE: 68 BPM | OXYGEN SATURATION: 97 % | SYSTOLIC BLOOD PRESSURE: 116 MMHG | DIASTOLIC BLOOD PRESSURE: 74 MMHG | BODY MASS INDEX: 26.57 KG/M2 | WEIGHT: 144.4 LBS

## 2023-05-04 DIAGNOSIS — G50.0 TRIGEMINAL NEURALGIA OF RIGHT SIDE OF FACE: Primary | ICD-10-CM

## 2023-05-04 PROCEDURE — 99214 OFFICE O/P EST MOD 30 MIN: CPT | Performed by: STUDENT IN AN ORGANIZED HEALTH CARE EDUCATION/TRAINING PROGRAM

## 2023-05-04 RX ORDER — CARBAMAZEPINE 200 MG/1
TABLET, EXTENDED RELEASE ORAL
Qty: 180 TABLET | Refills: 5 | Status: SHIPPED | OUTPATIENT
Start: 2023-05-04

## 2023-05-04 NOTE — PROGRESS NOTES
"Chief Complaint   Patient presents with   • Numbness     Trigeminal neuralgia  right side of face            Patient ID: Kristina Kang is a 62 y.o. female.    HPI:    Ms. Kristina Kang is a 62-year-old female who presents for follow-up of trigeminal neuralgia. In between her last appointment and her current appointment, I increased her carbamazepine to 600/400 mg.    Ms. Kristina Kang reports that she had a sinus infection since her last visit. She states that the change of carbamazepine is slowly helping. She notes that when she tried to wash the right side of her face this morning, it was so painful that she had to stop and breathe through it. She reports that this happens periodically. She states that some days she can go all day and nothing, and some days it is worse. She denies missing any dosages since her last visit. She states that after a while, it seemed to subside. She reports that when she called about it, it had just started. She states that she tried to \"bear through it\". She reports that it was when she could not lay on that side. She states that it was in the middle of the night, and she would lie wrong and would \"zap\" her awake. She states that once she got to sleep, she was fine. She reports that some days she might brush her teeth, and some days she cannot touch her face at all. She states that since her last visit, she was fine for an extended period, and then it was not fine. She reports that when it is not a good day, it may last for a few days before it stops. She did not develop any brain fog when the carbamazepine was increased. She denies any recent falls.      The following portions of the patient's history were reviewed and updated as appropriate: allergies, current medications, past family history, past medical history, past social history, past surgical history and problem list.        Review of Systems   Constitutional: Negative for activity change, appetite change, fatigue and " unexpected weight change.   Eyes: Negative.    Respiratory: Positive for cough (sinus infection). Negative for choking, chest tightness, shortness of breath and wheezing.    Cardiovascular: Negative.  Negative for chest pain, palpitations and leg swelling.   Gastrointestinal: Negative.    Endocrine: Negative for cold intolerance and heat intolerance.   Genitourinary: Negative.    Musculoskeletal: Negative for back pain, gait problem, neck pain and neck stiffness.   Skin: Negative.    Allergic/Immunologic: Negative.    Neurological: Positive for dizziness and numbness. Negative for tremors, speech difficulty, weakness, light-headedness and headaches.   Hematological: Bruises/bleeds easily.   Psychiatric/Behavioral: Negative for agitation, behavioral problems, confusion, decreased concentration and sleep disturbance. The patient is not nervous/anxious and is not hyperactive.           Vitals:    23 1320   BP: 116/74   Pulse: 68   SpO2: 97%       Neurologic Exam     Mental Status   Speech: speech is normal   Level of consciousness: alert    Cranial Nerves     CN II   Visual fields full to confrontation.     CN III, IV, VI   Pupils are equal, round, and reactive to light.  Extraocular motions are normal.     CN V   Facial sensation intact.     CN VII   Facial expression full, symmetric.     CN VIII   Hearing: intact    CN IX, X   Palate: symmetric    CN XI   Right trapezius strength: normal  Left trapezius strength: normal    CN XII   Tongue: not atrophic  Fasciculations: absent  Tongue deviation: none    Motor Exam   Muscle bulk: normal    Strength   Right deltoid: 5/5  Left deltoid: 5/5  Right biceps: 5/5  Left biceps: 5/5  Right triceps: 5/5  Left triceps: 5/5  Right iliopsoas: 5/5  Left iliopsoas: 5/5  Right quadriceps: 5/5  Left quadriceps: 5/5  Right hamstrin/5  Left hamstrin/5  Right anterior tibial: 5/5  Left anterior tibial: 5/5  Right gastroc: 5/5  Left gastroc: 5/5Grip 5 out of 5 bilaterally      Sensory Exam   Right arm light touch: normal  Left arm light touch: normal  Right leg light touch: normal  Left leg light touch: normal    Gait, Coordination, and Reflexes     Coordination   Finger to nose coordination: normal  Heel to shin coordination: normal      Physical Exam  Eyes:      Extraocular Movements: EOM normal.      Pupils: Pupils are equal, round, and reactive to light.   Neurological:      Coordination: Finger-Nose-Finger Test and Heel to Shin Test normal.   Psychiatric:         Speech: Speech normal.         Procedures    Assessment/Plan:         There are no diagnoses linked to this encounter.     1. Trigeminal neuralgia  - We will increase her carbamazepine to 600 mg in the morning and 600 mg at night.  - She acknowledges she will let me know in 1 month if she is still having pain.  - She will follow up in 3 months.    MDM Level 4, pt with chronic condition of trigeminal neuralgia not yet at treatment goal in terms of pain control, prescription medication increased at this appointment.    Transcribed from ambient dictation for Layton Izaguirre MD by Maria Teresa Carrizales.  05/04/23   16:20 EDT    Patient or patient representative verbalized consent to the visit recording.  I have personally performed the services described in this document as transcribed by the above individual, and it is both accurate and complete.  Layton zIaguirre MD  5/19/2023  11:27 EDT

## 2023-05-23 LAB — CASR GENE FULL MUT ANL BLD/T SEQ: NORMAL

## 2023-05-28 RX ORDER — VALSARTAN 160 MG/1
TABLET ORAL
Qty: 30 TABLET | Refills: 0 | Status: SHIPPED | OUTPATIENT
Start: 2023-05-28

## 2023-07-22 DIAGNOSIS — I10 ESSENTIAL HYPERTENSION: ICD-10-CM

## 2023-07-24 RX ORDER — FUROSEMIDE 20 MG/1
TABLET ORAL
Qty: 90 TABLET | Refills: 2 | Status: SHIPPED | OUTPATIENT
Start: 2023-07-24

## 2023-07-30 RX ORDER — CARBAMAZEPINE 200 MG/1
200 TABLET ORAL 2 TIMES DAILY
Qty: 180 TABLET | Refills: 1 | OUTPATIENT
Start: 2023-07-30

## 2023-07-31 RX ORDER — CARBAMAZEPINE 200 MG/1
TABLET, EXTENDED RELEASE ORAL
Qty: 450 TABLET | Refills: 1 | Status: SHIPPED | OUTPATIENT
Start: 2023-07-31

## 2023-08-04 ENCOUNTER — OFFICE VISIT (OUTPATIENT)
Dept: NEUROLOGY | Facility: CLINIC | Age: 63
End: 2023-08-04
Payer: COMMERCIAL

## 2023-08-04 VITALS
OXYGEN SATURATION: 97 % | SYSTOLIC BLOOD PRESSURE: 124 MMHG | HEART RATE: 51 BPM | BODY MASS INDEX: 26.04 KG/M2 | HEIGHT: 62 IN | DIASTOLIC BLOOD PRESSURE: 74 MMHG

## 2023-08-04 DIAGNOSIS — G50.0 TRIGEMINAL NEURALGIA OF RIGHT SIDE OF FACE: Primary | ICD-10-CM

## 2023-08-04 DIAGNOSIS — M54.42 LOW BACK PAIN WITH LEFT-SIDED SCIATICA, UNSPECIFIED BACK PAIN LATERALITY, UNSPECIFIED CHRONICITY: ICD-10-CM

## 2023-08-04 DIAGNOSIS — R20.0 LEFT LEG NUMBNESS: ICD-10-CM

## 2023-08-04 PROCEDURE — 99214 OFFICE O/P EST MOD 30 MIN: CPT | Performed by: STUDENT IN AN ORGANIZED HEALTH CARE EDUCATION/TRAINING PROGRAM

## 2023-08-04 RX ORDER — ATORVASTATIN CALCIUM 20 MG/1
1 TABLET, FILM COATED ORAL DAILY
COMMUNITY
Start: 2023-06-20

## 2023-08-04 NOTE — PROGRESS NOTES
"Chief Complaint   Patient presents with    Trigeminal Neuralgia       Patient ID: Kristina Kang is a 62 y.o. female.    HPI:  Interval history:  Ms. Kristina Kang is a 62-year-old female who presents for follow-up of trigeminal neuralgia. At her last appointment, I increased her carbamazepine to 600/600 mg. She is on Tegretol extended release to clarify the dosage that she is taking today and update her prescription potentially.     The patient reports that her facial pain has improved since increasing her carbamazepine. She states that she was taking 3 tablets in the morning and 3 tablets in the evening. She notes that she switched it to 3 tablets in the morning and 2 tablets in the evening, after reading the prescription instructions. The patient reports that occasionally she will experience a \"zinging\" sensation when washing her face, but it is not as severe. She notes the sensation is intermittent and sporadic. The patient denies any side effects on the increased dose.     Ms. Kang reports that she has been experiencing numbness and tingling that starts in her left buttock and radiates down her left lower extremity. She notes that her left foot becomes weak while walking down steps, and she must stop because she cannot put weight on it. The patient states that lifting her left foot causes pain at the top of her ankle. She notes that she has a degenerative disc disease, and she experiences back pain. She denies any worsening of her back pain.     The patient states that she saw her ophthalmologist, Dr. Kendall De La Garza, a few months ago. She notes that she was told her eye pressure was elevated. The patient reports that she went back on 07/31/2023 and was prescribed latanoprost eye drops. She notes that her vision was fine until 04/2023 when her ophthalmologist noticed that her eye pressure was elevated. The patient reports that she wears reading glasses. She notes that she has a follow-up appointment with " her ophthalmologist on 2022.         The following portions of the patient's history were reviewed and updated as appropriate: allergies, current medications, past family history, past medical history, past social history, past surgical history and problem list.      Review of Systems   Neurological:  Positive for numbness. Negative for dizziness, tremors, seizures, syncope, facial asymmetry, speech difficulty, weakness, light-headedness and headaches.   Hematological:  Negative for adenopathy. Bruises/bleeds easily.         Vitals:    23 0842   BP: 124/74   Pulse: 51   SpO2: 97%       Neurologic Exam     Mental Status   Speech: speech is normal   Level of consciousness: alert    Cranial Nerves     CN II   Visual fields full to confrontation.     CN III, IV, VI   Pupils are equal, round, and reactive to light.  Extraocular motions are normal.     CN V   Facial sensation intact.     CN VII   Facial expression full, symmetric.     CN VIII   Hearing: intact    CN IX, X   Palate: symmetric    CN XI   Right trapezius strength: normal  Left trapezius strength: normal    CN XII   Tongue: not atrophic  Fasciculations: absent  Tongue deviation: none    Motor Exam   Muscle bulk: normal    Strength   Right deltoid: 5/5  Left deltoid: 5/5  Right biceps: 5/5  Left biceps: 5/5  Right triceps: 5/5  Left triceps: 5/5  Right iliopsoas: 5/5  Left iliopsoas: 5/5  Right quadriceps: 5/5  Left quadriceps: 5/5  Right hamstrin/5  Left hamstrin/5  Right anterior tibial: 5/5  Left anterior tibial: 4/5  Right gastroc: 5/5  Left gastroc: 5/5Grip 5 out of 5 bilaterally     Sensory Exam   Right arm light touch: normal  Left arm light touch: normal  Right leg light touch: normal  Left leg light touch: normal    Gait, Coordination, and Reflexes     Coordination   Finger to nose coordination: normal  Heel to shin coordination: normal    Physical Exam  Eyes:      Extraocular Movements: EOM normal.      Pupils: Pupils are equal,  round, and reactive to light.   Neurological:      Coordination: Finger-Nose-Finger Test and Heel to Shin Test normal.   Psychiatric:         Speech: Speech normal.     Procedures    Assessment/Plan:         Diagnoses and all orders for this visit:    1. Trigeminal neuralgia of right side of face (Primary)    2. Left leg numbness  -     Ambulatory Referral to Physical Therapy Evaluate and treat; Stretching, ROM  -     MRI Lumbar Spine Without Contrast; Future    3. Low back pain with left-sided sciatica, unspecified back pain laterality, unspecified chronicity  -     Ambulatory Referral to Physical Therapy Evaluate and treat; Stretching, ROM  -     MRI Lumbar Spine Without Contrast; Future         Ms. Kristina Kang presents for follow-up of trigeminal neuralgia, and also has low back pain. She will continue taking Tegretol 200 mg 3 tablets in the morning and 2 tablets at night. I advised her  to contact her ophthalmologist if she notices any changes in her vision. The patient will complete an MRI of her lower back. She will be referred to physical therapy for her left lower extremity. I will request ophthalmology notes now and after 08/29/2023. Two conditions evaluated today, prescription management done. MDM level 4    follow-up in 09/2023.      Transcribed from ambient dictation for Layton Izaguirre MD by Tom Morrison.  08/04/23   12:49 EDT    Patient or patient representative verbalized consent to the visit recording.  I have personally performed the services described in this document as transcribed by the above individual, and it is both accurate and complete.  Layton Izaguirre MD  8/19/2023  00:17 EDT'

## 2023-08-04 NOTE — LETTER
"August 4, 2023       No Recipients    Patient: Kristina Kang   YOB: 1960   Date of Visit: 8/4/2023     Dear Joanne Maurice PA-C:       Thank you for referring Kristina Kang to me for evaluation. Below are the relevant portions of my assessment and plan of care.    If you have questions, please do not hesitate to call me. I look forward to following Kristina along with you.         Sincerely,        Layton Izaguirre MD        CC:   No Recipients    Tom Morrison  08/04/23 1249  Sign when Signing Visit  Chief Complaint   Patient presents with    Trigeminal Neuralgia       Patient ID: Kristina Kang is a 62 y.o. female.    HPI:    Ms. Kristina Kang is a 62-year-old female who presents for follow-up of trigeminal neuralgia. At her last appointment, I increased her carbamazepine to 600/600 mg. She is on Tegretol extended release to clarify the dosage that she is taking today and update her prescription potentially.     The patient reports that her facial pain has improved since increasing her carbamazepine. She states that she was taking 3 tablets in the morning and 3 tablets in the evening. She notes that she switched it to 3 tablets in the morning and 2 tablets in the evening, after reading the prescription instructions. The patient reports that occasionally she will experience a \"zinging\" sensation when washing her face, but it is not as severe. She notes the sensation is intermittent and sporadic. The patient denies any side effects on the increased dose.     Ms. Kang reports that she has been experiencing numbness and tingling that starts in her left buttock and radiates down her left lower extremity. She notes that her left foot becomes weak while walking down steps, and she must stop because she cannot put weight on it. The patient states that lifting her left foot causes pain at the top of her ankle. She notes that she has a degenerative disc disease, and she experiences back pain. " She denies any worsening of her back pain.     The patient states that she saw her ophthalmologist, Dr. Kendall De La Garza, a few months ago. She notes that she was told her eye pressure was elevated. The patient reports that she went back on 07/31/2023 and was prescribed latanoprost eye drops. She notes that her vision was fine until 04/2023 when her ophthalmologist noticed that her eye pressure was elevated. The patient reports that she wears reading glasses. She notes that she has a follow-up appointment with her ophthalmologist on 08/29/2022.         The following portions of the patient's history were reviewed and updated as appropriate: allergies, current medications, past family history, past medical history, past social history, past surgical history and problem list.    Interval history:    Review of Systems   Neurological:  Positive for numbness. Negative for dizziness, tremors, seizures, syncope, facial asymmetry, speech difficulty, weakness, light-headedness and headaches.   Hematological:  Negative for adenopathy. Bruises/bleeds easily.    ***    Vitals:    08/04/23 0842   BP: 124/74   Pulse: 51   SpO2: 97%       Neurologic Exam     Mental Status   Speech: speech is normal   Level of consciousness: alert    Cranial Nerves     CN II   Visual fields full to confrontation.     CN III, IV, VI   Pupils are equal, round, and reactive to light.  Extraocular motions are normal.     CN V   Facial sensation intact.     CN VII   Facial expression full, symmetric.     CN VIII   Hearing: intact    CN IX, X   Palate: symmetric    CN XI   Right trapezius strength: normal  Left trapezius strength: normal    CN XII   Tongue: not atrophic  Fasciculations: absent  Tongue deviation: none    Motor Exam   Muscle bulk: normal    Strength   Right deltoid: 5/5  Left deltoid: 5/5  Right biceps: 5/5  Left biceps: 5/5  Right triceps: 5/5  Left triceps: 5/5  Right iliopsoas: 5/5  Left iliopsoas: 5/5  Right quadriceps: 5/5  Left  "quadriceps: 5/5  Right hamstrin/5  Left hamstrin/5  Right anterior tibial: 5/5  Left anterior tibial: 4/5  Right gastroc: 5/5  Left gastroc: 5/5Grip 5 out of 5 bilaterally     Sensory Exam   Right arm light touch: normal  Left arm light touch: normal  Right leg light touch: normal  Left leg light touch: normal    Gait, Coordination, and Reflexes     Coordination   Finger to nose coordination: normal  Heel to shin coordination: normal    Physical Exam  Eyes:      Extraocular Movements: EOM normal.      Pupils: Pupils are equal, round, and reactive to light.   Neurological:      Coordination: Finger-Nose-Finger Test and Heel to Shin Test normal.   Psychiatric:         Speech: Speech normal.     Procedures    Assessment/Plan:         Diagnoses and all orders for this visit:    1. Trigeminal neuralgia of right side of face (Primary)    2. Left leg numbness  -     Ambulatory Referral to Physical Therapy Evaluate and treat; Stretching, ROM  -     MRI Lumbar Spine Without Contrast; Future    3. Low back pain with left-sided sciatica, unspecified back pain laterality, unspecified chronicity  -     Ambulatory Referral to Physical Therapy Evaluate and treat; Stretching, ROM  -     MRI Lumbar Spine Without Contrast; Future         Ms. Kristina Kang presents for follow-up of trigeminal neuralgia. She will continue taking Tegretol 200 mg 3 tablets in the morning and 2 tablets at night. I advised her  to contact her ophthalmologist if she notices any changes in her vision. The patient will complete an MRI of her lower back. She will be referred to physical therapy for her left lower extremity. I will request ophthalmology notes now and after 2023.     follow-up in 2023.      Transcribed from ambient dictation for Layton Izaguirre MD by Tom Morrison.  23   12:49 EDT    {JEN Provider Statement:23492::\"Patient or patient representative verbalized consent to the visit recording.\",\"I have personally " "performed the services described in this document as transcribed by the above individual, and it is both accurate and complete.\"}Layton Menchaca MD  23 0929  Incomplete  Chief Complaint   Patient presents with    Trigeminal Neuralgia       Patient ID: Kristina Kang is a 62 y.o. female.    HPI:    The following portions of the patient's history were reviewed and updated as appropriate: allergies, current medications, past family history, past medical history, past social history, past surgical history and problem list.    Interval history:    Review of Systems   Neurological:  Positive for numbness. Negative for dizziness, tremors, seizures, syncope, facial asymmetry, speech difficulty, weakness, light-headedness and headaches.   Hematological:  Negative for adenopathy. Bruises/bleeds easily.    ***    There were no vitals filed for this visit.    Neurologic Exam     Mental Status   Speech: speech is normal   Level of consciousness: alert    Cranial Nerves     CN II   Visual fields full to confrontation.     CN III, IV, VI   Pupils are equal, round, and reactive to light.  Extraocular motions are normal.     CN V   Facial sensation intact.     CN VII   Facial expression full, symmetric.     CN VIII   Hearing: intact    CN IX, X   Palate: symmetric    CN XI   Right trapezius strength: normal  Left trapezius strength: normal    CN XII   Tongue: not atrophic  Fasciculations: absent  Tongue deviation: none    Motor Exam   Muscle bulk: normal    Strength   Right deltoid: 5/5  Left deltoid: 5/5  Right biceps: 5/5  Left biceps: 5/5  Right triceps: 5/5  Left triceps: 5/5  Right iliopsoas: 5/5  Left iliopsoas: 5/5  Right quadriceps: 5/5  Left quadriceps: 5/5  Right hamstrin/5  Left hamstrin/5  Right anterior tibial: 5/5  Left anterior tibial: 4/5  Right gastroc: 5/5  Left gastroc: 5/5Grip 5 out of 5 bilaterally     Sensory Exam   Right arm light touch: normal  Left arm light touch: normal  Right leg " light touch: normal  Left leg light touch: normal    Gait, Coordination, and Reflexes     Coordination   Finger to nose coordination: normal  Heel to shin coordination: normal    Physical Exam  Eyes:      Extraocular Movements: EOM normal.      Pupils: Pupils are equal, round, and reactive to light.   Neurological:      Coordination: Finger-Nose-Finger Test and Heel to Shin Test normal.   Psychiatric:         Speech: Speech normal.     Procedures    Assessment/Plan:         There are no diagnoses linked to this encounter.       Sheree Jaimes MA

## 2023-08-20 RX ORDER — VALSARTAN 160 MG/1
TABLET ORAL
Qty: 15 TABLET | Refills: 0 | Status: SHIPPED | OUTPATIENT
Start: 2023-08-20

## 2023-08-24 ENCOUNTER — TELEPHONE (OUTPATIENT)
Dept: NEUROLOGY | Facility: CLINIC | Age: 63
End: 2023-08-24

## 2023-08-24 ENCOUNTER — DOCUMENTATION (OUTPATIENT)
Dept: NEUROLOGY | Facility: CLINIC | Age: 63
End: 2023-08-24
Payer: COMMERCIAL

## 2023-08-24 NOTE — PROGRESS NOTES
Patient called in noting trigeminal neuralgia is worse.  I left a voicemail noting she can increase her carbamazepine extended release to 600 in the morning and 600 at night for now to see if this will help.  Additional options could be considered if she continues to have breakthrough pain on 1200 mg total daily dose of carbamazepine.    Addendum: past few weeks pain is more intense from her nose and more activities are causing it to trigger like blowing nose or closing her right eye. The eye blinking pain seems to have ceased.  She did get my voicemail and increased her dose to 600/600.  She has follow-up with me on September 15.  I discussed with her that she should be on this increased dose for a week and let me know if she still having pain before her next appointment.  We could add on a low-dose of pregabalin.  She has previously had side effects on gabapentin.  If she is not able to tolerate a second dose we can explore alternative options for nerve pain but we may need to refer to a surgical specialist to help evaluate her case further if she is proving refractory on 2 different medications or is unable to tolerate to concurrent medications.

## 2023-08-24 NOTE — TELEPHONE ENCOUNTER
Caller: Kristina Kang A    Relationship: Self    Best call back number: [phone]    Who are you requesting to speak with (clinical staff, provider,  specific staff member): DR CLINE    What was the call regarding:   THIS WEEK THE PT STARTED HAVING PAIN DUE TRIGEMINAL NEURALGIA. PT HAS BEEN TAKING   carBAMazepine XR (TEGretol XR) 200 MG 12 hr tablet   AS DIRECTED, 600 MG MORNING  MG AT NIGHT.    SPEAKING, WEARING GLASSES, AND TOUCHING HER FACE IS VERY PAINFUL.    THE RX IS NO LONGER HELPING TO CONTROL THE PAIN.    PLEASE CALL PT TO DISCUSS.

## 2023-08-28 ENCOUNTER — HOSPITAL ENCOUNTER (OUTPATIENT)
Dept: PHYSICAL THERAPY | Facility: HOSPITAL | Age: 63
Setting detail: THERAPIES SERIES
Discharge: HOME | End: 2023-08-28
Payer: COMMERCIAL

## 2023-08-28 DIAGNOSIS — M54.42 LEFT-SIDED LOW BACK PAIN WITH LEFT-SIDED SCIATICA, UNSPECIFIED CHRONICITY: Primary | ICD-10-CM

## 2023-08-28 DIAGNOSIS — R20.0 LEFT LEG NUMBNESS: ICD-10-CM

## 2023-08-28 PROCEDURE — 97110 THERAPEUTIC EXERCISES: CPT

## 2023-08-28 PROCEDURE — 97161 PT EVAL LOW COMPLEX 20 MIN: CPT

## 2023-08-28 NOTE — THERAPY EVALUATION
Outpatient Physical Therapy Ortho Initial Evaluation  [facility]     Patient Name: Kristina Kang  : 1960  MRN: [id number]  Today's Date: 2023      Visit Date: 2023    Patient Active Problem List   Diagnosis    Degeneration of lumbar intervertebral disc    Hepatitis C    Essential hypertension    Hyperlipidemia    Vitamin D deficiency    Osteoarthritis    Episode of syncope    Amaurosis fugax of right eye    Osteopenia of both hips    Chronic seasonal allergic rhinitis    Hypocalciuric hypercalcemia    Vaginal atrophy    Generalized anxiety disorder    Abnormal MRI    Trigeminal neuralgia    Elevated hemoglobin A1c        Past Medical History:   Diagnosis Date    Allergic     Anemia     Colon polyp     Degeneration of lumbar intervertebral disc     Essential hypertension     Hepatitis C 2008    Dr. Coleman Null    History of bone density study     Normal    History of chest x-ray 2010    normal    History of echocardiogram 2015    History of EKG 2013    normal    History of Holter monitoring 2015    occas PVC    History of nuclear stress test 10/06/2015    LCG; ischemia    Hypercalcemia     Hyperlipidemia     Leiomyoma of uterus     and fibroids    Osteoarthritis     Osteopenia     Postmenopausal     Pyelonephritis     Trigeminal neuralgia     Vitamin D deficiency         Past Surgical History:   Procedure Laterality Date     SECTION      COLONOSCOPY      COLONOSCOPY W/ POLYPECTOMY      Benign polyps.  Dr. Malone    HYSTERECTOMY  2004    took one ovary     LIVER BIOPSY      OOPHORECTOMY Bilateral age 33    left 1/4 of the right        Visit Dx:     ICD-10-CM ICD-9-CM   1. Left-sided low back pain with left-sided sciatica, unspecified chronicity  M54.42 724.3   2. Left leg numbness  R20.0 782.0          Patient History       Row Name 23 1400             History    Chief Complaint Pain  -DR      Type of Pain Back pain  -DR      Date  "Current Problem(s) Began 08/01/23  -      Brief Description of Current Complaint Kristina Kang is a 63 y/o F presenting for initial evaluation with c/o low back pain radiating into left leg. Prior to today's appointment, pt had a significant flare-up of sciatica that began in her hip and into her left leg years ago. As that has resolved, she felt a very mild flare-up in August 2023, where she went to her PCP. Denies imagining on that date. The pain described to PCP was a feeling of tingling and weakness in L foot > R foot. She suffers from chronic LBP as well. She will notice her L foot feeling weak at any point where she has FWB on it, like going down the steps. Describes it as giving out. She states she has tripped on her L foot a few times, not due to clumsiness, but due to feeling weak and thinks her L foot drags. Very sporadic when that occurs, denies a specific event. At today's appt, pt has 0/10 pain in L leg and foot, but says her LBP is constant but not debilitating. 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 Sitting for a full work day seems to aggravate full symptoms (>6hrs). Moving around, walking leisurely, good shoes all assist with minimizing discomfort. Lying down seems to constantly irritate her low back, but not onset the L leg tingling/numbness.  -      Previous treatment for THIS PROBLEM Rehabilitation  PT a few years ago for LBP.  -      Patient/Caregiver Goals Relieve pain;Return to prior level of function;Improve mobility;Return to work;Improve strength;Know what to do to help the symptoms  -      Hand Dominance right-handed  -      Occupation/sports/leisure activities desk-job at [employer], walking leisurely  -      How has patient tried to help current problem? PT for first sciatic " "flare-up years ago  -DR      What clinical tests have you had for this problem? --  -DR      Results of Clinical Tests --  -DR         Pain     Pain Location Back;Leg  -DR      Pain at Present 0  -DR      Pain at Best 0  -DR      Pain at Worst 5  -DR      Pain Frequency Intermittent  -DR      Pain Description Numbness;Tingling  some weakness in foot; \"crunchy\"  -DR      What Performance Factors Make the Current Problem(s) WORSE? sitting for >6hrs, going down the steps feeling the weakness, laying down causes LBP  -DR      What Performance Factors Make the Current Problem(s) BETTER? standing up and moving around, good tennisshoes, leisurely walking  -DR      Tolerance Time- Sitting 6 hours  -DR      Tolerance Time- Lying constant from LBP; has been going on for years  -DR      Is your sleep disturbed? Yes  from LBP  -DR      Total hours of sleep per night 6-7  -DR      What position do you sleep in? Right sidelying  -DR      Difficulties at work? yes- with sitting for too long of periods  -DR      Difficulties with recreational activities? cant fast walk without irritation  -DR         Fall Risk Assessment    Any falls in the past year: No  -DR         Services    Prior Rehab/Home Health Experiences No  -DR         Daily Activities    Primary Language English  -DR      How does patient learn best? Demonstration;Pictures/Video  -DR      Barriers to learning None  -DR      Pt Participated in POC and Goals Yes  -DR         Safety    Are you being hurt, hit, or frightened by anyone at home or in your life? No  -DR      Are you being neglected by a caregiver No  -DR      Have you had any of the following issues with N/A  -DR                User Key  (r) = Recorded By, (t) = Taken By, (c) = Cosigned By      Initials Name Provider Type    Mason Up, PT Physical Therapist                     PT Ortho       Row Name 08/28/23 1400       Posture/Observations    Alignment Options Forward head;Rounded shoulders  -DR    " Forward Head Mild  -DR    Rounded Shoulders Mild  -DR       Quarter Clearing    Quarter Clearing Lower Quarter Clearing  -DR       Neural Tension Signs- Lower Quarter Clearing    Slump Bilateral:;Negative  -DR       Sensory Screen for Light Touch- Lower Quarter Clearing    L1 (inguinal area) Intact  -DR    L2 (anterior mid thigh) Intact  -DR    L3 (distal anterior thigh) Left:;Diminished  -DR    L4 (medial lower leg/foot) Left:;Diminished  -DR    L5 (lateral lower leg/great toe) Intact  -DR    S1 (bottom of foot) Intact  -DR       Myotomal Screen- Lower Quarter Clearing    Hip flexion (L2) Right:;4 (Good);Left:;4- (Good -)  -DR    Knee extension (L3) Right:;4+ (Good +);Left:;4- (Good -)  -DR    Ankle DF (L4) Left:;4- (Good -);Right:;4+ (Good +)  -DR    Knee flexion (S2) Bilateral:;4+ (Good +)  -DR       Lumbar ROM Screen- Lower Quarter Clearing    Lumbar Flexion Normal  -DR    Lumbar Extension Normal  -DR    Lumbar Lateral Flexion Impaired  75% motion, pain present to B sides  -DR    Lumbar Rotation Normal  -DR       SI/Hip Screen- Lower Quarter Clearing    Chris's/Hussain's test Bilateral:;Negative  -DR       MMT (Manual Muscle Testing)    Rt Lower Ext Rt Hip ABduction  -DR    Lt Lower Ext Lt Hip ABduction  -DR       MMT Right Lower Ext    Rt Hip ABduction MMT, Gross Movement (4/5) good  -DR       MMT Left Lower Ext    Lt Hip ABduction MMT, Gross Movement (4-/5) good minus  -DR              User Key  (r) = Recorded By, (t) = Taken By, (c) = Cosigned By      Initials Name Provider Type    Mason Up, PT Physical Therapist                                Therapy Education  Education Details: Educated on PT role and POC; discussed expectations/timeframe for healing. Provided HEP, Access Code: [email or web address]  Given: HEP, Symptoms/condition management, Pain management, Posture/body mechanics, Mobility training  Program: New  How Provided: Verbal, Demonstration, Written  Provided to: Patient  Level of Understanding:  Teach back education performed, Verbalized, Demonstrated      PT OP Goals       Row Name 08/28/23 1500          PT Short Term Goals    STG Date to Achieve 09/27/23  -     STG 1 Patient will be independent with education for symptom management and initial HEP to decrease LBP/L LE pain.  -     STG 1 Progress New  -     STG 2 Pt will improve B LE strength to at least 4+/5.  -     STG 2 Progress New  -     STG 3 Pt will improve lumbar lateral flexion ROM to WNL with </=1/10 pain for improved mobility and participation in ADLs.  -     STG 3 Progress New  KEYON        Long Term Goals    LTG Date to Achieve 10/27/23  -     LTG 1 Patient will be independent with education for symptom management and advanced HEP to decrease LBP/L LE pain.  -     LTG 1 Progress New  -     LTG 2 Patient will reduce level of percieved functional ability as measured by the LEFS  from 57/80 to >/= 75/80 function in order to improve quality of life.  -     LTG 2 Progress New  -     LTG 3 Patient will improve ability to sleep through the night without sleep disturbances related to back pain to improve overall sleep quality and quality of life  -     LTG 3 Progress New  -     LTG 4 Patient will improve sitting tolerance from 6 hours during a work-day to a full 8-hour work day without LBP to improve participation in community activities.  -     LTG 4 Progress New  -DR        Time Calculation    PT Goal Re-Cert Due Date 11/26/23  -               User Key  (r) = Recorded By, (t) = Taken By, (c) = Cosigned By      Initials Name Provider Type    Mason Up, PT Physical Therapist                     PT Assessment/Plan       Row Name 08/28/23 1500          PT Assessment    Functional Limitations Limitations in community activities;Limitations in functional capacity and performance;Performance in leisure activities;Performance in self-care ADL;Performance in work activities  -     Impairments Range of  motion;Posture;Pain;Muscle strength;Motor function;Joint mobility;Joint integrity  -DR     Assessment Comments Kristina Kang is a 62 y.o. year-old female referred to physical therapy for low back pain with intermittent L sided sciatica. She presents with a stable clinical presentation. She has comorbidities of trigeminal neuralgia, HTN, HLD, hepatitis C, OA and personal factors of active lifestyle, chronicity of symptoms, work tasks, imaging that may affect her progress in the plan of care. Self scored disability measure of LEFS was a 57/80, where 80 is fully functional. She demonstrated mild forward head with rounded shoulders, mild decrease in B LE strength, WNL lumbar ROM with moderate provoking of symptoms when side bending to both R and L sides, negative slump test. Signs and symptoms are consistent with referring diagnosis. She is appropriate for skilled therapy services at this time to address deficits and improve QOL.  -     Please refer to paper survey for additional self-reported information No  -DR     Rehab Potential Good  -DR     Patient/caregiver participated in establishment of treatment plan and goals Yes  -DR     Patient would benefit from skilled therapy intervention Yes  -DR        PT Plan    PT Frequency 1x/week;2x/week  -     Predicted Duration of Therapy Intervention (PT) 6-8 sessions  -     Planned CPT's? PT EVAL LOW COMPLEXITY: 50284;PT RE-EVAL: 27402;PT THER PROC EA 15 MIN: 95826;PT THER ACT EA 15 MIN: 85257;PT MANUAL THERAPY EA 15 MIN: 63836;PT NEUROMUSC RE-EDUCATION EA 15 MIN: 57274;PT GAIT TRAINING EA 15 MIN: 40341;PT SELF CARE/HOME MGMT/TRAIN EA 15: 07702;PT HOT OR COLD PACK TREAT MCARE  -     PT Plan Comments Assess tolerance of HEP, review HEP, consider nustep for warm up, sciatic n. glide, clamshells, PPT with march, leg-overs, swiss ball roll outs, side steps  -               User Key  (r) = Recorded By, (t) = Taken By, (c) = Cosigned By      Initials Name Provider Type     Mason Up, PT Physical Therapist                       OP Exercises       Row Name 08/28/23 1500             Subjective Comments    Subjective Comments eval  -DR         Total Minutes    29521 - PT Therapeutic Exercise Minutes 10  -DR         Exercise 1    Exercise Name 1 nustep  -DR      Additional Comments next visit  -DR         Exercise 2    Exercise Name 2 LTR  -DR      Cueing 2 Verbal;Demo  -DR      Reps 2 20 pawel  -DR      Time 2 3sec  -DR         Exercise 3    Exercise Name 3 PPT  -DR      Cueing 3 Verbal;Demo  -DR      Sets 3 2  -DR      Reps 3 10  -DR      Time 3 5sec  -DR         Exercise 4    Exercise Name 4 PPT with bridge  -DR      Cueing 4 Verbal;Demo  -DR      Sets 4 2  -DR      Reps 4 10  -DR         Exercise 5    Exercise Name 5 figure 4 stretch  -DR      Cueing 5 Verbal;Demo  -DR         Exercise 6    Exercise Name 6 seated HS stretch  -DR      Cueing 6 Verbal;Demo  -DR      Reps 6 3  -DR      Time 6 20s  -DR                User Key  (r) = Recorded By, (t) = Taken By, (c) = Cosigned By      Initials Name Provider Type    Mason Up, PT Physical Therapist                                  Lower Extremity Functional Index  Any of your usual work, housework or school activities: A little bit of difficulty  Your usual hobbies, recreational or sporting activities: A little bit of difficulty  Getting into or out of the bath: No difficulty  Walking between rooms: A little bit of difficulty  Putting on your shoes or socks: No difficulty  Squatting: Moderate difficulty  Lifting an object, like a bag of groceries from the floor: Moderate difficulty  Performing light activities around your home: A little bit of difficulty  Performing heavy activities around your home: Moderate difficulty  Getting into or out of a car: No difficulty  Walking 2 blocks: No difficulty  Walking a mile: A little bit of difficulty  Going up or down 10 stairs (about 1 flight of stairs): Moderate  difficulty  Standing for 1 hour: No difficulty  Sitting for 1 hour: Moderate difficulty  Running on even ground: Moderate difficulty  Running on uneven ground: Quite a bit of difficulty  Making sharp turns while running fast: Quite a bit of difficulty  Hopping: No difficulty  Rolling over in bed: No difficulty  Total: 57  Lower Extremity Functional Index  Any of your usual work, housework or school activities: A little bit of difficulty  Your usual hobbies, recreational or sporting activities: A little bit of difficulty  Getting into or out of the bath: No difficulty  Walking between rooms: A little bit of difficulty  Putting on your shoes or socks: No difficulty  Squatting: Moderate difficulty  Lifting an object, like a bag of groceries from the floor: Moderate difficulty  Performing light activities around your home: A little bit of difficulty  Performing heavy activities around your home: Moderate difficulty  Getting into or out of a car: No difficulty  Walking 2 blocks: No difficulty  Walking a mile: A little bit of difficulty  Going up or down 10 stairs (about 1 flight of stairs): Moderate difficulty  Standing for 1 hour: No difficulty  Sitting for 1 hour: Moderate difficulty  Running on even ground: Moderate difficulty  Running on uneven ground: Quite a bit of difficulty  Making sharp turns while running fast: Quite a bit of difficulty  Hopping: No difficulty  Rolling over in bed: No difficulty  Total: 57      Time Calculation:     Start Time: 1422  Stop Time: 1510  Time Calculation (min): 48 min  Timed Charges  25738 - PT Therapeutic Exercise Minutes: 10  Total Minutes  Timed Charges Total Minutes: 10   Total Minutes: 10     Therapy Charges for Today       Code Description Service Date Service Provider Modifiers Qty    92006536200 HC PT THER PROC EA 15 MIN 8/28/2023 Mason Elena, PT GP 1    30114850619 HC PT EVAL LOW COMPLEXITY 2 8/28/2023 Mason Elena, PT GP 1            PT G-Codes  Total: 57          Mason Elena, PT  8/28/2023

## 2023-08-29 ENCOUNTER — HOSPITAL ENCOUNTER (EMERGENCY)
Facility: HOSPITAL | Age: 63
Discharge: HOME | End: 2023-08-29
Attending: EMERGENCY MEDICINE
Payer: COMMERCIAL

## 2023-08-29 ENCOUNTER — APPOINTMENT (OUTPATIENT)
Dept: GENERAL RADIOLOGY | Facility: HOSPITAL | Age: 63
End: 2023-08-29
Payer: COMMERCIAL

## 2023-08-29 VITALS
TEMPERATURE: 98 F | SYSTOLIC BLOOD PRESSURE: 115 MMHG | HEART RATE: 63 BPM | OXYGEN SATURATION: 99 % | RESPIRATION RATE: 18 BRPM | BODY MASS INDEX: 24.96 KG/M2 | WEIGHT: 140.87 LBS | DIASTOLIC BLOOD PRESSURE: 59 MMHG | HEIGHT: 63 IN

## 2023-08-29 DIAGNOSIS — R07.9 CHEST PAIN, UNSPECIFIED TYPE: Primary | ICD-10-CM

## 2023-08-29 LAB
ANION GAP SERPL CALCULATED.3IONS-SCNC: 6 MMOL/L (ref 5–15)
APTT PPP: 27.7 SECONDS (ref 61–76.5)
BASOPHILS # BLD AUTO: 0 10*3/MM3 (ref 0–0.2)
BASOPHILS NFR BLD AUTO: 0.6 % (ref 0–1.5)
BUN SERPL-MCNC: 15 MG/DL (ref 8–23)
BUN/CREAT SERPL: 14.7 (ref 7–25)
CALCIUM SPEC-SCNC: 10.9 MG/DL (ref 8.6–10.5)
CHLORIDE SERPL-SCNC: 106 MMOL/L (ref 98–107)
CO2 SERPL-SCNC: 29 MMOL/L (ref 22–29)
CREAT SERPL-MCNC: 1.02 MG/DL (ref 0.57–1)
DEPRECATED RDW RBC AUTO: 47.7 FL (ref 37–54)
EGFRCR SERPLBLD CKD-EPI 2021: 62.3 ML/MIN/1.73
EOSINOPHIL # BLD AUTO: 0.2 10*3/MM3 (ref 0–0.4)
EOSINOPHIL NFR BLD AUTO: 2.4 % (ref 0.3–6.2)
ERYTHROCYTE [DISTWIDTH] IN BLOOD BY AUTOMATED COUNT: 13.9 % (ref 12.3–15.4)
GLUCOSE SERPL-MCNC: 94 MG/DL (ref 65–99)
HCT VFR BLD AUTO: 40 % (ref 34–46.6)
HGB BLD-MCNC: 13 G/DL (ref 12–15.9)
INR PPP: <0.93 (ref 0.93–1.1)
LYMPHOCYTES # BLD AUTO: 2 10*3/MM3 (ref 0.7–3.1)
LYMPHOCYTES NFR BLD AUTO: 24.5 % (ref 19.6–45.3)
MCH RBC QN AUTO: 29.9 PG (ref 26.6–33)
MCHC RBC AUTO-ENTMCNC: 32.4 G/DL (ref 31.5–35.7)
MCV RBC AUTO: 92.2 FL (ref 79–97)
MONOCYTES # BLD AUTO: 0.4 10*3/MM3 (ref 0.1–0.9)
MONOCYTES NFR BLD AUTO: 4.9 % (ref 5–12)
NEUTROPHILS NFR BLD AUTO: 5.5 10*3/MM3 (ref 1.7–7)
NEUTROPHILS NFR BLD AUTO: 67.6 % (ref 42.7–76)
NRBC BLD AUTO-RTO: 0 /100 WBC (ref 0–0.2)
PLATELET # BLD AUTO: 236 10*3/MM3 (ref 140–450)
PMV BLD AUTO: 7 FL (ref 6–12)
POTASSIUM SERPL-SCNC: 4.3 MMOL/L (ref 3.5–5.2)
PROTHROMBIN TIME: 9.8 SECONDS (ref 9.6–11.7)
RBC # BLD AUTO: 4.34 10*6/MM3 (ref 3.77–5.28)
SODIUM SERPL-SCNC: 141 MMOL/L (ref 136–145)
TROPONIN T SERPL HS-MCNC: <6 NG/L
WBC NRBC COR # BLD: 8.1 10*3/MM3 (ref 3.4–10.8)

## 2023-08-29 PROCEDURE — 84484 ASSAY OF TROPONIN QUANT: CPT | Performed by: EMERGENCY MEDICINE

## 2023-08-29 PROCEDURE — 36415 COLL VENOUS BLD VENIPUNCTURE: CPT

## 2023-08-29 PROCEDURE — 71045 X-RAY EXAM CHEST 1 VIEW: CPT

## 2023-08-29 PROCEDURE — 80048 BASIC METABOLIC PNL TOTAL CA: CPT | Performed by: EMERGENCY MEDICINE

## 2023-08-29 PROCEDURE — 85610 PROTHROMBIN TIME: CPT | Performed by: EMERGENCY MEDICINE

## 2023-08-29 PROCEDURE — 99284 EMERGENCY DEPT VISIT MOD MDM: CPT

## 2023-08-29 PROCEDURE — 85025 COMPLETE CBC W/AUTO DIFF WBC: CPT | Performed by: EMERGENCY MEDICINE

## 2023-08-29 PROCEDURE — 85730 THROMBOPLASTIN TIME PARTIAL: CPT | Performed by: EMERGENCY MEDICINE

## 2023-08-29 PROCEDURE — 93005 ELECTROCARDIOGRAM TRACING: CPT

## 2023-08-29 PROCEDURE — 93005 ELECTROCARDIOGRAM TRACING: CPT | Performed by: EMERGENCY MEDICINE

## 2023-08-29 RX ORDER — SODIUM CHLORIDE 0.9 % (FLUSH) 0.9 %
10 SYRINGE (ML) INJECTION AS NEEDED
Status: DISCONTINUED | OUTPATIENT
Start: 2023-08-29 | End: 2023-08-29 | Stop reason: HOSPADM

## 2023-08-29 NOTE — ED PROVIDER NOTES
Subjective   History of Present Illness  Chief complaint: Chest pain    62-year-old female presents with chest pain.  Patient states pain started while she was at work this morning.  She describes it as a sharp pain in the center of her chest that lasted about 5 to 10 minutes.  She states the pain also radiated into her back and right arm.  She denies any shortness of breath, diaphoresis, dizziness, palpitations, nausea.  She denies any alleviating or exacerbating factors.  She states the pain is now gone.  She has had no pain or swelling in her legs.    History provided by:  Patient    Review of Systems   Constitutional:  Negative for fever.   HENT:  Negative for congestion.    Respiratory:  Negative for cough and shortness of breath.    Cardiovascular:  Positive for chest pain.   Gastrointestinal:  Negative for abdominal pain and vomiting.   Musculoskeletal:  Positive for back pain.   Skin:  Negative for rash.   Neurological:  Negative for weakness and headaches.   Psychiatric/Behavioral:  Negative for confusion.      Past Medical History:   Diagnosis Date    Allergic     Anemia     Colon polyp     Degeneration of lumbar intervertebral disc     Essential hypertension     Hepatitis C 2008    Dr. Coleman Null    History of bone density study     Normal    History of chest x-ray 2010    normal    History of echocardiogram 2015    History of EKG 2013    normal    History of Holter monitoring 2015    occas PVC    History of nuclear stress test 10/06/2015    LCG; ischemia    Hypercalcemia     Hyperlipidemia     Leiomyoma of uterus     and fibroids    Osteoarthritis     Osteopenia     Postmenopausal     Pyelonephritis     Trigeminal neuralgia     Vitamin D deficiency        No Known Allergies    Past Surgical History:   Procedure Laterality Date     SECTION      COLONOSCOPY      COLONOSCOPY W/ POLYPECTOMY      Benign polyps.  Dr. Malone    HYSTERECTOMY  2004    took one  "ov[place]     LIVER BIOPSY      OOPHORECTOMY Bilateral age 33    left 1/4 of the right        Family History   Adopted: Yes       Social History     Socioeconomic History    Marital status:    Tobacco Use    Smoking status: Former     Packs/day: 0.50     Years: 10.00     Pack years: 5.00     Types: Cigarettes    Smokeless tobacco: Former     Quit date: 2011   Vaping Use    Vaping Use: Never used   Substance and Sexual Activity    Alcohol use: Not Currently     Comment: Seldom    Drug use: No    Sexual activity: Yes     Partners: Male     Birth control/protection: Post-menopausal, None     Comment: Hysterectomy       /61 (Patient Position: Sitting)   Pulse 71   Temp 99.2 °F (37.3 °C) (Oral)   Resp 16   Ht 160 cm (63\")   Wt 63.9 kg (140 lb 14 oz)   LMP  (LMP Unknown)   SpO2 98%   BMI 24.95 kg/m²       Objective   Physical Exam  Vitals and nursing note reviewed.   Constitutional:       Appearance: She is well-developed.   HENT:      Head: Normocephalic and atraumatic.   Cardiovascular:      Rate and Rhythm: Normal rate and regular rhythm.      Heart sounds: Normal heart sounds.   Pulmonary:      Effort: Pulmonary effort is normal. No respiratory distress.      Breath sounds: Normal breath sounds.   Abdominal:      General: Bowel sounds are normal.      Palpations: Abdomen is soft.      Tenderness: There is no abdominal tenderness.   Musculoskeletal:      Right lower leg: No tenderness. No edema.      Left lower leg: No tenderness. No edema.   Skin:     General: Skin is warm and dry.   Neurological:      Mental Status: She is alert and oriented to person, place, and time.       Procedures           ED Course      My interpretation of EKG shows sinus rhythm, rate of 68, no ST elevation     Results for orders placed or performed during the hospital encounter of 08/29/23   Basic Metabolic Panel    Specimen: Blood   Result Value Ref Range    Glucose 94 65 - 99 mg/dL    BUN 15 8 - 23 mg/dL    Creatinine " 1.02 (H) 0.57 - 1.00 mg/dL    Sodium 141 136 - 145 mmol/L    Potassium 4.3 3.5 - 5.2 mmol/L    Chloride 106 98 - 107 mmol/L    CO2 29.0 22.0 - 29.0 mmol/L    Calcium 10.9 (H) 8.6 - 10.5 mg/dL    BUN/Creatinine Ratio 14.7 7.0 - 25.0    Anion Gap 6.0 5.0 - 15.0 mmol/L    eGFR 62.3 >60.0 mL/min/1.73   Protime-INR    Specimen: Blood   Result Value Ref Range    Protime 9.8 9.6 - 11.7 Seconds    INR <0.93 (L) 0.93 - 1.10   aPTT    Specimen: Blood   Result Value Ref Range    PTT 27.7 (L) 61.0 - 76.5 seconds   Single High Sensitivity Troponin T    Specimen: Blood   Result Value Ref Range    HS Troponin T <6 <10 ng/L   CBC Auto Differential    Specimen: Blood   Result Value Ref Range    WBC 8.10 3.40 - 10.80 10*3/mm3    RBC 4.34 3.77 - 5.28 10*6/mm3    Hemoglobin 13.0 12.0 - 15.9 g/dL    Hematocrit 40.0 34.0 - 46.6 %    MCV 92.2 79.0 - 97.0 fL    MCH 29.9 26.6 - 33.0 pg    MCHC 32.4 31.5 - 35.7 g/dL    RDW 13.9 12.3 - 15.4 %    RDW-SD 47.7 37.0 - 54.0 fl    MPV 7.0 6.0 - 12.0 fL    Platelets 236 140 - 450 10*3/mm3    Neutrophil % 67.6 42.7 - 76.0 %    Lymphocyte % 24.5 19.6 - 45.3 %    Monocyte % 4.9 (L) 5.0 - 12.0 %    Eosinophil % 2.4 0.3 - 6.2 %    Basophil % 0.6 0.0 - 1.5 %    Neutrophils, Absolute 5.50 1.70 - 7.00 10*3/mm3    Lymphocytes, Absolute 2.00 0.70 - 3.10 10*3/mm3    Monocytes, Absolute 0.40 0.10 - 0.90 10*3/mm3    Eosinophils, Absolute 0.20 0.00 - 0.40 10*3/mm3    Basophils, Absolute 0.00 0.00 - 0.20 10*3/mm3    nRBC 0.0 0.0 - 0.2 /100 WBC   ECG 12 Lead Chest Pain   Result Value Ref Range    QT Interval 367 ms    QTC Interval 391 ms     XR Chest 1 View    Result Date: 8/29/2023  Impression: No active cardiopulmonary disease Electronically Signed: Eliu Tam  8/29/2023 9:15 AM EDT  Workstation ID: [id number]                                   Medical Decision Making  Amount and/or Complexity of Data Reviewed  Labs: ordered.  Radiology: ordered.  ECG/medicine tests: ordered.    Risk  Prescription drug  management.      Patient had the above evaluation.  Results were discussed with the patient.  I interpretation of chest x-ray shows no infiltrate or effusion.  EKG shows no acute ischemia.  Troponin is negative.  White blood cell count is normal.  BMP is unremarkable.  Patient has remained well-appearing in the emergency room.  We discussed possible need for stress test and admission versus discharge.  Patient states she would like to follow-up with her primary doctor for possible outpatient stress test.  Heart score is low risk.      Final diagnoses:   Chest pain, unspecified type       ED Disposition  ED Disposition       ED Disposition   Discharge    Condition   Stable    Comment   --               Joanne Maurice, PA-C  [address] [address]  [phone]    Call in 2 days           Medication List      No changes were made to your prescriptions during this visit.            Jg Salinas MD  08/29/23 0965

## 2023-08-30 LAB
QT INTERVAL: 367 MS
QTC INTERVAL: 391 MS

## 2023-09-06 RX ORDER — VALSARTAN 160 MG/1
TABLET ORAL
Qty: 15 TABLET | Refills: 0 | Status: SHIPPED | OUTPATIENT
Start: 2023-09-06

## 2023-09-12 ENCOUNTER — OFFICE VISIT (OUTPATIENT)
Dept: FAMILY MEDICINE CLINIC | Facility: CLINIC | Age: 63
End: 2023-09-12
Payer: COMMERCIAL

## 2023-09-12 VITALS
RESPIRATION RATE: 16 BRPM | DIASTOLIC BLOOD PRESSURE: 70 MMHG | WEIGHT: 144 LBS | OXYGEN SATURATION: 99 % | SYSTOLIC BLOOD PRESSURE: 120 MMHG | BODY MASS INDEX: 25.52 KG/M2 | HEART RATE: 73 BPM | HEIGHT: 63 IN | TEMPERATURE: 97 F

## 2023-09-12 DIAGNOSIS — G45.3 AMAUROSIS FUGAX OF RIGHT EYE: ICD-10-CM

## 2023-09-12 DIAGNOSIS — I10 ESSENTIAL HYPERTENSION: Primary | ICD-10-CM

## 2023-09-12 DIAGNOSIS — Z09 HOSPITAL DISCHARGE FOLLOW-UP: ICD-10-CM

## 2023-09-12 DIAGNOSIS — G50.0 TRIGEMINAL NEURALGIA: ICD-10-CM

## 2023-09-12 DIAGNOSIS — E55.9 VITAMIN D DEFICIENCY: ICD-10-CM

## 2023-09-12 DIAGNOSIS — E78.2 MIXED HYPERLIPIDEMIA: ICD-10-CM

## 2023-09-12 DIAGNOSIS — R73.01 IMPAIRED FASTING GLUCOSE: ICD-10-CM

## 2023-09-12 PROCEDURE — 99214 OFFICE O/P EST MOD 30 MIN: CPT | Performed by: PHYSICIAN ASSISTANT

## 2023-09-12 RX ORDER — LATANOPROST 50 UG/ML
1 SOLUTION/ DROPS OPHTHALMIC NIGHTLY
COMMUNITY

## 2023-09-12 RX ORDER — VALSARTAN 160 MG/1
160 TABLET ORAL DAILY
Qty: 90 TABLET | Refills: 1 | Status: SHIPPED | OUTPATIENT
Start: 2023-09-12

## 2023-09-12 RX ORDER — ATORVASTATIN CALCIUM 20 MG/1
20 TABLET, FILM COATED ORAL DAILY
Qty: 90 TABLET | Refills: 3 | Status: SHIPPED | OUTPATIENT
Start: 2023-09-12

## 2023-09-12 NOTE — PROGRESS NOTES
Subjective   Kristina Kang is a 62 y.o. female.     History of Present Illness   Kristina Kang 62 y.o. female presents today for Emergency Room follow up.  she was treated 8-29-23 for chest pain  .  I reviewed all of the labs and diagnostic testing and noted: EKG, chest x-ray, labs including troponin level--- nothing out of range  The patient's medications were not changed:  Current outpatient and discharge medications have been reconciled for the patient.  Reviewed by: Joanne Maurice PA-C    she does not have a follow up appointment with a specialist:  She is taking folic acid and B12 and did labs about a year ago----just had a CBC at the ER and that was all in normal range--we will continue over-the-counter folic acid  62-year-old female presents with chest pain.  Patient states pain started while she was at work this morning.  She describes it as a sharp pain in the center of her chest that lasted about 5 to 10 minutes.  She states the pain also radiated into her back and right arm.  She denies any shortness of breath, diaphoresis, dizziness, palpitations, nausea.  She denies any alleviating or exacerbating factors.  She states the pain is now gone.  She has had no pain or swelling in her legs.  No more chest pain---no GERD.  No exertional chest pain.---does walk   Patient had the above evaluation.  Results were discussed with the patient.  I interpretation of chest x-ray shows no infiltrate or effusion.  EKG shows no acute ischemia.  Troponin is negative.  White blood cell count is normal.  BMP is unremarkable.  Patient has remained well-appearing in the emergency room.  We discussed possible need for stress test and admission versus discharge.  Patient states she would like to follow-up with her primary doctor for possible outpatient stress test.  Heart score is low risk.   Bone density done in September 2022 showed osteopenia without significant change since December 2019.  Her 10-year risk of major  "osteoporotic fracture is 4.8% and of hip fracture is 0.7%.      July 2016---Amaurosis Fugax---carotid doppler neg--neg work up---not reoccured.  I want her LDL <70 d/t this and on ASA 81mg  Hep C in remission---watch LFTs  Dr Avendaño----last visit 5-2-23-----endocrine---monitors Ca+;  Hx hypercalcemia-----now off HCTZ and watch Ca+;  Also watch K+  Doing well off Zoloft and not having any anxiety issues  GI Dr Malone---polyps --colon--f/u scope 1 yr----to do in June  Estrace crm for atrophy  Watching lymph count--folate    Saw derm 5-15-23--Dr. Gee for seborrheic dermatitis and milia    She had myocardial PET perfusion scan on 10/6/2015--- low risk study negative.  Patient was seen by cardiologist Dr. Donato on 9/25/2015.    Last saw Dr. Izaguirre following up with trigeminal neuralgia right side of face on 8-4-23 this was a 3-month follow-up for increasing her Tegretol for the breakthrough pain..  helping --Also noted low back pain ----ordered lumbar MRI and physical therapy.----No results yet for MRI lumbar   Since the last visit, she has overall felt well.  She has Primary Hypertension and well controlled on current medication, Impaired fasting glucose and will monitor labs to watch for DMII, Hyperlipidemia with goals met with current Rx, and Vitamin D deficiency and labs are at goal >30 ng/mL.  she has been compliant with current medications have reviewed them.  The patient denies medication side effects.  Will refill medications. /62   Pulse 73   Temp 97 °F (36.1 °C)   Resp 16   Ht 160 cm (63\")   Wt 65.3 kg (144 lb)   LMP  (LMP Unknown)   SpO2 99%   BMI 25.51 kg/m²   Sees Dr Avendaño for the hypercalcemia---notes above.  Results for orders placed or performed during the hospital encounter of 08/29/23   Basic Metabolic Panel    Specimen: Blood   Result Value Ref Range    Glucose 94 65 - 99 mg/dL    BUN 15 8 - 23 mg/dL    Creatinine 1.02 (H) 0.57 - 1.00 mg/dL    Sodium 141 136 - 145 mmol/L    " Potassium 4.3 3.5 - 5.2 mmol/L    Chloride 106 98 - 107 mmol/L    CO2 29.0 22.0 - 29.0 mmol/L    Calcium 10.9 (H) 8.6 - 10.5 mg/dL    BUN/Creatinine Ratio 14.7 7.0 - 25.0    Anion Gap 6.0 5.0 - 15.0 mmol/L    eGFR 62.3 >60.0 mL/min/1.73   Protime-INR    Specimen: Blood   Result Value Ref Range    Protime 9.8 9.6 - 11.7 Seconds    INR <0.93 (L) 0.93 - 1.10   aPTT    Specimen: Blood   Result Value Ref Range    PTT 27.7 (L) 61.0 - 76.5 seconds   Single High Sensitivity Troponin T    Specimen: Blood   Result Value Ref Range    HS Troponin T <6 <10 ng/L   CBC Auto Differential    Specimen: Blood   Result Value Ref Range    WBC 8.10 3.40 - 10.80 10*3/mm3    RBC 4.34 3.77 - 5.28 10*6/mm3    Hemoglobin 13.0 12.0 - 15.9 g/dL    Hematocrit 40.0 34.0 - 46.6 %    MCV 92.2 79.0 - 97.0 fL    MCH 29.9 26.6 - 33.0 pg    MCHC 32.4 31.5 - 35.7 g/dL    RDW 13.9 12.3 - 15.4 %    RDW-SD 47.7 37.0 - 54.0 fl    MPV 7.0 6.0 - 12.0 fL    Platelets 236 140 - 450 10*3/mm3    Neutrophil % 67.6 42.7 - 76.0 %    Lymphocyte % 24.5 19.6 - 45.3 %    Monocyte % 4.9 (L) 5.0 - 12.0 %    Eosinophil % 2.4 0.3 - 6.2 %    Basophil % 0.6 0.0 - 1.5 %    Neutrophils, Absolute 5.50 1.70 - 7.00 10*3/mm3    Lymphocytes, Absolute 2.00 0.70 - 3.10 10*3/mm3    Monocytes, Absolute 0.40 0.10 - 0.90 10*3/mm3    Eosinophils, Absolute 0.20 0.00 - 0.40 10*3/mm3    Basophils, Absolute 0.00 0.00 - 0.20 10*3/mm3    nRBC 0.0 0.0 - 0.2 /100 WBC   ECG 12 Lead Chest Pain   Result Value Ref Range    QT Interval 367 ms    QTC Interval 391 ms     Had colonoscopy August 2022 and because of polyps repeat 2 years per Dr. Malone    The following portions of the patient's history were reviewed and updated as appropriate: allergies, current medications, past family history, past medical history, past social history, past surgical history, and problem list.    Review of Systems   Constitutional:  Negative for diaphoresis.   HENT:  Negative for nosebleeds and trouble swallowing.     Eyes:  Negative for blurred vision and visual disturbance.   Respiratory:  Negative for choking.    Gastrointestinal:  Negative for blood in stool.   Allergic/Immunologic: Negative for immunocompromised state.   Neurological:  Negative for facial asymmetry and speech difficulty.   Psychiatric/Behavioral:  Negative for self-injury and suicidal ideas.      Objective   Physical Exam  Vitals and nursing note reviewed.   Constitutional:       General: She is not in acute distress.     Appearance: Normal appearance. She is well-developed. She is not ill-appearing or toxic-appearing.   HENT:      Head: Normocephalic.      Right Ear: External ear normal.      Left Ear: External ear normal.      Nose: Nose normal.      Mouth/Throat:      Pharynx: Oropharynx is clear.   Eyes:      General: No scleral icterus.     Conjunctiva/sclera: Conjunctivae normal.      Pupils: Pupils are equal, round, and reactive to light.   Neck:      Thyroid: No thyromegaly.      Vascular: No carotid bruit.   Cardiovascular:      Rate and Rhythm: Normal rate and regular rhythm.      Heart sounds: Normal heart sounds. No murmur heard.  Pulmonary:      Effort: Pulmonary effort is normal. No respiratory distress.      Breath sounds: Normal breath sounds. No rales.   Abdominal:      Tenderness: There is no abdominal tenderness.   Musculoskeletal:         General: No deformity. Normal range of motion.      Cervical back: Normal range of motion and neck supple.      Right lower leg: No edema.      Left lower leg: No edema.   Skin:     General: Skin is warm and dry.      Findings: No rash.   Neurological:      General: No focal deficit present.      Mental Status: She is alert and oriented to person, place, and time. Mental status is at baseline.   Psychiatric:         Mood and Affect: Mood normal.         Behavior: Behavior normal.         Thought Content: Thought content normal.         Judgment: Judgment normal.         Assessment & Plan   Diagnoses  and all orders for this visit:    1. Essential hypertension (Primary)    2. Mixed hyperlipidemia    3. Vitamin D deficiency    4. Trigeminal neuralgia    5. Amaurosis fugax of right eye    Other orders  -     valsartan (DIOVAN) 160 MG tablet; Take 1 tablet by mouth Daily. for blood pressure.  Dispense: 90 tablet; Refill: 1  -     atorvastatin (LIPITOR) 20 MG tablet; Take 1 tablet by mouth Daily. For cholesterol  Dispense: 90 tablet; Refill: 3       monitors her calcium level closel and recently updated her vitamin D and lipids we will continue same dose of atorvastatin goals were met on cholesterol followed by Dr. Izaguirre for trigeminal neuralgia and has follow-up appointment I do not see results of the lumbar MRI yet  Being followed by ophthalmology for eye pressures  Sees dermatology Dr. Gee as needed  Okay to continue over-the-counter folic acid   Hold the Flonase until you have follow-up with ophthalmology due to your abnormal eye pressures  Consider carotid doppler  Patient knows to message me if any exertional chest pain or shortness of breath will refer back to Dr. Tanmay Dhaliwal, Kristina Kang, was seen today.  she was seen for HTN and continue medication, Imparied fasting glucose and plan follow up labs, diet, and exercise, Hyperlipidemia and will continue current medication, and Vitamin D deficiency and supplemented.             Answers submitted by the patient for this visit:  Other (Submitted on 9/5/2023)  Please describe your symptoms.: Follow up from ER visit for chest pain  Have you had these symptoms before?: No  How long have you been having these symptoms?: 1-4 days  Please list any medications you are currently taking for this condition.: None  Please describe any probable cause for these symptoms. : Unknown  Primary Reason for Visit (Submitted on 9/5/2023)  What is the primary reason for your visit?: Other

## 2023-09-15 ENCOUNTER — OFFICE VISIT (OUTPATIENT)
Dept: NEUROLOGY | Facility: CLINIC | Age: 63
End: 2023-09-15
Payer: COMMERCIAL

## 2023-09-15 VITALS
BODY MASS INDEX: 24.8 KG/M2 | DIASTOLIC BLOOD PRESSURE: 76 MMHG | SYSTOLIC BLOOD PRESSURE: 128 MMHG | OXYGEN SATURATION: 94 % | HEIGHT: 63 IN | HEART RATE: 79 BPM | WEIGHT: 140 LBS

## 2023-09-15 DIAGNOSIS — G50.0 TRIGEMINAL NEURALGIA OF RIGHT SIDE OF FACE: Primary | ICD-10-CM

## 2023-09-15 PROCEDURE — 99214 OFFICE O/P EST MOD 30 MIN: CPT | Performed by: STUDENT IN AN ORGANIZED HEALTH CARE EDUCATION/TRAINING PROGRAM

## 2023-09-15 RX ORDER — PREGABALIN 75 MG/1
75 CAPSULE ORAL 2 TIMES DAILY
Qty: 60 CAPSULE | Refills: 3 | Status: SHIPPED | OUTPATIENT
Start: 2023-09-15

## 2023-09-15 NOTE — PROGRESS NOTES
Chief Complaint   Patient presents with    Trigeminal neuralgia of right side of face       Patient ID: Kristina Kang is a 62 y.o. female.    HPI:      The following portions of the patient's history were reviewed and updated as appropriate: allergies, current medications, past family history, past medical history, past social history, past surgical history and problem list.    Interval history:    Ms. Kang is a 62-year-old female who presents for follow-up of trigeminal neuralgia. I reviewed eye care records that I requested, which showed that per the ophthalmologist, the glaucoma as of 07/31/2023, had a visual field test without clear signs of progression.    Today, the patient's pain has improved after the adjustment of her medication. The pain is still sporadic, but it is not daily. The pain is not quite as intense and is not as bad as the day goes on. The pain is has improved, and is on the right side of her face. Today is a good day for her. The pain is not constant throughout the day, but is more intense when she lays on her face.    The patient denies any new weakness. She has been going to physical therapy, and it seems to be helping. She will get a twinge every now and then, and then she does not have anything.    Her eye visit went well, and her eye pressure is coming down. She is still using the eye drops.    She has tried gabapentin in the past, and it did not help very much. She denies any side effects on the carbamazepine. She felt as though she had brain fog early on, and she believes that it may not have anything to do with the medicine. She does have word finding difficulty, but it is tolerable.    The patient saw a neurosurgeon, Dr. Morse; he discussed her surgical options, and informed her that if she ever gets to that, then they can certainly do that.    The patient will see her eye doctor in 12/2023.    The following portions of the patient's history were reviewed and updated as  appropriate: allergies, current medications, past family history, past medical history, past social history, past surgical history and problem list.      Review of Systems   Neurological:  Positive for headaches. Negative for dizziness, tremors, seizures, syncope, facial asymmetry, speech difficulty, weakness, light-headedness and numbness.   Psychiatric/Behavioral:  Negative for agitation, behavioral problems, confusion, decreased concentration, dysphoric mood, hallucinations, self-injury, sleep disturbance and suicidal ideas. The patient is not nervous/anxious and is not hyperactive.          Vitals:    09/15/23 0818   BP: 128/76   Pulse: 79   SpO2: 94%       Neurologic Exam     Mental Status   Speech: speech is normal   Level of consciousness: alert    Cranial Nerves     CN II   Visual fields full to confrontation.     CN III, IV, VI   Pupils are equal, round, and reactive to light.  Extraocular motions are normal.     CN V   Facial sensation intact.     CN VII   Facial expression full, symmetric.     CN VIII   Hearing: intact    CN IX, X   Palate: symmetric    CN XI   Right trapezius strength: normal  Left trapezius strength: normal    CN XII   Tongue: not atrophic  Fasciculations: absent  Tongue deviation: none  No facial pain on palpation on right or left side.     Motor Exam   Muscle bulk: normal    Strength   Right deltoid: 5/5  Left deltoid: 5/5  Right biceps: 5/5  Left biceps: 5/5  Right triceps: 5/5  Left triceps: 5/5  Right iliopsoas: 5/5  Left iliopsoas: 5/5  Right quadriceps: 5/5  Left quadriceps: 5/5  Right hamstrin/5  Left hamstrin/5  Right anterior tibial: 5/5  Left anterior tibial: 5/5  Right gastroc: 5/5  Left gastroc: 5/5Grip 5 out of 5 bilaterally     Sensory Exam   Right arm light touch: normal  Left arm light touch: normal  Right leg light touch: normal  Left leg light touch: normal    Gait, Coordination, and Reflexes     Coordination   Finger to nose coordination: normal  Heel to shin  coordination: normal    Physical Exam  Eyes:      Extraocular Movements: EOM normal.      Pupils: Pupils are equal, round, and reactive to light.   Neurological:      Coordination: Finger-Nose-Finger Test and Heel to Shin Test normal.   Psychiatric:         Speech: Speech normal.       Procedures    Assessment/Plan:         Diagnoses and all orders for this visit:    1. Trigeminal neuralgia of right side of face (Primary)  -     pregabalin (LYRICA) 75 MG capsule; Take 1 capsule by mouth 2 (Two) Times a Day.  Dispense: 60 capsule; Refill: 3       1. Trigeminal neuralgia  MDM level 4. The patient has a chronic condition of trigeminal neuralgia which is worsening. We discussed surgery and prescription medication management today. I added pregabalin 75 mg twice daily because she has breakthrough symptoms on the current medication regimen indicating that the pain is worse. I did make adjustments prior to her last visit, but she continues to have breakthrough symptoms.    Follow up in 3 months or sooner with any new or worsening symptoms.        Transcribed from ambient dictation for Layton Izaguirre MD by Sera Domínguez.  09/15/23   11:17 EDT    Patient or patient representative verbalized consent to the visit recording.  I have personally performed the services described in this document as transcribed by the above individual, and it is both accurate and complete.  Layton Izaguirre MD  10/1/2023  16:59 EDT

## 2023-09-16 ENCOUNTER — HOSPITAL ENCOUNTER (OUTPATIENT)
Dept: MAMMOGRAPHY | Facility: HOSPITAL | Age: 63
Discharge: HOME | End: 2023-09-16
Admitting: PHYSICIAN ASSISTANT
Payer: COMMERCIAL

## 2023-09-16 DIAGNOSIS — Z12.31 VISIT FOR SCREENING MAMMOGRAM: ICD-10-CM

## 2023-09-16 PROCEDURE — 77063 BREAST TOMOSYNTHESIS BI: CPT

## 2023-09-16 PROCEDURE — 77067 SCR MAMMO BI INCL CAD: CPT

## 2023-09-19 ENCOUNTER — HOSPITAL ENCOUNTER (OUTPATIENT)
Dept: PHYSICAL THERAPY | Facility: HOSPITAL | Age: 63
Setting detail: THERAPIES SERIES
Discharge: HOME | End: 2023-09-19
Payer: COMMERCIAL

## 2023-09-19 DIAGNOSIS — R20.0 LEFT LEG NUMBNESS: ICD-10-CM

## 2023-09-19 DIAGNOSIS — M54.42 LEFT-SIDED LOW BACK PAIN WITH LEFT-SIDED SCIATICA, UNSPECIFIED CHRONICITY: Primary | ICD-10-CM

## 2023-09-19 PROCEDURE — 97110 THERAPEUTIC EXERCISES: CPT

## 2023-09-19 NOTE — THERAPY TREATMENT NOTE
Outpatient Physical Therapy Ortho Treatment Note  [facility]     Patient Name: Kristina Kang  : 1960  MRN: [id number]  Today's Date: 2023      Visit Date: 2023    Visit Dx:    ICD-10-CM ICD-9-CM   1. Left-sided low back pain with left-sided sciatica, unspecified chronicity  M54.42 724.3   2. Left leg numbness  R20.0 782.0       Patient Active Problem List   Diagnosis    Degeneration of lumbar intervertebral disc    Hepatitis C    Essential hypertension    Hyperlipidemia    Vitamin D deficiency    Osteoarthritis    Episode of syncope    Amaurosis fugax of right eye    Osteopenia of both hips    Chronic seasonal allergic rhinitis    Hypocalciuric hypercalcemia    Vaginal atrophy    Generalized anxiety disorder    Abnormal MRI    Trigeminal neuralgia    Elevated hemoglobin A1c        Past Medical History:   Diagnosis Date    Allergic     Anemia     Colon polyp     Degeneration of lumbar intervertebral disc     Essential hypertension     Hepatitis C 2008    Dr. Coleman Null    History of bone density study     Normal    History of chest x-ray 2010    normal    History of echocardiogram 2015    History of EKG 2013    normal    History of Holter monitoring 2015    occas PVC    History of nuclear stress test 10/06/2015    LCG; ischemia    Hypercalcemia     Hyperlipidemia     Leiomyoma of uterus     and fibroids    Osteoarthritis     Osteopenia     Postmenopausal     Pyelonephritis     Trigeminal neuralgia     Vitamin D deficiency         Past Surgical History:   Procedure Laterality Date     SECTION      COLONOSCOPY      COLONOSCOPY W/ POLYPECTOMY      Benign polyps.  Dr. Malone    HYSTERECTOMY  2004    took one ovary     LIVER BIOPSY      OOPHORECTOMY Bilateral age 33    left 1/4 of the right                         PT Assessment/Plan       Row Name 23 1400          PT Assessment    Assessment Comments Ms. Kang returns for first time  f/u since initial evaluation for LBP with L sided sciatica. Great reports from patient, stating her exercises have really helped subside the numbness that was going down her L LE. She also got a new medication from Dr. Izaguirre to treat trigeminal neuralgia, which he stated may also help L LE N/T. Pt reports great compliance to HEP, which has benefited her with assisting with returning to PLOF. Progressed exercises today by adding STS, standing HS curls, side steps, and hip adduction squeeze. Updated HEP and will continue progressing as tolerated. Pt will continue to benefit from skilled PT at this time.  -DR        PT Plan    PT Plan Comments assess how pt felt after last visit, add monster walks, step ups, 3 way hip, PPT with march, bird dog, clamshells  -               User Key  (r) = Recorded By, (t) = Taken By, (c) = Cosigned By      Initials Name Provider Type    Mason Up, PT Physical Therapist                       OP Exercises       Row Name 09/19/23 1400             Subjective    Subjective Comments I have been doing much better actually. Your exercises have really helped me, and i was prescribed a new medication for my other nerve problem and my doctor said it should benefit my numbness in left leg.  -DR         Total Minutes    53319 - PT Therapeutic Exercise Minutes 40  -DR         Exercise 1    Exercise Name 1 nustep  -DR      Cueing 1 Verbal  -DR      Time 1 5 min  -DR         Exercise 2    Exercise Name 2 LTR  -DR      Cueing 2 Verbal;Demo  -DR      Reps 2 20 pawel  -DR      Time 2 3sec  -DR         Exercise 3    Exercise Name 3 PPT  -DR      Cueing 3 Verbal;Demo  -DR      Sets 3 1  -DR      Reps 3 10  -DR      Time 3 5sec  -DR         Exercise 4    Exercise Name 4 PPT with bridge  -DR      Cueing 4 Verbal;Demo  -DR      Sets 4 2  -DR      Reps 4 10  -DR         Exercise 5    Exercise Name 5 figure 4 stretch  -DR      Cueing 5 Verbal;Demo  -DR      Reps 5 3  -DR      Time 5 20s  -DR          Exercise 6    Exercise Name 6 seated HS stretch  -DR      Cueing 6 Verbal;Demo  -DR      Reps 6 3  -DR      Time 6 20s  -DR         Exercise 7    Exercise Name 7 hip adduction squeeze  -DR      Cueing 7 Verbal;Demo  -DR      Sets 7 2  -DR      Reps 7 10  -DR      Time 7 5s  -DR         Exercise 8    Exercise Name 8 side steps  -DR      Cueing 8 Verbal;Demo  -DR      Sets 8 3 laps  -DR      Time 8 RTB  -DR         Exercise 9    Exercise Name 9 STS with TA  -DR      Cueing 9 Verbal;Demo  -DR      Sets 9 1  -DR      Reps 9 10  -DR         Exercise 10    Exercise Name 10 standing HS curls  -DR      Cueing 10 Verbal;Demo  -DR      Sets 10 2  -DR      Reps 10 10  -DR      Time 10 2# AW  -DR                User Key  (r) = Recorded By, (t) = Taken By, (c) = Cosigned By      Initials Name Provider Type    Mason Up, PT Physical Therapist                                  PT OP Goals       Row Name 09/19/23 1400          PT Short Term Goals    STG Date to Achieve 09/27/23  -     STG 1 Patient will be independent with education for symptom management and initial HEP to decrease LBP/L LE pain.  -     STG 1 Progress Ongoing  -     STG 2 Pt will improve B LE strength to at least 4+/5.  -     STG 2 Progress Ongoing  -     STG 3 Pt will improve lumbar lateral flexion ROM to WNL with </=1/10 pain for improved mobility and participation in ADLs.  -     STG 3 Progress Ongoing  -        Long Term Goals    LTG Date to Achieve 10/27/23  -     LTG 1 Patient will be independent with education for symptom management and advanced HEP to decrease LBP/L LE pain.  -     LTG 1 Progress Ongoing  -     LTG 2 Patient will reduce level of percieved functional ability as measured by the LEFS  from 57/80 to >/= 75/80 function in order to improve quality of life.  -DR ARIASG 2 Progress Ongoing  -     LTG 3 Patient will improve ability to sleep through the night without sleep disturbances related to back pain to improve  overall sleep quality and quality of life  -     LTG 3 Progress Ongoing  -     LTG 4 Patient will improve sitting tolerance from 6 hours during a work-day to a full 8-hour work day without LBP to improve participation in community activities.  -     LTG 4 Progress Ongoing  -               User Key  (r) = Recorded By, (t) = Taken By, (c) = Cosigned By      Initials Name Provider Type    Mason Up, PT Physical Therapist                    Therapy Education  Education Details: updated HEP & reviewed frequency  Given: HEP  Program: Reinforced, Progressed  How Provided: Verbal, Demonstration, Written  Provided to: Patient  Level of Understanding: Teach back education performed, Verbalized, Demonstrated              Time Calculation:   Start Time: 1405  Stop Time: 1445  Time Calculation (min): 40 min  Timed Charges  02446 - PT Therapeutic Exercise Minutes: 40  Total Minutes  Timed Charges Total Minutes: 40   Total Minutes: 40  Therapy Charges for Today       Code Description Service Date Service Provider Modifiers Qty    08399842066 HC PT THER PROC EA 15 MIN 9/19/2023 Mason Elena, PT GP 3                      Mason Elena, PT  9/19/2023

## 2023-10-01 RX ORDER — CARBAMAZEPINE 200 MG/1
200 TABLET ORAL 2 TIMES DAILY
Qty: 180 TABLET | Refills: 1 | OUTPATIENT
Start: 2023-10-01

## 2023-10-03 ENCOUNTER — HOSPITAL ENCOUNTER (OUTPATIENT)
Dept: PHYSICAL THERAPY | Facility: HOSPITAL | Age: 63
Setting detail: THERAPIES SERIES
Discharge: HOME | End: 2023-10-03
Payer: COMMERCIAL

## 2023-10-03 PROCEDURE — 97110 THERAPEUTIC EXERCISES: CPT

## 2023-10-03 PROCEDURE — 97530 THERAPEUTIC ACTIVITIES: CPT

## 2023-10-03 NOTE — THERAPY PROGRESS REPORT/RE-CERT
Outpatient Physical Therapy Ortho Progress Note  [facility]     Patient Name: Kristina Kang  : 1960  MRN: [id number]  Today's Date: 10/3/2023      Visit Date: 10/03/2023    Visit Dx:  No diagnosis found.    Patient Active Problem List   Diagnosis    Degeneration of lumbar intervertebral disc    Hepatitis C    Essential hypertension    Hyperlipidemia    Vitamin D deficiency    Osteoarthritis    Episode of syncope    Amaurosis fugax of right eye    Osteopenia of both hips    Chronic seasonal allergic rhinitis    Hypocalciuric hypercalcemia    Vaginal atrophy    Generalized anxiety disorder    Abnormal MRI    Trigeminal neuralgia    Elevated hemoglobin A1c        Past Medical History:   Diagnosis Date    Allergic     Anemia     Colon polyp     Degeneration of lumbar intervertebral disc     Essential hypertension     Hepatitis C 2008    Dr. Coleman Null    History of bone density study     Normal    History of chest x-ray 2010    normal    History of echocardiogram 2015    History of EKG 2013    normal    History of Holter monitoring 2015    occas PVC    History of nuclear stress test 10/06/2015    LCG; ischemia    Hypercalcemia     Hyperlipidemia     Leiomyoma of uterus     and fibroids    Osteoarthritis     Osteopenia     Postmenopausal     Pyelonephritis     Trigeminal neuralgia     Vitamin D deficiency         Past Surgical History:   Procedure Laterality Date     SECTION      COLONOSCOPY      COLONOSCOPY W/ POLYPECTOMY      Benign polyps.  Dr. Malone    HYSTERECTOMY  2004    took one ovary     LIVER BIOPSY      OOPHORECTOMY Bilateral age 33    left 1/4 of the right         PT Ortho       Row Name 10/03/23 1500       Myotomal Screen- Lower Quarter Clearing    Hip flexion (L2) Left:;4- (Good -);Right:;4 (Good)  -    Knee extension (L3) Bilateral:;4+ (Good +)  -    Ankle DF (L4) Left:;4+ (Good +);Right:;5 (Normal)  -    Knee flexion (S2)  Bilateral:;4+ (Good +)  -              User Key  (r) = Recorded By, (t) = Taken By, (c) = Cosigned By      Initials Name Provider Type    Mason Up, PT Physical Therapist                                 PT Assessment/Plan       Row Name 10/03/23 1500          PT Assessment    Functional Limitations Impaired gait;Limitation in home management;Limitations in community activities;Limitations in functional capacity and performance;Performance in work activities;Performance in leisure activities  -     Impairments Range of motion;Posture;Pain;Muscle strength;Joint mobility;Joint integrity  -     Assessment Comments Kristinasa EFE Kang has been seen for 3 physical therapy sessions for LBP with L sided sciatica. Treatment has included therapeutic exercise and therapeutic activity. Progress to physical therapy goals is good as pt. has met 1.5/3 STGs, and 2/4 LTGs. She reports improved symptoms overall, demonstrated by no true sciatica onset since initial evaluation/beginning HEP, ability to sit for a full work day without increased LBP, better sleep in the night with minimal sleep disturbances. Pt still expresses slight LBP, but comes and goes, so she is not as concerned with that. Her hip still flares up, demonstrated by assessing lumbar lateral flexion and hip pain became 6/10. Ms. Kang has one more visit scheduled; discussed possible d/c if continuing to feel well by next visit. Progressed today with adding monster walks, 3D hip, and sciatic nerve glide. She will benefit from continued skilled physical therapy to address remaining impairments and functional limitations.  -     Please refer to paper survey for additional self-reported information No  -DR     Rehab Potential Good  -DR     Patient/caregiver participated in establishment of treatment plan and goals Yes  -DR     Patient would benefit from skilled therapy intervention Yes  -DR        PT Plan    PT Frequency 1x/week  -     Predicted  Duration of Therapy Intervention (PT) 1-2 sessions  -DR     Planned CPT's? PT RE-EVAL: 55882;PT THER PROC EA 15 MIN: 43728;PT MANUAL THERAPY EA 15 MIN: 14919;PT THER ACT EA 15 MIN: 88638;PT NEUROMUSC RE-EDUCATION EA 15 MIN: 25817;PT GAIT TRAINING EA 15 MIN: 29871;PT SELF CARE/HOME MGMT/TRAIN EA 15: 45774;PT HOT OR COLD PACK TREAT MCARE  -DR     PT Plan Comments possible d/c with full HEP update? if adding more visits, consider adding heenal, bird dog, PPT with march, step up  -DR               User Key  (r) = Recorded By, (t) = Taken By, (c) = Cosigned By      Initials Name Provider Type    Mason Up, PT Physical Therapist                       OP Exercises       Row Name 10/03/23 1500             Subjective    Subjective Comments I am still doing well. My sciatica is gone.  -DR         Total Minutes    92633 - PT Therapeutic Exercise Minutes 25  -DR      09084 - PT Therapeutic Activity Minutes 20  -DR         Exercise 1    Exercise Name 1 nustep  -DR      Cueing 1 Verbal  -DR      Time 1 5 min  -DR         Exercise 8    Exercise Name 8 side steps  -DR      Cueing 8 Verbal;Demo  -DR      Sets 8 3 laps  -DR      Time 8 RTB  -DR         Exercise 11    Exercise Name 11 TA- progress note requirements  -DR      Time 11 20 min  -DR         Exercise 12    Exercise Name 12 monster walks  -DR      Cueing 12 Verbal;Demo  -DR      Reps 12 3 laps  -DR      Additional Comments RTB  -DR         Exercise 13    Exercise Name 13 3D hip  -DR      Cueing 13 Verbal;Demo  -DR      Sets 13 2  -DR      Reps 13 10  -DR      Time 13 RTB  -DR         Exercise 14    Exercise Name 14 sciatic nerve glide  -DR      Cueing 14 Verbal;Demo  -DR      Sets 14 1  -DR      Reps 14 10  -DR      Time 14 90/90 position  -DR                User Key  (r) = Recorded By, (t) = Taken By, (c) = Cosigned By      Initials Name Provider Type    Mason Up, PT Physical Therapist                                  PT OP Goals       Row Name  10/03/23 1500          PT Short Term Goals    STG Date to Achieve 09/27/23  -DR     STG 1 Patient will be independent with education for symptom management and initial HEP to decrease LBP/L LE pain.  -     STG 1 Progress Met  -DR     STG 2 Pt will improve B LE strength to at least 4+/5.  -DR     STG 2 Progress Ongoing;Partially Met  -DR     STG 3 Pt will improve lumbar lateral flexion ROM to WNL with </=1/10 pain for improved mobility and participation in ADLs.  -     STG 3 Progress Ongoing  -DR     STG 3 Progress Comments 6/10 from R hip- achy  -DR        Long Term Goals    LTG Date to Achieve 10/27/23  -DR     LTG 1 Patient will be independent with education for symptom management and advanced HEP to decrease LBP/L LE pain.  -DR     LTG 1 Progress Ongoing  -DR     LTG 2 Patient will reduce level of percieved functional ability as measured by the LEFS  from 57/80 to >/= 75/80 function in order to improve quality of life.  -DR     LTG 2 Progress Ongoing  -DR     LTG 3 Patient will improve ability to sleep through the night without sleep disturbances related to back pain to improve overall sleep quality and quality of life  -DR     LTG 3 Progress Met  -DR     LTG 4 Patient will improve sitting tolerance from 6 hours during a work-day to a full 8-hour work day without LBP to improve participation in community activities.  -     LTG 4 Progress Met  -DR               User Key  (r) = Recorded By, (t) = Taken By, (c) = Cosigned By      Initials Name Provider Type    Mason Up, PT Physical Therapist                         Lower Extremity Functional Index  Any of your usual work, housework or school activities: No difficulty  Your usual hobbies, recreational or sporting activities: No difficulty  Getting into or out of the bath: Moderate difficulty  Walking between rooms: No difficulty  Putting on your shoes or socks: No difficulty  Squatting: No difficulty  Lifting an object, like a bag of groceries from  the floor: No difficulty  Performing light activities around your home: No difficulty  Performing heavy activities around your home: A little bit of difficulty  Getting into or out of a car: No difficulty  Walking 2 blocks: A little bit of difficulty  Walking a mile: Moderate difficulty  Going up or down 10 stairs (about 1 flight of stairs): No difficulty  Standing for 1 hour: No difficulty  Sitting for 1 hour: No difficulty  Running on even ground: Moderate difficulty  Running on uneven ground: Quite a bit of difficulty  Making sharp turns while running fast: Quite a bit of difficulty  Hopping: Moderate difficulty  Rolling over in bed: No difficulty  Total: 64  Lower Extremity Functional Index  Any of your usual work, housework or school activities: No difficulty  Your usual hobbies, recreational or sporting activities: No difficulty  Getting into or out of the bath: Moderate difficulty  Walking between rooms: No difficulty  Putting on your shoes or socks: No difficulty  Squatting: No difficulty  Lifting an object, like a bag of groceries from the floor: No difficulty  Performing light activities around your home: No difficulty  Performing heavy activities around your home: A little bit of difficulty  Getting into or out of a car: No difficulty  Walking 2 blocks: A little bit of difficulty  Walking a mile: Moderate difficulty  Going up or down 10 stairs (about 1 flight of stairs): No difficulty  Standing for 1 hour: No difficulty  Sitting for 1 hour: No difficulty  Running on even ground: Moderate difficulty  Running on uneven ground: Quite a bit of difficulty  Making sharp turns while running fast: Quite a bit of difficulty  Hopping: Moderate difficulty  Rolling over in bed: No difficulty  Total: 64      Time Calculation:   Start Time: 1454  Stop Time: 1539  Time Calculation (min): 45 min  Timed Charges  11415 - PT Therapeutic Exercise Minutes: 25  73321 - PT Therapeutic Activity Minutes: 20  Total Minutes  Timed  Charges Total Minutes: 45   Total Minutes: 45  Therapy Charges for Today       Code Description Service Date Service Provider Modifiers Qty    36353794649  PT THER PROC EA 15 MIN 10/3/2023 Mason Elena, PT GP 2    [id number]  PT THERAPEUTIC ACT EA 15 MIN 10/3/2023 Mason Elena, PT GP 1            PT G-Codes  Total: 64         Mason Elena, PT  10/3/2023

## 2023-10-11 RX ORDER — CARBAMAZEPINE 200 MG/1
TABLET, EXTENDED RELEASE ORAL
Qty: 450 TABLET | Refills: 1 | Status: SHIPPED | OUTPATIENT
Start: 2023-10-11

## 2023-10-12 ENCOUNTER — TELEPHONE (OUTPATIENT)
Dept: NEUROLOGY | Facility: CLINIC | Age: 63
End: 2023-10-12

## 2023-10-12 DIAGNOSIS — G50.0 TRIGEMINAL NEURALGIA: Primary | ICD-10-CM

## 2023-10-12 NOTE — TELEPHONE ENCOUNTER
Caller: Kristina Kang A    Relationship: Self    Best call back number: [phone]    What is the medical concern/diagnosis: TRIGEMINAL NEURALGIA OF RIGHT SIDE OF FACE    What specialty or service is being requested: NEUROSURGERY    What is the provider, practice or medical service name:  NEUR[facility]- SCAR SPARKS MD    What is the office location: [address] [place] [address], [address], [place] [address]    What is the office phone number: [phone]    What is the office fax number: [phone]    Any additional details: PT DOES NOT FEEL THE PREGABALIN/LYRICA MEDICATION IS HELPING ALL THAT MUCH WITH HER FACIAL PAIN. PT STATES SHE HAS ALSO BEEN VERY TIRED AND FATIGUED WHICH SHE FEELS IS RELATED TO THE MEDICATION.    ADDITIONALLY, PT STATES THAT A FEW NIGHTS AGO, SHE JUST BEGAN CRYING OUT OF NOWHERE. SHE DENIES ANY OUTSIDE STRESSORS/PERSONAL ISSUES THAT WOULD PROMPT THE CRYING. PT DOES NOT KNOW IF THIS IS RELATED TO THE MEDICATION EITHER.    PT WOULD LIKE TO PROCEED WITH REFERRAL TO NEUROSURGERY.    PLEASE REVIEW AND ADVISE.

## 2023-11-13 ENCOUNTER — OFFICE VISIT (OUTPATIENT)
Dept: ENDOCRINOLOGY | Age: 63
End: 2023-11-13
Payer: COMMERCIAL

## 2023-11-13 VITALS
HEART RATE: 68 BPM | DIASTOLIC BLOOD PRESSURE: 80 MMHG | TEMPERATURE: 96.9 F | BODY MASS INDEX: 25.69 KG/M2 | SYSTOLIC BLOOD PRESSURE: 124 MMHG | WEIGHT: 145 LBS | HEIGHT: 63 IN | OXYGEN SATURATION: 96 %

## 2023-11-13 DIAGNOSIS — I10 ESSENTIAL HYPERTENSION: ICD-10-CM

## 2023-11-13 DIAGNOSIS — E78.2 MIXED HYPERLIPIDEMIA: ICD-10-CM

## 2023-11-13 DIAGNOSIS — E83.52 HYPOCALCIURIC HYPERCALCEMIA: ICD-10-CM

## 2023-11-13 DIAGNOSIS — R73.03 PREDIABETES: ICD-10-CM

## 2023-11-13 DIAGNOSIS — E83.52 HYPERCALCEMIA: Primary | ICD-10-CM

## 2023-11-13 DIAGNOSIS — R73.09 ELEVATED HEMOGLOBIN A1C: ICD-10-CM

## 2023-11-13 DIAGNOSIS — E55.9 VITAMIN D DEFICIENCY: ICD-10-CM

## 2023-11-13 PROCEDURE — 99214 OFFICE O/P EST MOD 30 MIN: CPT | Performed by: INTERNAL MEDICINE

## 2023-11-13 NOTE — PROGRESS NOTES
Luis Kang is a 62 y.o. female.     History of Present Illness     Her serum calcium has ranged from 10.7-11.2 with the upper limit of normal of 10.5 mg per DL. She had a bone density done at [facility] in December 2015 which showed osteopenia.  Bone density done in December 2017 showed stable osteopenia compared to 2015.  Serum calcium was at 10.5 mg per DL in February 2021     Bone density done in September 2022 showed osteopenia without significant change since December 2019.  Her 10-year risk of major osteoporotic fracture is 4.8% and of hip fracture is 0.7%.     She has history of vitamin D deficiency and has been taking vitamin D 2000 units/day.  She has no previous history of kidney stones, peptic ulcer, or depression. She denies muscle weakness.          There is no family history of hypercalcemia or nephrolithiasis.     Workup done in January 2017 are as follows: Serum calcium at 11.0 mg per DL.  Intact PTH is normal at 42 pg per mL.  Undetectable PTH RP.  25-hydroxy vitamin D is 28 ng per mL.  24 urine calcium is low at 31.2 mg     Repeat 24-hour urine collection done after vitamin D repletion in 4/18 showed low calcium at 57 mg per 24 hours.  Calcium to creatinine clearance ratio is low at 0.004.     She has elevated hemoglobin A1c since 2023.  She has no history of gestational diabetes.  Hemoglobin A1c done in April 2023 is 5.8% with an elevated fasting glucose of 108 mg per DL.  Her last meal was last night.      She has history of hypertension and has been on furosemide 20  mg once a day and valsartan 160 mg/day.  Lisinopril was discontinued because of a dry cough.  No chest pain, shortness of breath or pedal edema.s      She has hyperlipidemia and is on Lipitor 20 mg once a day. She denies any myalgia.. She has no history of diabetes mellitus.  She has gained 4 lbs since 5/23.       She had an episode of transient visual loss on the right eye in July 2016 thought to be due to  "amaurosis fugax. Carotid ultrasound was normal. She is on aspirin and Lipitor.  She has no recurrence of visual loss since.  She had an eye examination in 9/23 and was started on medication for glaucoma.     She has right trigeminal neuralgia and is on carbamazepine XR and pregabalin prescribed by [facility]oney.  Symptom have not resolved and she will see Dr. Morse.     She had colon polyps removed by Dr. Malone in April 2019.  She had colonoscopy in May 2020 and 20 polyps were removed by Dr. Malone.  She had colonoscopy in June 2021 and had multiple polyps removed.  She had 8 polyps removed by colonoscopy in June 2022 by Dr. Malone.  She was advised to have a colonoscopy in 2024.  She denies bowel complaints.     She is adopted and does not know her family history.  Her children are age 40 and younger and have not had a colonoscopy.  She denies bowel complaints.    She walks 1-2 times a week.  She works as manager in a bank.    The following portions of the patient's history were reviewed and updated as appropriate: allergies, current medications, past family history, past medical history, past social history, past surgical history, and problem list.    Review of Systems   HENT: Negative.     Eyes: Negative.  Negative for visual disturbance.   Respiratory:  Negative for shortness of breath and wheezing.    Cardiovascular:  Negative for chest pain and palpitations.   Gastrointestinal: Negative.    Endocrine: Negative for polyuria.   Genitourinary: Negative.    Musculoskeletal:  Negative for myalgias.   Neurological:  Negative for numbness.     Vitals:    11/13/23 0857   BP: 124/80   Pulse: 68   Temp: 96.9 °F (36.1 °C)   SpO2: 96%   Weight: 65.8 kg (145 lb)   Height: 160 cm (62.99\")      Objective   Physical Exam  Constitutional:       General: She is not in acute distress.     Appearance: Normal appearance. She is not ill-appearing or toxic-appearing.   Eyes:      General: No scleral icterus.        Right " eye: No discharge.         Left eye: No discharge.   Neck:      Vascular: No carotid bruit.   Cardiovascular:      Rate and Rhythm: Normal rate and regular rhythm.      Heart sounds: Normal heart sounds. No murmur heard.     No friction rub.   Pulmonary:      Effort: No respiratory distress.      Breath sounds: Normal breath sounds. No stridor. No rales.   Chest:      Chest wall: No tenderness.   Abdominal:      General: Bowel sounds are normal.      Palpations: Abdomen is soft.      Tenderness: There is no right CVA tenderness or left CVA tenderness.   Musculoskeletal:      Right lower leg: No edema.      Left lower leg: No edema.   Lymphadenopathy:      Cervical: No cervical adenopathy.   Skin:     General: Skin is warm and dry.   Neurological:      General: No focal deficit present.      Mental Status: She is alert and oriented to person, place, and time.   Psychiatric:         Mood and Affect: Mood normal.         Behavior: Behavior normal.       Admission on 08/29/2023, Discharged on 08/29/2023   Component Date Value Ref Range Status    QT Interval 08/29/2023 367  ms Final    QTC Interval 08/29/2023 391  ms Final    Glucose 08/29/2023 94  65 - 99 mg/dL Final    BUN 08/29/2023 15  8 - 23 mg/dL Final    Creatinine 08/29/2023 1.02 (H)  0.57 - 1.00 mg/dL Final    Sodium 08/29/2023 141  136 - 145 mmol/L Final    Potassium 08/29/2023 4.3  3.5 - 5.2 mmol/L Final    Chloride 08/29/2023 106  98 - 107 mmol/L Final    CO2 08/29/2023 29.0  22.0 - 29.0 mmol/L Final    Calcium 08/29/2023 10.9 (H)  8.6 - 10.5 mg/dL Final    BUN/Creatinine Ratio 08/29/2023 14.7  7.0 - 25.0 Final    Anion Gap 08/29/2023 6.0  5.0 - 15.0 mmol/L Final    eGFR 08/29/2023 62.3  >60.0 mL/min/1.73 Final    Protime 08/29/2023 9.8  9.6 - 11.7 Seconds Final    INR 08/29/2023 <0.93 (L)  0.93 - 1.10 Final    PTT 08/29/2023 27.7 (L)  61.0 - 76.5 seconds Final    HS Troponin T 08/29/2023 <6  <10 ng/L Final    WBC 08/29/2023 8.10  3.40 - 10.80 10*3/mm3 Final     RBC 08/29/2023 4.34  3.77 - 5.28 10*6/mm3 Final    Hemoglobin 08/29/2023 13.0  12.0 - 15.9 g/dL Final    Hematocrit 08/29/2023 40.0  34.0 - 46.6 % Final    MCV 08/29/2023 92.2  79.0 - 97.0 fL Final    MCH 08/29/2023 29.9  26.6 - 33.0 pg Final    MCHC 08/29/2023 32.4  31.5 - 35.7 g/dL Final    RDW 08/29/2023 13.9  12.3 - 15.4 % Final    RDW-SD 08/29/2023 47.7  37.0 - 54.0 fl Final    MPV 08/29/2023 7.0  6.0 - 12.0 fL Final    Platelets 08/29/2023 236  140 - 450 10*3/mm3 Final    Neutrophil % 08/29/2023 67.6  42.7 - 76.0 % Final    Lymphocyte % 08/29/2023 24.5  19.6 - 45.3 % Final    Monocyte % 08/29/2023 4.9 (L)  5.0 - 12.0 % Final    Eosinophil % 08/29/2023 2.4  0.3 - 6.2 % Final    Basophil % 08/29/2023 0.6  0.0 - 1.5 % Final    Neutrophils, Absolute 08/29/2023 5.50  1.70 - 7.00 10*3/mm3 Final    Lymphocytes, Absolute 08/29/2023 2.00  0.70 - 3.10 10*3/mm3 Final    Monocytes, Absolute 08/29/2023 0.40  0.10 - 0.90 10*3/mm3 Final    Eosinophils, Absolute 08/29/2023 0.20  0.00 - 0.40 10*3/mm3 Final    Basophils, Absolute 08/29/2023 0.00  0.00 - 0.20 10*3/mm3 Final    nRBC 08/29/2023 0.0  0.0 - 0.2 /100 WBC Final     Assessment & Plan   Diagnoses and all orders for this visit:    1. Hypercalcemia (Primary)  -     Comprehensive Metabolic Panel  -     PTH, Intact  -     Phosphorus  -     Calcium, Ionized  -     Vitamin D,25-Hydroxy    2. Hypocalciuric hypercalcemia  -     Comprehensive Metabolic Panel  -     PTH, Intact  -     Phosphorus  -     Calcium, Ionized  -     Vitamin D,25-Hydroxy    3. Vitamin D deficiency  -     Vitamin D,25-Hydroxy    4. Elevated hemoglobin A1c  -     Comprehensive Metabolic Panel  -     Hemoglobin A1c    5. Prediabetes  -     Comprehensive Metabolic Panel    6. Essential hypertension  -     Comprehensive Metabolic Panel    7. Mixed hyperlipidemia  -     Comprehensive Metabolic Panel  -     Lipid Panel      Check serum calcium, ionized calcium, PTH, phosphorus and vitamin D.  Check  hemoglobin A1c and lipid panel.  Continue no concentrated sweet low-fat diet.  Advised to walk for 30 minutes 3-4 4 times a week.    Copy of my note sent to [facility].    RTC 6 mos

## 2023-11-14 LAB
25(OH)D3+25(OH)D2 SERPL-MCNC: 26.2 NG/ML (ref 30–100)
ALBUMIN SERPL-MCNC: 4.4 G/DL (ref 3.9–4.9)
ALBUMIN/GLOB SERPL: 1.6 {RATIO} (ref 1.2–2.2)
ALP SERPL-CCNC: 95 IU/L (ref 44–121)
ALT SERPL-CCNC: 9 IU/L (ref 0–32)
AST SERPL-CCNC: 12 IU/L (ref 0–40)
BILIRUB SERPL-MCNC: <0.2 MG/DL (ref 0–1.2)
BUN SERPL-MCNC: 15 MG/DL (ref 8–27)
BUN/CREAT SERPL: 18 (ref 12–28)
CA-I SERPL ISE-MCNC: 5.6 MG/DL (ref 4.5–5.6)
CALCIUM SERPL-MCNC: 10.4 MG/DL (ref 8.7–10.3)
CHLORIDE SERPL-SCNC: 107 MMOL/L (ref 96–106)
CHOLEST SERPL-MCNC: 186 MG/DL (ref 100–199)
CO2 SERPL-SCNC: 24 MMOL/L (ref 20–29)
CREAT SERPL-MCNC: 0.84 MG/DL (ref 0.57–1)
EGFRCR SERPLBLD CKD-EPI 2021: 79 ML/MIN/1.73
GLOBULIN SER CALC-MCNC: 2.7 G/DL (ref 1.5–4.5)
GLUCOSE SERPL-MCNC: 92 MG/DL (ref 70–99)
HBA1C MFR BLD: 5.5 % (ref 4.8–5.6)
HDLC SERPL-MCNC: 57 MG/DL
IMP & REVIEW OF LAB RESULTS: NORMAL
LDLC SERPL CALC-MCNC: 110 MG/DL (ref 0–99)
PHOSPHATE SERPL-MCNC: 3.1 MG/DL (ref 3–4.3)
POTASSIUM SERPL-SCNC: 4.4 MMOL/L (ref 3.5–5.2)
PROT SERPL-MCNC: 7.1 G/DL (ref 6–8.5)
PTH-INTACT SERPL-MCNC: 36 PG/ML (ref 15–65)
SODIUM SERPL-SCNC: 142 MMOL/L (ref 134–144)
TRIGL SERPL-MCNC: 107 MG/DL (ref 0–149)
VLDLC SERPL CALC-MCNC: 19 MG/DL (ref 5–40)

## 2023-11-16 NOTE — H&P (VIEW-ONLY)
Subjective   History of Present Illness: Kristina Kang is a 62 y.o. female is here today for follow-up with right sided trigeminal neuralgia.  The trigeminal neuralgia involves the V2 and V3 segment.  She reports that she has had mild improvement in her symptoms with Lyrica and carbamazepine.  She reports that she is not tolerating the medications very well.  She has had significant brain fog and memory loss on the medications.  She also continues to have intermittent episodes of pain on the medications.  She would like to proceed with surgery at the next available date.    History of Present Illness    The following portions of the patient's history were reviewed and updated as appropriate: allergies, current medications, past family history, past medical history, past social history, past surgical history, and problem list.    Past Medical History:   Diagnosis Date    Allergic     Anemia     Colon polyp     Degeneration of lumbar intervertebral disc     Episode of syncope 2015    Essential hypertension     Hepatitis C 2008    Dr. Coleman Null    History of bone density study     Normal    History of chest x-ray 2010    normal    History of echocardiogram 2015    History of EKG 2013    normal    History of Holter monitoring 2015    occas PVC    History of nuclear stress test 10/06/2015    LCG; ischemia    Hypercalcemia     Hyperlipidemia     Leiomyoma of uterus     and fibroids    Osteoarthritis     Osteopenia     Postmenopausal     Pyelonephritis     Trigeminal neuralgia     Vitamin D deficiency         Past Surgical History:   Procedure Laterality Date     SECTION      COLONOSCOPY      COLONOSCOPY W/ POLYPECTOMY      Benign polyps.  Dr. Malone    HYSTERECTOMY  2004    took one ovary     LIVER BIOPSY      OOPHORECTOMY Bilateral age 33    left 1/4 of the right           Current Outpatient Medications:     aspirin 81 MG EC tablet, Take 1 tablet by mouth  Daily., Disp: , Rfl:     atorvastatin (LIPITOR) 40 MG tablet, Take 1 tablet by mouth Daily. For cholesterol, Disp: 90 tablet, Rfl: 2    carBAMazepine XR (TEGretol  XR) 200 MG 12 hr tablet, TAKE 600MG DURING THE DAY AND 400MG AT NIGHT., Disp: 450 tablet, Rfl: 1    cholecalciferol (VITAMIN D3) 25 MCG (1000 UT) tablet, 3 tablets daily, Disp: 270 tablet, Rfl: 2    cholecalciferol 2000 units tablet, Take 2,000 Units by mouth Daily., Disp: , Rfl:     folic acid (FOLVITE) 400 MCG tablet, Take 1 tablet by mouth Daily., Disp: 90 tablet, Rfl: 4    furosemide (LASIX) 20 MG tablet, TAKE 1 TABLET BY MOUTH EVERY DAY, Disp: 90 tablet, Rfl: 2    latanoprost (XALATAN) 0.005 % ophthalmic solution, 1 drop Every Night., Disp: , Rfl:     pregabalin (LYRICA) 75 MG capsule, Take 1 capsule by mouth 2 (Two) Times a Day., Disp: 60 capsule, Rfl: 3    valsartan (DIOVAN) 160 MG tablet, Take 1 tablet by mouth Daily. for blood pressure., Disp: 90 tablet, Rfl: 1     No Known Allergies     Social History     Socioeconomic History    Marital status:    Tobacco Use    Smoking status: Former     Packs/day: 0.50     Years: 10.00     Additional pack years: 0.00     Total pack years: 5.00     Types: Cigarettes    Smokeless tobacco: Former     Quit date: 2011   Vaping Use    Vaping Use: Never used   Substance and Sexual Activity    Alcohol use: Not Currently     Comment: Seldom    Drug use: No    Sexual activity: Yes     Partners: Male     Birth control/protection: Post-menopausal, None     Comment: Hysterectomy        Family History   Adopted: Yes        Review of Systems   Constitutional:  Positive for fatigue.   Eyes:  Positive for visual disturbance.   Neurological:  Positive for dizziness. Negative for syncope, weakness, light-headedness and headaches.   Psychiatric/Behavioral:  Positive for confusion, decreased concentration and sleep disturbance.        Objective     Vitals:    11/22/23 1358   BP: 118/78   Pulse: 76   Resp: 20   Temp: 97.5  "°F (36.4 °C)   SpO2: 99%   Weight: 66.7 kg (147 lb)   Height: 160 cm (62.99\")     Body mass index is 26.05 kg/m².      Physical Exam  Neurologic Exam  Awake, alert, oriented x3  Pupils equal round reactive to light  Extraocular muscles intact  Face symmetric  Speech is fluent and clear  No pronator drift  Motor exam  Bilateral deltoids 5/5, bilateral biceps 5/5, bilateral triceps 5/5, bilateral wrist extension 5/5 bilateral hand  5/5  Bilateral hip flexion 5/5, bilateral knee extension 5/5, bilateral DF/PF 5/5  No clonus  No Terry's reflex  Steady normal gait  Able to detect  light touch in all 4 extremities  V1 through V3 segments intact to sensation bilaterally      Assessment & Plan     Medical Decision Makin-year-old female with right-sided trigeminal neuralgia  -She has failed medical therapy with carbamazepine, Lyrica, gabapentin.  She reports that she has had some improvement in her symptoms however is not tolerating the medications well.  She reports that the medications because brain fog and cognitive decline.  She continues to have intermittent episodes of severe pain however the episodes are less frequent.  We talked about the risks and benefits of a microvascular decompression versus stereotactic radiosurgery.  Including, but not limited to the risk of infection, hemorrhage, venous sinus injury, brainstem injury, stroke.  She expressed understanding of the risks and benefits and elected to proceed with the surgical intervention at the next available date.    -I have ordered a CT head Stealth for surgical planning.  I will plan to proceed with the surgery the next available date    Diagnoses and all orders for this visit:    1. Trigeminal neuralgia (Primary)  -     CT Head With & Without Contrast; Future      Return For a right-sided microvascular decompression.    I spent 30 minutes reviewing the medical record, reviewing the previous MRI images, discussing the management of trigeminal " neuralgia, discussing the risks and benefits of surgery, discussing the risks and benefits of radiation

## 2023-11-18 DIAGNOSIS — E78.2 MIXED HYPERLIPIDEMIA: ICD-10-CM

## 2023-11-18 DIAGNOSIS — E55.9 VITAMIN D DEFICIENCY: Primary | ICD-10-CM

## 2023-11-18 RX ORDER — ATORVASTATIN CALCIUM 40 MG/1
40 TABLET, FILM COATED ORAL DAILY
Qty: 90 TABLET | Refills: 2 | Status: SHIPPED | OUTPATIENT
Start: 2023-11-18

## 2023-11-18 RX ORDER — MELATONIN
Qty: 270 TABLET | Refills: 2 | Status: SHIPPED | OUTPATIENT
Start: 2023-11-18

## 2023-11-18 NOTE — PROGRESS NOTES
Calcium 10.4 mg/dl.  PTH 36 pg/ml.  Normal phosphorus  Vit D insufficient on vitamin D3 2000 units/day.  Increase vitamin D3 to 1000 units 3 capsules daily.  .  HDL 57.  LDL higher.  Increase Lipitor to 40 mg/day.  Prescription sent to pharmacy.  Reduce dietary fat and try to lose 5 lbs. Repeat CMP, vitamin D and lipid panel in 2 months.  Orders placed on chart.  Copy of labs sent to [facility].  Please notify patient of results and instructions.

## 2023-11-22 ENCOUNTER — OFFICE VISIT (OUTPATIENT)
Dept: NEUROSURGERY | Facility: CLINIC | Age: 63
End: 2023-11-22
Payer: COMMERCIAL

## 2023-11-22 ENCOUNTER — PREP FOR SURGERY (OUTPATIENT)
Dept: OTHER | Facility: HOSPITAL | Age: 63
End: 2023-11-22
Payer: COMMERCIAL

## 2023-11-22 VITALS
SYSTOLIC BLOOD PRESSURE: 118 MMHG | DIASTOLIC BLOOD PRESSURE: 78 MMHG | TEMPERATURE: 97.5 F | HEIGHT: 63 IN | OXYGEN SATURATION: 99 % | HEART RATE: 76 BPM | WEIGHT: 147 LBS | BODY MASS INDEX: 26.05 KG/M2 | RESPIRATION RATE: 20 BRPM

## 2023-11-22 DIAGNOSIS — Z01.818 PREOP EXAMINATION: ICD-10-CM

## 2023-11-22 DIAGNOSIS — G50.0 TRIGEMINAL NEURALGIA: Primary | ICD-10-CM

## 2023-11-22 PROCEDURE — 99214 OFFICE O/P EST MOD 30 MIN: CPT | Performed by: NEUROLOGICAL SURGERY

## 2023-11-28 PROBLEM — Z01.818 PREOP EXAMINATION: Status: ACTIVE | Noted: 2023-11-22

## 2023-12-01 ENCOUNTER — PRE-ADMISSION TESTING (OUTPATIENT)
Dept: PREADMISSION TESTING | Facility: HOSPITAL | Age: 63
End: 2023-12-01
Payer: COMMERCIAL

## 2023-12-01 VITALS
HEART RATE: 75 BPM | DIASTOLIC BLOOD PRESSURE: 76 MMHG | WEIGHT: 141 LBS | RESPIRATION RATE: 18 BRPM | BODY MASS INDEX: 25.95 KG/M2 | SYSTOLIC BLOOD PRESSURE: 147 MMHG | OXYGEN SATURATION: 95 % | TEMPERATURE: 97.9 F | HEIGHT: 62 IN

## 2023-12-01 DIAGNOSIS — G50.0 TRIGEMINAL NEURALGIA: ICD-10-CM

## 2023-12-01 DIAGNOSIS — Z01.818 PREOP EXAMINATION: ICD-10-CM

## 2023-12-01 LAB
ANION GAP SERPL CALCULATED.3IONS-SCNC: 9 MMOL/L (ref 5–15)
BUN SERPL-MCNC: 13 MG/DL (ref 8–23)
BUN/CREAT SERPL: 13.4 (ref 7–25)
CALCIUM SPEC-SCNC: 10.9 MG/DL (ref 8.6–10.5)
CHLORIDE SERPL-SCNC: 106 MMOL/L (ref 98–107)
CO2 SERPL-SCNC: 25 MMOL/L (ref 22–29)
CREAT SERPL-MCNC: 0.97 MG/DL (ref 0.57–1)
DEPRECATED RDW RBC AUTO: 40.1 FL (ref 37–54)
EGFRCR SERPLBLD CKD-EPI 2021: 66.2 ML/MIN/1.73
ERYTHROCYTE [DISTWIDTH] IN BLOOD BY AUTOMATED COUNT: 12.1 % (ref 12.3–15.4)
GLUCOSE SERPL-MCNC: 88 MG/DL (ref 65–99)
HCT VFR BLD AUTO: 37.3 % (ref 34–46.6)
HGB BLD-MCNC: 12.3 G/DL (ref 12–15.9)
MCH RBC QN AUTO: 30 PG (ref 26.6–33)
MCHC RBC AUTO-ENTMCNC: 33 G/DL (ref 31.5–35.7)
MCV RBC AUTO: 91 FL (ref 79–97)
PLATELET # BLD AUTO: 207 10*3/MM3 (ref 140–450)
PMV BLD AUTO: 9.2 FL (ref 6–12)
POTASSIUM SERPL-SCNC: 4 MMOL/L (ref 3.5–5.2)
RBC # BLD AUTO: 4.1 10*6/MM3 (ref 3.77–5.28)
SODIUM SERPL-SCNC: 140 MMOL/L (ref 136–145)
WBC NRBC COR # BLD AUTO: 7.78 10*3/MM3 (ref 3.4–10.8)

## 2023-12-01 PROCEDURE — 80048 BASIC METABOLIC PNL TOTAL CA: CPT

## 2023-12-01 PROCEDURE — 85027 COMPLETE CBC AUTOMATED: CPT

## 2023-12-01 PROCEDURE — 36415 COLL VENOUS BLD VENIPUNCTURE: CPT

## 2023-12-01 NOTE — DISCHARGE INSTRUCTIONS
Take the following medications the morning of surgery:CARBAMAZEPINE      If you are on prescription narcotic pain medication to control your pain you may also take that medication the morning of surgery.    General Instructions:  Do not eat solid food after midnight the night before surgery.  You may drink clear liquids day of surgery but must stop at least one hour before your hospital arrival time.  It is beneficial for you to have a clear drink that contains carbohydrates the day of surgery.  We suggest a 12 to 20 ounce bottle of Gatorade or Powerade for non-diabetic patients or a 12 to 20 ounce bottle of G2 or Powerade Zero for diabetic patients. (Pediatric patients, are not advised to drink a 12 to 20 ounce carbohydrate drink)    Clear liquids are liquids you can see through.  Nothing red in color.     Plain water                               Sports drinks  Sodas                                   Gelatin (Jell-O)  Fruit juices without pulp such as white grape juice and apple juice  Popsicles that contain no fruit or yogurt  Tea or coffee (no cream or milk added)  Gatorade / Powerade  G2 / Powerade Zero    Infants may have breast milk up to four hours before surgery.  Infants drinking formula may drink formula up to six hours before surgery.   Patients who avoid smoking, chewing tobacco and alcohol for 4 weeks prior to surgery have a reduced risk of post-operative complications.  Quit smoking as many days before surgery as you can.  Do not smoke, use chewing tobacco or drink alcohol the day of surgery.   If applicable bring your C-PAP/ BI-PAP machine in with you to preop day of surgery.  Bring any papers given to you in the doctor’s office.  Wear clean comfortable clothes.  Do not wear contact lenses, false eyelashes or make-up.  Bring a case for your glasses.   Bring crutches or walker if applicable.  Remove all piercings.  Leave jewelry and any other valuables at home.  Hair extensions with metal clips must  be removed prior to surgery.  The Pre-Admission Testing nurse will instruct you to bring medications if unable to obtain an accurate list in Pre-Admission Testing.        If you were given a blood bank ID arm band remember to bring it with you the day of surgery.    Preventing a Surgical Site Infection:  For 2 to 3 days before surgery, avoid shaving with a razor because the razor can irritate skin and make it easier to develop an infection.    Any areas of open skin can increase the risk of a post-operative wound infection by allowing bacteria to enter and travel throughout the body.  Notify your surgeon if you have any skin wounds / rashes even if it is not near the expected surgical site.  The area will need assessed to determine if surgery should be delayed until it is healed.  The night prior to surgery shower using a fresh bar of anti-bacterial soap (such as Dial) and clean washcloth.  Sleep in a clean bed with clean clothing.  Do not allow pets to sleep with you.  Shower on the morning of surgery using a fresh bar of anti-bacterial soap (such as Dial) and clean washcloth.  Dry with a clean towel and dress in clean clothing.  Ask your surgeon if you will be receiving antibiotics prior to surgery.  Make sure you, your family, and all healthcare providers clean their hands with soap and water or an alcohol based hand  before caring for you or your wound.    Day of surgery:  Your arrival time is approximately two hours before your scheduled surgery time.  Upon arrival, a Pre-op nurse and Anesthesiologist will review your health history, obtain vital signs, and answer questions you may have.  The only belongings needed at this time will be a list of your home medications and if applicable your C-PAP/BI-PAP machine.  A Pre-op nurse will start an IV and you may receive medication in preparation for surgery, including something to help you relax.     Please be aware that surgery does come with discomfort.  We  want to make every effort to control your discomfort so please discuss any uncontrolled symptoms with your nurse.   Your doctor will most likely have prescribed pain medications.      If you are going home after surgery you will receive individualized written care instructions before being discharged.  A responsible adult must drive you to and from the hospital on the day of your surgery and stay with you for 24 hours.  Discharge prescriptions can be filled by the hospital pharmacy during regular pharmacy hours.  If you are having surgery late in the day/evening your prescription may be e-prescribed to your pharmacy.  Please verify your pharmacy hours or chose a 24 hour pharmacy to avoid not having access to your prescription because your pharmacy has closed for the day.    If you are staying overnight following surgery, you will be transported to your hospital room following the recovery period.   has all private rooms.    If you have any questions please call Pre-Admission Testing at [phone].  Deductibles and co-payments are collected on the day of service. Please be prepared to pay the required co-pay, deductible or deposit on the day of service as defined by your plan.    Call your surgeon immediately if you experience any of the following symptoms:  Sore Throat  Shortness of Breath or difficulty breathing  Cough  Chills  Body soreness or muscle pain  Headache  Fever  New loss of taste or smell  Do not arrive for your surgery ill.  Your procedure will need to be rescheduled to another time.  You will need to call your physician before the day of surgery to avoid any unnecessary exposure to hospital staff as well as other patients.  CHLORHEXIDINE CLOTH INSTRUCTIONS  The morning of surgery follow these instructions using the Chlorhexidine cloths you've been given.  These steps reduce bacteria on the body.  Do not use the cloths near your eyes, ears mouth, genitalia or on open wounds.   Throw the cloths away after use but do not try to flush them down a toilet.      Open and remove one cloth at a time from the package.    Leave the cloth unfolded and begin the bathing.  Massage the skin with the cloths using gentle pressure to remove bacteria.  Do not scrub harshly.   Follow the steps below with one 2% CHG cloth per area (6 total cloths).  One cloth for neck, shoulders and chest.  One cloth for both arms, hands, fingers and underarms (do underarms last).  One cloth for the abdomen followed by groin.  One cloth for right leg and foot including between the toes.  One cloth for left leg and foot including between the toes.  The last cloth is to be used for the back of the neck, back and buttocks.    Allow the CHG to air dry 3 minutes on the skin which will give it time to work and decrease the chance of irritation.  The skin may feel sticky until it is dry.  Do not rinse with water or any other liquid or you will lose the beneficial effects of the CHG.  If mild skin irritation occurs, do rinse the skin to remove the CHG.  Report this to the nurse at time of admission.  Do not apply lotions, creams, ointments, deodorants or perfumes after using the clothes. Dress in clean clothes before coming to the hospital.

## 2023-12-05 ENCOUNTER — HOSPITAL ENCOUNTER (OUTPATIENT)
Dept: CT IMAGING | Facility: HOSPITAL | Age: 63
Discharge: HOME | End: 2023-12-05
Admitting: NEUROLOGICAL SURGERY
Payer: COMMERCIAL

## 2023-12-05 DIAGNOSIS — G50.0 TRIGEMINAL NEURALGIA: ICD-10-CM

## 2023-12-05 PROCEDURE — 25510000001 IOPAMIDOL PER 1 ML: Performed by: NEUROLOGICAL SURGERY

## 2023-12-05 PROCEDURE — 70470 CT HEAD/BRAIN W/O & W/DYE: CPT

## 2023-12-05 RX ADMIN — IOPAMIDOL 95 ML: 755 INJECTION, SOLUTION INTRAVENOUS at 09:35

## 2023-12-06 NOTE — PROGRESS NOTES
Procedure     ECG 12 Lead  Date/Time: 2/25/2020 9:15 AM  Performed by: Joanne Maurice PA-C  Authorized by: Joanne Maurice PA-C   Comparison: compared with previous ECG from 1/22/2019  Similar to previous ECG  Rhythm: sinus rhythm  Rate: normal  BPM: 59  Conduction: conduction normal  ST Segments: ST segments normal  T Waves: T waves normal  QRS axis: normal    Clinical impression: normal ECG  Comments: EKG Interpretation Report    Heart rate:    59 beats/min, [place] interval:  160 msec, QRS duration:  96 msec  QTu:412 msec, QTc:  408 msec                Quality 137: Melanoma: Continuity Of Care - Recall System: Patient information entered into a recall system that includes: target date for the next exam specified AND a process to follow up with patients regarding missed or unscheduled appointments When Should The Patient Follow-Up For Their Next Full-Body Skin Exam?: 6 Months Detail Level: Detailed Detail Level: Zone

## 2023-12-07 ENCOUNTER — ANESTHESIA EVENT (OUTPATIENT)
Dept: PERIOP | Facility: HOSPITAL | Age: 63
End: 2023-12-07
Payer: COMMERCIAL

## 2023-12-07 ENCOUNTER — ANESTHESIA (OUTPATIENT)
Dept: PERIOP | Facility: HOSPITAL | Age: 63
End: 2023-12-07
Payer: COMMERCIAL

## 2023-12-07 ENCOUNTER — HOSPITAL ENCOUNTER (INPATIENT)
Facility: HOSPITAL | Age: 63
LOS: 2 days | Discharge: HOME | DRG: 026 | End: 2023-12-09
Attending: NEUROLOGICAL SURGERY | Admitting: NEUROLOGICAL SURGERY
Payer: COMMERCIAL

## 2023-12-07 DIAGNOSIS — Z01.818 PREOP EXAMINATION: ICD-10-CM

## 2023-12-07 DIAGNOSIS — G50.0 TRIGEMINAL NEURALGIA: ICD-10-CM

## 2023-12-07 LAB — GLUCOSE BLDC GLUCOMTR-MCNC: 156 MG/DL (ref 70–130)

## 2023-12-07 PROCEDURE — 61458 CRNEC SOPL XPL/DCMPR CRL NRV: CPT | Performed by: NEUROLOGICAL SURGERY

## 2023-12-07 PROCEDURE — 25010000002 LABETALOL 5 MG/ML SOLUTION: Performed by: ANESTHESIOLOGY

## 2023-12-07 PROCEDURE — 88304 TISSUE EXAM BY PATHOLOGIST: CPT | Performed by: NEUROLOGICAL SURGERY

## 2023-12-07 PROCEDURE — 25010000002 HYDROMORPHONE PER 4 MG: Performed by: ANESTHESIOLOGY

## 2023-12-07 PROCEDURE — 8E09XBZ COMPUTER ASSISTED PROCEDURE OF HEAD AND NECK REGION: ICD-10-PCS | Performed by: NEUROLOGICAL SURGERY

## 2023-12-07 PROCEDURE — 25810000003 SODIUM CHLORIDE 0.9 % SOLUTION 250 ML FLEX CONT: Performed by: ANESTHESIOLOGY

## 2023-12-07 PROCEDURE — C1713 ANCHOR/SCREW BN/BN,TIS/BN: HCPCS | Performed by: NEUROLOGICAL SURGERY

## 2023-12-07 PROCEDURE — 82948 REAGENT STRIP/BLOOD GLUCOSE: CPT

## 2023-12-07 PROCEDURE — 25010000002 MIDAZOLAM PER 1 MG: Performed by: ANESTHESIOLOGY

## 2023-12-07 PROCEDURE — 25010000002 PROPOFOL 200 MG/20ML EMULSION: Performed by: ANESTHESIOLOGY

## 2023-12-07 PROCEDURE — 25810000003 LACTATED RINGERS PER 1000 ML: Performed by: ANESTHESIOLOGY

## 2023-12-07 PROCEDURE — 25010000002 DEXAMETHASONE SODIUM PHOSPHATE 20 MG/5ML SOLUTION: Performed by: ANESTHESIOLOGY

## 2023-12-07 PROCEDURE — 25010000002 FENTANYL CITRATE (PF) 50 MCG/ML SOLUTION: Performed by: ANESTHESIOLOGY

## 2023-12-07 PROCEDURE — C1889 IMPLANT/INSERT DEVICE, NOC: HCPCS | Performed by: NEUROLOGICAL SURGERY

## 2023-12-07 PROCEDURE — 88311 DECALCIFY TISSUE: CPT | Performed by: NEUROLOGICAL SURGERY

## 2023-12-07 PROCEDURE — 61458 CRNEC SOPL XPL/DCMPR CRL NRV: CPT | Performed by: SPECIALIST/TECHNOLOGIST, OTHER

## 2023-12-07 PROCEDURE — 25010000002 ONDANSETRON PER 1 MG: Performed by: ANESTHESIOLOGY

## 2023-12-07 PROCEDURE — 4A10X4G MONITORING OF CENTRAL NERVOUS ELECTRICAL ACTIVITY, INTRAOPERATIVE, EXTERNAL APPROACH: ICD-10-PCS | Performed by: NEUROLOGICAL SURGERY

## 2023-12-07 PROCEDURE — 25010000002 POTASSIUM CHLORIDE PER 2 MEQ: Performed by: NEUROLOGICAL SURGERY

## 2023-12-07 PROCEDURE — 25010000002 CEFAZOLIN IN DEXTROSE 2000 MG/ 100 ML SOLUTION: Performed by: NEUROLOGICAL SURGERY

## 2023-12-07 PROCEDURE — 25010000002 PHENYLEPHRINE 10 MG/ML SOLUTION 5 ML VIAL: Performed by: ANESTHESIOLOGY

## 2023-12-07 PROCEDURE — 25010000002 KETOROLAC TROMETHAMINE PER 15 MG: Performed by: ANESTHESIOLOGY

## 2023-12-07 PROCEDURE — 61781 SCAN PROC CRANIAL INTRA: CPT | Performed by: NEUROLOGICAL SURGERY

## 2023-12-07 PROCEDURE — 00Q20ZZ REPAIR DURA MATER, OPEN APPROACH: ICD-10-PCS | Performed by: NEUROLOGICAL SURGERY

## 2023-12-07 PROCEDURE — 25010000002 CEFAZOLIN PER 500 MG: Performed by: NEUROLOGICAL SURGERY

## 2023-12-07 PROCEDURE — 25010000002 CEFAZOLIN IN DEXTROSE 2-4 GM/100ML-% SOLUTION: Performed by: NEUROLOGICAL SURGERY

## 2023-12-07 PROCEDURE — 25810000003 SODIUM CHLORIDE 0.9 % SOLUTION: Performed by: NEUROLOGICAL SURGERY

## 2023-12-07 PROCEDURE — 00NK0ZZ RELEASE TRIGEMINAL NERVE, OPEN APPROACH: ICD-10-PCS | Performed by: NEUROLOGICAL SURGERY

## 2023-12-07 DEVICE — SCRW CRS/DRV DRL FREE MICRO TI 1.5X4MM: Type: IMPLANTABLE DEVICE | Site: CRANIAL | Status: FUNCTIONAL

## 2023-12-07 DEVICE — SSC BONE WAX
Type: IMPLANTABLE DEVICE | Site: CRANIAL | Status: FUNCTIONAL
Brand: SSC BONE WAX

## 2023-12-07 DEVICE — PLEDGET SOFT LARGE 3/8X3/16X1/16
Type: IMPLANTABLE DEVICE | Site: CRANIAL | Status: FUNCTIONAL
Brand: DEKNATEL

## 2023-12-07 DEVICE — ADHERUS AUTOSPRAY DURAL SEALANT IS A STERILE, SINGLE-USE, ELECTROMECHANICAL, BATTERY OPERATED, DEVICE WITH INTERNAL SYSTEM COMPONENTS THAT PROVIDE AIR FLOW TO AID IN THE DELIVERY OF A SYNTHETIC, ABSORBABLE, TWO-COMPONENT HYDROGEL SEALANT SYSTEM AND ALLOW DELIVERY TO BE INTERRUPTED WITHOUT CLOGGING.
Type: IMPLANTABLE DEVICE | Site: CRANIAL | Status: FUNCTIONAL
Brand: ADHERUS AUTOSPRAY DURAL SEALANT

## 2023-12-07 DEVICE — DURAGEN® PLUS DURAL REGENERATION MATRIX, 3 IN X 3 IN (7.5 CM X 7.5 CM)
Type: IMPLANTABLE DEVICE | Site: CRANIAL | Status: FUNCTIONAL
Brand: DURAGEN® PLUS

## 2023-12-07 DEVICE — FLOSEAL WITH RECOTHROM - 10ML.
Type: IMPLANTABLE DEVICE | Site: CRANIAL | Status: FUNCTIONAL
Brand: FLOSEAL HEMOSTATIC MATRIX

## 2023-12-07 DEVICE — HEMOST ABS SURGIFOAM SZ100 8X12 10MM: Type: IMPLANTABLE DEVICE | Site: CRANIAL | Status: FUNCTIONAL

## 2023-12-07 DEVICE — ABSORBABLE HEMOSTAT (OXIDIZED REGENERATED CELLULOSE, U.S.P.)
Type: IMPLANTABLE DEVICE | Site: CRANIAL | Status: FUNCTIONAL
Brand: SURGICEL

## 2023-12-07 DEVICE — MESH SCREEN PITTSBURG TI .3MM REG: Type: IMPLANTABLE DEVICE | Site: CRANIAL | Status: FUNCTIONAL

## 2023-12-07 DEVICE — PLEDGET SOFT 5/8 X 1/4 X 1/16 X4 PER PACK
Type: IMPLANTABLE DEVICE | Site: CRANIAL | Status: FUNCTIONAL
Brand: DEKNATEL

## 2023-12-07 DEVICE — ABSORBABLE HEMOSTAT (OXIDIZED REGENERATED CELLULOSE, U.S.P.)
Type: IMPLANTABLE DEVICE | Site: CRANIAL | Status: FUNCTIONAL
Brand: SURGICEL FIBRILLAR

## 2023-12-07 RX ORDER — ONDANSETRON 4 MG/1
4 TABLET, FILM COATED ORAL EVERY 6 HOURS PRN
Status: DISCONTINUED | OUTPATIENT
Start: 2023-12-07 | End: 2023-12-09 | Stop reason: HOSPADM

## 2023-12-07 RX ORDER — HYDROMORPHONE HYDROCHLORIDE 1 MG/ML
0.5 INJECTION, SOLUTION INTRAMUSCULAR; INTRAVENOUS; SUBCUTANEOUS
Status: DISCONTINUED | OUTPATIENT
Start: 2023-12-07 | End: 2023-12-07 | Stop reason: HOSPADM

## 2023-12-07 RX ORDER — MIDAZOLAM HYDROCHLORIDE 1 MG/ML
INJECTION INTRAMUSCULAR; INTRAVENOUS
Status: COMPLETED | OUTPATIENT
Start: 2023-12-07 | End: 2023-12-07

## 2023-12-07 RX ORDER — FLUMAZENIL 0.1 MG/ML
0.2 INJECTION INTRAVENOUS AS NEEDED
Status: DISCONTINUED | OUTPATIENT
Start: 2023-12-07 | End: 2023-12-07 | Stop reason: HOSPADM

## 2023-12-07 RX ORDER — DIPHENHYDRAMINE HYDROCHLORIDE 50 MG/ML
12.5 INJECTION INTRAMUSCULAR; INTRAVENOUS
Status: DISCONTINUED | OUTPATIENT
Start: 2023-12-07 | End: 2023-12-07 | Stop reason: HOSPADM

## 2023-12-07 RX ORDER — SODIUM CHLORIDE 0.9 % (FLUSH) 0.9 %
10 SYRINGE (ML) INJECTION EVERY 12 HOURS SCHEDULED
Status: DISCONTINUED | OUTPATIENT
Start: 2023-12-07 | End: 2023-12-09 | Stop reason: HOSPADM

## 2023-12-07 RX ORDER — SODIUM CHLORIDE 9 MG/ML
40 INJECTION, SOLUTION INTRAVENOUS AS NEEDED
Status: DISCONTINUED | OUTPATIENT
Start: 2023-12-07 | End: 2023-12-09 | Stop reason: HOSPADM

## 2023-12-07 RX ORDER — DOCUSATE SODIUM 100 MG/1
100 CAPSULE, LIQUID FILLED ORAL 2 TIMES DAILY PRN
Status: DISCONTINUED | OUTPATIENT
Start: 2023-12-07 | End: 2023-12-07 | Stop reason: SDUPTHER

## 2023-12-07 RX ORDER — CEFAZOLIN SODIUM 2 G/100ML
2000 INJECTION, SOLUTION INTRAVENOUS EVERY 8 HOURS
Status: COMPLETED | OUTPATIENT
Start: 2023-12-07 | End: 2023-12-07

## 2023-12-07 RX ORDER — DROPERIDOL 2.5 MG/ML
0.62 INJECTION, SOLUTION INTRAMUSCULAR; INTRAVENOUS
Status: DISCONTINUED | OUTPATIENT
Start: 2023-12-07 | End: 2023-12-07 | Stop reason: HOSPADM

## 2023-12-07 RX ORDER — ONDANSETRON 2 MG/ML
INJECTION INTRAMUSCULAR; INTRAVENOUS AS NEEDED
Status: DISCONTINUED | OUTPATIENT
Start: 2023-12-07 | End: 2023-12-07 | Stop reason: SURG

## 2023-12-07 RX ORDER — LATANOPROST 50 UG/ML
1 SOLUTION/ DROPS OPHTHALMIC NIGHTLY
Status: DISCONTINUED | OUTPATIENT
Start: 2023-12-07 | End: 2023-12-09 | Stop reason: HOSPADM

## 2023-12-07 RX ORDER — NALOXONE HCL 0.4 MG/ML
0.2 VIAL (ML) INJECTION AS NEEDED
Status: DISCONTINUED | OUTPATIENT
Start: 2023-12-07 | End: 2023-12-07 | Stop reason: HOSPADM

## 2023-12-07 RX ORDER — LABETALOL HYDROCHLORIDE 5 MG/ML
5 INJECTION, SOLUTION INTRAVENOUS
Status: DISCONTINUED | OUTPATIENT
Start: 2023-12-07 | End: 2023-12-07 | Stop reason: HOSPADM

## 2023-12-07 RX ORDER — PROMETHAZINE HYDROCHLORIDE 25 MG/1
25 TABLET ORAL ONCE AS NEEDED
Status: DISCONTINUED | OUTPATIENT
Start: 2023-12-07 | End: 2023-12-07 | Stop reason: HOSPADM

## 2023-12-07 RX ORDER — PREGABALIN 75 MG/1
75 CAPSULE ORAL 2 TIMES DAILY
Status: DISCONTINUED | OUTPATIENT
Start: 2023-12-07 | End: 2023-12-09 | Stop reason: HOSPADM

## 2023-12-07 RX ORDER — HYDRALAZINE HYDROCHLORIDE 20 MG/ML
5 INJECTION INTRAMUSCULAR; INTRAVENOUS
Status: DISCONTINUED | OUTPATIENT
Start: 2023-12-07 | End: 2023-12-07 | Stop reason: HOSPADM

## 2023-12-07 RX ORDER — SUCCINYLCHOLINE/SOD CL,ISO/PF 200MG/10ML
SYRINGE (ML) INTRAVENOUS AS NEEDED
Status: DISCONTINUED | OUTPATIENT
Start: 2023-12-07 | End: 2023-12-07 | Stop reason: SURG

## 2023-12-07 RX ORDER — MANNITOL 20 G/100ML
INJECTION, SOLUTION INTRAVENOUS CONTINUOUS PRN
Status: DISCONTINUED | OUTPATIENT
Start: 2023-12-07 | End: 2023-12-07 | Stop reason: SURG

## 2023-12-07 RX ORDER — ONDANSETRON 2 MG/ML
4 INJECTION INTRAMUSCULAR; INTRAVENOUS ONCE AS NEEDED
Status: COMPLETED | OUTPATIENT
Start: 2023-12-07 | End: 2023-12-07

## 2023-12-07 RX ORDER — FENTANYL CITRATE 50 UG/ML
INJECTION, SOLUTION INTRAMUSCULAR; INTRAVENOUS
Status: COMPLETED | OUTPATIENT
Start: 2023-12-07 | End: 2023-12-07

## 2023-12-07 RX ORDER — BISACODYL 5 MG/1
5 TABLET, DELAYED RELEASE ORAL DAILY PRN
Status: DISCONTINUED | OUTPATIENT
Start: 2023-12-07 | End: 2023-12-09 | Stop reason: HOSPADM

## 2023-12-07 RX ORDER — IPRATROPIUM BROMIDE AND ALBUTEROL SULFATE 2.5; .5 MG/3ML; MG/3ML
3 SOLUTION RESPIRATORY (INHALATION) ONCE AS NEEDED
Status: DISCONTINUED | OUTPATIENT
Start: 2023-12-07 | End: 2023-12-07 | Stop reason: HOSPADM

## 2023-12-07 RX ORDER — MORPHINE SULFATE 2 MG/ML
2 INJECTION, SOLUTION INTRAMUSCULAR; INTRAVENOUS
Status: DISCONTINUED | OUTPATIENT
Start: 2023-12-07 | End: 2023-12-09 | Stop reason: HOSPADM

## 2023-12-07 RX ORDER — NALOXONE HCL 0.4 MG/ML
0.4 VIAL (ML) INJECTION
Status: DISCONTINUED | OUTPATIENT
Start: 2023-12-07 | End: 2023-12-09 | Stop reason: HOSPADM

## 2023-12-07 RX ORDER — BISACODYL 10 MG
10 SUPPOSITORY, RECTAL RECTAL DAILY PRN
Status: DISCONTINUED | OUTPATIENT
Start: 2023-12-07 | End: 2023-12-09 | Stop reason: HOSPADM

## 2023-12-07 RX ORDER — AMOXICILLIN 250 MG
1 CAPSULE ORAL NIGHTLY PRN
Status: DISCONTINUED | OUTPATIENT
Start: 2023-12-07 | End: 2023-12-07 | Stop reason: SDUPTHER

## 2023-12-07 RX ORDER — SODIUM CHLORIDE, SODIUM LACTATE, POTASSIUM CHLORIDE, CALCIUM CHLORIDE 600; 310; 30; 20 MG/100ML; MG/100ML; MG/100ML; MG/100ML
INJECTION, SOLUTION INTRAVENOUS CONTINUOUS PRN
Status: DISCONTINUED | OUTPATIENT
Start: 2023-12-07 | End: 2023-12-07 | Stop reason: SURG

## 2023-12-07 RX ORDER — HYDROCODONE BITARTRATE AND ACETAMINOPHEN 5; 325 MG/1; MG/1
1 TABLET ORAL ONCE AS NEEDED
Status: DISCONTINUED | OUTPATIENT
Start: 2023-12-07 | End: 2023-12-07 | Stop reason: HOSPADM

## 2023-12-07 RX ORDER — EPHEDRINE SULFATE 50 MG/ML
5 INJECTION, SOLUTION INTRAVENOUS ONCE AS NEEDED
Status: DISCONTINUED | OUTPATIENT
Start: 2023-12-07 | End: 2023-12-07 | Stop reason: HOSPADM

## 2023-12-07 RX ORDER — OXYCODONE HYDROCHLORIDE AND ACETAMINOPHEN 5; 325 MG/1; MG/1
2 TABLET ORAL EVERY 6 HOURS PRN
Status: DISCONTINUED | OUTPATIENT
Start: 2023-12-07 | End: 2023-12-09 | Stop reason: HOSPADM

## 2023-12-07 RX ORDER — SODIUM CHLORIDE 0.9 % (FLUSH) 0.9 %
10 SYRINGE (ML) INJECTION AS NEEDED
Status: DISCONTINUED | OUTPATIENT
Start: 2023-12-07 | End: 2023-12-09 | Stop reason: HOSPADM

## 2023-12-07 RX ORDER — CARBAMAZEPINE 200 MG/1
600 TABLET, EXTENDED RELEASE ORAL 2 TIMES DAILY
Status: DISCONTINUED | OUTPATIENT
Start: 2023-12-07 | End: 2023-12-09 | Stop reason: HOSPADM

## 2023-12-07 RX ORDER — AMOXICILLIN 250 MG
2 CAPSULE ORAL 2 TIMES DAILY
Status: DISCONTINUED | OUTPATIENT
Start: 2023-12-07 | End: 2023-12-09 | Stop reason: HOSPADM

## 2023-12-07 RX ORDER — PROPOFOL 10 MG/ML
INJECTION, EMULSION INTRAVENOUS AS NEEDED
Status: DISCONTINUED | OUTPATIENT
Start: 2023-12-07 | End: 2023-12-07 | Stop reason: SURG

## 2023-12-07 RX ORDER — SODIUM CHLORIDE 0.9 % (FLUSH) 0.9 %
3-10 SYRINGE (ML) INJECTION AS NEEDED
Status: DISCONTINUED | OUTPATIENT
Start: 2023-12-07 | End: 2023-12-07 | Stop reason: HOSPADM

## 2023-12-07 RX ORDER — LIDOCAINE HYDROCHLORIDE 10 MG/ML
0.5 INJECTION, SOLUTION INFILTRATION; PERINEURAL ONCE AS NEEDED
Status: DISCONTINUED | OUTPATIENT
Start: 2023-12-07 | End: 2023-12-07 | Stop reason: HOSPADM

## 2023-12-07 RX ORDER — SODIUM CHLORIDE 0.9 % (FLUSH) 0.9 %
3 SYRINGE (ML) INJECTION EVERY 12 HOURS SCHEDULED
Status: DISCONTINUED | OUTPATIENT
Start: 2023-12-07 | End: 2023-12-07 | Stop reason: HOSPADM

## 2023-12-07 RX ORDER — EPHEDRINE SULFATE 50 MG/ML
INJECTION INTRAVENOUS AS NEEDED
Status: DISCONTINUED | OUTPATIENT
Start: 2023-12-07 | End: 2023-12-07 | Stop reason: SURG

## 2023-12-07 RX ORDER — VALSARTAN 160 MG/1
160 TABLET ORAL DAILY
Status: DISCONTINUED | OUTPATIENT
Start: 2023-12-08 | End: 2023-12-09 | Stop reason: HOSPADM

## 2023-12-07 RX ORDER — FAMOTIDINE 10 MG/ML
20 INJECTION, SOLUTION INTRAVENOUS ONCE
Status: COMPLETED | OUTPATIENT
Start: 2023-12-07 | End: 2023-12-07

## 2023-12-07 RX ORDER — NITROGLYCERIN 0.4 MG/1
0.4 TABLET SUBLINGUAL
Status: DISCONTINUED | OUTPATIENT
Start: 2023-12-07 | End: 2023-12-09 | Stop reason: HOSPADM

## 2023-12-07 RX ORDER — ONDANSETRON 2 MG/ML
4 INJECTION INTRAMUSCULAR; INTRAVENOUS EVERY 6 HOURS PRN
Status: DISCONTINUED | OUTPATIENT
Start: 2023-12-07 | End: 2023-12-09 | Stop reason: HOSPADM

## 2023-12-07 RX ORDER — KETOROLAC TROMETHAMINE 30 MG/ML
INJECTION, SOLUTION INTRAMUSCULAR; INTRAVENOUS AS NEEDED
Status: DISCONTINUED | OUTPATIENT
Start: 2023-12-07 | End: 2023-12-07 | Stop reason: SURG

## 2023-12-07 RX ORDER — FENTANYL CITRATE 50 UG/ML
50 INJECTION, SOLUTION INTRAMUSCULAR; INTRAVENOUS ONCE AS NEEDED
Status: COMPLETED | OUTPATIENT
Start: 2023-12-07 | End: 2023-12-07

## 2023-12-07 RX ORDER — MIDAZOLAM HYDROCHLORIDE 1 MG/ML
1 INJECTION INTRAMUSCULAR; INTRAVENOUS
Status: DISCONTINUED | OUTPATIENT
Start: 2023-12-07 | End: 2023-12-07 | Stop reason: HOSPADM

## 2023-12-07 RX ORDER — MAGNESIUM HYDROXIDE 1200 MG/15ML
LIQUID ORAL AS NEEDED
Status: DISCONTINUED | OUTPATIENT
Start: 2023-12-07 | End: 2023-12-07 | Stop reason: HOSPADM

## 2023-12-07 RX ORDER — FENTANYL CITRATE 50 UG/ML
50 INJECTION, SOLUTION INTRAMUSCULAR; INTRAVENOUS
Status: DISCONTINUED | OUTPATIENT
Start: 2023-12-07 | End: 2023-12-07 | Stop reason: HOSPADM

## 2023-12-07 RX ORDER — SODIUM CHLORIDE AND POTASSIUM CHLORIDE 150; 450 MG/100ML; MG/100ML
75 INJECTION, SOLUTION INTRAVENOUS CONTINUOUS
Status: DISCONTINUED | OUTPATIENT
Start: 2023-12-07 | End: 2023-12-08

## 2023-12-07 RX ORDER — DEXAMETHASONE SODIUM PHOSPHATE 4 MG/ML
INJECTION, SOLUTION INTRA-ARTICULAR; INTRALESIONAL; INTRAMUSCULAR; INTRAVENOUS; SOFT TISSUE AS NEEDED
Status: DISCONTINUED | OUTPATIENT
Start: 2023-12-07 | End: 2023-12-07 | Stop reason: SURG

## 2023-12-07 RX ORDER — FUROSEMIDE 20 MG/1
20 TABLET ORAL DAILY
Status: DISCONTINUED | OUTPATIENT
Start: 2023-12-08 | End: 2023-12-09 | Stop reason: HOSPADM

## 2023-12-07 RX ORDER — SODIUM CHLORIDE, SODIUM LACTATE, POTASSIUM CHLORIDE, CALCIUM CHLORIDE 600; 310; 30; 20 MG/100ML; MG/100ML; MG/100ML; MG/100ML
9 INJECTION, SOLUTION INTRAVENOUS CONTINUOUS
Status: DISCONTINUED | OUTPATIENT
Start: 2023-12-07 | End: 2023-12-08

## 2023-12-07 RX ORDER — PROMETHAZINE HYDROCHLORIDE 25 MG/1
25 SUPPOSITORY RECTAL ONCE AS NEEDED
Status: DISCONTINUED | OUTPATIENT
Start: 2023-12-07 | End: 2023-12-07 | Stop reason: HOSPADM

## 2023-12-07 RX ORDER — OXYCODONE AND ACETAMINOPHEN 7.5; 325 MG/1; MG/1
1 TABLET ORAL EVERY 4 HOURS PRN
Status: DISCONTINUED | OUTPATIENT
Start: 2023-12-07 | End: 2023-12-07 | Stop reason: HOSPADM

## 2023-12-07 RX ORDER — LIDOCAINE HYDROCHLORIDE AND EPINEPHRINE 10; 10 MG/ML; UG/ML
INJECTION, SOLUTION INFILTRATION; PERINEURAL AS NEEDED
Status: DISCONTINUED | OUTPATIENT
Start: 2023-12-07 | End: 2023-12-07 | Stop reason: HOSPADM

## 2023-12-07 RX ORDER — LIDOCAINE HYDROCHLORIDE 20 MG/ML
INJECTION, SOLUTION INFILTRATION; PERINEURAL AS NEEDED
Status: DISCONTINUED | OUTPATIENT
Start: 2023-12-07 | End: 2023-12-07 | Stop reason: SURG

## 2023-12-07 RX ORDER — POLYETHYLENE GLYCOL 3350 17 G/17G
17 POWDER, FOR SOLUTION ORAL DAILY PRN
Status: DISCONTINUED | OUTPATIENT
Start: 2023-12-07 | End: 2023-12-09 | Stop reason: HOSPADM

## 2023-12-07 RX ADMIN — CEFAZOLIN SODIUM 2000 MG: 2 INJECTION, SOLUTION INTRAVENOUS at 11:55

## 2023-12-07 RX ADMIN — LATANOPROST 1 DROP: 50 SOLUTION OPHTHALMIC at 22:54

## 2023-12-07 RX ADMIN — KETOROLAC TROMETHAMINE 30 MG: 30 INJECTION, SOLUTION INTRAMUSCULAR at 17:53

## 2023-12-07 RX ADMIN — FENTANYL CITRATE 50 MCG: 50 INJECTION, SOLUTION INTRAMUSCULAR; INTRAVENOUS at 13:19

## 2023-12-07 RX ADMIN — CEFAZOLIN SODIUM 2000 MG: 2 INJECTION, SOLUTION INTRAVENOUS at 15:55

## 2023-12-07 RX ADMIN — EPHEDRINE SULFATE 10 MG: 50 INJECTION INTRAVENOUS at 14:25

## 2023-12-07 RX ADMIN — HYDROMORPHONE HYDROCHLORIDE 0.5 MG: 1 INJECTION, SOLUTION INTRAMUSCULAR; INTRAVENOUS; SUBCUTANEOUS at 19:29

## 2023-12-07 RX ADMIN — Medication 160 MG: at 12:26

## 2023-12-07 RX ADMIN — DEXAMETHASONE SODIUM PHOSPHATE 4 MG: 4 INJECTION, SOLUTION INTRAMUSCULAR; INTRAVENOUS at 17:57

## 2023-12-07 RX ADMIN — PROPOFOL 50 MG: 10 INJECTION, EMULSION INTRAVENOUS at 16:12

## 2023-12-07 RX ADMIN — MANNITOL: 20 INJECTION, SOLUTION INTRAVENOUS at 13:24

## 2023-12-07 RX ADMIN — EPHEDRINE SULFATE 10 MG: 50 INJECTION INTRAVENOUS at 13:56

## 2023-12-07 RX ADMIN — SODIUM CHLORIDE, POTASSIUM CHLORIDE, SODIUM LACTATE AND CALCIUM CHLORIDE 9 ML/HR: 600; 310; 30; 20 INJECTION, SOLUTION INTRAVENOUS at 11:55

## 2023-12-07 RX ADMIN — Medication 10 MG/HR: at 19:19

## 2023-12-07 RX ADMIN — FENTANYL CITRATE 50 MCG: 50 INJECTION, SOLUTION INTRAMUSCULAR; INTRAVENOUS at 12:21

## 2023-12-07 RX ADMIN — FENTANYL CITRATE 50 MCG: 50 INJECTION, SOLUTION INTRAMUSCULAR; INTRAVENOUS at 19:39

## 2023-12-07 RX ADMIN — ONDANSETRON 4 MG: 2 INJECTION INTRAMUSCULAR; INTRAVENOUS at 19:37

## 2023-12-07 RX ADMIN — DEXAMETHASONE SODIUM PHOSPHATE 6 MG: 4 INJECTION, SOLUTION INTRAMUSCULAR; INTRAVENOUS at 12:38

## 2023-12-07 RX ADMIN — LABETALOL HYDROCHLORIDE 5 MG: 5 INJECTION, SOLUTION INTRAVENOUS at 19:10

## 2023-12-07 RX ADMIN — SODIUM CHLORIDE, POTASSIUM CHLORIDE, SODIUM LACTATE AND CALCIUM CHLORIDE: 600; 310; 30; 20 INJECTION, SOLUTION INTRAVENOUS at 13:52

## 2023-12-07 RX ADMIN — CEFAZOLIN SODIUM 2000 MG: 2 INJECTION, SOLUTION INTRAVENOUS at 23:01

## 2023-12-07 RX ADMIN — PROPOFOL 200 MG: 10 INJECTION, EMULSION INTRAVENOUS at 12:24

## 2023-12-07 RX ADMIN — FENTANYL CITRATE 25 MCG: 50 INJECTION, SOLUTION INTRAMUSCULAR; INTRAVENOUS at 11:41

## 2023-12-07 RX ADMIN — Medication 10 ML: at 21:23

## 2023-12-07 RX ADMIN — MIDAZOLAM 1 MG: 1 INJECTION INTRAMUSCULAR; INTRAVENOUS at 11:41

## 2023-12-07 RX ADMIN — POTASSIUM CHLORIDE AND SODIUM CHLORIDE 75 ML/HR: 450; 150 INJECTION, SOLUTION INTRAVENOUS at 22:54

## 2023-12-07 RX ADMIN — LIDOCAINE HYDROCHLORIDE 100 MG: 20 INJECTION, SOLUTION INFILTRATION; PERINEURAL at 12:22

## 2023-12-07 RX ADMIN — HYDROMORPHONE HYDROCHLORIDE 0.5 MG: 1 INJECTION, SOLUTION INTRAMUSCULAR; INTRAVENOUS; SUBCUTANEOUS at 20:02

## 2023-12-07 RX ADMIN — EPHEDRINE SULFATE 5 MG: 50 INJECTION INTRAVENOUS at 17:52

## 2023-12-07 RX ADMIN — PHENYLEPHRINE HYDROCHLORIDE 0.5 MCG/KG/MIN: 10 INJECTION, SOLUTION INTRAVENOUS at 14:44

## 2023-12-07 RX ADMIN — OXYCODONE AND ACETAMINOPHEN 2 TABLET: 5; 325 TABLET ORAL at 22:50

## 2023-12-07 RX ADMIN — EPHEDRINE SULFATE 5 MG: 50 INJECTION INTRAVENOUS at 14:45

## 2023-12-07 RX ADMIN — SODIUM CHLORIDE, POTASSIUM CHLORIDE, SODIUM LACTATE AND CALCIUM CHLORIDE: 600; 310; 30; 20 INJECTION, SOLUTION INTRAVENOUS at 13:21

## 2023-12-07 RX ADMIN — FENTANYL CITRATE 50 MCG: 50 INJECTION, SOLUTION INTRAMUSCULAR; INTRAVENOUS at 12:59

## 2023-12-07 RX ADMIN — PROPOFOL 50 MG: 10 INJECTION, EMULSION INTRAVENOUS at 17:05

## 2023-12-07 RX ADMIN — ONDANSETRON 4 MG: 2 INJECTION INTRAMUSCULAR; INTRAVENOUS at 17:57

## 2023-12-07 RX ADMIN — FENTANYL CITRATE 50 MCG: 50 INJECTION, SOLUTION INTRAMUSCULAR; INTRAVENOUS at 14:02

## 2023-12-07 RX ADMIN — FAMOTIDINE 20 MG: 10 INJECTION INTRAVENOUS at 11:41

## 2023-12-07 RX ADMIN — PREGABALIN 75 MG: 75 CAPSULE ORAL at 22:50

## 2023-12-07 RX ADMIN — CARBAMAZEPINE 600 MG: 200 TABLET, EXTENDED RELEASE ORAL at 22:50

## 2023-12-07 NOTE — OP NOTE
Surgeon: João Morse MD  Assistant: Na Renee,  assist      Procedure:   1.  Right-sided craniectomy for microvascular decompression  2.  With intraoperative neuro monitoring and use of intraoperative microscope and Stealth navigation  3.  Duraplasty of a 2 cm x 2 cm dural defect and cranioplasty of a 3 cm x 3 cm cranial defect    Preoperative diagnosis: Severe medically refractive left-sided V3 trigeminal neuralgia  Postoperative diagnosis: Trigeminal neuralgia caused by SCA loop compression and overlying vein compression      Anesthesia: General endotracheal anesthesia      Indications/consent: The patient is a 62-year-old female who presented with a history of right V3 trigeminal neuralgia.  She has been followed by a neurologist treated with multiple medications with very minimal improvement in her symptoms over time.  She reports that the medications have severe side effects have made him very lethargic and confused at times.  she requesting to undergo a microvascular decompression to help alleviate his symptoms.  The patient was consented for a right retrosigmoid craniectomy for microvascular decompression.  The risks and benefits of the procedure were discussed with the patient including but not limited to infection, hemorrhage, brainstem injury, stroke, CSF leak.  The patient expressed understanding of the risks and benefits of the procedure and elected to proceed.     Description of the procedure: The patient was brought into the operating room a timeout was performed.  The patient was put to sleep with general endotracheal anesthesia in the supine position.  The bed was turned 90 degrees.  The patient was then positioned with a large roll under her shoulder and her head turned to the left .  Small roll was placed under the brachial plexus.  All of her bony prominences were padded and she was secured to the table with 3 straps.  Her head was secured in the [place] head hein and tightened  to 60 pounds per square inch.  Stealth navigation was then registered and the incision was marked out under the junction of the transverse and sigmoid junction.  A 4 cm incision was made just above the transverse sinus straight down 1 fingerbreadth behind the ear to the mastoid process.  Hemostasis was carried out using Bovie electrocautery as well as a subperiosteal dissection.  A self-retaining retractor was put in place.  High-speed drill was then used to perform a craniectomy approximately 3 cm x 3 cm.  The dura was then opened using 11 blade knife.   The remainder of the dura was then opened using Metzenbaum scissors.  4-0 Nurolon sutures were used to retract the dura laterally.  A Williamson retractor was attached to the [place] head hein.  The patient was given 60g of mannitol.  After the cerebellum was felt to be well relaxed the Williamson retractor was slid over a cottonoid fredo down to the level of the brainstem.  A small blunt probe was used to stimulate at 0.2 mA the facial nerve as well as the trigeminal nerve.  A loop of an artery was found coursing underneath the trigeminal nerve causing an indentation.  A small Raz pledget was then cut in half and wedged in between the artery and the nerve causing some separation.  Next there was a large vein tightly adhered to the surface of the trigeminal nerve.  This was dissected away using microsurgical techniques. .  The nerve was felt to be free in 360 degrees.  The wound was then irrigated.  Hemostasis was obtained using irrigation, bipolar forceps, and FloSeal.  The dura was then tightly closed with 4-0 Nurolon suture.  It was then covered with adherence as well as a DuraGen graft.  This was then covered with more adheres glue followed by a large piece of Gelfoam and a 3 cm x 3 cm titanium metal plate which was secured into place with 4 mm titanium metal screws.  The wound was irrigated with antibiotic irrigation.  The fascia was closed with 0 Vicryl  sutures, the subcutaneous tissue with 2-0 Vicryl sutures and the skin with 3-0 Monocryl.  Skin was then covered with Dermabond.  Patient was then extubated and transferred to the recovery unit in stable condition.  Na Renee was the first assist who helped me during this case.  Her assistance was greatly appreciated and necessary for completion of the case with her help with suctioning, retraction, and irrigation.  Of note a small sebaceous cyst was removed while opening the incision and sent for permanent specimen.  The cyst was incidentally found while making the incision.      Complications: None  Specimen: Cyst from scalp    Disposition: We will plan to admit to the ICU to watch overnight and discharge home in 2 to 3 days.

## 2023-12-07 NOTE — ANESTHESIA PROCEDURE NOTES
Arterial Line      Patient reassessed immediately prior to procedure    Patient location during procedure: pre-op  Start time: 12/7/2023 11:42 AM  Stop Time:12/7/2023 11:45 AM       Line placed for hemodynamic monitoring and ABGs/Labs/ISTAT.  Performed By   Anesthesiologist: Marty Robles MD   Preanesthetic Checklist  Completed: patient identified, IV checked, site marked, risks and benefits discussed, surgical consent, monitors and equipment checked, pre-op evaluation and timeout performed  Arterial Line Prep    Sterile Tech: cap, gloves and mask  Prep: ChloraPrep  Patient monitoring: blood pressure monitoring, continuous pulse oximetry and EKG  Arterial Line Procedure   Laterality:left  Location:  radial artery  Catheter size: 20 G   Guidance: palpation technique  Number of attempts: 1  Successful placement: yes   Post Assessment   Dressing Type: biopatch applied, occlusive dressing applied, secured with tape and wrist guard applied.   Complications no  Circ/Move/Sens Assessment: normal and unchanged.   Patient Tolerance: patient tolerated the procedure well with no apparent complications

## 2023-12-07 NOTE — ANESTHESIA PROCEDURE NOTES
Airway  Urgency: elective    Date/Time: 12/7/2023 12:26 PM    General Information and Staff    Patient location during procedure: OR  Anesthesiologist: Odilia Castro MD    Indications and Patient Condition  Indications for airway management: airway protection    Preoxygenated: yes  Mask difficulty assessment: 1 - vent by mask    Final Airway Details  Final airway type: endotracheal airway      Successful airway: ETT and reinforced tube  Cuffed: yes   Successful intubation technique: direct laryngoscopy  Facilitating devices/methods: Bougie  Blade: Nathaniel  Blade size: 3  ETT size (mm): 7.0  Cormack-Lehane Classification: grade IIa - partial view of glottis  Measured from: teeth  Number of attempts at approach: 1  Assessment: lips, teeth, and gum same as pre-op and atraumatic intubation

## 2023-12-07 NOTE — ANESTHESIA PREPROCEDURE EVALUATION
Anesthesia Evaluation     Patient summary reviewed and Nursing notes reviewed   NPO Solid Status: > 8 hours  NPO Liquid Status: > 4 hours           Airway   Mallampati: II  TM distance: >3 FB  Neck ROM: full  No difficulty expected  Dental - normal exam     Pulmonary - normal exam    breath sounds clear to auscultation  (+) a smoker Former,  Cardiovascular - normal exam    ECG reviewed  Rhythm: regular  Rate: normal    (+) hypertension 2 medications or greater, dysrhythmias PVC, hyperlipidemia      Neuro/Psych  (+) TIA, syncope, numbness, psychiatric history Anxiety    ROS Comment: Amaurosis fugax/trigeminal neuralgia  GI/Hepatic/Renal/Endo    (+) hepatitis C, liver disease    Musculoskeletal         ROS comment: Lumbar DDD  Abdominal  - normal exam   Substance History      OB/GYN          Other   arthritis,                   Anesthesia Plan    ASA 3     general     (Art line)  intravenous induction     Anesthetic plan, risks, benefits, and alternatives have been provided, discussed and informed consent has been obtained with: patient.      CODE STATUS:

## 2023-12-08 ENCOUNTER — APPOINTMENT (OUTPATIENT)
Dept: CT IMAGING | Facility: HOSPITAL | Age: 63
DRG: 026 | End: 2023-12-08
Payer: COMMERCIAL

## 2023-12-08 LAB
ANION GAP SERPL CALCULATED.3IONS-SCNC: 11.2 MMOL/L (ref 5–15)
APTT PPP: 28.2 SECONDS (ref 22.7–35.4)
BASOPHILS # BLD AUTO: 0.02 10*3/MM3 (ref 0–0.2)
BASOPHILS NFR BLD AUTO: 0.2 % (ref 0–1.5)
BUN SERPL-MCNC: 8 MG/DL (ref 8–23)
BUN/CREAT SERPL: 11.1 (ref 7–25)
CALCIUM SPEC-SCNC: 9.5 MG/DL (ref 8.6–10.5)
CHLORIDE SERPL-SCNC: 108 MMOL/L (ref 98–107)
CO2 SERPL-SCNC: 22.8 MMOL/L (ref 22–29)
CREAT SERPL-MCNC: 0.72 MG/DL (ref 0.57–1)
DEPRECATED RDW RBC AUTO: 38.7 FL (ref 37–54)
EGFRCR SERPLBLD CKD-EPI 2021: 94.7 ML/MIN/1.73
EOSINOPHIL # BLD AUTO: 0 10*3/MM3 (ref 0–0.4)
EOSINOPHIL NFR BLD AUTO: 0 % (ref 0.3–6.2)
ERYTHROCYTE [DISTWIDTH] IN BLOOD BY AUTOMATED COUNT: 11.8 % (ref 12.3–15.4)
GLUCOSE SERPL-MCNC: 139 MG/DL (ref 65–99)
HCT VFR BLD AUTO: 32.3 % (ref 34–46.6)
HCT VFR BLD AUTO: 35.2 % (ref 34–46.6)
HGB BLD-MCNC: 10.6 G/DL (ref 12–15.9)
HGB BLD-MCNC: 10.8 G/DL (ref 12–15.9)
IMM GRANULOCYTES # BLD AUTO: 0.03 10*3/MM3 (ref 0–0.05)
IMM GRANULOCYTES NFR BLD AUTO: 0.4 % (ref 0–0.5)
INR PPP: 1.09 (ref 0.9–1.1)
LYMPHOCYTES # BLD AUTO: 1.24 10*3/MM3 (ref 0.7–3.1)
LYMPHOCYTES NFR BLD AUTO: 14.5 % (ref 19.6–45.3)
MCH RBC QN AUTO: 27.8 PG (ref 26.6–33)
MCHC RBC AUTO-ENTMCNC: 30.7 G/DL (ref 31.5–35.7)
MCV RBC AUTO: 90.7 FL (ref 79–97)
MONOCYTES # BLD AUTO: 0.36 10*3/MM3 (ref 0.1–0.9)
MONOCYTES NFR BLD AUTO: 4.2 % (ref 5–12)
NEUTROPHILS NFR BLD AUTO: 6.92 10*3/MM3 (ref 1.7–7)
NEUTROPHILS NFR BLD AUTO: 80.7 % (ref 42.7–76)
NRBC BLD AUTO-RTO: 0 /100 WBC (ref 0–0.2)
PLATELET # BLD AUTO: 201 10*3/MM3 (ref 140–450)
PMV BLD AUTO: 9.6 FL (ref 6–12)
POTASSIUM SERPL-SCNC: 4.2 MMOL/L (ref 3.5–5.2)
PROTHROMBIN TIME: 14.2 SECONDS (ref 11.7–14.2)
RBC # BLD AUTO: 3.88 10*6/MM3 (ref 3.77–5.28)
SODIUM SERPL-SCNC: 142 MMOL/L (ref 136–145)
WBC NRBC COR # BLD AUTO: 8.57 10*3/MM3 (ref 3.4–10.8)

## 2023-12-08 PROCEDURE — 70450 CT HEAD/BRAIN W/O DYE: CPT

## 2023-12-08 PROCEDURE — 85018 HEMOGLOBIN: CPT | Performed by: INTERNAL MEDICINE

## 2023-12-08 PROCEDURE — 85014 HEMATOCRIT: CPT | Performed by: INTERNAL MEDICINE

## 2023-12-08 PROCEDURE — 97162 PT EVAL MOD COMPLEX 30 MIN: CPT

## 2023-12-08 PROCEDURE — 25010000002 MORPHINE PER 10 MG: Performed by: NEUROLOGICAL SURGERY

## 2023-12-08 PROCEDURE — 80048 BASIC METABOLIC PNL TOTAL CA: CPT | Performed by: NEUROLOGICAL SURGERY

## 2023-12-08 PROCEDURE — 99024 POSTOP FOLLOW-UP VISIT: CPT | Performed by: NURSE PRACTITIONER

## 2023-12-08 PROCEDURE — 97530 THERAPEUTIC ACTIVITIES: CPT

## 2023-12-08 PROCEDURE — 85025 COMPLETE CBC W/AUTO DIFF WBC: CPT | Performed by: NEUROLOGICAL SURGERY

## 2023-12-08 PROCEDURE — 85730 THROMBOPLASTIN TIME PARTIAL: CPT | Performed by: NEUROLOGICAL SURGERY

## 2023-12-08 PROCEDURE — 85610 PROTHROMBIN TIME: CPT | Performed by: NEUROLOGICAL SURGERY

## 2023-12-08 RX ADMIN — MORPHINE SULFATE 2 MG: 2 INJECTION, SOLUTION INTRAMUSCULAR; INTRAVENOUS at 20:58

## 2023-12-08 RX ADMIN — CARBAMAZEPINE 600 MG: 200 TABLET, EXTENDED RELEASE ORAL at 08:20

## 2023-12-08 RX ADMIN — CARBAMAZEPINE 600 MG: 200 TABLET, EXTENDED RELEASE ORAL at 22:05

## 2023-12-08 RX ADMIN — DOCUSATE SODIUM 50MG AND SENNOSIDES 8.6MG 2 TABLET: 8.6; 5 TABLET, FILM COATED ORAL at 20:50

## 2023-12-08 RX ADMIN — MORPHINE SULFATE 2 MG: 2 INJECTION, SOLUTION INTRAMUSCULAR; INTRAVENOUS at 12:11

## 2023-12-08 RX ADMIN — OXYCODONE AND ACETAMINOPHEN 2 TABLET: 5; 325 TABLET ORAL at 16:42

## 2023-12-08 RX ADMIN — OXYCODONE AND ACETAMINOPHEN 2 TABLET: 5; 325 TABLET ORAL at 08:20

## 2023-12-08 RX ADMIN — LATANOPROST 1 DROP: 50 SOLUTION OPHTHALMIC at 22:05

## 2023-12-08 RX ADMIN — Medication 10 ML: at 20:51

## 2023-12-08 RX ADMIN — MORPHINE SULFATE 2 MG: 2 INJECTION, SOLUTION INTRAMUSCULAR; INTRAVENOUS at 03:23

## 2023-12-08 RX ADMIN — FUROSEMIDE 20 MG: 20 TABLET ORAL at 08:20

## 2023-12-08 RX ADMIN — PREGABALIN 75 MG: 75 CAPSULE ORAL at 08:19

## 2023-12-08 RX ADMIN — VALSARTAN 160 MG: 160 TABLET, FILM COATED ORAL at 08:19

## 2023-12-08 RX ADMIN — DOCUSATE SODIUM 50MG AND SENNOSIDES 8.6MG 2 TABLET: 8.6; 5 TABLET, FILM COATED ORAL at 08:20

## 2023-12-08 RX ADMIN — Medication 10 ML: at 08:21

## 2023-12-08 RX ADMIN — PREGABALIN 75 MG: 75 CAPSULE ORAL at 20:50

## 2023-12-08 NOTE — THERAPY EVALUATION
Patient Name: Kristina Kang  : 1960    MRN: [id number]                              Today's Date: 2023       Admit Date: 2023    Visit Dx:     ICD-10-CM ICD-9-CM   1. Trigeminal neuralgia  G50.0 350.1   2. Preop examination  Z01.818 V72.84     Patient Active Problem List   Diagnosis    Degeneration of lumbar intervertebral disc    Hepatitis C    Essential hypertension    Hyperlipidemia    Vitamin D deficiency    Osteoarthritis    Episode of syncope    Amaurosis fugax of right eye    Osteopenia of both hips    Chronic seasonal allergic rhinitis    Hypocalciuric hypercalcemia    Vaginal atrophy    Generalized anxiety disorder    Abnormal MRI    Trigeminal neuralgia    Elevated hemoglobin A1c    Preop examination     Past Medical History:   Diagnosis Date    Allergic     Anemia     Colon polyp     Degeneration of lumbar intervertebral disc     Episode of syncope 2015    Essential hypertension     Glaucoma     Hepatitis C 2008    Dr. Coleman Null    History of bone density study     Normal    History of chest x-ray 2010    normal    History of echocardiogram 2015    History of EKG 2013    normal    History of Holter monitoring 2015    occas PVC    History of nuclear stress test 10/06/2015    LCG; ischemia    Hypercalcemia     Hyperlipidemia     Leiomyoma of uterus     and fibroids    Osteoarthritis     Osteopenia     Postmenopausal     Pyelonephritis     Trigeminal neuralgia     Vitamin D deficiency      Past Surgical History:   Procedure Laterality Date     SECTION      COLONOSCOPY      COLONOSCOPY W/ POLYPECTOMY      Benign polyps.  Dr. Malone    CRANIOTOMY FOR NERVE DECOMPRESSION Right 2023    Procedure: RIGHT SIDED RETROSIGMOID CRANIOTOMY FOR MICROVASCULAR DECOMPRESSION;  Surgeon: Tripp Morse MD;  Location: [facility];  Service: Neurosurgery;  Laterality: Right;    HYSTERECTOMY  2004    took one ovary     LIVER BIOPSY       OOPHORECTOMY Bilateral age 33    left 1/4 of the right       General Information       Row Name 12/08/23 1217          Physical Therapy Time and Intention    Document Type evaluation  -EE     Mode of Treatment individual therapy;physical therapy  -EE       Row Name 12/08/23 1217          General Information    Prior Level of Function independent:;all household mobility;community mobility  -EE     Existing Precautions/Restrictions fall  -EE     Barriers to Rehab none identified  -EE       Row Name 12/08/23 1217          Living Environment    People in Home significant other  -EE       Row Name 12/08/23 1217          Home Main Entrance    Number of Stairs, Main Entrance three  -EE     Stair Railings, Main Entrance railings safe and in good condition  -EE       Row Name 12/08/23 1217          Cognition    Orientation Status (Cognition) oriented x 4  -EE       Row Name 12/08/23 1217          Safety Issues, Functional Mobility    Impairments Affecting Function (Mobility) balance;endurance/activity tolerance  -EE     Comment, Safety Issues/Impairments (Mobility) pt reports she is unable to hear out of R ear  -EE               User Key  (r) = Recorded By, (t) = Taken By, (c) = Cosigned By      Initials Name Provider Type    EE Tiffanie Fields, PT Physical Therapist                   Mobility       Row Name 12/08/23 1218          Bed Mobility    Bed Mobility supine-sit  -EE     Supine-Sit [place] (Bed Mobility) standby assist  -EE     Assistive Device (Bed Mobility) head of bed elevated;bed rails  -       Row Name 12/08/23 1218          Sit-Stand Transfer    Sit-Stand [place] (Transfers) minimum assist (75% patient effort);verbal cues  -EE     Comment, (Sit-Stand Transfer) unsteady with small posterior LOB upon standing  -EE       Row Name 12/08/23 1218          Gait/Stairs (Locomotion)    [place] Level (Gait) minimum assist (75% patient effort);verbal cues  -EE     Assistive Device (Gait) walker,  front-wheeled  -EE     Distance in Feet (Gait) 65'  -EE     Deviations/Abnormal Patterns (Gait) nicole decreased;base of support, narrow;stride length decreased  -EE     Bilateral Gait Deviations heel strike decreased;weight shift ability decreased;forward flexed posture  -EE     Comment, (Gait/Stairs) Unsteadiness noted throughout but no overt LOB, cues for increased step length  -EE               User Key  (r) = Recorded By, (t) = Taken By, (c) = Cosigned By      Initials Name Provider Type    EE Tiffanie Fields PT Physical Therapist                   Obj/Interventions       Row Name 12/08/23 1219          Range of Motion Comprehensive    General Range of Motion bilateral lower extremity ROM WFL  -EE       Row Name 12/08/23 1219          Strength Comprehensive (MMT)    General Manual Muscle Testing (MMT) Assessment no strength deficits identified  -EE     Comment, General Manual Muscle Testing (MMT) Assessment B LEs grossly 4 to 4+/5  -EE       Row Name 12/08/23 1219          Balance    Balance Assessment sitting static balance;standing static balance;standing dynamic balance  -EE     Static Sitting Balance standby assist  -EE     Position, Sitting Balance unsupported;sitting edge of bed  -EE     Static Standing Balance contact guard;minimal assist  -EE     Dynamic Standing Balance minimal assist  -EE     Position/Device Used, Standing Balance supported;walker, front-wheeled  -EE       Row Name 12/08/23 1219          Sensory Assessment (Somatosensory)    Sensory Assessment (Somatosensory) LE sensation intact  -EE               User Key  (r) = Recorded By, (t) = Taken By, (c) = Cosigned By      Initials Name Provider Type     Tiffanie Fields PT Physical Therapist                   Goals/Plan       Row Name 12/08/23 1223          Bed Mobility Goal 1 (PT)    Activity/Assistive Device (Bed Mobility Goal 1, PT) sit to supine/supine to sit  -EE     [place] Level/Cues Needed (Bed Mobility Goal 1, PT) independent  -EE      Time Frame (Bed Mobility Goal 1, PT) 1 week  -EE     Progress/Outcomes (Bed Mobility Goal 1, PT) goal ongoing  -EE       Row Name 12/08/23 1223          Transfer Goal 1 (PT)    Activity/Assistive Device (Transfer Goal 1, PT) sit-to-stand/stand-to-sit  -EE     [place] Level/Cues Needed (Transfer Goal 1, PT) supervision required  -EE     Time Frame (Transfer Goal 1, PT) 1 week  -EE     Progress/Outcome (Transfer Goal 1, PT) goal ongoing  -EE       Row Name 12/08/23 1223          Gait Training Goal 1 (PT)    Activity/Assistive Device (Gait Training Goal 1, PT) gait (walking locomotion);assistive device use  -EE     [place] Level (Gait Training Goal 1, PT) standby assist  -EE     Distance (Gait Training Goal 1, PT) 150'  -EE     Time Frame (Gait Training Goal 1, PT) 1 week  -EE     Progress/Outcome (Gait Training Goal 1, PT) goal ongoing  -EE       Row Name 12/08/23 1223          Therapy Assessment/Plan (PT)    Planned Therapy Interventions (PT) balance training;bed mobility training;gait training;home exercise program;patient/family education;strengthening;stair training;neuromuscular re-education;postural re-education;ROM (range of motion);transfer training  -EE               User Key  (r) = Recorded By, (t) = Taken By, (c) = Cosigned By      Initials Name Provider Type    EE Tiffanie Fields, PT Physical Therapist                   Clinical Impression       Row Name 12/08/23 1219          Pain    Pretreatment Pain Rating 6/10  -EE     Posttreatment Pain Rating 6/10  -EE     Pain Location - Side/Orientation Right  -EE     Pain Location incisional  -EE     Pain Location - head  -EE     Pain Intervention(s) Repositioned;Rest;Nursing Notified  -EE       Row Name 12/08/23 1219          Plan of Care Review    Plan of Care Reviewed With patient;daughter  -EE     Outcome Evaluation Pt is a 63 yo female with hx of R trigeminal neuralgia s/p R craniectomy for microvascular decompression. Prior to admission, pt was  living at home with SO and independent with all mobility. Upon exam, pt demonstrates impaired balance and decreased endurance limiting mobility. Pt also reports that she cannot hear out of R ear. Pt performed bed mobility with SBA and required min A to stand at EOB. Pt ambulated 65' with rwx and min A. Pt demos mild unsteadiness throughout ambulation but demo's no overt LOB. Pt will continue to benefit from PT to address impairments and assist with return to PLOF.  -EE       Row Name 12/08/23 1219          Therapy Assessment/Plan (PT)    Rehab Potential (PT) good, to achieve stated therapy goals  -EE     Criteria for Skilled Interventions Met (PT) yes;meets criteria;skilled treatment is necessary  -EE     Therapy Frequency (PT) daily  -EE       Row Name 12/08/23 1219          Positioning and Restraints    Pre-Treatment Position in bed  -EE     Post Treatment Position chair  -EE     In Chair sitting;call light within reach;encouraged to call for assist;notified nsg;with family/caregiver  -EE               User Key  (r) = Recorded By, (t) = Taken By, (c) = Cosigned By      Initials Name Provider Type    Tiffanie West, PT Physical Therapist                   Outcome Measures       Row Name 12/08/23 1224          How much help from another person do you currently need...    Turning from your back to your side while in flat bed without using bedrails? 4  -EE     Moving from lying on back to sitting on the side of a flat bed without bedrails? 3  -EE     Moving to and from a bed to a chair (including a wheelchair)? 3  -EE     Standing up from a chair using your arms (e.g., wheelchair, bedside chair)? 3  -EE     Climbing 3-5 steps with a railing? 2  -EE     To walk in hospital room? 3  -EE     AM-PAC 6 Clicks Score (PT) 18  -EE     Highest Level of Mobility Goal 6 --> Walk 10 steps or more  -EE               User Key  (r) = Recorded By, (t) = Taken By, (c) = Cosigned By      Initials Name Provider Type    Tiffanie West,  PT Physical Therapist                                 Physical Therapy Education       Title: PT OT SLP Therapies (In Progress)       Topic: Physical Therapy (In Progress)       Point: Mobility training (Done)       Learning Progress Summary             Patient Acceptance, E, VU,NR by EE at 12/8/2023 1224                         Point: Home exercise program (Not Started)       Learner Progress:  Not documented in this visit.              Point: Body mechanics (Done)       Learning Progress Summary             Patient Acceptance, E, VU,NR by EE at 12/8/2023 1224                         Point: Precautions (Not Started)       Learner Progress:  Not documented in this visit.                              User Key       Initials Effective Dates Name Provider Type Discipline    EE 06/16/21 -  Tiffanie Fields, PT Physical Therapist PT                  PT Recommendation and Plan  Planned Therapy Interventions (PT): balance training, bed mobility training, gait training, home exercise program, patient/family education, strengthening, stair training, neuromuscular re-education, postural re-education, ROM (range of motion), transfer training  Plan of Care Reviewed With: patient, daughter  Outcome Evaluation: Pt is a 61 yo female with hx of R trigeminal neuralgia s/p R craniectomy for microvascular decompression. Prior to admission, pt was living at home with SO and independent with all mobility. Upon exam, pt demonstrates impaired balance and decreased endurance limiting mobility. Pt also reports that she cannot hear out of R ear. Pt performed bed mobility with SBA and required min A to stand at EOB. Pt ambulated 65' with rwx and min A. Pt demos mild unsteadiness throughout ambulation but demo's no overt LOB. Pt will continue to benefit from PT to address impairments and assist with return to PLOF.     Time Calculation:         PT Charges       Row Name 12/08/23 1224             Time Calculation    Start Time 1145  -EE      Stop  Time 1206  -EE      Time Calculation (min) 21 min  -EE      PT Received On 12/08/23  -EE      PT - Next Appointment 12/09/23  -EE      PT Goal Re-Cert Due Date 12/15/23  -EE         Time Calculation- PT    Total Timed Code Minutes- PT 10 minute(s)  -EE         Timed Charges    56449 - PT Therapeutic Activity Minutes 10  -EE         Total Minutes    Timed Charges Total Minutes 10  -EE       Total Minutes 10  -EE                User Key  (r) = Recorded By, (t) = Taken By, (c) = Cosigned By      Initials Name Provider Type    EE Tiffanie Fields, PT Physical Therapist                  Therapy Charges for Today       Code Description Service Date Service Provider Modifiers Qty    66442272802 HC PT THERAPEUTIC ACT EA 15 MIN 12/8/2023 Tiffanie Fields, PT GP 1    95988296494 HC PT EVAL MOD COMPLEXITY 2 12/8/2023 Tiffanie Fields, PT GP 1            PT G-Codes  AM-PAC 6 Clicks Score (PT): 18  PT Discharge Summary  Anticipated Discharge Disposition (PT): home with assist, home with home health, home with outpatient therapy services, inpatient rehabilitation facility (pending progress with balance and gait)    Tiffanie Fields, SALMA  12/8/2023

## 2023-12-08 NOTE — CASE MANAGEMENT/SOCIAL WORK
Continued Stay Note  [facility]     Patient Name: Kristina Kang  MRN: [id number]  Today's Date: 12/8/2023    Admit Date: 12/7/2023    Plan: Undetermined   Discharge Plan       Row Name 12/08/23 1050       Plan    Plan Undetermined    Plan Comments CCP called into room and on cell phone  Message left for return call                   Discharge Codes    No documentation.                       Justina Reynoso RN

## 2023-12-08 NOTE — DISCHARGE INSTRUCTIONS
[facility] Neurological Surgery    [address], [address]    [place] [address]    Phone: [phone]    Fax: [phone]    João Morse MD            CRANIOTOMY POST-OPERATIVE INSTRUCTIONS    1. You should have a post-operative visit scheduled for approximately 2 weeks from your date of surgery. If you do not have one, please call the office at [phone] to schedule a visit.    2. You will likely have staples over the incision, which will be removed at the first post-operative visit. If you are instructed to keep your incision(s) covered, you need to use a loose dressing. Do not dress the incision too tightly or with something that will rub the incision.    3. You may wash your hair with a mild shampoo, but do not scrub the incision aggressively or hold it under forceful water. Do not submerge the incision such as in a swimming pool or hot tub. Pat and dry gently.    4. If you experience any abnormal swelling, redness, drainage at the incision site or fever or chills you must call our office immediately. Please call the office if you experience a severe headache, nausea, or vomiting.    5. You may walk, sit, or stand at will. No straining or lifting greater than 5 pounds. No bending over for extended periods.    6. You will likely be given pain medication. This is generally enough to last until your first post-operative visit. In order to have this medication refilled you must contact the office four days prior to running out.

## 2023-12-08 NOTE — PLAN OF CARE
Goal Outcome Evaluation:  Plan of Care Reviewed With: patient, daughter           Outcome Evaluation: Pt is a 61 yo female with hx of R trigeminal neuralgia s/p R craniectomy for microvascular decompression. Prior to admission, pt was living at home with SO and independent with all mobility. Upon exam, pt demonstrates impaired balance and decreased endurance limiting mobility. Pt also reports that she cannot hear out of R ear. Pt performed bed mobility with SBA and required min A to stand at EOB. Pt ambulated 65' with rwx and min A. Pt demos mild unsteadiness throughout ambulation but demo's no overt LOB. Pt will continue to benefit from PT to address impairments and assist with return to PLOF.      Anticipated Discharge Disposition (PT): home with assist, home with home health, home with outpatient therapy services, inpatient rehabilitation facility (pending progress with balance and gait)

## 2023-12-08 NOTE — PROGRESS NOTES
[facility] NEUROSURGERY INTRACRANIAL PROGRESS NOTE    PATIENT IDENTIFICATION:   Name:  Kristina Kang      MRN:  [id number]     62 y.o.  female               CC: POD #1 right-sided craniectomy for MVD      Subjective     Interval History: No new complaints or events overnight.  Facial pain has resolved.    ROS:  Constitutional: No fever, chills  HEENT: No headache, no vision changes, no tinnitus, no hearing difficulties  Neck: no stiffness  GI: No nausea, vomiting, no swallow difficulties  Neuro: No numbness, tingling, or weakness, no speech difficulties, no balance difficulties    Objective     Vital signs in last 24 hours:  Temp:  [97.6 °F (36.4 °C)-97.7 °F (36.5 °C)] 97.7 °F (36.5 °C)  Heart Rate:  [60-89] 61  Resp:  [12-18] 12  BP: (109-179)/(57-93) 121/57    Intake/Output this shift:  No intake/output data recorded.    Intake/Output last 3 shifts:  I/O last 3 completed shifts:  In: 2331 [P.O.:240; I.V.:1091; Other:700; IV Piggyback:300]  Out: 2200 [Urine:2200]    LABS:  Results from last 7 days   Lab Units 12/08/23  0314 12/01/23  1028   WBC 10*3/mm3 8.57 7.78   HEMOGLOBIN g/dL 10.8* 12.3   HEMATOCRIT % 35.2 37.3   PLATELETS 10*3/mm3 201 207       Results from last 7 days   Lab Units 12/08/23  0314 12/01/23  1028   SODIUM mmol/L 142 140   POTASSIUM mmol/L 4.2 4.0   CHLORIDE mmol/L 108* 106   CO2 mmol/L 22.8 25.0   BUN mg/dL 8 13   CREATININE mg/dL 0.72 0.97   GLUCOSE mg/dL 139* 88   CALCIUM mg/dL 9.5 10.9*          IMAGING STUDIES:  CT Head Without Contrast    Result Date: 12/8/2023  Findings consistent with a right trigeminal nerve decompression. Mild pneumocephaly is noted. No complicating feature is identified.  Radiation dose reduction techniques were utilized, including automated exposure control and exposure modulation based on body size.          I personally viewed and interpreted the patient's chart.    Meds reviewed    Physical exam  Awake, alert, oriented x3  Pupils equal round reactive to  "light  Extraocular muscles intact  Face symmetric  Speech is fluent and clear  Motor exam  Bilateral deltoids 5/5, bilateral biceps 5/5, bilateral triceps 5/5, bilateral wrist extension 5/5 bilateral hand  5/5  Bilateral hip flexion 5/5, bilateral knee extension 5/5, bilateral DF/PF 5/5  gait deferred  Able to detect  light touch in all 4 extremities  Right craniectomy incision well-approximated, no erythema, drainage or swelling      Assessment & Plan     ASSESSMENT:      Preop examination    Trigeminal neuralgia    62-year-old female who is POD #1 right-sided craniectomy for MVD.    PLAN:     She is doing well postoperatively and reports facial symptoms have resolved.  Okay to transfer to the floor  DC Eric catheter  DC A-line  Okay to DC home later today if she feels like it.  If not discharged tomorrow.    I discussed the patient's findings and my recommendations with patient, family, nursing staff, and primary care team    I spent 30 minutes caring for Kristina Kang on this date of service. This time includes time spent by me in the following activities: preparing for the visit, reviewing tests, obtaining and/or reviewing a separately obtained history, performing a medically appropriate examination and/or evaluation, counseling and educating the patient/family/caregiver, ordering medications, tests, or procedures, referring and communicating with other health care professionals, documenting information in the medical record, independently interpreting results and communicating that information with the patient/family/caregiver, and care coordination          LOS: 1 day       Ana Escoto, APRN  12/8/2023  07:59 EST    \"Dictated utilizing Dragon dictation\".      "

## 2023-12-08 NOTE — ANESTHESIA POSTPROCEDURE EVALUATION
Patient: Kristina Kang    Procedure Summary       Date: 12/07/23 Room / Location: [facility] OR  / [facility] MAIN OR    Anesthesia Start: 1201 Anesthesia Stop: 1842    Procedure: RIGHT SIDED RETROSIGMOID CRANIOTOMY FOR MICROVASCULAR DECOMPRESSION (Right: Head) Diagnosis:       Trigeminal neuralgia      Preop examination      (Trigeminal neuralgia [G50.0])      (Preop examination [Z01.818])    Surgeons: Tripp Morse MD Provider: Nikolay, Peter Moraes MD    Anesthesia Type: general ASA Status: 3            Anesthesia Type: general    Vitals  Vitals Value Taken Time   /70 12/07/23 2030   Temp 36.5 °C (97.7 °F) 12/07/23 1841   Pulse 63 12/07/23 2037   Resp 14 12/07/23 2030   SpO2 100 % 12/07/23 2037   Vitals shown include unfiled device data.        Post Anesthesia Care and Evaluation    Patient location during evaluation: PACU  Patient participation: complete - patient participated  Level of consciousness: awake and alert  Pain management: adequate    Airway patency: patent  Anesthetic complications: No anesthetic complications  PONV Status: controlled  Cardiovascular status: acceptable and hemodynamically stable  Respiratory status: acceptable  Hydration status: acceptable    Comments: /70   Pulse 63   Temp 36.5 °C (97.7 °F) (Oral)   Resp 14   LMP  (LMP Unknown)   SpO2 100%

## 2023-12-08 NOTE — CONSULTS
CONSULT NOTE    Patient Identification:  Kristina Kang  62 y.o.  female  1960  [id number]            Requesting physician: Neurosurgery    Reason for Consultation: ICU comanagement    CC:     History of Present Illness:  62-year-old female with right V3 trigeminal neuralgia underwent right-sided craniectomy for microvascular decompression.  Postop patient on 2 L oxygen nasal cannula.  Initially reported some headache which have now resolved with pain medication.  She is neuro intact.  Denies any headache nausea vomiting or double vision at this time.  Currently has an A-line in place and blood pressure is stable.  She tells me she does not smoke or drink alcohol daily.      Review of Systems  As above rest is negative  Past Medical History:  Past Medical History:   Diagnosis Date    Allergic     Anemia     Colon polyp     Degeneration of lumbar intervertebral disc     Episode of syncope 2015    Essential hypertension     Glaucoma     Hepatitis C 2008    Dr. Coleman Null    History of bone density study     Normal    History of chest x-ray 2010    normal    History of echocardiogram 2015    History of EKG 2013    normal    History of Holter monitoring 2015    occas PVC    History of nuclear stress test 10/06/2015    LCG; ischemia    Hypercalcemia     Hyperlipidemia     Leiomyoma of uterus     and fibroids    Osteoarthritis     Osteopenia     Postmenopausal     Pyelonephritis     Trigeminal neuralgia     Vitamin D deficiency        Past Surgical History:  Past Surgical History:   Procedure Laterality Date     SECTION      COLONOSCOPY      COLONOSCOPY W/ POLYPECTOMY      Benign polyps.  Dr. Malone    HYSTERECTOMY  2004    took one ovary     LIVER BIOPSY      OOPHORECTOMY Bilateral age 33    left 1/4 of the right         Home Meds:  Medications Prior to Admission   Medication Sig Dispense Refill Last Dose    atorvastatin (LIPITOR) 40 MG tablet Take 1  tablet by mouth Daily. For cholesterol 90 tablet 2 12/6/2023    carBAMazepine XR (TEGretol  XR) 200 MG 12 hr tablet TAKE 600MG DURING THE DAY AND 400MG AT NIGHT. (Patient taking differently: Take 3 tablets by mouth 2 (Two) Times a Day.) 450 tablet 1 12/7/2023 at 0630    cholecalciferol (VITAMIN D3) 25 MCG (1000 UT) tablet 3 tablets daily (Patient taking differently: Take 1 tablet by mouth 3 (Three) Times a Day. 3 tablets daily) 270 tablet 2 12/6/2023    folic acid (FOLVITE) 400 MCG tablet Take 1 tablet by mouth Daily. 90 tablet 4 12/6/2023    furosemide (LASIX) 20 MG tablet TAKE 1 TABLET BY MOUTH EVERY DAY (Patient taking differently: Take 1 tablet by mouth Daily.) 90 tablet 2 12/6/2023    latanoprost (XALATAN) 0.005 % ophthalmic solution Administer 1 drop to both eyes Every Night.   12/6/2023    pregabalin (LYRICA) 75 MG capsule Take 1 capsule by mouth 2 (Two) Times a Day. (Patient taking differently: Take 1 capsule by mouth Every Night.) 60 capsule 3 12/6/2023    valsartan (DIOVAN) 160 MG tablet Take 1 tablet by mouth Daily. for blood pressure. (Patient taking differently: Take 1 tablet by mouth Daily. for blood pressure.  HOLD 24 HOURS PRIOR TO SURGERY) 90 tablet 1 12/6/2023    aspirin 81 MG EC tablet Take 1 tablet by mouth Daily. HELD FOR SURGERY   11/23/2023       Allergies:  No Known Allergies    Social History:   Social History     Socioeconomic History    Marital status:    Tobacco Use    Smoking status: Former     Packs/day: 0.50     Years: 10.00     Additional pack years: 0.00     Total pack years: 5.00     Types: Cigarettes    Smokeless tobacco: Former     Quit date: 2011   Vaping Use    Vaping Use: Never used   Substance and Sexual Activity    Alcohol use: Not Currently     Comment: Seldom    Drug use: No    Sexual activity: Yes     Partners: Male     Birth control/protection: Post-menopausal, None     Comment: Hysterectomy       Family History:  Family History   Adopted: Yes   Problem Relation  Age of Onset    Malig Hyperthermia Neg Hx        Physical Exam:  /67   Pulse 64   Temp 97.7 °F (36.5 °C) (Oral)   Resp 14   LMP  (LMP Unknown)   SpO2 100%  There is no height or weight on file to calculate BMI. 100%    Physical Exam  Patient is examined using the personal protective equipment as per guidelines from infection control for this particular patient as enacted.  Hand hygiene was performed before and after patient interaction.  Middle-age female postop sleepy but wakes up follows simple commands  Eyes normal conjunctivae and pupils reactive to light  ENT normocephalic atraumatic with surgical incision behind the right ear  Neck midline trachea no thyromegaly  Chest no labored breathing clear  CVS regular rate and rhythm no lower extremity edema  Abdomen soft nontender no hepatosplenomegaly  CNS intact normal sensory exam  Skin no rashes no nodules  Psych somewhat sleepy  Musculoskeletal no cyanosis no clubbing normal range of motion        LABS:  Lab Results   Component Value Date    CALCIUM 10.9 (H) 12/01/2023     Results from last 7 days   Lab Units 12/01/23  1028   SODIUM mmol/L 140   POTASSIUM mmol/L 4.0   CHLORIDE mmol/L 106   CO2 mmol/L 25.0   BUN mg/dL 13   CREATININE mg/dL 0.97   GLUCOSE mg/dL 88   CALCIUM mg/dL 10.9*   WBC 10*3/mm3 7.78   HEMOGLOBIN g/dL 12.3   PLATELETS 10*3/mm3 207     Lab Results   Component Value Date    TROPONINT <6 08/29/2023                                 Lab Results   Component Value Date    TSH 0.752 09/20/2022     Estimated Creatinine Clearance: 52.9 mL/min (by C-G formula based on SCr of 0.97 mg/dL).         Imaging: I personally visualized the images of scans/x-rays performed within last 3 days.      Assessment:  Right-sided craniectomy for microvascular decompression  Trigeminal neuralgia  Hypertension  Hyperlipidemia      Recommendations:  Neurochecks every hour per protocol as per neurosurgery  Blood pressure management keep systolic less than 140  ICU  core measures  Pain management  Further management per neurosurgery              Manuel Bland MD  12/7/2023  19:53 EST      Much of this encounter note is an electronic transcription/translation of spoken language to printed text using Dragon Software.   [Disease: _____________________] : Disease: [unfilled] [de-identified] : The patient is an 82 year old woman who presented with post-menopausal vaginal bleeding in March 2021. \par Upon further work up, PAP smear on 3/18/21 showed: LSIL/AGC/HPV+. Pelvic US on 3/22/21 showed endometrium to be 4.2mm thick. Cervical biopsy on 3/31/21 showed squamous cell carcinoma, poorly differentiated. \par MRI pelvis on 4/3/21 showed signal abnormality in the posterior cervix 2.5cm x 1.6cm. The lesion does not go beyond the limit of the cervix. \par She saw Dr. Echevarria on 4/14/21 and on his exam, she was noted to have a 4-5cm cervical mass involving at least the middle of the vaginal wall, circumferentially. No parametrial involvement. She was not deemed a surgical candidate. \par Definitive chemoradiation was recommended. She is here to discuss radiation. \par PET/CT: Pending\par \par Pt C/O low back pain, for which she takes Tylenol. Vaginal bleeding is not very excessive. No SOB, Chest pain.\par Pt has a stress test next week\par  [de-identified] : Squamous cell [de-identified] : 5/18/21\par Pt is here for follow up.\par She has completed a PET scan.\par has moderate back pain\par \par 6/9/21\par Pt is here for follow up.\par She has started RT last week. She also recd 1 dose of carboplatin last week.\par She did not have any nausea or vomiting. She has been getting diarrhea which she has been managing with imodium. Her bld sugar was also running high.\par No fever, mouth sores.\par \par 6/16/21\par Pt is here for follow up.\par She is s/p 2 doses of Carbo.\par C/O feeling very fatigued. Had diarrhea which is controlled with imodium.\par No N, V.\par has abdominal discomfort.\par \par 6/23/2021\par Pt is here to follow up for b=cervical cancer, accompanied by daughter\par She is on Carbo s/p 2 cycles, tolerating well\par She feels a lot better today\par She denies abdominal pain, nausea or vomiting\par She gets diarrhea but is managed with Imodium\par \par 7/1/2021\par Pt is here to follow up for cervical cancer, accompanied by daughter\par She is on Carbo s/p 3 cycles, tolerating well\par She feels fine today, appetite is good\par She denies abdominal pain, nausea or vomiting, no vaginal bleeding/discharge\par She gets diarrhea, takes Imodium after having 7-8 episodes then gets constipation the following day\par \par 7/7/2021\par Pt is here to follow up for cervical cancer, accompanied by daughter\par She is on weekly Carbo s/p  4 cycles, tolerating well\par She feels fine today, appetite is good\par She denies nausea or vomiting, no vaginal bleeding/discharge\par She c/o of low abdominal cramping and gets diarrhea 4-5x/day, managed with Imodium then gets constipation the following day\par \par 7/26/21\par Pt is here for follow up,\par She is feeling better. Diarrhea has improved. NO vaginal bleeding\par \par 9/8/21\par Pt is here for follow up. Has completed RT on 8/2/21.\par No vag bleeding, no pelvic pain.\par \par 12/21/21\par Pt is here today for follow up visit for cervical cancer. She has completed chemo (7/2021)/Rad (8/2021).\par Pt denies any vaginal bleeding or abd/pelvic pain.\par She had a PET CT scan on 12/21/21 which showed FDG avid right external iliac lymph node, SUV max 10.0 measuring\par 1.0 cm, consistent with biological tumor activity. She saw Dr. Ragland and agreed to have SBRT since she is not surgical candidate.\par Simulation was done.\par She has an appt with Dr. Echevarria 1/12/22.\par \par 4/25/22\par Pt is here for follow up. She had been C/O rectal bleeding over the past week. She went to ER and was treated with ABX. CT scan abdomen and pelvis reviwed ? proctoclitis.\par Symptoms have improved.Pt has appt with GI.\par \par 12/12/22\par Pt is here for follow up. \par She was recently admitted to hospital in November 2022 when she had severe RLE pain. She underwent CT AP in ED showing necrotic R iliac lymph node that increased in size since 4/2022 with asymmetrically dilated R iliac and feeding veins. She was evaluated by vascular team and she was taken for R iliac vein stent placement and now on eliquis. She was also evaluated by gynonc team as well radiation oncology team, unfortunately cannot offer any intervention at this time.\par She is doing much better since she was discharged from hospital, swelling of RLE significant improved. \par She still experiences R groin pain which she takes Percocet. \par Otherwise, she denies fever, chill, SOB, CP, N/V/D. \par \par 1/12/2023\par Pt presents to clinic for follow up and cycle#2 of Carbo/taxol & Keytruda accompanied by daughter. \par With her first cycle she had diarrhea & fatigue for two days that improved with Imodium.\par She has lost 20 lbs over last months. Her daughters are taking care of her  diet. \par Her leg swelling has been subsiding well and she is requiring lesser pain meds for her leg pain. \par \par 1/31/23\par Pt is here for follow up.\par C/O pain in her back since a week or so. Leg swelling and pain has resolved.\par C/O significant fatigue\par \par 3/2/23\par Pt is here for follow up.\par Feels well mostly except for fatigue.\par Becomes confused post chemo ? d/t steroids.\par Swelling in LE has resolved. Tolerating AC well. Had one episode of rectal bleed which resolved likely d/t hemorrhoids, pt was constipated\par \par 3/20/23\par Pt is here for follow up.\par Feels well mostly except for fatigue.\par Becomes confused post chemo ? d/t steroids which persisted for about 3 days post chemo. \par Swelling in LE has resolved. Tolerating AC well. Had one episode of rectal bleed which resolved likely d/t hemorrhoids\par She completed PET scan on 3/9/23. \par She is s/p 4 cycles of Carboplatin/Taxol.Keytruda. \par \par 4/10/23\par Pt is here for follow up.\par Feels well mostly except for fatigue.\par Swelling in LE has resolved. Tolerating AC well. Had one episode of rectal bleed previously which resolved likely d/t hemorrhoids\par She completed PET scan on 3/9/23. \par She is s/p 4 cycles of Carboplatin/Taxol/Keytruda which was switched to Keytruda alone starting cycle 5. \par She complains of joint pain in b/l hands and left ankle, associated with L ankle swelling. She has hx of RA (was on MTX which has been on hold since chemo started). \par \par 5/1/23\par Pt is here for follow up.\par Swelling in LE has resolved. Tolerating AC well. Had one episode of rectal bleed previously which resolved likely d/t hemorrhoids\par She completed PET scan on 3/9/23. \par She is s/p 4 cycles of Carboplatin/Taxol/Keytruda which was switched to Keytruda alone starting cycle 5. \par She complains of joint pain in b/l hands and left ankle, associated with L ankle swelling. She has hx of RA (was on MTX which has been on hold since chemo started). She\par still has persistent joint pain, stiffness and is affecting her quality of life. \par She saw Dr Grant (Rheum) and was started on Prednisone 10mg qd, Hydroxychlorquine 400mg daily, and Tramadol prn. She feels symptoms are still uncontrolled. \par \par 5/22/23\par Patient presents to clinic for follow up accompanied by her daughter.\par She appears to be in acute RA flare. She has stiffness and pain in her back, shoulders and small joints of hands.\par Daughter reported that she had a MRI done of cervical spine that showed nerve impingement in the cervical spine\par She is taking MTX, Plaquenil and advil 200 mg TiD with minimal relief.\par We spoke to her about giving her a break from immunotherapy till her pain improves and she agreed.\par \par 6/12/23\par Patient presents to clinic for follow up accompanied by her daughter.\par Daughter reported that she had a MRI done of cervical spine that showed nerve impingement in the cervical spine\par She is taking MTX, Plaquenil and advil 200 mg TiD, and prednisone. \par Her Keytruda was held last cycle, due to her RA flare. She is due for C#8 today. \par She feels much better today, only complains of joint pain in b/l hands and neuropathy. \par She also feels fatigued. \par She completed PET scan on 6/9/23. \par \par 7/3/23\par Pt is here for follow up. Feels well. \par The joint pain is better

## 2023-12-08 NOTE — PLAN OF CARE
Problem: Adult Inpatient Plan of Care  Goal: Plan of Care Review  Outcome: Ongoing, Progressing  Flowsheets (Taken 12/7/2023 2157)  Progress: improving  Plan of Care Reviewed With: patient  Goal: Patient-Specific Goal (Individualized)  Outcome: Ongoing, Progressing  Goal: Absence of Hospital-Acquired Illness or Injury  Outcome: Ongoing, Progressing  Goal: Optimal Comfort and Wellbeing  Outcome: Ongoing, Progressing  Goal: Readiness for Transition of Care  Outcome: Ongoing, Progressing  Intervention: Mutually Develop Transition Plan  Recent Flowsheet Documentation  Taken 12/7/2023 2156 by Caron Wise, RN  Equipment Currently Used at Home: none  Transportation Anticipated: family or friend will provide  Patient/Family Anticipated Services at Transition: none  Patient/Family Anticipates Transition to: home with family   Goal Outcome Evaluation:  Plan of Care Reviewed With: patient        Progress: improving

## 2023-12-08 NOTE — DISCHARGE SUMMARY
Kristina Ruthring  1960    Patient Care Team:  Joanne Maurice PA-C as PCP - General  Joanne Maurice PA-C as PCP - Family Medicine  Lew Malone MD as Consulting Physician (Gastroenterology)  Conor Avendaño MD as Consulting Physician (Endocrinology)  Kendall De La Garza MD as Consulting Physician (Ophthalmology)  Aletha Sher MD (Dermatology)  Layton Izaguirre MD as Consulting Physician (Neurology)  Carole Gee MD as Consulting Physician (Dermatology)    Date of Admit: 12/7/2023    Date of Discharge:  12/9/2023    Discharge Diagnosis:  Preop examination    Trigeminal neuralgia      Procedures Performed  Procedure(s):  RIGHT SIDED RETROSIGMOID CRANIOTOMY FOR MICROVASCULAR DECOMPRESSION       Complications: None    Consultants:   Consults       Date and Time Order Name Status Description    12/7/2023  9:20 PM Inpatient Intensivist Consult              Condition on Discharge: Improved    Discharge disposition: Home    Brief HPI: The patient is a 62-year-old female who presented to the office with a history of right V3 trigeminal neuralgia.  She has been followed by a neurologist, treated with multiple medications but unfortunately has had very minimal improvement in her symptoms over time.  She reports the medications have severe side effects and have made her very lethargic and confused at times.  She requested to undergo a microvascular decompression to help alleviate symptoms.  She was consented for a right retrosigmoid craniectomy for MVD.  The risks and benefits of the procedure were discussed with the patient including but not limited to infection, hemorrhage, brainstem injury, stroke and CSF leak.  The patient was in agreement and elected to move forward with the procedure.    Hospital Course: Patient admitted for above procedure. The procedure itself was without complication. The patient was transferred to ICU following recovery where she has done very well.  She reports  resolution and facial pain postoperatively.  She did not feel ready for discharge yesterday but was transferred out of ICU.  She worked with PT using walker.  She has been up with walker.  She does not have 1 at home and does not know whether she really needs 1.  She reports some headache and neck pain.  Preoperative facial pain still resolved.  Tolerating diet, voiding.  Last PT to evaluate for any home health needs or durable medical equipment. she is stable for discharge home today.  She will follow-up with our office in 2 weeks where we will start to wean her off of medications for trigeminal neuralgia at that time.    Discharge Physical Exam:    Temp:  [98.2 °F (36.8 °C)-99.3 °F (37.4 °C)] 99.3 °F (37.4 °C)  Heart Rate:  [66-87] 87  Resp:  [16-18] 16  BP: (110-152)/(58-76) 152/74    Current labs:  Results from last 7 days   Lab Units 12/08/23  1444 12/08/23  0314   WBC 10*3/mm3  --  8.57   HEMOGLOBIN g/dL 10.6* 10.8*   HEMATOCRIT % 32.3* 35.2   PLATELETS 10*3/mm3  --  201     Results from last 7 days   Lab Units 12/08/23  0314   SODIUM mmol/L 142   POTASSIUM mmol/L 4.2   CHLORIDE mmol/L 108*   CO2 mmol/L 22.8   BUN mg/dL 8   CREATININE mg/dL 0.72   CALCIUM mg/dL 9.5   GLUCOSE mg/dL 139*     Pathology- pending    General Appearance No acute distress   HEENT NC/AT; right posterior auricular craniotomy incision well-approximated, no erythema, swelling or drainage   Neurological Awake, Alert, and oriented x 3   Cranial nerves Mild right facial weakness, sensation intact in face, no hypersensitivity, normal facial movements, EOMI, tongue midline   Motor Moves all extremities.  No drift   Gait and station Per PT note 12/8-performed bed mobility with SBA and required min A to stand at EOB. Pt ambulated 65' with rwx and min A. Pt demos mild unsteadiness throughout ambulation but demo's no overt LOB.    Extremities Moves all extremities well, no edema, no cyanosis, no redness         Discharge Medications  ALEJANDRO has  been reviewed and narcotic consent is on file in the patient's chart.     Your medication list        START taking these medications        Instructions Last Dose Given Next Dose Due   methocarbamol 500 MG tablet  Commonly known as: ROBAXIN      Take 1 tablet by mouth Every 8 (Eight) Hours As Needed (neck pain).       oxyCODONE-acetaminophen 5-325 MG per tablet  Commonly known as: PERCOCET      1 PO q 4 hrs PRN moderate pain, 2 PO q 4 hrs PRN severe pain       sennosides-docusate 8.6-50 MG per tablet  Commonly known as: PERICOLACE      Take 2 tablets by mouth 2 (Two) Times a Day.              CHANGE how you take these medications        Instructions Last Dose Given Next Dose Due   carBAMazepine  MG 12 hr tablet  Commonly known as: TEGretol  XR  What changed:   how much to take  how to take this  when to take this  additional instructions      TAKE 600MG DURING THE DAY AND 400MG AT NIGHT.       cholecalciferol 25 MCG (1000 UT) tablet  Commonly known as: VITAMIN D3  What changed:   how much to take  how to take this  when to take this      3 tablets daily              CONTINUE taking these medications        Instructions Last Dose Given Next Dose Due   atorvastatin 40 MG tablet  Commonly known as: LIPITOR      Take 1 tablet by mouth Daily. For cholesterol       folic acid 400 MCG tablet  Commonly known as: FOLVITE      Take 1 tablet by mouth Daily.       furosemide 20 MG tablet  Commonly known as: LASIX      TAKE 1 TABLET BY MOUTH EVERY DAY       latanoprost 0.005 % ophthalmic solution  Commonly known as: XALATAN      Administer 1 drop to both eyes Every Night.       pregabalin 75 MG capsule  Commonly known as: LYRICA      Take 1 capsule by mouth 2 (Two) Times a Day.       valsartan 160 MG tablet  Commonly known as: DIOVAN      Take 1 tablet by mouth Daily. for blood pressure.              STOP taking these medications      aspirin 81 MG EC tablet                  Where to Get Your Medications        These  medications were sent to [facility]/pharmacy #[id number] - [place], [place] - [address] [facility] - [phone]  - [phone] FX  [address] [facility] [place] [address]      Phone: [phone]   methocarbamol 500 MG tablet  oxyCODONE-acetaminophen 5-325 MG per tablet       You can get these medications from any pharmacy    You don't need a prescription for these medications  sennosides-docusate 8.6-50 MG per tablet         Discharge Diet:   Diet Instructions       Diet: Regular/House Diet; Regular Texture (IDDSI 7); Thin (IDDSI 0)      Discharge Diet: Regular/House Diet    Texture: Regular Texture (IDDSI 7)    Fluid Consistency: Thin (IDDSI 0)          Diet Order   Procedures    Diet: Regular/House Diet; Texture: Regular Texture (IDDSI 7); Fluid Consistency: Thin (IDDSI 0)       Activity at Discharge:   Activity Instructions       Discharge Activity      1) No driving until cleared by us and no longer taking narcotics.   2) Off work/school until cleared by us.  3) May shower but no submerging wound in tub, pool, etc.  4) Do not lift / push / pull more then 5 lbs.  5.)  No exertional or impact activity    Other Restrictions (Specify)      Pelvic Rest              Call for: questions or concerns    Follow-up Appointments  Future Appointments   Date Time Provider Department Center   12/22/2023  9:00 AM Layton Izaguirre MD [facility]   12/29/2023  9:00 AM Tripp Morse MD [facility]   1/26/2024  3:50 PM [id number]   3/7/2024  9:15 AM Joanne Maurice PA-C [id number]   5/21/2024  9:30 AM Conor Avendaño MD [facility]      Follow-up Information       Joanne Maurice PA-C .    Specialty: Family Medicine  Contact information:  [address] [address] [address] [phone]                           Additional Instructions for the Follow-ups that You Need to Schedule       Notify Physician or Go To The ED For the Following Conditions   As directed      Including but not  "limited to fever >100.5, chills, wound concerns (redness, swelling, drainage), new symptoms of numbness, tingling, weakness; new or uncontrolled pain despite using prescribed medications    Order Comments: Including but not limited to fever >100.5, chills, wound concerns (redness, swelling, drainage), new symptoms of numbness, tingling, weakness; new or uncontrolled pain despite using prescribed medications                 Test Results Pending at Discharge  Pending Labs       Order Current Status    Tissue Pathology Exam In process           None    I discussed the discharge instructions with patient and nursing staff    Holly Monaco, APRN  12/09/23  11:45 EST    30 min spent in reviewing records, discussion and examination of the patient and discussion with other members of the patient's medical team.    \"Dictated utilizing Dragon dictation\".      "

## 2023-12-08 NOTE — PROGRESS NOTES
"  Daily Progress Note.   [facility] INTENSIVE CARE  12/8/2023    Patient:  Name:  Kristina Kang  MRN:  [id number]  1960  62 y.o.  female         CC: icu mgmt, post op pain control bp control    Interval History:    No acute events overnight  Bp within goal  No worsening ha new neuro complaint.  Awake alert conversant  No cp soa.        Physical Exam:  /57   Pulse 61   Temp 97.7 °F (36.5 °C) (Oral)   Resp 12   Ht 157.5 cm (62\")   Wt 71.6 kg (157 lb 13.6 oz)   LMP  (LMP Unknown)   SpO2 100%   BMI 28.87 kg/m²   Body mass index is 28.87 kg/m².    Intake/Output Summary (Last 24 hours) at 12/8/2023 0718  Last data filed at 12/8/2023 0300  Gross per 24 hour   Intake 2331 ml   Output 2200 ml   Net 131 ml     General appearance: Nontoxic, conversant   Eyes: anicteric sclerae, moist conjunctivae; no lidlag;    HENT:  ; oropharynx clear with moist mucous membranes    Neck: Trachea midline;  supple   Lungs: CTA, with normal respiratory effort and no intercostal retractions  CV: RRR, no rub   Abdomen: Soft, nontender; BS+  Extremities:  no peripheral edema    Skin: WWP no diffuse visible rash  Psych: Appropriate affect, alert   Neuro: Moves all ext. CN II-XII. Speech intact.    Data Review:  Notable Labs:  Results from last 7 days   Lab Units 12/08/23  0314 12/01/23  1028   WBC 10*3/mm3 8.57 7.78   HEMOGLOBIN g/dL 10.8* 12.3   PLATELETS 10*3/mm3 201 207     Results from last 7 days   Lab Units 12/08/23  0314 12/01/23  1028   SODIUM mmol/L 142 140   POTASSIUM mmol/L 4.2 4.0   CHLORIDE mmol/L 108* 106   CO2 mmol/L 22.8 25.0   BUN mg/dL 8 13   CREATININE mg/dL 0.72 0.97   GLUCOSE mg/dL 139* 88   CALCIUM mg/dL 9.5 10.9*   Estimated Creatinine Clearance: 75.1 mL/min (by C-G formula based on SCr of 0.72 mg/dL).    Results from last 7 days   Lab Units 12/08/23  0314 12/01/23  1028   PLATELETS 10*3/mm3 201 207             Imaging:  Reviewed chest images personally from past 3 days    ASSESSMENT  /  " PLAN:  Right-sided craniectomy for microvascular decompression  Trigeminal neuralgia  Hypertension  Hyperlipidemia   Anemia        Neurochecks every hour per protocol as per neurosurgery  Sbp less than 140  Repeat hh  to ensure no further  Pain control  Further management per neurosurgery    All issues new to me today.  Prior hospital course, labs and imaging reviewed.  Talha overnight intensivist.  Will sign off    Electronically signed by Harvey Garcia MD, 12/08/23, 11:39 AM EST.

## 2023-12-09 ENCOUNTER — DOCUMENTATION (OUTPATIENT)
Dept: HOME HEALTH SERVICES | Facility: HOME HEALTHCARE | Age: 63
End: 2023-12-09
Payer: COMMERCIAL

## 2023-12-09 ENCOUNTER — HOME HEALTH ADMISSION (OUTPATIENT)
Dept: HOME HEALTH SERVICES | Facility: HOME HEALTHCARE | Age: 63
End: 2023-12-09
Payer: COMMERCIAL

## 2023-12-09 ENCOUNTER — READMISSION MANAGEMENT (OUTPATIENT)
Dept: CALL CENTER | Facility: HOSPITAL | Age: 63
End: 2023-12-09
Payer: COMMERCIAL

## 2023-12-09 VITALS
SYSTOLIC BLOOD PRESSURE: 152 MMHG | OXYGEN SATURATION: 94 % | WEIGHT: 157.85 LBS | DIASTOLIC BLOOD PRESSURE: 74 MMHG | RESPIRATION RATE: 16 BRPM | HEIGHT: 62 IN | BODY MASS INDEX: 29.05 KG/M2 | TEMPERATURE: 99.3 F | HEART RATE: 87 BPM

## 2023-12-09 PROCEDURE — 97110 THERAPEUTIC EXERCISES: CPT

## 2023-12-09 RX ORDER — METHOCARBAMOL 500 MG/1
500 TABLET, FILM COATED ORAL EVERY 8 HOURS PRN
Qty: 30 TABLET | Refills: 0 | Status: SHIPPED | OUTPATIENT
Start: 2023-12-09

## 2023-12-09 RX ORDER — OXYCODONE HYDROCHLORIDE AND ACETAMINOPHEN 5; 325 MG/1; MG/1
TABLET ORAL
Qty: 36 TABLET | Refills: 0 | Status: SHIPPED | OUTPATIENT
Start: 2023-12-09

## 2023-12-09 RX ORDER — AMOXICILLIN 250 MG
2 CAPSULE ORAL 2 TIMES DAILY
COMMUNITY
Start: 2023-12-09

## 2023-12-09 RX ORDER — METHOCARBAMOL 500 MG/1
500 TABLET, FILM COATED ORAL EVERY 8 HOURS PRN
Status: DISCONTINUED | OUTPATIENT
Start: 2023-12-09 | End: 2023-12-09 | Stop reason: HOSPADM

## 2023-12-09 RX ADMIN — CARBAMAZEPINE 600 MG: 200 TABLET, EXTENDED RELEASE ORAL at 08:15

## 2023-12-09 RX ADMIN — DOCUSATE SODIUM 50MG AND SENNOSIDES 8.6MG 2 TABLET: 8.6; 5 TABLET, FILM COATED ORAL at 08:15

## 2023-12-09 RX ADMIN — Medication 10 ML: at 08:16

## 2023-12-09 RX ADMIN — PREGABALIN 75 MG: 75 CAPSULE ORAL at 08:15

## 2023-12-09 RX ADMIN — FUROSEMIDE 20 MG: 20 TABLET ORAL at 08:15

## 2023-12-09 RX ADMIN — VALSARTAN 160 MG: 160 TABLET, FILM COATED ORAL at 08:15

## 2023-12-09 RX ADMIN — OXYCODONE AND ACETAMINOPHEN 2 TABLET: 5; 325 TABLET ORAL at 08:15

## 2023-12-09 NOTE — PROGRESS NOTES
[facility] to provide Home Care services. Patient and family agreeable and deny previous HH services. Contact information confirmed.

## 2023-12-09 NOTE — CASE MANAGEMENT/SOCIAL WORK
Continued Stay Note  [facility]     Patient Name: Kristina Kang  MRN: [id number]  Today's Date: 12/9/2023    Admit Date: 12/7/2023    Plan: Home via private auto; [facility] for rolling walker and [facility] to follow   Discharge Plan       Row Name 12/09/23 1541       Plan    Plan Home via private auto; [facility] for rolling walker and [facility] to follow    Patient/Family in Agreement with Plan yes    Plan Comments DC orders noted. Pt has order for rolling walker and HH. Called in to pt's room to discuss DC plans; she would like to use [facility] for DME and [facility]. Rolling walker delivered to bedside from [facility] stock in [facility] office. Spoke with Ryan/[facility] and they can accept and follow......JW                   Discharge Codes    No documentation.                 Expected Discharge Date and Time       Expected Discharge Date Expected Discharge Time    Dec 9, 2023               Martha Salinas, RN

## 2023-12-09 NOTE — OUTREACH NOTE
Prep Survey      Flowsheet Row Responses   Restoration facility patient discharged from? [facility]   Is LACE score < 7 ? No   Eligibility [facility]   Date of Admission 12/07/23   Date of Discharge 12/09/23   Discharge Disposition Home-Health Care Sv   Discharge diagnosis RIGHT SIDED RETROSIGMOID CRANIOTOMY FOR MICROVASCULAR DECOMPRESSION   Does the patient have one of the following disease processes/diagnoses(primary or secondary)? General Surgery   Does the patient have Home health ordered? Yes   What is the Home health agency?  [facility]   Is there a DME ordered? Yes   What DME was ordered? [place] for rolling walker   Prep survey completed? Yes            Liz DURAND - Registered Nurse

## 2023-12-09 NOTE — PLAN OF CARE
Goal Outcome Evaluation:        Problem: Adult Inpatient Plan of Care  Goal: Plan of Care Review  Outcome: Ongoing, Progressing  Goal: Patient-Specific Goal (Individualized)  Outcome: Ongoing, Progressing  Goal: Absence of Hospital-Acquired Illness or Injury  Outcome: Ongoing, Progressing  Intervention: Identify and Manage Fall Risk  Recent Flowsheet Documentation  Taken 12/9/2023 0400 by Ion Waldron RN  Safety Promotion/Fall Prevention:   activity supervised   fall prevention program maintained   safety round/check completed  Taken 12/9/2023 0000 by Ion Waldron RN  Safety Promotion/Fall Prevention: activity supervised  Taken 12/8/2023 2200 by Ion Waldron RN  Safety Promotion/Fall Prevention:   activity supervised   fall prevention program maintained   safety round/check completed  Taken 12/8/2023 2000 by Ion Waldron RN  Safety Promotion/Fall Prevention:   activity supervised   fall prevention program maintained   safety round/check completed  Intervention: Prevent Skin Injury  Recent Flowsheet Documentation  Taken 12/9/2023 0400 by Ion Waldron RN  Body Position: position changed independently  Taken 12/9/2023 0000 by Ion Waldron RN  Body Position: position changed independently  Taken 12/8/2023 2200 by Ion Waldron RN  Body Position: position changed independently  Taken 12/8/2023 2000 by Ion Waldron RN  Body Position: position changed independently  Intervention: Prevent and Manage VTE (Venous Thromboembolism) Risk  Recent Flowsheet Documentation  Taken 12/8/2023 2000 by Ion Waldron RN  Activity Management: activity encouraged  Intervention: Prevent Infection  Recent Flowsheet Documentation  Taken 12/8/2023 2000 by Ion Waldron RN  Infection Prevention: cohorting utilized  Goal: Optimal Comfort and Wellbeing  Outcome: Ongoing, Progressing  Intervention: Provide Person-Centered Care  Recent Flowsheet Documentation  Taken 12/8/2023 2000 by Ion Waldron RN  Trust  Relationship/Rapport: care explained  Goal: Readiness for Transition of Care  Outcome: Ongoing, Progressing     Problem: Pain Acute  Goal: Acceptable Pain Control and Functional Ability  Outcome: Ongoing, Progressing  Intervention: Prevent or Manage Pain  Recent Flowsheet Documentation  Taken 12/9/2023 0400 by Ion Waldron RN  Medication Review/Management: medications reviewed  Taken 12/9/2023 0000 by Ion Waldron RN  Medication Review/Management: medications reviewed  Taken 12/8/2023 2200 by Ion Waldron RN  Medication Review/Management: medications reviewed  Taken 12/8/2023 2000 by Ion Waldron RN  Medication Review/Management: medications reviewed  Intervention: Optimize Psychosocial Wellbeing  Recent Flowsheet Documentation  Taken 12/8/2023 2000 by Ion Waldron RN  Diversional Activities: television     Problem: Fall Injury Risk  Goal: Absence of Fall and Fall-Related Injury  Outcome: Ongoing, Progressing  Intervention: Identify and Manage Contributors  Recent Flowsheet Documentation  Taken 12/9/2023 0400 by Ion Waldron RN  Medication Review/Management: medications reviewed  Taken 12/9/2023 0000 by Ion Waldron RN  Medication Review/Management: medications reviewed  Taken 12/8/2023 2200 by Ion Waldron RN  Medication Review/Management: medications reviewed  Taken 12/8/2023 2000 by Ion Waldron RN  Medication Review/Management: medications reviewed  Intervention: Promote Injury-Free Environment  Recent Flowsheet Documentation  Taken 12/9/2023 0400 by Ion Waldron RN  Safety Promotion/Fall Prevention:   activity supervised   fall prevention program maintained   safety round/check completed  Taken 12/9/2023 0000 by Ion Waldron RN  Safety Promotion/Fall Prevention: activity supervised  Taken 12/8/2023 2200 by Ion Waldron RN  Safety Promotion/Fall Prevention:   activity supervised   fall prevention program maintained   safety round/check completed  Taken 12/8/2023 2000 by Chivo  Ion RN  Safety Promotion/Fall Prevention:   activity supervised   fall prevention program maintained   safety round/check completed     Problem: Infection  Goal: Absence of Infection Signs and Symptoms  Outcome: Ongoing, Progressing

## 2023-12-09 NOTE — THERAPY TREATMENT NOTE
Patient Name: Kristina Kang  : 1960    MRN: [id number]                              Today's Date: 2023       Admit Date: 2023    Visit Dx:     ICD-10-CM ICD-9-CM   1. Trigeminal neuralgia  G50.0 350.1   2. Preop examination  Z01.818 V72.84     Patient Active Problem List   Diagnosis    Degeneration of lumbar intervertebral disc    Hepatitis C    Essential hypertension    Hyperlipidemia    Vitamin D deficiency    Osteoarthritis    Episode of syncope    Amaurosis fugax of right eye    Osteopenia of both hips    Chronic seasonal allergic rhinitis    Hypocalciuric hypercalcemia    Vaginal atrophy    Generalized anxiety disorder    Abnormal MRI    Trigeminal neuralgia    Elevated hemoglobin A1c    Preop examination     Past Medical History:   Diagnosis Date    Allergic     Anemia     Colon polyp     Degeneration of lumbar intervertebral disc     Episode of syncope 2015    Essential hypertension     Glaucoma     Hepatitis C 2008    Dr. Coleman Null    History of bone density study     Normal    History of chest x-ray 2010    normal    History of echocardiogram 2015    History of EKG 2013    normal    History of Holter monitoring 2015    occas PVC    History of nuclear stress test 10/06/2015    LCG; ischemia    Hypercalcemia     Hyperlipidemia     Leiomyoma of uterus     and fibroids    Osteoarthritis     Osteopenia     Postmenopausal     Pyelonephritis     Trigeminal neuralgia     Vitamin D deficiency      Past Surgical History:   Procedure Laterality Date     SECTION      COLONOSCOPY      COLONOSCOPY W/ POLYPECTOMY      Benign polyps.  Dr. Malone    CRANIOTOMY FOR NERVE DECOMPRESSION Right 2023    Procedure: RIGHT SIDED RETROSIGMOID CRANIOTOMY FOR MICROVASCULAR DECOMPRESSION;  Surgeon: Tripp Morse MD;  Location: [facility];  Service: Neurosurgery;  Laterality: Right;    HYSTERECTOMY  2004    took one ovary     LIVER BIOPSY       OOPHORECTOMY Bilateral age 33    left 1/4 of the right       General Information       Row Name 12/09/23 1707          Physical Therapy Time and Intention    Document Type therapy note (daily note);discharge treatment  -     Mode of Treatment individual therapy;physical therapy  -       Row Name 12/09/23 1707          General Information    Patient Profile Reviewed yes  -     Existing Precautions/Restrictions fall  -       Row Name 12/09/23 1707          Living Environment    People in Home significant other  -       Row Name 12/09/23 1707          Home Main Entrance    Number of Stairs, Main Entrance three  -     Stair Railings, Main Entrance railings on both sides of stairs  -       Row Name 12/09/23 1707          Cognition    Orientation Status (Cognition) oriented x 4  -       Row Name 12/09/23 1707          Safety Issues, Functional Mobility    Impairments Affecting Function (Mobility) strength;endurance/activity tolerance  -               User Key  (r) = Recorded By, (t) = Taken By, (c) = Cosigned By      Initials Name Provider Type     Na Dela Cruz PTA Physical Therapist Assistant                   Mobility       Row Name 12/09/23 1708          Bed Mobility    Supine-Sit Frazer (Bed Mobility) supervision  -       Row Name 12/09/23 1708          Sit-Stand Transfer    Sit-Stand Frazer (Transfers) contact guard;standby assist;verbal cues  -     Assistive Device (Sit-Stand Transfers) walker, front-wheeled  -       Row Name 12/09/23 1708          Gait/Stairs (Locomotion)    Frazer Level (Gait) contact guard;standby assist  -     Assistive Device (Gait) walker, front-wheeled  -     Distance in Feet (Gait) 200ft  -     Deviations/Abnormal Patterns (Gait) nicole decreased;stride length decreased  -     Frazer Level (Stairs) contact guard;stand by assist  -     Handrail Location (Stairs) both sides  -     Ascending Technique (Stairs) step-to-step  -      Descending Technique (Stairs) step-to-step  backwards  -               User Key  (r) = Recorded By, (t) = Taken By, (c) = Cosigned By      Initials Name Provider Type    Na Prieto PTA Physical Therapist Assistant                   Obj/Interventions    No documentation.                  Goals/Plan    No documentation.                  Clinical Impression       Row Name 12/09/23 1710          Pain    Pretreatment Pain Rating 0/10 - no pain  -     Posttreatment Pain Rating 0/10 - no pain  -       Row Name 12/09/23 1710          Plan of Care Review    Plan of Care Reviewed With patient;significant other  -     Progress improving  -     Outcome Evaluation Pt agreed to PT session, pt improved balance and posture w/o cues, no c/o pain or dizziness, asked pt several times during amb; pt viki amb ~200ft CGA/SBA w/rwx , viki stair training x3 steps , has 2 HR; SBA for all tfers; plans home w/HH and [facility] arranging rwx for DC  -       Row Name 12/09/23 1710          Therapy Assessment/Plan (PT)    Rehab Potential (PT) good, to achieve stated therapy goals  -     Criteria for Skilled Interventions Met (PT) yes  -       Row Name 12/09/23 1710          Positioning and Restraints    Pre-Treatment Position in bed  -     Post Treatment Position bed  -JM     In Bed sitting EOB;with family/caregiver;encouraged to call for assist  no alarm upon entry, fam present  -               User Key  (r) = Recorded By, (t) = Taken By, (c) = Cosigned By      Initials Name Provider Type    Na Prieto PTA Physical Therapist Assistant                   Outcome Measures       Row Name 12/09/23 1716          How much help from another person do you currently need...    Turning from your back to your side while in flat bed without using bedrails? 4  -JM     Moving from lying on back to sitting on the side of a flat bed without bedrails? 4  -JM     Moving to and from a bed to a chair (including a wheelchair)? 3   -JM     Standing up from a chair using your arms (e.g., wheelchair, bedside chair)? 4  -JM     Climbing 3-5 steps with a railing? 3  -JM     To walk in hospital room? 3  -JM     AM-PAC 6 Clicks Score (PT) 21  -     Highest Level of Mobility Goal 6 --> Walk 10 steps or more  -               User Key  (r) = Recorded By, (t) = Taken By, (c) = Cosigned By      Initials Name Provider Type    Na Prieto PTA Physical Therapist Assistant                                 Physical Therapy Education       Title: PT OT SLP Therapies (Done)       Topic: Physical Therapy (Done)       Point: Mobility training (Done)       Learning Progress Summary             Patient Eager, TB,E,D, VU,DU by  at 12/9/2023 1716    Acceptance, E, VU,NR by  at 12/8/2023 1224   Family Eager, TB,E,D, VU,DU by  at 12/9/2023 1716                         Point: Home exercise program (Done)       Learning Progress Summary             Patient Eager, TB,E,D, VU,DU by  at 12/9/2023 1716   Family Eager, TB,E,D, VU,DU by  at 12/9/2023 1716                         Point: Body mechanics (Done)       Learning Progress Summary             Patient Eager, TB,E,D, VU,DU by  at 12/9/2023 1716    Acceptance, E, VU,NR by  at 12/8/2023 1224   Family Eager, TB,E,D, VU,DU by  at 12/9/2023 1716                         Point: Precautions (Done)       Learning Progress Summary             Patient Eager, TB,E,D, VU,DU by  at 12/9/2023 1716   Family Eager, TB,E,D, VU,DU by  at 12/9/2023 1716                                         User Key       Initials Effective Dates Name Provider Type Discipline    EE 06/16/21 -  Tiffanie Fields, PT Physical Therapist PT    BILL 03/07/18 -  Na Dela Cruz PTA Physical Therapist Assistant PT                  PT Recommendation and Plan     Plan of Care Reviewed With: patient, significant other  Progress: improving  Outcome Evaluation: Pt agreed to PT session, pt improved balance and posture w/o cues, no c/o pain  or dizziness, asked pt several times during amb; pt viki amb ~200ft CGA/SBA w/rwx , viki stair training x3 steps , has 2 HR; SBA for all tfers; plans home w/HH and [facility] arranging rwx for DC     Time Calculation:         PT Charges       Row Name 12/09/23 1706             Time Calculation    Start Time 1415  -      Stop Time 1435  -JM      Time Calculation (min) 20 min  -JM      PT Received On 12/09/23  -                User Key  (r) = Recorded By, (t) = Taken By, (c) = Cosigned By      Initials Name Provider Type    Na Prieto PTA Physical Therapist Assistant                  Therapy Charges for Today       Code Description Service Date Service Provider Modifiers Qty    54285435147 HC PT THER PROC EA 15 MIN 12/9/2023 Na Dela Cruz PTA GP 1            PT G-Codes  AM-PAC 6 Clicks Score (PT): 21  PT Discharge Summary  Anticipated Discharge Disposition (PT): home with assist, home with home health (then may transition to outpt services if needed)    Na Dela Cruz PTA  12/9/2023

## 2023-12-09 NOTE — PLAN OF CARE
Goal Outcome Evaluation:  Plan of Care Reviewed With: patient, significant other        Progress: improving  Outcome Evaluation: Pt agreed to PT session, pt improved balance and posture w/o cues, no c/o pain or dizziness, asked pt several times during amb; pt viki amb ~200ft CGA/SBA w/rwx , viki stair training x3 steps , has 2 HR; SBA for all tfers; plans home w/HH and [facility] arranging rwx for DC      Anticipated Discharge Disposition (PT): home with assist, home with home health (then may transition to outpt services if needed)

## 2023-12-09 NOTE — DISCHARGE PLACEMENT REQUEST
"Kristina Ramirez (62 y.o. Female)       Date of Birth   1960    Social Security Number       Address   [address] [place] [address]    Home Phone   [phone]    MRN   [id number]       Anglican   Other    Marital Status                               Admission Date   12/7/23    Admission Type   Elective    Admitting Provider   Tripp Morse MD    Attending Provider   Tripp Morse MD    Department, Room/Bed   [address], P589/1       Discharge Date       Discharge Disposition   Home or Self Care    Discharge Destination                                 Attending Provider: Tripp Morse MD    Allergies: No Known Allergies    Isolation: None   Infection: None   Code Status: CPR    Ht: 157.5 cm (62\")   Wt: 71.6 kg (157 lb 13.6 oz)    Admission Cmt: None   Principal Problem: Preop examination [Z01.818]                   Active Insurance as of 12/7/2023       Primary Coverage       Payor Plan Insurance Group Employer/Plan Group    [facility] BLUE CROSS [facility] BLUE CROSS BLUE SHIELD PPO [id number]       Payor Plan Address Payor Plan Phone Number Payor Plan Fax Number Effective Dates    [address] [phone]  1/1/2014 - None Entered    [facility] [address]         Subscriber Name Subscriber Birth Date Member ID       KRISTINA RAMIREZ 1960 [id number]                     Emergency Contacts        (Rel.) Home Phone Work Phone Mobile Phone    Aaron Alonso (Daughter) -- -- [phone]                "

## 2023-12-10 ENCOUNTER — HOME CARE VISIT (OUTPATIENT)
Dept: HOME HEALTH SERVICES | Facility: HOME HEALTHCARE | Age: 63
End: 2023-12-10
Payer: COMMERCIAL

## 2023-12-10 VITALS
SYSTOLIC BLOOD PRESSURE: 128 MMHG | TEMPERATURE: 98 F | OXYGEN SATURATION: 99 % | HEART RATE: 74 BPM | RESPIRATION RATE: 16 BRPM | DIASTOLIC BLOOD PRESSURE: 80 MMHG

## 2023-12-10 PROCEDURE — G0151 HHCP-SERV OF PT,EA 15 MIN: HCPCS

## 2023-12-10 NOTE — HOME HEALTH
"Physical Therapy Evaluation   Diagnosis: trigeminal neuralgia  Focus of Care: trigeminal neuralgia  Surgical Procedure and date: right sided retrosigmoid craniotomy for microvascular decompression, 12/7/23    Pertinent Medical History: see epic    Prior level of function:   Ambulation: independent with no device   Activities of daily living: independent   Instrumental activities of daily living: independent   Driving: drives   Other/ Hobbies/Work:  works at [employer], can work from home    Home Environment: lives with fiance in home with steps with rail to enter/exit  Goal for Physical Therapy: \"to be able to drive and work from home, get rid of echo in right ear\"  Skilled Physical Therapy indicated for instruction in gait, exercise, education and home safety.    Plan for next visit: check incision, review meds, gait"

## 2023-12-10 NOTE — CASE COMMUNICATION
Patient will be 2w1, 1w1 for  PT.  Patient s/p surgery to relieve trigeminal neuralgia. Patient at home with lucinda.  Had not picked up her methocarbamol, oxycodone or percolace because pharmacy was closed yesterday.  Will  today.   All other meds in home.  Patient walking with walker.  Has no pain.  Incision clean and dry, secured by staples.  PT to monitor incision, meds, gait and neurological signs and symptoms.  Patient darrell uld do well.

## 2023-12-10 NOTE — PAYOR COMM NOTE
"Kristina Ramirez (62 y.o. Female)          DC SUMMARY FOR [id number]     CONTACT F# [phone]         Date of Birth   1960    Social Security Number       Address   [address] [place] [address]    Home Phone   [phone]    MRN   [id number]       Jewish   Other    Marital Status                               Admission Date   12/7/23    Admission Type   Elective    Admitting Provider   Tripp Morse MD    Attending Provider       Department, Room/Bed   [address], P589/1       Discharge Date   12/9/2023    Discharge Disposition   Home or Self Care    Discharge Destination                                 Attending Provider: (none)   Allergies: No Known Allergies    Isolation: None   Infection: None   Code Status: CPR    Ht: 157.5 cm (62\")   Wt: 71.6 kg (157 lb 13.6 oz)    Admission Cmt: None   Principal Problem: Preop examination [Z01.818]                   Active Insurance as of 12/7/2023       Primary Coverage       Payor Plan Insurance Group Employer/Plan Group    [facility] [employer] [facility] [employer] [facility] [facility] PPO [id number]       Payor Plan Address Payor Plan Phone Number Payor Plan Fax Number Effective Dates    [address] [phone]  1/1/2014 - None Entered    [place] [address]         Subscriber Name Subscriber Birth Date Member ID       KRISTINA RAMIREZ 1960 [id number]                     Emergency Contacts        (Rel.) Home Phone Work Phone Mobile Phone    Aaron Alonso (Daughter) -- -- [phone]                 Discharge Summary        Holly Monaco, APRN at 12/08/23 09Vida Ramirez  1960    Patient Care Team:  Joanne Maurice PA-C as PCP - General  Joanne Maurice PA-C as PCP - Family Medicine  Lew Malone MD as Consulting Physician (Gastroenterology)  Conor Avendaño MD as Consulting Physician (Endocrinology)  Kendall De La Garza MD as Consulting Physician " (Ophthalmology)  Aletah Sher MD (Dermatology)  Layton Izaguirre MD as Consulting Physician (Neurology)  Carole Gee MD as Consulting Physician (Dermatology)    Date of Admit: 12/7/2023    Date of Discharge:  12/9/2023    Discharge Diagnosis:  Preop examination    Trigeminal neuralgia      Procedures Performed  Procedure(s):  RIGHT SIDED RETROSIGMOID CRANIOTOMY FOR MICROVASCULAR DECOMPRESSION       Complications: None    Consultants:   Consults       Date and Time Order Name Status Description    12/7/2023  9:20 PM Inpatient Intensivist Consult              Condition on Discharge: Improved    Discharge disposition: Home    Brief HPI: The patient is a 62-year-old female who presented to the office with a history of right V3 trigeminal neuralgia.  She has been followed by a neurologist, treated with multiple medications but unfortunately has had very minimal improvement in her symptoms over time.  She reports the medications have severe side effects and have made her very lethargic and confused at times.  She requested to undergo a microvascular decompression to help alleviate symptoms.  She was consented for a right retrosigmoid craniectomy for MVD.  The risks and benefits of the procedure were discussed with the patient including but not limited to infection, hemorrhage, brainstem injury, stroke and CSF leak.  The patient was in agreement and elected to move forward with the procedure.    Hospital Course: Patient admitted for above procedure. The procedure itself was without complication. The patient was transferred to ICU following recovery where she has done very well.  She reports resolution and facial pain postoperatively.  She did not feel ready for discharge yesterday but was transferred out of ICU.  She worked with PT using walker.  She has been up with walker.  She does not have 1 at home and does not know whether she really needs 1.  She reports some headache and neck pain.   Preoperative facial pain still resolved.  Tolerating diet, voiding.  Last PT to evaluate for any home health needs or durable medical equipment. she is stable for discharge home today.  She will follow-up with our office in 2 weeks where we will start to wean her off of medications for trigeminal neuralgia at that time.    Discharge Physical Exam:    Temp:  [98.2 °F (36.8 °C)-99.3 °F (37.4 °C)] 99.3 °F (37.4 °C)  Heart Rate:  [66-87] 87  Resp:  [16-18] 16  BP: (110-152)/(58-76) 152/74    Current labs:  Results from last 7 days   Lab Units 12/08/23  1444 12/08/23  0314   WBC 10*3/mm3  --  8.57   HEMOGLOBIN g/dL 10.6* 10.8*   HEMATOCRIT % 32.3* 35.2   PLATELETS 10*3/mm3  --  201     Results from last 7 days   Lab Units 12/08/23  0314   SODIUM mmol/L 142   POTASSIUM mmol/L 4.2   CHLORIDE mmol/L 108*   CO2 mmol/L 22.8   BUN mg/dL 8   CREATININE mg/dL 0.72   CALCIUM mg/dL 9.5   GLUCOSE mg/dL 139*     Pathology- pending    General Appearance No acute distress   HEENT NC/AT; right posterior auricular craniotomy incision well-approximated, no erythema, swelling or drainage   Neurological Awake, Alert, and oriented x 3   Cranial nerves Mild right facial weakness, sensation intact in face, no hypersensitivity, normal facial movements, EOMI, tongue midline   Motor Moves all extremities.  No drift   Gait and station Per PT note 12/8-performed bed mobility with SBA and required min A to stand at EOB. Pt ambulated 65' with rwx and min A. Pt demos mild unsteadiness throughout ambulation but demo's no overt LOB.    Extremities Moves all extremities well, no edema, no cyanosis, no redness         Discharge Medications  ALEJANDRO has been reviewed and narcotic consent is on file in the patient's chart.     Your medication list        START taking these medications        Instructions Last Dose Given Next Dose Due   methocarbamol 500 MG tablet  Commonly known as: ROBAXIN      Take 1 tablet by mouth Every 8 (Eight) Hours As Needed (neck  pain).       oxyCODONE-acetaminophen 5-325 MG per tablet  Commonly known as: PERCOCET      1 PO q 4 hrs PRN moderate pain, 2 PO q 4 hrs PRN severe pain       sennosides-docusate 8.6-50 MG per tablet  Commonly known as: PERICOLACE      Take 2 tablets by mouth 2 (Two) Times a Day.              CHANGE how you take these medications        Instructions Last Dose Given Next Dose Due   carBAMazepine  MG 12 hr tablet  Commonly known as: TEGretol  XR  What changed:   how much to take  how to take this  when to take this  additional instructions      TAKE 600MG DURING THE DAY AND 400MG AT NIGHT.       cholecalciferol 25 MCG (1000 UT) tablet  Commonly known as: VITAMIN D3  What changed:   how much to take  how to take this  when to take this      3 tablets daily              CONTINUE taking these medications        Instructions Last Dose Given Next Dose Due   atorvastatin 40 MG tablet  Commonly known as: LIPITOR      Take 1 tablet by mouth Daily. For cholesterol       folic acid 400 MCG tablet  Commonly known as: FOLVITE      Take 1 tablet by mouth Daily.       furosemide 20 MG tablet  Commonly known as: LASIX      TAKE 1 TABLET BY MOUTH EVERY DAY       latanoprost 0.005 % ophthalmic solution  Commonly known as: XALATAN      Administer 1 drop to both eyes Every Night.       pregabalin 75 MG capsule  Commonly known as: LYRICA      Take 1 capsule by mouth 2 (Two) Times a Day.       valsartan 160 MG tablet  Commonly known as: DIOVAN      Take 1 tablet by mouth Daily. for blood pressure.              STOP taking these medications      aspirin 81 MG EC tablet                  Where to Get Your Medications        These medications were sent to [facility]/pharmacy #[id number] - [facility] [place] - [address] [facility] [phone] [place] [phone] FX  [address] [facility] [place] [address]      Phone: [phone]   methocarbamol 500 MG tablet  oxyCODONE-acetaminophen 5-325 MG per tablet       You can get these medications from any pharmacy    You  don't need a prescription for these medications  sennosides-docusate 8.6-50 MG per tablet         Discharge Diet:   Diet Instructions       Diet: Regular/House Diet; Regular Texture (IDDSI 7); Thin (IDDSI 0)      Discharge Diet: Regular/House Diet    Texture: Regular Texture (IDDSI 7)    Fluid Consistency: Thin (IDDSI 0)          Diet Order   Procedures    Diet: Regular/House Diet; Texture: Regular Texture (IDDSI 7); Fluid Consistency: Thin (IDDSI 0)       Activity at Discharge:   Activity Instructions       Discharge Activity      1) No driving until cleared by us and no longer taking narcotics.   2) Off work/school until cleared by us.  3) May shower but no submerging wound in tub, pool, etc.  4) Do not lift / push / pull more then 5 lbs.  5.)  No exertional or impact activity    Other Restrictions (Specify)      Pelvic Rest              Call for: questions or concerns    Follow-up Appointments  Future Appointments   Date Time Provider Department Center   12/22/2023  9:00 AM Layton Izaguirre MD [facility]   12/29/2023  9:00 AM Tripp Morse MD [facility] LOU41 [facility]   1/26/2024  3:50 PM [facility]  [facility]   3/7/2024  9:15 AM Joanne Maurice PA-C [facility] JTWN2 [facility]   5/21/2024  9:30 AM Conor Avendaño MD [facility]  [facility]      Follow-up Information       Joanne Maurice PA-C .    Specialty: Family Medicine  Contact information:  [address] [place] [address]  [phone]                           Additional Instructions for the Follow-ups that You Need to Schedule       Notify Physician or Go To The ED For the Following Conditions   As directed      Including but not limited to fever >100.5, chills, wound concerns (redness, swelling, drainage), new symptoms of numbness, tingling, weakness; new or uncontrolled pain despite using prescribed medications    Order Comments: Including but not limited to fever >100.5, chills, wound concerns (redness, swelling, drainage), new  "symptoms of numbness, tingling, weakness; new or uncontrolled pain despite using prescribed medications                 Test Results Pending at Discharge  Pending Labs       Order Current Status    Tissue Pathology Exam In process           None    I discussed the discharge instructions with patient and nursing staff    TERELL Lynch  12/09/23  11:45 EST    30 min spent in reviewing records, discussion and examination of the patient and discussion with other members of the patient's medical team.    \"Dictated utilizing Dragon dictation\".        Electronically signed by Holly Monaco APRN at 12/09/23 1145       "

## 2023-12-11 ENCOUNTER — TRANSITIONAL CARE MANAGEMENT TELEPHONE ENCOUNTER (OUTPATIENT)
Dept: CALL CENTER | Facility: HOSPITAL | Age: 63
End: 2023-12-11
Payer: COMMERCIAL

## 2023-12-11 NOTE — PROGRESS NOTES
Case Management Discharge Note      Final Note: Home with [facility] and walker via [facility]         Selected Continued Care - Discharged on 12/9/2023 Admission date: 12/7/2023 - Discharge disposition: Home or Self Care      Destination    No services have been selected for the patient.                Durable Medical Equipment Coordination complete.      Service Provider Selected Services Address Phone Fax Patient Preferred    [facility] [address], [facility] [address] [phone] --              Dialysis/Infusion    No services have been selected for the patient.                Home Medical Care Coordination complete.      Service Provider Selected Services Address Phone Fax Patient Preferred    [facility] [address] [place] [address] [address] [phone] --              Therapy    No services have been selected for the patient.                Community Resources    No services have been selected for the patient.                Community & DME    No services have been selected for the patient.                    Transportation Services  Private: Car    Final Discharge Disposition Code: 06 - home with home health care

## 2023-12-11 NOTE — PAYOR COMM NOTE
"Kristina Ramirez (62 y.o. Female)     PLEASE SEE ATTACHED FOR DC NOTICE    REF #  [id number]     THANK YOU  MAICOL KUNZ RN/UM DEPT  [facility]   [phone]  [phone]        Date of Birth   1960    Social Security Number       Address   [address] [place] [address]    Home Phone   [phone]    MRN   [id number]       Sabianist   Other    Marital Status                               Admission Date   12/7/23    Admission Type   Elective    Admitting Provider   Tripp Morse MD    Attending Provider       Department, Room/Bed   [facility] 5 [place], P589/1       Discharge Date   12/9/2023    Discharge Disposition   Home or Self Care    Discharge Destination                                 Attending Provider: (none)   Allergies: No Known Allergies    Isolation: None   Infection: None   Code Status: CPR    Ht: 157.5 cm (62\")   Wt: 71.6 kg (157 lb 13.6 oz)    Admission Cmt: None   Principal Problem: Preop examination [Z01.818]                   Active Insurance as of 12/7/2023       Primary Coverage       Payor Plan Insurance Group Employer/Plan Group    [employer] PPO [id number]       Payor Plan Address Payor Plan Phone Number Payor Plan Fax Number Effective Dates    [address] [phone]  1/1/2014 - None Entered    [place] [address]         Subscriber Name Subscriber Birth Date Member ID       KRISTINA RAMIREZ 1960 [id number]                     Emergency Contacts        (Rel.) Home Phone Work Phone Mobile Phone    Aaron Alonso (Daughter) -- -- [phone]              [place]: [facility] [id number]  Tax ID [id number]     Discharge Summary        Holly Monaco, APRN at 12/08/23 0904          Kristina Ramirez  1960    Patient Care Team:  Joanne Maurice PA-C as PCP - General  Joanne Maurice PA-C as PCP - Family Medicine  Lew Malone MD as Consulting Physician " (Gastroenterology)  Conor Avendaño MD as Consulting Physician (Endocrinology)  Kendall De La Garza MD as Consulting Physician (Ophthalmology)  Aletha Sher MD (Dermatology)  Layton Izaguirre MD as Consulting Physician (Neurology)  Carole Gee MD as Consulting Physician (Dermatology)    Date of Admit: 12/7/2023    Date of Discharge:  12/9/2023    Discharge Diagnosis:  Preop examination    Trigeminal neuralgia      Procedures Performed  Procedure(s):  RIGHT SIDED RETROSIGMOID CRANIOTOMY FOR MICROVASCULAR DECOMPRESSION       Complications: None    Consultants:   Consults       Date and Time Order Name Status Description    12/7/2023  9:20 PM Inpatient Intensivist Consult              Condition on Discharge: Improved    Discharge disposition: Home    Brief HPI: The patient is a 62-year-old female who presented to the office with a history of right V3 trigeminal neuralgia.  She has been followed by a neurologist, treated with multiple medications but unfortunately has had very minimal improvement in her symptoms over time.  She reports the medications have severe side effects and have made her very lethargic and confused at times.  She requested to undergo a microvascular decompression to help alleviate symptoms.  She was consented for a right retrosigmoid craniectomy for MVD.  The risks and benefits of the procedure were discussed with the patient including but not limited to infection, hemorrhage, brainstem injury, stroke and CSF leak.  The patient was in agreement and elected to move forward with the procedure.    Hospital Course: Patient admitted for above procedure. The procedure itself was without complication. The patient was transferred to ICU following recovery where she has done very well.  She reports resolution and facial pain postoperatively.  She did not feel ready for discharge yesterday but was transferred out of ICU.  She worked with PT using walker.  She has been up with walker.   She does not have 1 at home and does not know whether she really needs 1.  She reports some headache and neck pain.  Preoperative facial pain still resolved.  Tolerating diet, voiding.  Last PT to evaluate for any home health needs or durable medical equipment. she is stable for discharge home today.  She will follow-up with our office in 2 weeks where we will start to wean her off of medications for trigeminal neuralgia at that time.    Discharge Physical Exam:    Temp:  [98.2 °F (36.8 °C)-99.3 °F (37.4 °C)] 99.3 °F (37.4 °C)  Heart Rate:  [66-87] 87  Resp:  [16-18] 16  BP: (110-152)/(58-76) 152/74    Current labs:  Results from last 7 days   Lab Units 12/08/23  1444 12/08/23  0314   WBC 10*3/mm3  --  8.57   HEMOGLOBIN g/dL 10.6* 10.8*   HEMATOCRIT % 32.3* 35.2   PLATELETS 10*3/mm3  --  201     Results from last 7 days   Lab Units 12/08/23  0314   SODIUM mmol/L 142   POTASSIUM mmol/L 4.2   CHLORIDE mmol/L 108*   CO2 mmol/L 22.8   BUN mg/dL 8   CREATININE mg/dL 0.72   CALCIUM mg/dL 9.5   GLUCOSE mg/dL 139*     Pathology- pending    General Appearance No acute distress   HEENT NC/AT; right posterior auricular craniotomy incision well-approximated, no erythema, swelling or drainage   Neurological Awake, Alert, and oriented x 3   Cranial nerves Mild right facial weakness, sensation intact in face, no hypersensitivity, normal facial movements, EOMI, tongue midline   Motor Moves all extremities.  No drift   Gait and station Per PT note 12/8-performed bed mobility with SBA and required min A to stand at EOB. Pt ambulated 65' with rwx and min A. Pt demos mild unsteadiness throughout ambulation but demo's no overt LOB.    Extremities Moves all extremities well, no edema, no cyanosis, no redness         Discharge Medications  ALEJANDRO has been reviewed and narcotic consent is on file in the patient's chart.     Your medication list        START taking these medications        Instructions Last Dose Given Next Dose Due    methocarbamol 500 MG tablet  Commonly known as: ROBAXIN      Take 1 tablet by mouth Every 8 (Eight) Hours As Needed (neck pain).       oxyCODONE-acetaminophen 5-325 MG per tablet  Commonly known as: PERCOCET      1 PO q 4 hrs PRN moderate pain, 2 PO q 4 hrs PRN severe pain       sennosides-docusate 8.6-50 MG per tablet  Commonly known as: PERICOLACE      Take 2 tablets by mouth 2 (Two) Times a Day.              CHANGE how you take these medications        Instructions Last Dose Given Next Dose Due   carBAMazepine  MG 12 hr tablet  Commonly known as: TEGretol  XR  What changed:   how much to take  how to take this  when to take this  additional instructions      TAKE 600MG DURING THE DAY AND 400MG AT NIGHT.       cholecalciferol 25 MCG (1000 UT) tablet  Commonly known as: VITAMIN D3  What changed:   how much to take  how to take this  when to take this      3 tablets daily              CONTINUE taking these medications        Instructions Last Dose Given Next Dose Due   atorvastatin 40 MG tablet  Commonly known as: LIPITOR      Take 1 tablet by mouth Daily. For cholesterol       folic acid 400 MCG tablet  Commonly known as: FOLVITE      Take 1 tablet by mouth Daily.       furosemide 20 MG tablet  Commonly known as: LASIX      TAKE 1 TABLET BY MOUTH EVERY DAY       latanoprost 0.005 % ophthalmic solution  Commonly known as: XALATAN      Administer 1 drop to both eyes Every Night.       pregabalin 75 MG capsule  Commonly known as: LYRICA      Take 1 capsule by mouth 2 (Two) Times a Day.       valsartan 160 MG tablet  Commonly known as: DIOVAN      Take 1 tablet by mouth Daily. for blood pressure.              STOP taking these medications      aspirin 81 MG EC tablet                  Where to Get Your Medications        These medications were sent to [facility]/pharmacy #[id number] - [place], [place] - [address] [facility] - [phone] [facility] [phone] FX  [address] [facility] [place] [address]      Phone: [phone]    methocarbamol 500 MG tablet  oxyCODONE-acetaminophen 5-325 MG per tablet       You can get these medications from any pharmacy    You don't need a prescription for these medications  sennosides-docusate 8.6-50 MG per tablet         Discharge Diet:   Diet Instructions       Diet: Regular/House Diet; Regular Texture (IDDSI 7); Thin (IDDSI 0)      Discharge Diet: Regular/House Diet    Texture: Regular Texture (IDDSI 7)    Fluid Consistency: Thin (IDDSI 0)          Diet Order   Procedures    Diet: Regular/House Diet; Texture: Regular Texture (IDDSI 7); Fluid Consistency: Thin (IDDSI 0)       Activity at Discharge:   Activity Instructions       Discharge Activity      1) No driving until cleared by us and no longer taking narcotics.   2) Off work/school until cleared by us.  3) May shower but no submerging wound in tub, pool, etc.  4) Do not lift / push / pull more then 5 lbs.  5.)  No exertional or impact activity    Other Restrictions (Specify)      Pelvic Rest              Call for: questions or concerns    Follow-up Appointments  Future Appointments   Date Time Provider Department Center   12/22/2023  9:00 AM Layton Izaguirre MD [facility]   12/29/2023  9:00 AM Tripp Morse MD [facility] LOU41 [facility]   1/26/2024  3:50 PM [facility]  [facility]   3/7/2024  9:15 AM Joanne Maurice PA-C [facility] JTWN2 [facility]   5/21/2024  9:30 AM Conor Avendaño MD [facility]  [facility]      Follow-up Information       Joanne Maurice PA-C .    Specialty: Family Medicine  Contact information:  [address] [place] [address]  [phone]                           Additional Instructions for the Follow-ups that You Need to Schedule       Notify Physician or Go To The ED For the Following Conditions   As directed      Including but not limited to fever >100.5, chills, wound concerns (redness, swelling, drainage), new symptoms of numbness, tingling, weakness; new or uncontrolled pain despite using prescribed  "medications    Order Comments: Including but not limited to fever >100.5, chills, wound concerns (redness, swelling, drainage), new symptoms of numbness, tingling, weakness; new or uncontrolled pain despite using prescribed medications                 Test Results Pending at Discharge  Pending Labs       Order Current Status    Tissue Pathology Exam In process           None    I discussed the discharge instructions with patient and nursing staff    TERELL Lynch  12/09/23  11:45 EST    30 min spent in reviewing records, discussion and examination of the patient and discussion with other members of the patient's medical team.    \"Dictated utilizing Dragon dictation\".        Electronically signed by Holly Monaco APRN at 12/09/23 1145       Discharge Order (From admission, onward)       Start     Ordered    12/09/23 1127  Discharge patient  Once        Expected Discharge Date: 12/09/23   Expected Discharge Time: Midday   Discharge Disposition: Home or Self Care   Physician of Record for Attribution - Please select from Treatment Team: SCAR SPARKS [[id number]]   Review needed by CMO to determine Physician of Record: No      Question Answer Comment   Physician of Record for Attribution - Please select from Treatment Team SCAR SPARKS    Review needed by CMO to determine Physician of Record No        12/09/23 1132                  "

## 2023-12-11 NOTE — OUTREACH NOTE
Call Center TCM Note      Flowsheet Row Responses   [facility] patient discharged from? [facility]   Does the patient have one of the following disease processes/diagnoses(primary or secondary)? General Surgery   TCM attempt successful? No   Unsuccessful attempts Attempt 1            Kamini Castaneda MA    12/11/2023, 11:42 EST

## 2023-12-11 NOTE — OUTREACH NOTE
Call Center TCM Note      Flowsheet Row Responses   [facility] patient discharged from? [facility]   Does the patient have one of the following disease processes/diagnoses(primary or secondary)? General Surgery   TCM attempt successful? No   Unsuccessful attempts Attempt 2            Kamini Castaneda MA    12/11/2023, 16:06 EST

## 2023-12-12 ENCOUNTER — TRANSITIONAL CARE MANAGEMENT TELEPHONE ENCOUNTER (OUTPATIENT)
Dept: CALL CENTER | Facility: HOSPITAL | Age: 63
End: 2023-12-12
Payer: COMMERCIAL

## 2023-12-12 LAB
LAB AP CASE REPORT: NORMAL
PATH REPORT.FINAL DX SPEC: NORMAL
PATH REPORT.GROSS SPEC: NORMAL

## 2023-12-12 NOTE — OUTREACH NOTE
Call Center TCM Note      Flowsheet Row Responses   [facility] patient discharged from? [place]   Does the patient have one of the following disease processes/diagnoses(primary or secondary)? General Surgery   TCM attempt successful? Yes   Call start time 1220   Call end time 1223   Discharge diagnosis RIGHT SIDED RETROSIGMOID CRANIOTOMY FOR MICROVASCULAR DECOMPRESSION   Meds reviewed with patient/caregiver? Yes   Is the patient having any side effects they believe may be caused by any medication additions or changes? No   Does the patient have all medications related to this admission filled (includes all antibiotics, pain medications, etc.) Yes   Is the patient taking all medications as directed (includes completed medication regime)? Yes   Does the patient have an appointment with their PCP within 7-14 days of discharge? No   Nursing Interventions Patient desires to follow up with specialty only, Routed TCM call to PCP office, Patient declined scheduling/rescheduling appointment at this time   What is the Home health agency?  [facility]   Has home health visited the patient within 72 hours of discharge? Yes   What DME was ordered? [place] for rolling walker   Has all DME been delivered? Yes   Psychosocial issues? No   Did the patient receive a copy of their discharge instructions? Yes   Nursing interventions Reviewed instructions with patient   What is the patient's perception of their health status since discharge? Improving   Nursing interventions Nurse provided patient education   Is the patient /caregiver able to teach back basic post-op care? Continue use of incentive spirometry at least 1 week post discharge, Take showers only when approved by MD-sponge bathe until then, Do not remove steri-strips, Lifting as instructed by MD in discharge instructions, No tub bath, swimming, or hot tub until instructed by MD, Practice 'cough and deep breath', Drive as instructed by MD in discharge instructions, Keep  incision areas clean,dry and protected   Is the patient/caregiver able to teach back signs and symptoms of incisional infection? Increased redness, swelling or pain at the incisonal site, Pus or odor from incision, Fever, Increased drainage or bleeding, Incisional warmth   Is the patient/caregiver able to teach back steps to recovery at home? Set small, achievable goals for return to baseline health, Practice good oral hygiene, Eat a well-balance diet, Rest and rebuild strength, gradually increase activity, Weigh daily, Make a list of questions for surgeon's appointment   If the patient is a current smoker, are they able to teach back resources for cessation? Not a smoker   Is the patient/caregiver able to teach back the hierarchy of who to call/visit for symptoms/problems? PCP, Specialist, Home health nurse, Urgent Care, ED, 911 Yes   TCM call completed? Yes   Wrap up additional comments D/C DX: Rt sided Retrosigmoid Craniotomy for Microvascular Decompression - Pt feels she is doing pretty well. New rx's thocarbamol, Oxy-Acet, Pericolace in place. 81mg asa discontinued. New DME rolling walker in home. [facility] following pt. No questions at this time. FIrst POST OP appt is 12/29/2023. Pt for now declines TCM APPT with PCP DESTINY Maurice but will keep sched ov 03/07/2024, will call if soone appt needed.   Call end time 1023            Kamini Castaneda MA    12/12/2023, 12:26 EST

## 2023-12-14 ENCOUNTER — TELEPHONE (OUTPATIENT)
Dept: NEUROSURGERY | Facility: CLINIC | Age: 63
End: 2023-12-14
Payer: COMMERCIAL

## 2023-12-14 DIAGNOSIS — G96.01 CSF RHINORRHEA: Primary | ICD-10-CM

## 2023-12-14 NOTE — TELEPHONE ENCOUNTER
Patient had a Rt Sided Retrosigmoid Craniotomy on 12/07/2023 by Dr Morse. Patient is leaking clear liquid from her nose and it is getting heavier.  She wants to know what to do and if this is normal.  Please call patient and advise.    [phone]

## 2023-12-14 NOTE — TELEPHONE ENCOUNTER
Richard, I spoke with the patient and Dr. Morse this evening.  He wants her to have a CT head tomorrow as soon as possible.  She is going to go by the [facility] and get a specimen cup and try to collect some fluid to drop off at the lab tomorrow when she comes in.  He wants to see her in the office after her CT.  Please contact the patient for [place] in the morning with CT and appointment information.  Thank you

## 2023-12-14 NOTE — TELEPHONE ENCOUNTER
Spoke with patient and she asked if we had received any forms from [facility] for her short term disability. I could not find any forms so she said she would provide the forms to us if we did not receive them. If you come across them can you touch base with her and let her know? Thank you!

## 2023-12-14 NOTE — TELEPHONE ENCOUNTER
Patient reports noticing after discharge, slight dripping from nose when looking down, but today it is dripping even with head up. It is thin like water. She has some sense of post nasal drip but no salty taste. No headache. Hearing is muffled on right side but has been that way since surgery.  No fever.  Otherwise feels okay.  Advised patient that sounds like CSF leak, but with the surgery she had, would be unusual for a rhinorrhea CSF leak.  I have messaged Dr. Morse to call me when he completes his OR case and I will contact the patient after I spoken with him.  I did tell her to present to the hospital immediately if she starts having any postural headache or fever or generally feeling worse.

## 2023-12-15 ENCOUNTER — HOME CARE VISIT (OUTPATIENT)
Dept: HOME HEALTH SERVICES | Facility: HOME HEALTHCARE | Age: 63
End: 2023-12-15
Payer: COMMERCIAL

## 2023-12-15 ENCOUNTER — HOSPITAL ENCOUNTER (OUTPATIENT)
Dept: CT IMAGING | Facility: HOSPITAL | Age: 63
Discharge: HOME | End: 2023-12-15
Payer: COMMERCIAL

## 2023-12-15 ENCOUNTER — TELEPHONE (OUTPATIENT)
Dept: NEUROSURGERY | Facility: CLINIC | Age: 63
End: 2023-12-15
Payer: COMMERCIAL

## 2023-12-15 ENCOUNTER — LAB (OUTPATIENT)
Dept: LAB | Facility: HOSPITAL | Age: 63
End: 2023-12-15
Payer: COMMERCIAL

## 2023-12-15 ENCOUNTER — OFFICE VISIT (OUTPATIENT)
Dept: NEUROSURGERY | Facility: CLINIC | Age: 63
End: 2023-12-15
Payer: COMMERCIAL

## 2023-12-15 VITALS
WEIGHT: 143.4 LBS | HEIGHT: 62 IN | BODY MASS INDEX: 26.39 KG/M2 | DIASTOLIC BLOOD PRESSURE: 70 MMHG | HEART RATE: 64 BPM | OXYGEN SATURATION: 95 % | SYSTOLIC BLOOD PRESSURE: 116 MMHG

## 2023-12-15 DIAGNOSIS — G96.01 CSF RHINORRHEA: Primary | ICD-10-CM

## 2023-12-15 DIAGNOSIS — G96.01 CSF RHINORRHEA: ICD-10-CM

## 2023-12-15 DIAGNOSIS — G50.0 TRIGEMINAL NEURALGIA: ICD-10-CM

## 2023-12-15 PROCEDURE — 70450 CT HEAD/BRAIN W/O DYE: CPT

## 2023-12-15 PROCEDURE — 99024 POSTOP FOLLOW-UP VISIT: CPT | Performed by: NEUROLOGICAL SURGERY

## 2023-12-15 NOTE — PROGRESS NOTES
Subjective   History of Present Illness: Kristina Kang is a 63 y.o. female is here today for follow-up. She is s/p right sided retrosigmoid craniotomy for microvascular decompression 23. CT head completed today 12/15/23.  She is doing well today.  No new complaints.  Denies any changes in the frequency or severity of her headaches.  Denies any changes in vision.  Denies any strokelike episodes.  Denies any changes in strength or sensation.  She reports that her facial pain has resolved.  Denies any drainage from her incision      History of Present Illness    The following portions of the patient's history were reviewed and updated as appropriate: allergies, current medications, past family history, past medical history, past social history, past surgical history, and problem list.    Past Medical History:   Diagnosis Date    Allergic     Anemia     Colon polyp     Degeneration of lumbar intervertebral disc     Episode of syncope 2015    Essential hypertension     Glaucoma     Hepatitis C 2008    Dr. Coleman Null    History of bone density study     Normal    History of chest x-ray 2010    normal    History of echocardiogram 2015    History of EKG 2013    normal    History of Holter monitoring 2015    occas PVC    History of nuclear stress test 10/06/2015    LCG; ischemia    Hypercalcemia     Hyperlipidemia     Leiomyoma of uterus     and fibroids    Osteoarthritis     Osteopenia     Postmenopausal     Pyelonephritis     Trigeminal neuralgia     Vitamin D deficiency         Past Surgical History:   Procedure Laterality Date     SECTION      COLONOSCOPY      COLONOSCOPY W/ POLYPECTOMY      Benign polyps.  Dr. Malone    CRANIOTOMY FOR NERVE DECOMPRESSION Right 2023    Procedure: RIGHT SIDED RETROSIGMOID CRANIOTOMY FOR MICROVASCULAR DECOMPRESSION;  Surgeon: Tripp Morse MD;  Location: [facility];  Service: Neurosurgery;  Laterality: Right;     HYSTERECTOMY  02/2004    took one ovary     LIVER BIOPSY      OOPHORECTOMY Bilateral age 33    left 1/4 of the right           Current Outpatient Medications:     atorvastatin (LIPITOR) 40 MG tablet, Take 1 tablet by mouth Daily. For cholesterol, Disp: 90 tablet, Rfl: 2    carBAMazepine XR (TEGretol  XR) 200 MG 12 hr tablet, TAKE 600MG DURING THE DAY AND 400MG AT NIGHT. (Patient taking differently: Take 3 tablets by mouth 2 (Two) Times a Day.), Disp: 450 tablet, Rfl: 1    cholecalciferol (VITAMIN D3) 25 MCG (1000 UT) tablet, 3 tablets daily (Patient taking differently: Take 1 tablet by mouth 3 (Three) Times a Day. 3 tablets daily), Disp: 270 tablet, Rfl: 2    folic acid (FOLVITE) 400 MCG tablet, Take 1 tablet by mouth Daily., Disp: 90 tablet, Rfl: 4    furosemide (LASIX) 20 MG tablet, TAKE 1 TABLET BY MOUTH EVERY DAY (Patient taking differently: Take 1 tablet by mouth Daily.), Disp: 90 tablet, Rfl: 2    latanoprost (XALATAN) 0.005 % ophthalmic solution, Administer 1 drop to both eyes Every Night. Indications: Wide-Angle Glaucoma, Disp: , Rfl:     methocarbamol (ROBAXIN) 500 MG tablet, Take 1 tablet by mouth Every 8 (Eight) Hours As Needed (neck pain)., Disp: 30 tablet, Rfl: 0    oxyCODONE-acetaminophen (PERCOCET) 5-325 MG per tablet, 1 PO q 4 hrs PRN moderate pain, 2 PO q 4 hrs PRN severe pain, Disp: 36 tablet, Rfl: 0    pregabalin (LYRICA) 75 MG capsule, Take 1 capsule by mouth 2 (Two) Times a Day. (Patient taking differently: Take 1 capsule by mouth Every Night.), Disp: 60 capsule, Rfl: 3    sennosides-docusate (PERICOLACE) 8.6-50 MG per tablet, Take 2 tablets by mouth 2 (Two) Times a Day., Disp: , Rfl:     valsartan (DIOVAN) 160 MG tablet, Take 1 tablet by mouth Daily. for blood pressure. (Patient taking differently: Take 1 tablet by mouth Daily. for blood pressure. HOLD 24 HOURS PRIOR TO SURGERY), Disp: 90 tablet, Rfl: 1     No Known Allergies     Social History     Socioeconomic History    Marital status:  "   Tobacco Use    Smoking status: Former     Packs/day: 0.50     Years: 10.00     Additional pack years: 0.00     Total pack years: 5.00     Types: Cigarettes    Smokeless tobacco: Former     Quit date:    Vaping Use    Vaping Use: Never used   Substance and Sexual Activity    Alcohol use: Not Currently     Comment: Seldom    Drug use: No    Sexual activity: Yes     Partners: Male     Birth control/protection: Post-menopausal, None     Comment: Hysterectomy        Family History   Adopted: Yes   Problem Relation Age of Onset    Malig Hyperthermia Neg Hx         Review of Systems   HENT:          CSF leak        Objective     Vitals:    12/15/23 1358   BP: 116/70   Pulse: 64   SpO2: 95%   Weight: 65 kg (143 lb 6.4 oz)   Height: 157.5 cm (62\")     Body mass index is 26.23 kg/m².      Physical Exam  Neurologic Exam  Awake, alert, oriented x3  Pupils equal round reactive to light  Extraocular muscles intact  Face symmetric  Speech is fluent and clear  No pronator drift  Motor exam  Bilateral deltoids 5/5, bilateral biceps 5/5, bilateral triceps 5/5, bilateral wrist extension 5/5 bilateral hand  5/5  Bilateral hip flexion 5/5, bilateral knee extension 5/5, bilateral DF/PF 5/5  No clonus  No Terry's reflex  Steady normal gait  Able to walk heel-to-toe without difficulty  Able to detect  light touch in all 4 extremities      Assessment & Plan   Independent Review of Radiographic Studies:      I personally reviewed the images from the following studies.  CT head without contrast from 2023  The CT head images were reviewed and demonstrate expected postoperative changes with a right-sided retrosigmoid craniectomy and pledget located lateral to the chase.     Medical Decision Makin-year-old female s/p right-sided craniectomy for MVD on 2023  -She has recovered very well from the procedure.  She reports her facial pain has resolved.  -She reports today that she has had some increased clear " drainage from her nose.  I will plan to have her send sample to the laboratory for beta-2 transferrin testing  -The postop CT head shows expected postoperative changes  -We discussed the signs and symptoms of CSF rhinorrhea and the risk of meningitis or infection.  If she continues to have increased drainage from her nose and the sample is positive for beta-2 transferrin we will consider a return to the OR for revision of the craniectomy and waxing of the mastoid air cells    Diagnoses and all orders for this visit:    1. CSF rhinorrhea (Primary)    2. Trigeminal neuralgia      Return in about 1 week (around 12/22/2023).  I spent 30 minutes reviewing the medical record, reviewing the CT images, discussing the signs and symptoms of CSF rhinorrhea.

## 2023-12-15 NOTE — TELEPHONE ENCOUNTER
I spoke to patient let her know we have received disability forms for patient. She is aware there is a 7-14 business day turn around period for forms. She voiced understanding.

## 2023-12-15 NOTE — TELEPHONE ENCOUNTER
I spoke with Dr. Morin, neuroradiologist about concern for potential CSF leak.  Dr. Morse updated.  Will correlate clinically with with patient office visit scheduled today.

## 2023-12-15 NOTE — TELEPHONE ENCOUNTER
Scheduled CT head today at [place] and pt will come in to office immediately afterwards. Pt is aware and will drop off specimen to lab at hospital prior to office appt.

## 2023-12-15 NOTE — CASE COMMUNICATION
The Physical Therapy Visit for the above patient was missed on 12/15/23 due to patient not being at home at previously scheduled appointment time.  Therefore the prescribed frequency of visits was not met.      If you have any questions or would like further information about this patient please contact [facility] at [phone].  Thank you for allowing us to assist you in the care of your patients.

## 2023-12-18 ENCOUNTER — TELEPHONE (OUTPATIENT)
Dept: NEUROSURGERY | Facility: CLINIC | Age: 63
End: 2023-12-18

## 2023-12-19 ENCOUNTER — READMISSION MANAGEMENT (OUTPATIENT)
Dept: CALL CENTER | Facility: HOSPITAL | Age: 63
End: 2023-12-19
Payer: COMMERCIAL

## 2023-12-19 NOTE — OUTREACH NOTE
"General Surgery Week 2 Survey      Flowsheet Row Responses   [facility] patient discharged from? [place]   Does the patient have one of the following disease processes/diagnoses(primary or secondary)? General Surgery   Week 2 attempt successful? Yes   Call start time 1408   Call end time 1420   Meds reviewed with patient/caregiver? Yes   Is the patient having any side effects they believe may be caused by any medication additions or changes? No   Does the patient have all medications related to this admission filled (includes all antibiotics, pain medications, etc.) Yes   Is the patient taking all medications as directed (includes completed medication regime)? Yes   Does the patient have a follow up appointment scheduled with their surgeon? Yes   Has the patient kept scheduled appointments due by today? Yes   Comments Has seen her neurosurgeon already on 12/15/23. Waiting on lab results regarding \"leaking nose\" with clear fluid.   Has home health visited the patient within 72 hours of discharge? Yes   DME comments States ambulating independently now.   Psychosocial issues? No   Did the patient receive a copy of their discharge instructions? Yes   Nursing interventions Reviewed instructions with patient   What is the patient's perception of their health status since discharge? Improving  [States surgeon is monitoring her clear nasal discharge.]   Nursing interventions Nurse provided patient education   Is the patient/caregiver able to teach back signs and symptoms of incisional infection? Increased redness, swelling or pain at the incisonal site, Increased drainage or bleeding, Incisional warmth, Pus or odor from incision, Fever   If the patient is a current smoker, are they able to teach back resources for cessation? Not a smoker   Is the patient/caregiver able to teach back the hierarchy of who to call/visit for symptoms/problems? PCP, Specialist, Home health nurse, Urgent Care, ED, 911 Yes   Additional " teach back comments States is aware to call surgeon if notes HA that improves with laying flat, neck stiffness, or fever.   Week 2 call completed? Yes   Is the patient interested in additional calls from an ambulatory ? No   Would this patient benefit from a Referral to [facility] Social Work? No   Wrap up additional comments Patient states is improving, but did develop clear nasal drainage last week. States has been evaluated by neurosurgeon, and waiting for some lab test results. Denies any worsening HA/neck stiffness, or fever. States incision healing well without s/s of infection. Denies any needs today.   Call end time 1420            Priyanka GERARDO - Registered Nurse

## 2023-12-20 ENCOUNTER — HOME CARE VISIT (OUTPATIENT)
Dept: HOME HEALTH SERVICES | Facility: HOME HEALTHCARE | Age: 63
End: 2023-12-20
Payer: COMMERCIAL

## 2023-12-20 VITALS
TEMPERATURE: 97.3 F | OXYGEN SATURATION: 97 % | RESPIRATION RATE: 16 BRPM | SYSTOLIC BLOOD PRESSURE: 130 MMHG | HEART RATE: 69 BPM | DIASTOLIC BLOOD PRESSURE: 80 MMHG

## 2023-12-20 PROCEDURE — G0151 HHCP-SERV OF PT,EA 15 MIN: HCPCS

## 2023-12-24 ENCOUNTER — APPOINTMENT (OUTPATIENT)
Dept: CT IMAGING | Facility: HOSPITAL | Age: 63
DRG: 027 | End: 2023-12-24
Payer: COMMERCIAL

## 2023-12-24 ENCOUNTER — APPOINTMENT (OUTPATIENT)
Dept: MRI IMAGING | Facility: HOSPITAL | Age: 63
DRG: 027 | End: 2023-12-24
Payer: COMMERCIAL

## 2023-12-24 ENCOUNTER — READMISSION MANAGEMENT (OUTPATIENT)
Dept: CALL CENTER | Facility: HOSPITAL | Age: 63
End: 2023-12-24
Payer: COMMERCIAL

## 2023-12-24 ENCOUNTER — HOSPITAL ENCOUNTER (INPATIENT)
Facility: HOSPITAL | Age: 63
LOS: 2 days | Discharge: HOME | DRG: 027 | End: 2023-12-29
Attending: EMERGENCY MEDICINE | Admitting: STUDENT IN AN ORGANIZED HEALTH CARE EDUCATION/TRAINING PROGRAM
Payer: COMMERCIAL

## 2023-12-24 DIAGNOSIS — G97.82 POSTOPERATIVE CSF LEAK: ICD-10-CM

## 2023-12-24 DIAGNOSIS — Z98.890 HISTORY OF CRANIOTOMY: ICD-10-CM

## 2023-12-24 DIAGNOSIS — R29.810 FACIAL DROOP: ICD-10-CM

## 2023-12-24 DIAGNOSIS — G96.00 POSTOPERATIVE CSF LEAK: ICD-10-CM

## 2023-12-24 DIAGNOSIS — I10 ELEVATED BLOOD PRESSURE READING IN OFFICE WITH DIAGNOSIS OF HYPERTENSION: ICD-10-CM

## 2023-12-24 DIAGNOSIS — G96.01 CSF RHINORRHEA: Primary | ICD-10-CM

## 2023-12-24 LAB
ALBUMIN SERPL-MCNC: 4.3 G/DL (ref 3.5–5.2)
ALBUMIN/GLOB SERPL: 1.3 G/DL
ALP SERPL-CCNC: 106 U/L (ref 39–117)
ALT SERPL W P-5'-P-CCNC: 11 U/L (ref 1–33)
ANION GAP SERPL CALCULATED.3IONS-SCNC: 9 MMOL/L (ref 5–15)
APTT PPP: 30.5 SECONDS (ref 22.7–35.4)
AST SERPL-CCNC: 15 U/L (ref 1–32)
BASOPHILS # BLD AUTO: 0.03 10*3/MM3 (ref 0–0.2)
BASOPHILS NFR BLD AUTO: 0.3 % (ref 0–1.5)
BILIRUB SERPL-MCNC: 0.2 MG/DL (ref 0–1.2)
BUN SERPL-MCNC: 10 MG/DL (ref 8–23)
BUN/CREAT SERPL: 11.4 (ref 7–25)
CALCIUM SPEC-SCNC: 10.7 MG/DL (ref 8.6–10.5)
CHLORIDE SERPL-SCNC: 104 MMOL/L (ref 98–107)
CO2 SERPL-SCNC: 27 MMOL/L (ref 22–29)
CREAT SERPL-MCNC: 0.88 MG/DL (ref 0.57–1)
DEPRECATED RDW RBC AUTO: 40.4 FL (ref 37–54)
EGFRCR SERPLBLD CKD-EPI 2021: 73.9 ML/MIN/1.73
EOSINOPHIL # BLD AUTO: 0.17 10*3/MM3 (ref 0–0.4)
EOSINOPHIL NFR BLD AUTO: 1.6 % (ref 0.3–6.2)
ERYTHROCYTE [DISTWIDTH] IN BLOOD BY AUTOMATED COUNT: 12.1 % (ref 12.3–15.4)
GLOBULIN UR ELPH-MCNC: 3.3 GM/DL
GLUCOSE SERPL-MCNC: 94 MG/DL (ref 65–99)
HCT VFR BLD AUTO: 38.8 % (ref 34–46.6)
HGB BLD-MCNC: 12.2 G/DL (ref 12–15.9)
IMM GRANULOCYTES # BLD AUTO: 0.04 10*3/MM3 (ref 0–0.05)
IMM GRANULOCYTES NFR BLD AUTO: 0.4 % (ref 0–0.5)
INR PPP: 1.03 (ref 0.9–1.1)
LYMPHOCYTES # BLD AUTO: 2.2 10*3/MM3 (ref 0.7–3.1)
LYMPHOCYTES NFR BLD AUTO: 21.1 % (ref 19.6–45.3)
MCH RBC QN AUTO: 28.9 PG (ref 26.6–33)
MCHC RBC AUTO-ENTMCNC: 31.4 G/DL (ref 31.5–35.7)
MCV RBC AUTO: 91.9 FL (ref 79–97)
MONOCYTES # BLD AUTO: 0.31 10*3/MM3 (ref 0.1–0.9)
MONOCYTES NFR BLD AUTO: 3 % (ref 5–12)
NEUTROPHILS NFR BLD AUTO: 7.69 10*3/MM3 (ref 1.7–7)
NEUTROPHILS NFR BLD AUTO: 73.6 % (ref 42.7–76)
NRBC BLD AUTO-RTO: 0 /100 WBC (ref 0–0.2)
PLATELET # BLD AUTO: 287 10*3/MM3 (ref 140–450)
PMV BLD AUTO: 8.8 FL (ref 6–12)
POTASSIUM SERPL-SCNC: 5.2 MMOL/L (ref 3.5–5.2)
PROT SERPL-MCNC: 7.6 G/DL (ref 6–8.5)
PROTHROMBIN TIME: 13.6 SECONDS (ref 11.7–14.2)
RBC # BLD AUTO: 4.22 10*6/MM3 (ref 3.77–5.28)
SODIUM SERPL-SCNC: 140 MMOL/L (ref 136–145)
WBC NRBC COR # BLD AUTO: 10.44 10*3/MM3 (ref 3.4–10.8)

## 2023-12-24 PROCEDURE — G0378 HOSPITAL OBSERVATION PER HR: HCPCS

## 2023-12-24 PROCEDURE — 25810000003 LACTATED RINGERS SOLUTION: Performed by: NEUROLOGICAL SURGERY

## 2023-12-24 PROCEDURE — 70480 CT ORBIT/EAR/FOSSA W/O DYE: CPT

## 2023-12-24 PROCEDURE — 85610 PROTHROMBIN TIME: CPT | Performed by: EMERGENCY MEDICINE

## 2023-12-24 PROCEDURE — 25810000003 SODIUM CHLORIDE 0.9 % SOLUTION: Performed by: EMERGENCY MEDICINE

## 2023-12-24 PROCEDURE — 85730 THROMBOPLASTIN TIME PARTIAL: CPT | Performed by: EMERGENCY MEDICINE

## 2023-12-24 PROCEDURE — 86335 IMMUNFIX E-PHORSIS/URINE/CSF: CPT | Performed by: EMERGENCY MEDICINE

## 2023-12-24 PROCEDURE — 85025 COMPLETE CBC W/AUTO DIFF WBC: CPT | Performed by: EMERGENCY MEDICINE

## 2023-12-24 PROCEDURE — 80053 COMPREHEN METABOLIC PANEL: CPT | Performed by: EMERGENCY MEDICINE

## 2023-12-24 PROCEDURE — A9577 INJ MULTIHANCE: HCPCS | Performed by: STUDENT IN AN ORGANIZED HEALTH CARE EDUCATION/TRAINING PROGRAM

## 2023-12-24 PROCEDURE — 99285 EMERGENCY DEPT VISIT HI MDM: CPT

## 2023-12-24 PROCEDURE — 70544 MR ANGIOGRAPHY HEAD W/O DYE: CPT

## 2023-12-24 PROCEDURE — 0 GADOBENATE DIMEGLUMINE 529 MG/ML SOLUTION: Performed by: STUDENT IN AN ORGANIZED HEALTH CARE EDUCATION/TRAINING PROGRAM

## 2023-12-24 PROCEDURE — 25010000002 KETOROLAC TROMETHAMINE PER 15 MG: Performed by: NURSE PRACTITIONER

## 2023-12-24 PROCEDURE — 70553 MRI BRAIN STEM W/O & W/DYE: CPT

## 2023-12-24 RX ORDER — CLONIDINE HYDROCHLORIDE 0.1 MG/1
0.1 TABLET ORAL ONCE
Status: DISCONTINUED | OUTPATIENT
Start: 2023-12-24 | End: 2023-12-24

## 2023-12-24 RX ORDER — LATANOPROST 50 UG/ML
1 SOLUTION/ DROPS OPHTHALMIC NIGHTLY
Status: DISCONTINUED | OUTPATIENT
Start: 2023-12-24 | End: 2023-12-29 | Stop reason: HOSPADM

## 2023-12-24 RX ORDER — KETOROLAC TROMETHAMINE 30 MG/ML
30 INJECTION, SOLUTION INTRAMUSCULAR; INTRAVENOUS ONCE
Status: COMPLETED | OUTPATIENT
Start: 2023-12-24 | End: 2023-12-24

## 2023-12-24 RX ORDER — ONDANSETRON 4 MG/1
4 TABLET, ORALLY DISINTEGRATING ORAL EVERY 6 HOURS PRN
Status: DISCONTINUED | OUTPATIENT
Start: 2023-12-24 | End: 2023-12-29 | Stop reason: HOSPADM

## 2023-12-24 RX ORDER — ACETAMINOPHEN 325 MG/1
650 TABLET ORAL EVERY 4 HOURS PRN
Status: DISCONTINUED | OUTPATIENT
Start: 2023-12-24 | End: 2023-12-29 | Stop reason: HOSPADM

## 2023-12-24 RX ORDER — ASPIRIN 81 MG/1
81 TABLET ORAL DAILY
COMMUNITY

## 2023-12-24 RX ORDER — METHOCARBAMOL 500 MG/1
500 TABLET, FILM COATED ORAL EVERY 8 HOURS PRN
Status: DISCONTINUED | OUTPATIENT
Start: 2023-12-24 | End: 2023-12-29 | Stop reason: HOSPADM

## 2023-12-24 RX ORDER — CARBAMAZEPINE 200 MG/1
600 TABLET, EXTENDED RELEASE ORAL 2 TIMES DAILY
Status: DISCONTINUED | OUTPATIENT
Start: 2023-12-24 | End: 2023-12-29 | Stop reason: HOSPADM

## 2023-12-24 RX ORDER — SODIUM CHLORIDE 9 MG/ML
125 INJECTION, SOLUTION INTRAVENOUS CONTINUOUS
Status: DISCONTINUED | OUTPATIENT
Start: 2023-12-24 | End: 2023-12-26

## 2023-12-24 RX ORDER — CHOLECALCIFEROL (VITAMIN D3) 125 MCG
1000 CAPSULE ORAL DAILY
Status: DISCONTINUED | OUTPATIENT
Start: 2023-12-24 | End: 2023-12-29 | Stop reason: HOSPADM

## 2023-12-24 RX ORDER — UREA 10 %
3 LOTION (ML) TOPICAL NIGHTLY PRN
Status: DISCONTINUED | OUTPATIENT
Start: 2023-12-24 | End: 2023-12-29 | Stop reason: HOSPADM

## 2023-12-24 RX ORDER — VALSARTAN 160 MG/1
160 TABLET ORAL DAILY
Status: DISCONTINUED | OUTPATIENT
Start: 2023-12-24 | End: 2023-12-24

## 2023-12-24 RX ORDER — ATORVASTATIN CALCIUM 20 MG/1
40 TABLET, FILM COATED ORAL DAILY
Status: DISCONTINUED | OUTPATIENT
Start: 2023-12-24 | End: 2023-12-29 | Stop reason: HOSPADM

## 2023-12-24 RX ORDER — LANOLIN ALCOHOL/MO/W.PET/CERES
1000 CREAM (GRAM) TOPICAL DAILY
COMMUNITY

## 2023-12-24 RX ORDER — LANOLIN ALCOHOL/MO/W.PET/CERES
400 CREAM (GRAM) TOPICAL DAILY
Status: DISCONTINUED | OUTPATIENT
Start: 2023-12-24 | End: 2023-12-29 | Stop reason: HOSPADM

## 2023-12-24 RX ORDER — FUROSEMIDE 20 MG/1
20 TABLET ORAL DAILY
Status: DISCONTINUED | OUTPATIENT
Start: 2023-12-24 | End: 2023-12-24

## 2023-12-24 RX ORDER — PREGABALIN 75 MG/1
75 CAPSULE ORAL NIGHTLY
Status: DISCONTINUED | OUTPATIENT
Start: 2023-12-24 | End: 2023-12-29 | Stop reason: HOSPADM

## 2023-12-24 RX ORDER — ONDANSETRON 2 MG/ML
4 INJECTION INTRAMUSCULAR; INTRAVENOUS EVERY 6 HOURS PRN
Status: DISCONTINUED | OUTPATIENT
Start: 2023-12-24 | End: 2023-12-29 | Stop reason: HOSPADM

## 2023-12-24 RX ADMIN — KETOROLAC TROMETHAMINE 30 MG: 30 INJECTION INTRAMUSCULAR; INTRAVENOUS at 22:56

## 2023-12-24 RX ADMIN — SODIUM CHLORIDE, POTASSIUM CHLORIDE, SODIUM LACTATE AND CALCIUM CHLORIDE 1000 ML: 600; 310; 30; 20 INJECTION, SOLUTION INTRAVENOUS at 20:41

## 2023-12-24 RX ADMIN — SODIUM CHLORIDE 125 ML/HR: 9 INJECTION, SOLUTION INTRAVENOUS at 21:48

## 2023-12-24 RX ADMIN — GADOBENATE DIMEGLUMINE 13 ML: 529 INJECTION, SOLUTION INTRAVENOUS at 19:51

## 2023-12-24 RX ADMIN — PREGABALIN 75 MG: 75 CAPSULE ORAL at 20:35

## 2023-12-24 RX ADMIN — CARBAMAZEPINE 600 MG: 200 TABLET, EXTENDED RELEASE ORAL at 20:35

## 2023-12-24 RX ADMIN — SODIUM CHLORIDE 125 ML/HR: 9 INJECTION, SOLUTION INTRAVENOUS at 12:58

## 2023-12-24 RX ADMIN — SODIUM CHLORIDE 500 ML: 9 INJECTION, SOLUTION INTRAVENOUS at 12:59

## 2023-12-24 RX ADMIN — ACETAMINOPHEN 650 MG: 325 TABLET, FILM COATED ORAL at 18:47

## 2023-12-24 RX ADMIN — LATANOPROST 1 DROP: 50 SOLUTION OPHTHALMIC at 22:56

## 2023-12-24 RX ADMIN — METHOCARBAMOL TABLETS 500 MG: 500 TABLET, COATED ORAL at 20:35

## 2023-12-24 NOTE — H&P
Patient Name:  Kristina Kang  YOB: 1960  MRN:  [id number]  Admit Date:  12/24/2023  Patient Care Team:  Joanne Maurice PA-C as PCP - General  Joanne Maurice PA-C as PCP - Family Medicine  Lew Malone MD as Consulting Physician (Gastroenterology)  Conor Avendaño MD as Consulting Physician (Endocrinology)  Kendall De La Garza MD as Consulting Physician (Ophthalmology)  Aletha Sher MD (Dermatology)  Layton Izaguirre MD as Consulting Physician (Neurology)  Carole Gee MD as Consulting Physician (Dermatology)      Subjective   History Present Illness     Chief Complaint   Patient presents with    Post-op Problem       Ms. Kang is a 63 y.o. woman with a history of v3 trigeminal neuralgia s/p right retrosigmoid craniotomy for microvascular decompression earlier this month (12/7/23) with neurosurgery, hypertension, hyperlipidemia, hypocalciuric hypercalcemia who presents with persistent clear drainage from her nose and right facial palsy.    History of Present Illness  Review of Systems   Constitutional:  Negative for chills, diaphoresis, fatigue and fever.   HENT:  Positive for rhinorrhea. Negative for sinus pain.    Eyes: Negative.    Respiratory:  Positive for cough. Negative for choking and shortness of breath.    Cardiovascular:  Negative for chest pain and palpitations.   Gastrointestinal:  Negative for abdominal distention, constipation, diarrhea and vomiting.   Endocrine: Negative.    Genitourinary:  Negative for dysuria, frequency and urgency.   Musculoskeletal:  Negative for arthralgias and myalgias.   Skin:  Negative for rash and wound.   Allergic/Immunologic: Negative.    Neurological:  Positive for headaches. Negative for light-headedness.   Hematological: Negative.    Psychiatric/Behavioral:  Negative for confusion.         Personal History     Past Medical History:   Diagnosis Date    Allergic     Anemia     Colon polyp     Degeneration of lumbar  intervertebral disc     Episode of syncope 2015    Essential hypertension     Glaucoma     Hepatitis C 2008    Dr. Coleman Null    History of bone density study     Normal    History of chest x-ray 2010    normal    History of echocardiogram 2015    History of EKG 2013    normal    History of Holter monitoring 2015    occas PVC    History of nuclear stress test 10/06/2015    LCG; ischemia    Hypercalcemia     Hyperlipidemia     Leiomyoma of uterus     and fibroids    Osteoarthritis     Osteopenia     Postmenopausal     Pyelonephritis     Trigeminal neuralgia     Vitamin D deficiency      Past Surgical History:   Procedure Laterality Date     SECTION      COLONOSCOPY      COLONOSCOPY W/ POLYPECTOMY      Benign polyps.  Dr. Malone    CRANIOTOMY FOR NERVE DECOMPRESSION Right 2023    Procedure: RIGHT SIDED RETROSIGMOID CRANIOTOMY FOR MICROVASCULAR DECOMPRESSION;  Surgeon: Tripp Morse MD;  Location: [facility];  Service: Neurosurgery;  Laterality: Right;    HYSTERECTOMY  2004    took one ovary     LIVER BIOPSY      OOPHORECTOMY Bilateral age 33    left 1/4 of the right      Family History   Adopted: Yes   Problem Relation Age of Onset    Malig Hyperthermia Neg Hx      Social History     Tobacco Use    Smoking status: Former     Packs/day: 0.50     Years: 10.00     Additional pack years: 0.00     Total pack years: 5.00     Types: Cigarettes    Smokeless tobacco: Former     Quit date:    Vaping Use    Vaping Use: Never used   Substance Use Topics    Alcohol use: Not Currently     Comment: Seldom    Drug use: No     No current facility-administered medications on file prior to encounter.     Current Outpatient Medications on File Prior to Encounter   Medication Sig Dispense Refill    atorvastatin (LIPITOR) 40 MG tablet Take 1 tablet by mouth Daily. For cholesterol 90 tablet 2    carBAMazepine XR (TEGretol  XR) 200 MG 12 hr tablet TAKE 600MG DURING  THE DAY AND 400MG AT NIGHT. (Patient taking differently: Take 3 tablets by mouth 2 (Two) Times a Day.) 450 tablet 1    cholecalciferol (VITAMIN D3) 25 MCG (1000 UT) tablet 3 tablets daily (Patient taking differently: Take 1 tablet by mouth 3 (Three) Times a Day. 3 tablets daily) 270 tablet 2    folic acid (FOLVITE) 400 MCG tablet Take 1 tablet by mouth Daily. 90 tablet 4    furosemide (LASIX) 20 MG tablet TAKE 1 TABLET BY MOUTH EVERY DAY (Patient taking differently: Take 1 tablet by mouth Daily.) 90 tablet 2    latanoprost (XALATAN) 0.005 % ophthalmic solution Administer 1 drop to both eyes Every Night. Indications: Wide-Angle Glaucoma      methocarbamol (ROBAXIN) 500 MG tablet Take 1 tablet by mouth Every 8 (Eight) Hours As Needed (neck pain). 30 tablet 0    oxyCODONE-acetaminophen (PERCOCET) 5-325 MG per tablet 1 PO q 4 hrs PRN moderate pain, 2 PO q 4 hrs PRN severe pain 36 tablet 0    pregabalin (LYRICA) 75 MG capsule Take 1 capsule by mouth 2 (Two) Times a Day. (Patient taking differently: Take 1 capsule by mouth Every Night.) 60 capsule 3    sennosides-docusate (PERICOLACE) 8.6-50 MG per tablet Take 2 tablets by mouth 2 (Two) Times a Day.      valsartan (DIOVAN) 160 MG tablet Take 1 tablet by mouth Daily. for blood pressure. (Patient taking differently: Take 1 tablet by mouth Daily. for blood pressure.  HOLD 24 HOURS PRIOR TO SURGERY) 90 tablet 1     No Known Allergies    Objective    Objective     Vital Signs  Temp:  [98.6 °F (37 °C)] 98.6 °F (37 °C)  Heart Rate:  [64-89] 64  Resp:  [16] 16  BP: (139-188)/(70-77) 155/73  SpO2:  [92 %-96 %] 96 %  on   ;   Device (Oxygen Therapy): room air  Body mass index is 26.16 kg/m².    Physical Exam  Vitals and nursing note reviewed.   Constitutional:       General: She is not in acute distress.     Appearance: She is not toxic-appearing.   HENT:      Head: Normocephalic and atraumatic.      Mouth/Throat:      Mouth: Mucous membranes are moist.      Pharynx: Oropharynx is  clear. No oropharyngeal exudate.   Eyes:      General: No visual field deficit.     Extraocular Movements: Extraocular movements intact.      Conjunctiva/sclera: Conjunctivae normal.      Pupils: Pupils are equal, round, and reactive to light.   Cardiovascular:      Rate and Rhythm: Normal rate and regular rhythm.      Heart sounds: Normal heart sounds.   Pulmonary:      Effort: Pulmonary effort is normal. No respiratory distress.      Breath sounds: Normal breath sounds. No wheezing, rhonchi or rales.   Abdominal:      General: Bowel sounds are normal. There is no distension.      Palpations: Abdomen is soft.      Tenderness: There is no abdominal tenderness. There is no guarding or rebound.   Musculoskeletal:      Cervical back: Normal range of motion and neck supple.      Right lower leg: No edema.      Left lower leg: No edema.   Skin:     General: Skin is warm and dry.      Findings: No erythema or rash.   Neurological:      Mental Status: She is alert and oriented to person, place, and time.      Cranial Nerves: Cranial nerve deficit and facial asymmetry present. No dysarthria.      Sensory: Sensation is intact.      Motor: Motor function is intact.      Coordination: Coordination is intact.      Comments: Rightward tongue deviation  Partial hearing loss out of right ear   Psychiatric:         Mood and Affect: Mood normal.         Behavior: Behavior normal.         Results Review:  I reviewed the patient's new clinical results.  I reviewed the patient's new imaging results and agree with the interpretation.  I reviewed the patient's other test results and agree with the interpretation  I personally viewed and interpreted the patient's EKG/Telemetry data  Discussed with ED provider.    Lab Results (last 24 hours)       Procedure Component Value Units Date/Time    CBC & Differential [[id number]]  (Abnormal) Collected: 12/24/23 1247    Specimen: Blood Updated: 12/24/23 1305    Narrative:      The following orders  were created for panel order CBC & Differential.  Procedure                               Abnormality         Status                     ---------                               -----------         ------                     CBC Auto Differential[[id number]]        Abnormal            Final result                 Please view results for these tests on the individual orders.    Comprehensive Metabolic Panel [[id number]]  (Abnormal) Collected: 12/24/23 1247    Specimen: Blood Updated: 12/24/23 1327     Glucose 94 mg/dL      BUN 10 mg/dL      Creatinine 0.88 mg/dL      Sodium 140 mmol/L      Potassium 5.2 mmol/L      Comment: Specimen hemolyzed.  Result may be falsely elevated.        Chloride 104 mmol/L      CO2 27.0 mmol/L      Calcium 10.7 mg/dL      Total Protein 7.6 g/dL      Albumin 4.3 g/dL      ALT (SGPT) 11 U/L      Comment: Specimen hemolyzed.  Result may  be falsely elevated.        AST (SGOT) 15 U/L      Comment: Specimen hemolyzed.  Result may be falsely elevated.        Alkaline Phosphatase 106 U/L      Total Bilirubin 0.2 mg/dL      Globulin 3.3 gm/dL      A/G Ratio 1.3 g/dL      BUN/Creatinine Ratio 11.4     Anion Gap 9.0 mmol/L      eGFR 73.9 mL/min/1.73     Narrative:      GFR Normal >60  Chronic Kidney Disease <60  Kidney Failure <15      Protime-INR [[id number]]  (Normal) Collected: 12/24/23 1247    Specimen: Blood Updated: 12/24/23 1321     Protime 13.6 Seconds      INR 1.03    aPTT [[id number]]  (Normal) Collected: 12/24/23 1247    Specimen: Blood Updated: 12/24/23 1321     PTT 30.5 seconds     CBC Auto Differential [[id number]]  (Abnormal) Collected: 12/24/23 1247    Specimen: Blood Updated: 12/24/23 1305     WBC 10.44 10*3/mm3      RBC 4.22 10*6/mm3      Hemoglobin 12.2 g/dL      Hematocrit 38.8 %      MCV 91.9 fL      MCH 28.9 pg      MCHC 31.4 g/dL      RDW 12.1 %      RDW-SD 40.4 fl      MPV 8.8 fL      Platelets 287 10*3/mm3      Neutrophil % 73.6 %      Lymphocyte % 21.1 %      Monocyte % 3.0 %       Eosinophil % 1.6 %      Basophil % 0.3 %      Immature Grans % 0.4 %      Neutrophils, Absolute 7.69 10*3/mm3      Lymphocytes, Absolute 2.20 10*3/mm3      Monocytes, Absolute 0.31 10*3/mm3      Eosinophils, Absolute 0.17 10*3/mm3      Basophils, Absolute 0.03 10*3/mm3      Immature Grans, Absolute 0.04 10*3/mm3      nRBC 0.0 /100 WBC     Beta 2 Transferrin - Body Fluid, [[id number]] Collected: 12/24/23 1248    Specimen: Body Fluid Updated: 12/24/23 1310            Imaging Results (Last 24 Hours)       Procedure Component Value Units Date/Time    CT Temporal Bones Without Contrast [[id number]] Resulted: 12/24/23 1253     Updated: 12/24/23 1253            Results for orders placed in visit on 10/06/15    SCANNED - ECHOCARDIOGRAM      No orders to display        Assessment/Plan     Active Hospital Problems    Diagnosis  POA    **Facial droop [R29.810]  Yes    CSF rhinorrhea [G96.01]  Yes    Trigeminal neuralgia [G50.0]  Yes    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       Ms. Kang is a 63 y.o. woman with a history of v3 trigeminal neuralgia s/p right retrosigmoid craniotomy for microvascular decompression earlier this month (12/7/23) with neurosurgery, hypertension, hyperlipidemia, hypocalciuric hypercalcemia who presents with persistent clear drainage from her nose and right facial palsy as well as possible 8th and 9th cranial nerve dysfunction    Possible CSF rhinorrhea with right facial palsy, rightward tongue deviation on protrusion, and possible partial right hearing loss following right retrosigmoid craniotomy for microvascular decompression earlier this month-it seems that these symptoms might be secondary to low CSF pressure per the ED's discussion with neurosurgery. I spoke with neurology regarding the possible multiple cranial nerve involvement and a MRI brain with and  without contrast and an MRA brain without contrast were recommended. A CT of the temporal bones is also pending per neurosurgery recommendations. The clear liquid substance draining from her nose has been sent for beta 2 transferrin testing to determine if it's CSF. Keep patient flat per neurosurgery recommendations. Consults to neurosurgery and neurology   Restart home medications once reconciled, but will hold antihypertensives until stroke is ruled out.  I discussed the patient's findings and my recommendations with patient, family, consulting provider, and ED provider.    VTE Prophylaxis - SCDs.  Code Status - Full code.       Torres Ascencio MD  [facility]  12/24/23  13:33 EST

## 2023-12-24 NOTE — PLAN OF CARE
Goal Outcome Evaluation:              Outcome Evaluation: Patient admitted from ED. Strict bed rest initiated. Orders to not elevate HOB acknowledged. NPO. MRI tech reports radiologist states unable to complete MRI due to angela mesh inserted during craniectomy, MRI compatibility unknown. Dr. Regan and Rei notified. BP elevated, BP medication on hold per MD recommendations. VSS. Will continue to monitor.

## 2023-12-24 NOTE — PROGRESS NOTES
Neurology update: discussed with neuroradiology. High suspicion for CSF leak; craniotomy extends to mastoid.    Regarding other reported cranial nerve deficits I have ordered an MRI to further evaluate this. In my opinion the risk of undergoing MRI which her recent neurosurgical procedure is exceedingly low given that neurosurgeons do not use MRI incompatible material when doing microvascular decompressions. I have reached out to her neurosurgeon but have not been able to definitively confirm. I recommend proceeding with MRI.

## 2023-12-24 NOTE — ED NOTES
Nursing report ED to floor  Kristina Kang  63 y.o.  female    HPI :   Chief Complaint   Patient presents with    Post-op Problem       Admitting doctor:   Torres Ascencio MD    Admitting diagnosis:   The primary encounter diagnosis was Postoperative CSF leak. Diagnoses of Facial droop, History of craniotomy, and Elevated blood pressure reading in office with diagnosis of hypertension were also pertinent to this visit.    Code status:   Current Code Status       Date Active Code Status Order ID Comments User Context       Prior            Allergies:   Patient has no known allergies.    Isolation:   No active isolations    Intake and Output    Intake/Output Summary (Last 24 hours) at 12/24/2023 1359  Last data filed at 12/24/2023 1329  Gross per 24 hour   Intake 500 ml   Output --   Net 500 ml       Weight:       12/24/23  1136   Weight: 64.9 kg (143 lb)       Most recent vitals:   Vitals:    12/24/23 1142 12/24/23 1143 12/24/23 1201 12/24/23 1258   BP: (!) 188/70 (!) 188/77 139/73 155/73   Patient Position: Sitting      Pulse:  80 68 64   Resp:       Temp:       TempSrc:       SpO2:  92% 96% 96%   Weight:       Height:           Active LDAs/IV Access:   Lines, Drains & Airways       Active LDAs       Name Placement date Placement time Site Days    Peripheral IV 12/24/23 1248 Posterior;Right Hand 12/24/23  1248  Hand  less than 1                    Labs (abnormal labs have a star):   Labs Reviewed   COMPREHENSIVE METABOLIC PANEL - Abnormal; Notable for the following components:       Result Value    Calcium 10.7 (*)     All other components within normal limits    Narrative:     GFR Normal >60  Chronic Kidney Disease <60  Kidney Failure <15     CBC WITH AUTO DIFFERENTIAL - Abnormal; Notable for the following components:    MCHC 31.4 (*)     RDW 12.1 (*)     Monocyte % 3.0 (*)     Neutrophils, Absolute 7.69 (*)     All other components within normal limits   PROTIME-INR - Normal   APTT - Normal   BETA 2 TRANSFERRIN  FLUID   CBC AND DIFFERENTIAL    Narrative:     The following orders were created for panel order CBC & Differential.  Procedure                               Abnormality         Status                     ---------                               -----------         ------                     CBC Auto Differential[[id number]]        Abnormal            Final result                 Please view results for these tests on the individual orders.       EKG:   No orders to display       Meds given in ED:   Medications   sodium chloride 0.9 % infusion (125 mL/hr Intravenous New Bag 12/24/23 1258)   acetaminophen (TYLENOL) tablet 650 mg (has no administration in time range)   ondansetron ODT (ZOFRAN-ODT) disintegrating tablet 4 mg (has no administration in time range)     Or   ondansetron (ZOFRAN) injection 4 mg (has no administration in time range)   melatonin tablet 3 mg (has no administration in time range)   sodium chloride 0.9 % bolus 500 mL (0 mL Intravenous Stopped 12/24/23 1329)       Imaging results:  No radiology results for the last day    Ambulatory status:   - standby    Social issues:   Social History     Socioeconomic History    Marital status:    Tobacco Use    Smoking status: Former     Packs/day: 0.50     Years: 10.00     Additional pack years: 0.00     Total pack years: 5.00     Types: Cigarettes    Smokeless tobacco: Former     Quit date: 2011   Vaping Use    Vaping Use: Never used   Substance and Sexual Activity    Alcohol use: Not Currently     Comment: Seldom    Drug use: No    Sexual activity: Yes     Partners: Male     Birth control/protection: Post-menopausal, None     Comment: Hysterectomy       NIH Stroke Scale:  Interval: baseline    Ana Hein RN  12/24/23 13:59 EST

## 2023-12-24 NOTE — ED PROVIDER NOTES
EMERGENCY DEPARTMENT ENCOUNTER    Room Number:  14/14  Date of encounter:  12/24/2023  PCP: Joanne Maurice, DESTINY  Historian: Patient    Patient was placed in face mask during triage process. Patient was wearing facemask when I entered the room and throughout our encounter. I wore full protective equipment throughout this patient encounter including a face mask, eye protection, and gloves. Hand hygiene was performed before donning protective equipment and again following doffing of PPE after leaving the room.    HPI:  Chief Complaint: Right facial droop  A complete HPI/ROS/PMH/PSH/SH/FH are unobtainable due to: N/A   Context: Kristina Kang is a 63 y.o. female who presents to the ED c/o right facial droop that began yesterday and worsened today with inability to completely close her right eye and some right mouth involvement.  Patient denies significant headache.  She has had no fevers reported.  She continues to have some rhinorrhea of clear liquid.  No other focal neurologic complaints at this time including altered mental status, gait disturbance, focal numbness weakness of the extremities.      MEDICAL HISTORY REVIEW  Additional sources:  - Discussed/ obtained information from independent historians: Patient family    - External (non-ED) record review:   Operative note 12/7/2023 by Dr. Beckwith reviewed: Patient with history of trigeminal neuralgia right V3 failing treatment with multiple medications underwent right retrosigmoid craniectomy for microvascular decompression  Neurosurgery office note 12/15/2023 by Dr. Krishna.  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    Vaginal atrophy 2019    Generalized anxiety disorder 2019    Abnormal MRI 2022    Trigeminal neuralgia 2022    Elevated hemoglobin A1c 2023    Preop examination 2023     Resolved Ambulatory Problems     Diagnosis Date Noted    Pyelonephritis      Past Medical History:   Diagnosis Date    Allergic     Anemia     Colon polyp     Glaucoma     History of bone density study     History of chest x-ray 2010    History of echocardiogram 2015    History of EKG 2013    History of Holter monitoring 2015    History of nuclear stress test 10/06/2015    Hypercalcemia     Leiomyoma of uterus     Osteopenia     Postmenopausal          PAST SURGICAL HISTORY  Past Surgical History:   Procedure Laterality Date     SECTION      COLONOSCOPY      COLONOSCOPY W/ POLYPECTOMY      Benign polyps.  Dr. Malone    CRANIOTOMY FOR NERVE DECOMPRESSION Right 2023    Procedure: RIGHT SIDED RETROSIGMOID CRANIOTOMY FOR MICROVASCULAR DECOMPRESSION;  Surgeon: Tripp Morse MD;  Location: [facility];  Service: Neurosurgery;  Laterality: Right;    HYSTERECTOMY  2004    took one ovary     LIVER BIOPSY      OOPHORECTOMY Bilateral age 33    left 1/4 of the right          FAMILY HISTORY  Family History   Adopted: Yes   Problem Relation Age of Onset    Malig Hyperthermia Neg Hx          SOCIAL HISTORY  Social History     Socioeconomic History    Marital status:    Tobacco Use    Smoking status: Former     Packs/day: 0.50     Years: 10.00     Additional pack years: 0.00     Total pack years: 5.00     Types: Cigarettes    Smokeless tobacco: Former     Quit date:    Vaping Use    Vaping Use: Never used   Substance and Sexual Activity    Alcohol use: Not  Currently     Comment: Seldom    Drug use: No    Sexual activity: Yes     Partners: Male     Birth control/protection: Post-menopausal, None     Comment: Hysterectomy         ALLERGIES  Patient has no known allergies.        REVIEW OF SYSTEMS  Review of Systems     All systems reviewed and negative except for those discussed in HPI.       PHYSICAL EXAM    I have reviewed the triage vital signs and nursing notes.    ED Triage Vitals [12/24/23 1136]   Temp Heart Rate Resp BP SpO2   98.6 °F (37 °C) 89 16 -- 93 %      Temp src Heart Rate Source Patient Position BP Location FiO2 (%)   Tympanic Monitor -- -- --       Physical Exam    Physical Exam   Constitutional: No distress.   HENT:  Head: Normocephalic.  Right mastoid area incision is clean dry and intact without fluctuance or drainage.  Face is asymmetric with right facial droop noted involving the right eyelid and corner of the right mouth.  Sensation to light touch however intact and equal bilateral face in all 3 distributions of the trigeminal nerve.  Oropharynx: Mucous membranes are moist.  Tongue midline  Eyes: No scleral icterus.  PERRL, EOMI  Neck: Neck supple.   Cardiovascular: Pink, warm and well-perfused throughout  Pulmonary/Chest: No respiratory distress.  No tachypnea or increased work of breathing  Abdominal: Soft.  No rebound or rigidity  Musculoskeletal: Moves all extremities equally.   Neurological: Alert.  Speech fluent and easily intelligible  Skin: Skin is pink, warm, and dry. No pallor.   Psychiatric: Mood and affect normal.   Nursing note and vitals reviewed.    LAB RESULTS  Recent Results (from the past 24 hour(s))   CBC Auto Differential    Collection Time: 12/24/23 12:47 PM    Specimen: Blood   Result Value Ref Range    WBC 10.44 3.40 - 10.80 10*3/mm3    RBC 4.22 3.77 - 5.28 10*6/mm3    Hemoglobin 12.2 12.0 - 15.9 g/dL    Hematocrit 38.8 34.0 - 46.6 %    MCV 91.9 79.0 - 97.0 fL    MCH 28.9 26.6 - 33.0 pg    MCHC 31.4 (L) 31.5 - 35.7 g/dL     RDW 12.1 (L) 12.3 - 15.4 %    RDW-SD 40.4 37.0 - 54.0 fl    MPV 8.8 6.0 - 12.0 fL    Platelets 287 140 - 450 10*3/mm3    Neutrophil % 73.6 42.7 - 76.0 %    Lymphocyte % 21.1 19.6 - 45.3 %    Monocyte % 3.0 (L) 5.0 - 12.0 %    Eosinophil % 1.6 0.3 - 6.2 %    Basophil % 0.3 0.0 - 1.5 %    Immature Grans % 0.4 0.0 - 0.5 %    Neutrophils, Absolute 7.69 (H) 1.70 - 7.00 10*3/mm3    Lymphocytes, Absolute 2.20 0.70 - 3.10 10*3/mm3    Monocytes, Absolute 0.31 0.10 - 0.90 10*3/mm3    Eosinophils, Absolute 0.17 0.00 - 0.40 10*3/mm3    Basophils, Absolute 0.03 0.00 - 0.20 10*3/mm3    Immature Grans, Absolute 0.04 0.00 - 0.05 10*3/mm3    nRBC 0.0 0.0 - 0.2 /100 WBC       Ordered the above labs and independently reviewed the results.        RADIOLOGY  No Radiology Exams Resulted Within Past 24 Hours    I ordered the above noted radiological studies. Reviewed by me and discussed with radiologist.  See dictation for official radiology interpretation.      PROCEDURES    Procedures        MEDICATIONS GIVEN IN ER    Medications   sodium chloride 0.9 % bolus 500 mL (500 mL Intravenous New Bag 12/24/23 1259)   sodium chloride 0.9 % infusion (125 mL/hr Intravenous New Bag 12/24/23 1258)   cloNIDine (CATAPRES) tablet 0.1 mg (has no administration in time range)         PROGRESS, DATA ANALYSIS, CONSULTS, AND MEDICAL DECISION MAKING    My differential diagnosis includes but is not limited to generalized weakness, electrolyte abnormality, CVA, TIA, Bell's palsy, acute MI, GI bleed, urinary tract infection, systemic infections including sepsis, alcohol abuse, drug abuse including prescription and street drug.      All labs have been independently reviewed by me.  All radiology studies have been reviewed by me and discussed with radiologist dictating the report.   EKG's independently viewed and interpreted by me.  Discussion below represents my analysis of pertinent findings related to patient's condition, differential diagnosis, treatment  plan and final disposition.      ED Course as of 12/24/23 1305   Sun Dec 24, 2023   1155 Patient outside the window for any TNK due to onset of symptoms greater than 6 hours.  Symptoms also very likely could be secondary to recent neurosurgery.  I briefly discussed the patient history and ED findings with on-call stroke neurologist, Dr. Minaya.  He agrees that he would defer to neurosurgery for recommendations on further evaluation and treatment.  If they say that this is not atypical finding after the surgery, MRI/MRA could be undertaken for stroke evaluation if needed. [RS]   1156 Neurosurgery consultation requested. [RS]   1216 CONSULT        Provider: Dr. Ascencio-neurosurgery    Discussion: The patient history, ED presentation and evaluation.  His suspicion is that the patient's weakness on the right side is due to ongoing CSF leak with low intracranial CSF levels.  He recommends the patient be laid flat, that fluid from the nose to be collected for beta-2 transferrin test immediately, and request CT temporal bones to evaluate for right-sided CSF leak.  He does request however that the patient be admitted through the medicine service with neurosurgery consultation.    Agreeable c treatment and planned disposition.         [RS]   1253 BP: 139/73  Improved without intervention [RS]   1305 CONSULT        Provider: Dr. Ascencio[facility]    Discussion: Reviewed patient history, ED presentation and evaluation as well as consultation with neurosurgery.  Agreeable to accept patient for admission.    Agreeable c treatment and planned disposition.         [RS]      ED Course User Index  [RS] Narciso Castro MD       AS OF 13:05 EST VITALS:    BP - 155/73  HR - 64  TEMP - 98.6 °F (37 °C) (Tympanic)  O2 SATS - 96%        DIAGNOSIS  Final diagnoses:   Postoperative CSF leak   Facial droop   History of craniotomy   Elevated blood pressure reading in office with diagnosis of hypertension         DISPOSITION  ADMISSION    Discussed  treatment plan and reason for admission with pt/family and admitting physician.  Pt/family voiced understanding of the plan for admission for further testing/treatment as needed.       Please note that portions of this were completed with a voice recognition program.       Note Disclaimer: At [facility], we believe that sharing information builds trust and better relationships. You are receiving this note because you are receiving care at [facility] or recently visited. It is possible you will see health information before a provider has talked with you about it. This kind of information can be easy to misunderstand. To help you fully understand what it means for your health, we urge you to discuss this note with your provider.     Narciso Castro MD  12/24/23 2810

## 2023-12-24 NOTE — ED NOTES
Pt had craniotomy 12/7 for trigeminal neuralgia. Pt reports right sided nasal drainage 12/9. Pt states that it is worse over the last couple days. Reports right facial numbness.  Pt saw neurology in regards to nasal drainage

## 2023-12-25 LAB
ANION GAP SERPL CALCULATED.3IONS-SCNC: 12 MMOL/L (ref 5–15)
BUN SERPL-MCNC: 9 MG/DL (ref 8–23)
BUN/CREAT SERPL: 12 (ref 7–25)
CALCIUM SPEC-SCNC: 9.6 MG/DL (ref 8.6–10.5)
CHLORIDE SERPL-SCNC: 108 MMOL/L (ref 98–107)
CO2 SERPL-SCNC: 22 MMOL/L (ref 22–29)
CREAT SERPL-MCNC: 0.75 MG/DL (ref 0.57–1)
DEPRECATED RDW RBC AUTO: 39.5 FL (ref 37–54)
EGFRCR SERPLBLD CKD-EPI 2021: 89.6 ML/MIN/1.73
ERYTHROCYTE [DISTWIDTH] IN BLOOD BY AUTOMATED COUNT: 11.9 % (ref 12.3–15.4)
GLUCOSE SERPL-MCNC: 74 MG/DL (ref 65–99)
HCT VFR BLD AUTO: 34.2 % (ref 34–46.6)
HGB BLD-MCNC: 11.3 G/DL (ref 12–15.9)
MCH RBC QN AUTO: 30.1 PG (ref 26.6–33)
MCHC RBC AUTO-ENTMCNC: 33 G/DL (ref 31.5–35.7)
MCV RBC AUTO: 91 FL (ref 79–97)
PLATELET # BLD AUTO: 224 10*3/MM3 (ref 140–450)
PMV BLD AUTO: 9 FL (ref 6–12)
POTASSIUM SERPL-SCNC: 4.1 MMOL/L (ref 3.5–5.2)
RBC # BLD AUTO: 3.76 10*6/MM3 (ref 3.77–5.28)
SODIUM SERPL-SCNC: 142 MMOL/L (ref 136–145)
WBC NRBC COR # BLD AUTO: 9.23 10*3/MM3 (ref 3.4–10.8)

## 2023-12-25 PROCEDURE — 99222 1ST HOSP IP/OBS MODERATE 55: CPT | Performed by: STUDENT IN AN ORGANIZED HEALTH CARE EDUCATION/TRAINING PROGRAM

## 2023-12-25 PROCEDURE — 25010000002 ENOXAPARIN PER 10 MG: Performed by: NEUROLOGICAL SURGERY

## 2023-12-25 PROCEDURE — 25010000002 THIAMINE HCL 200 MG/2ML SOLUTION: Performed by: STUDENT IN AN ORGANIZED HEALTH CARE EDUCATION/TRAINING PROGRAM

## 2023-12-25 PROCEDURE — 25010000002 VANCOMYCIN 750 MG RECONSTITUTED SOLUTION 1 EACH VIAL: Performed by: STUDENT IN AN ORGANIZED HEALTH CARE EDUCATION/TRAINING PROGRAM

## 2023-12-25 PROCEDURE — 25810000003 SODIUM CHLORIDE 0.9 % SOLUTION: Performed by: EMERGENCY MEDICINE

## 2023-12-25 PROCEDURE — 85027 COMPLETE CBC AUTOMATED: CPT | Performed by: STUDENT IN AN ORGANIZED HEALTH CARE EDUCATION/TRAINING PROGRAM

## 2023-12-25 PROCEDURE — 87040 BLOOD CULTURE FOR BACTERIA: CPT | Performed by: STUDENT IN AN ORGANIZED HEALTH CARE EDUCATION/TRAINING PROGRAM

## 2023-12-25 PROCEDURE — 25810000003 SODIUM CHLORIDE 0.9 % SOLUTION 1,000 ML FLEX CONT: Performed by: STUDENT IN AN ORGANIZED HEALTH CARE EDUCATION/TRAINING PROGRAM

## 2023-12-25 PROCEDURE — G0378 HOSPITAL OBSERVATION PER HR: HCPCS

## 2023-12-25 PROCEDURE — 80048 BASIC METABOLIC PNL TOTAL CA: CPT | Performed by: STUDENT IN AN ORGANIZED HEALTH CARE EDUCATION/TRAINING PROGRAM

## 2023-12-25 PROCEDURE — 25010000002 CEFEPIME PER 500 MG: Performed by: STUDENT IN AN ORGANIZED HEALTH CARE EDUCATION/TRAINING PROGRAM

## 2023-12-25 RX ORDER — ENOXAPARIN SODIUM 100 MG/ML
40 INJECTION SUBCUTANEOUS EVERY 24 HOURS
Status: DISCONTINUED | OUTPATIENT
Start: 2023-12-25 | End: 2023-12-27

## 2023-12-25 RX ORDER — HYDRALAZINE HYDROCHLORIDE 25 MG/1
25 TABLET, FILM COATED ORAL EVERY 8 HOURS PRN
Status: DISCONTINUED | OUTPATIENT
Start: 2023-12-25 | End: 2023-12-29 | Stop reason: HOSPADM

## 2023-12-25 RX ORDER — THIAMINE HYDROCHLORIDE 100 MG/ML
200 INJECTION, SOLUTION INTRAMUSCULAR; INTRAVENOUS DAILY
Qty: 6 ML | Refills: 0 | Status: DISPENSED | OUTPATIENT
Start: 2023-12-25 | End: 2023-12-28

## 2023-12-25 RX ADMIN — PREGABALIN 75 MG: 75 CAPSULE ORAL at 21:15

## 2023-12-25 RX ADMIN — SODIUM CHLORIDE 125 ML/HR: 9 INJECTION, SOLUTION INTRAVENOUS at 13:02

## 2023-12-25 RX ADMIN — SODIUM CHLORIDE 125 ML/HR: 9 INJECTION, SOLUTION INTRAVENOUS at 17:39

## 2023-12-25 RX ADMIN — Medication 1000 MCG: at 09:22

## 2023-12-25 RX ADMIN — CARBAMAZEPINE 600 MG: 200 TABLET, EXTENDED RELEASE ORAL at 09:22

## 2023-12-25 RX ADMIN — LATANOPROST 1 DROP: 50 SOLUTION OPHTHALMIC at 22:10

## 2023-12-25 RX ADMIN — Medication 400 MCG: at 09:22

## 2023-12-25 RX ADMIN — ACETAMINOPHEN 650 MG: 325 TABLET, FILM COATED ORAL at 09:22

## 2023-12-25 RX ADMIN — CEFEPIME 2000 MG: 2 INJECTION, POWDER, FOR SOLUTION INTRAVENOUS at 20:45

## 2023-12-25 RX ADMIN — VANCOMYCIN HYDROCHLORIDE 750 MG: 750 INJECTION, POWDER, LYOPHILIZED, FOR SOLUTION INTRAVENOUS at 15:27

## 2023-12-25 RX ADMIN — CARBAMAZEPINE 600 MG: 200 TABLET, EXTENDED RELEASE ORAL at 21:15

## 2023-12-25 RX ADMIN — SODIUM CHLORIDE 125 ML/HR: 9 INJECTION, SOLUTION INTRAVENOUS at 14:40

## 2023-12-25 RX ADMIN — ATORVASTATIN CALCIUM 40 MG: 20 TABLET, FILM COATED ORAL at 09:22

## 2023-12-25 RX ADMIN — ACETAMINOPHEN 650 MG: 325 TABLET, FILM COATED ORAL at 13:13

## 2023-12-25 RX ADMIN — CEFEPIME 2000 MG: 2 INJECTION, POWDER, FOR SOLUTION INTRAVENOUS at 13:01

## 2023-12-25 RX ADMIN — THIAMINE HYDROCHLORIDE 200 MG: 100 INJECTION, SOLUTION INTRAMUSCULAR; INTRAVENOUS at 13:01

## 2023-12-25 RX ADMIN — ENOXAPARIN SODIUM 40 MG: 100 INJECTION SUBCUTANEOUS at 17:38

## 2023-12-25 RX ADMIN — SODIUM CHLORIDE 125 ML/HR: 9 INJECTION, SOLUTION INTRAVENOUS at 05:23

## 2023-12-25 RX ADMIN — METHOCARBAMOL TABLETS 500 MG: 500 TABLET, COATED ORAL at 05:31

## 2023-12-25 NOTE — PROGRESS NOTES
Name: Kristina Kang ADMIT: 2023   : 1960  PCP: Joanne Maurice PA-C    MRN: [id number] LOS: 0 days   AGE/SEX: 63 y.o. female  ROOM: [facility]     Subjective   Subjective   No acute events overnight.  Patient states that she is feeling pretty well.  Continuing to have a headache and right-sided facial numbness, but otherwise no complaints.  No chest pain or shortness of breath.    Objective   Objective     Vital Signs  Temp:  [98.2 °F (36.8 °C)-99 °F (37.2 °C)] 99 °F (37.2 °C)  Heart Rate:  [60-89] 72  Resp:  [16] 16  BP: (139-188)/(68-90) 166/72  SpO2:  [92 %-100 %] 100 %  on   ;   Device (Oxygen Therapy): room air  Body mass index is 26.16 kg/m².    Physical Exam  General: Alert, no acute distress.  ENT: No conjunctival injection or scleral icterus. Moist mucous membranes. No JVD.   Neuro: Right-sided facial nerve palsy.  Strength normal in all extremities.  Other than right-sided facial nerve palsy, no additional focal neurologic deficits.  Lungs: Clear to auscultation bilaterally. No wheeze or crackles. No distress.   Heart: RRR, no murmurs. No edema.  Abdomen: Soft, non-tender, non-distended. Normal bowel sounds. No hepatosplenomegaly.   Ext: Warm and well-perfused. No edema.   Skin: No rashes or lesions. IV site without swelling or erythema.     Results Review     I reviewed the patient's new clinical results:  Results from last 7 days   Lab Units 23  0650 23  1247   WBC 10*3/mm3 9.23 10.44   HEMOGLOBIN g/dL 11.3* 12.2   PLATELETS 10*3/mm3 224 287     Results from last 7 days   Lab Units 23  0650 23  1247   SODIUM mmol/L 142 140   POTASSIUM mmol/L 4.1 5.2   CHLORIDE mmol/L 108* 104   CO2 mmol/L 22.0 27.0   BUN mg/dL 9 10   CREATININE mg/dL 0.75 0.88   GLUCOSE mg/dL 74 94   EGFR mL/min/1.73 89.6 73.9     Results from last 7 days   Lab Units 23  1247   ALBUMIN g/dL 4.3   BILIRUBIN mg/dL 0.2   ALK PHOS U/L 106   AST (SGOT) U/L 15   ALT (SGPT) U/L 11     Results from  "last 7 days   Lab Units 12/25/23  0650 12/24/23  1247   CALCIUM mg/dL 9.6 10.7*   ALBUMIN g/dL  --  4.3       No results found for: \"HGBA1C\", \"POCGLU\"    MRI Brain With & Without Contrast    Result Date: 12/24/2023  1. No evidence of restricted diffusion to suggest acute infarct. 2. Stable right lateral cerebellar hygroma. 3. There is some fluid seen superficial to the craniectomy. This may represent a seroma, although small pseudomeningocele is not excluded. There does appear to be some peripheral enhancement inferiorly on the postcontrast imaging, and correlation with any evidence of infection is recommended. 4. Extensive opacification of the right mastoid air cells and right middle ear. 5. Intracranial vessels appear to be patent.   This report was finalized on 12/24/2023 10:26 PM by Dr. Orly Seals M.D on Workstation: [id number]      MRI Angiogram Head Without Contrast    Result Date: 12/24/2023  1. No evidence of restricted diffusion to suggest acute infarct. 2. Stable right lateral cerebellar hygroma. 3. There is some fluid seen superficial to the craniectomy. This may represent a seroma, although small pseudomeningocele is not excluded. There does appear to be some peripheral enhancement inferiorly on the postcontrast imaging, and correlation with any evidence of infection is recommended. 4. Extensive opacification of the right mastoid air cells and right middle ear. 5. Intracranial vessels appear to be patent.   This report was finalized on 12/24/2023 10:26 PM by Dr. Orly Seals M.D on Workstation: [id number]      CT Temporal Bones Without Contrast    Result Date: 12/24/2023   The patient is status post a right lateral occipital craniotomy for the purposes of a right trigeminal nerve microvascular decompression for symptoms of trigeminal neuralgia. The craniotomy traverses a few right mastoid air cells. The right mastoid air cells and right middle ear cavity are subtotally opacified. This " opacification of the mastoid air cells and middle ear cavity is new when compared to prior preoperative CT scan dated 12/05/2023. If there is a CSF leak, this craniotomy traversing the right mastoid air cells is highly suspect for the cause of the CSF leak. Please correlate with clinical data.  These findings were discussed with Narciso Castro on 12/24/2023 at approximately 2:19 p.m.   Radiation dose reduction techniques were utilized, including automated exposure control and exposure modulation based on body size.   This report was finalized on 12/24/2023 2:53 PM by Dr. Lorenzo Samuel M.D on Workstation: [id number]       I have personally reviewed all medications:  Scheduled Medications  atorvastatin, 40 mg, Oral, Daily  carBAMazepine XR, 600 mg, Oral, BID  enoxaparin, 40 mg, Subcutaneous, Q24H  folic acid, 400 mcg, Oral, Daily  latanoprost, 1 drop, Both Eyes, Nightly  pregabalin, 75 mg, Oral, Nightly  thiamine (B-1) IV, 200 mg, Intravenous, Daily  vitamin B-12, 1,000 mcg, Oral, Daily    Infusions  Pharmacy Consult - Pharmacy to dose,   sodium chloride, 125 mL/hr, Last Rate: 125 mL/hr (12/25/23 0924)    Diet  Diet: Regular/House Diet; Texture: Regular Texture (IDDSI 7); Fluid Consistency: Thin (IDDSI 0)    Assessment/Plan     Active Hospital Problems    Diagnosis  POA    **Facial droop [R29.810]  Yes    CSF rhinorrhea [G96.01]  Yes    Trigeminal neuralgia [G50.0]  Yes    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       63 y.o. female with Facial droop.    Ms. Kang is a 63 y.o. woman with a history of v3 trigeminal neuralgia s/p right retrosigmoid craniotomy for microvascular decompression earlier this month (12/7/23) with neurosurgery, hypertension, hyperlipidemia, hypocalciuric hypercalcemia who presents with persistent clear drainage from her nose and right facial palsy as well as  possible 8th and 9th cranial nerve dysfunction     Possible CSF rhinorrhea with right facial palsy, rightward tongue deviation on protrusion, and possible partial right hearing loss following right retrosigmoid craniotomy for microvascular decompression earlier this month-it seems that these symptoms might be secondary to low CSF pressure per the ED's discussion with neurosurgery.  MRI brain and MRA were done with no evidence of acute infarction.  Once again concerning for CSF leak.  Neurosurgery and neurology consulted and following.  Neurology feels that patient has a right-sided Bell's palsy which could be postsurgical or idiopathic.  If no surgery is planned, the recommendation is for steroids and eyepatch.  Discussed with neurology today.  Per neurosurgery, hold off on steroids at this time.  Primary neurosurgery team to evaluate tomorrow.  SIMI also recommending started prophylactic meningitic antibiotics.  Will start Vanco and cefepime.  HTN-hold home valsartan and Lasix for now.  BP slightly elevated, but neurosurgery wanting to avoid intracranial hypotension.  Add hydralazine as needed.  Trigeminal neuralgia-continue home Tegretol and pregabalin.  Remainder of management as above.  I discussed the patient's findings and my recommendations with patient, nursing staff, and consulting provider.    SCDs for DVT prophylaxis.  Full code.  Discussed with patient, nursing staff, and consulting provider.  Anticipate discharge home timing yet to be determined.      Jany Cerrato MD  [facility]  12/25/23  11:34 EST

## 2023-12-25 NOTE — PLAN OF CARE
Patient was alert and oriented x4, calm cooperativer, and pleasant. Vital signs were stable, on room air. NIHSS score was 2, with right facial droop and numbness. Patient complained of a headache, tylenol did  not help, additional ketorolac given with relief. GCS 15, pupils were equally round and reactive to light.      Goal Outcome Evaluation:           Progress: no change

## 2023-12-25 NOTE — CONSULTS
"Neurology Consult Note    Consult Date: 12/25/2023    Referring MD: Torres Ascencio MD    Reason for Consult I have been asked to see the patient in neurological consultation to render advice and opinion regarding right facial droop    Kristina Kang is a 63 y.o. female with past medical history of trigeminal neuralgia s/p microvascular decompression done earlier this month on 12/7/2023, pretension, hyperlipidemia, hypercalcemia who presented to the ER yesterday with chief complaints of clear drainage from her nose and also right facial nerve palsy.  Neurology was consulted for the same.    Patient states that ever since her surgery on 7 she has been having clear watery fluid coming out of her nose, what brought her into the hospital was family noticed right facial droop and also she was complaining of right ear hearing loss.  She denies any headache and states she has complete resolution of her trigeminal neuralgia pain.    Denies any weakness numbness speech or vision problems.    Past Medical History:   Diagnosis Date    Allergic     Anemia     Colon polyp     Degeneration of lumbar intervertebral disc     Episode of syncope 11/30/2015    Essential hypertension     Glaucoma     Hepatitis C 12/17/2008    Dr. Coleman Null    History of bone density study 2015    Normal    History of chest x-ray 11/01/2010    normal    History of echocardiogram 09/26/2015    History of EKG 06/26/2013    normal    History of Holter monitoring 09/2015    occas PVC    History of nuclear stress test 10/06/2015    LCG; ischemia    Hypercalcemia     Hyperlipidemia     Leiomyoma of uterus     and fibroids    Osteoarthritis     Osteopenia     Postmenopausal     Pyelonephritis     Trigeminal neuralgia     Vitamin D deficiency        Exam  /72 (BP Location: Right arm, Patient Position: Lying)   Pulse 63   Temp 98.2 °F (36.8 °C) (Oral)   Resp 16   Ht 157.5 cm (62\")   Wt 64.9 kg (143 lb)   LMP  (LMP Unknown)   SpO2 95%   BMI " 26.16 kg/m²   Gen: NAD, vitals reviewed  MS: oriented x3, recent/remote memory intact, normal attention/concentration, language intact, no neglect.  CN: visual acuity grossly normal, PERRL, EOMI, complete right Bell's palsy  May be slight deviation of her tongue to the left  Right ear hearing loss  Motor: 5/5 throughout upper and lower extremities, normal tone    DATA:    Lab Results   Component Value Date    GLUCOSE 74 12/25/2023    CALCIUM 9.6 12/25/2023     12/25/2023    K 4.1 12/25/2023    CO2 22.0 12/25/2023     (H) 12/25/2023    BUN 9 12/25/2023    CREATININE 0.75 12/25/2023    EGFRIFAFRI 82 09/02/2021    EGFRIFNONA 67 09/02/2021    BCR 12.0 12/25/2023    ANIONGAP 12.0 12/25/2023     Lab Results   Component Value Date    WBC 10.44 12/24/2023    HGB 12.2 12/24/2023    HCT 38.8 12/24/2023    MCV 91.9 12/24/2023     12/24/2023       Lab review:   Sodium 142  Creatinine 0.75  Normal LFTs    WBC 10.44  Hemoglobin 12.2 and platelets 287    Imaging review:   MRI brain did not show any acute abnormality    CT temporal bones-mastoid and right middle ear cavity opacified and also right occipital craniotomy postsurgical changes    Diagnoses:  Right Bell's palsy  In the setting of recent right occipital craniotomy for microvascular decompression of trigeminal nerve    Trigeminal neuralgia medically intractable s/p surgery    Other medical problems  Hypertension  Hyperlipidemia    Pre-stroke MRS: 0  NIHSS: 0      She does have a complete right Bell's palsy this might be postsurgical from her recent surgery or idiopathic, but in the setting of right mastoid and right middle ear changes on CT temporal bones most likely postsurgical.     Neurosurgery is evaluating the patient if no surgical intervention is planned for the CSF leak might benefit from steroids 7 to 14 days along with an eye patch    Spoke about the plan with the hospitalist and also the patient    No further workup from neurology will sign  off.      MDM   Reviewed: Previous charts, nursing notes and vitals   Reviewed: Previous labs and CT scan and MRI scan   Interpretation: Labs and CT scan and MRI scan  Total time providing care is :30-74 minutes. This excluded time spent performing separately reportable procedures and services  Consults :Neurology/Stroke    Please note that portions of this note were completed with a voice recognition program.     Horacio Bailey MD  Neuro Hospitalist /Vascular Neurology.

## 2023-12-25 NOTE — OUTREACH NOTE
General Surgery Week 3 Survey      Flowsheet Row Responses   [facility], [place], operated by [facility] patient discharged from? [facility]   Does the patient have one of the following disease processes/diagnoses(primary or secondary)? General Surgery   Week 3 attempt successful? No   Unsuccessful attempts Attempt 1   Revoke Readmitted            CHASITY SILVA - Registered Nurse

## 2023-12-25 NOTE — PROGRESS NOTES
"[facility] Clinical Pharmacy Services: Vancomycin Pharmacokinetic Initial Consult Note    Kristina Kang is a 63 y.o. female who is on day 1 of pharmacy to dose vancomycin.    Indication: Surgical Prophylaxis s/p craniotomy on 12/7 - post-op complications  Consulting Provider: Patty  Planned Duration of Therapy: 7 - per neurosurgery, duration is until patient goes into surgery.  Loading Dose Ordered or Given: No loading dose needed as indication is surgical ppx   MRSA PCR performed: None; Result:   Culture/Source: None  Target: -600 mg/L.hr   Pertinent Vanc Dosing History: None  Other Antimicrobials: Cefepime 2 g q8h    Vitals/Labs  Ht: 157.5 cm (62\"); Wt: 64.9 kg (143 lb)  Temp Readings from Last 1 Encounters:   12/25/23 99 °F (37.2 °C) (Oral)    Estimated Creatinine Clearance: 67.9 mL/min (by C-G formula based on SCr of 0.75 mg/dL).       Results from last 7 days   Lab Units 12/25/23  0650 12/24/23  1247   CREATININE mg/dL 0.75 0.88   WBC 10*3/mm3 9.23 10.44     Assessment/Plan:  Patient s/p craniotomy from 12/7. Post-op she has developed CSF rhinorrhea and facial drooping. Neurosurgery suspects possible surgical exploration is needed so surgical prophylaxis was ordered. Renal function appears stable and at baseline. No loading dose was ordered considering indication is only for surgical prophylaxis. Will check vancomycin trough after 3rd dose as long as patient remains on vancomycin.     Vancomycin Dose:  750 mg (11.6 mg/kg) IV every 12 hours  Predictive AUC level for the dose ordered is 447 mg/L.hr, which is within the target of 400-600 mg/L.hr  Vanc Trough has been ordered for 12/27 at 1230     Pharmacy will follow patient's kidney function and will adjust doses and obtain levels as necessary. Thank you for involving pharmacy in this patient's care. Please contact pharmacy with any questions or concerns.                           Gómez Iglesias II, PharmD  Clinical Pharmacist   "

## 2023-12-25 NOTE — CONSULTS
Neurosurgical Consultation    Chief Complaint   Patient presents with    Post-op Problem        HPI: This is a 63-year-old woman with a history of a right-sided microvascular decompression for trigeminal neuralgia on 2023.  Postoperatively she has had clear fluid leaking from her right nose.  She followed up with the surgeon in outpatient setting and workup was attempted.  She returns with right sided House-Brackman grade 4 facial nerve palsy.  She does not have any indication of infection.  Her incision has not been leaking.  There is no erythema.    Past Medical History:   Diagnosis Date    Allergic     Anemia     Colon polyp     Degeneration of lumbar intervertebral disc     Episode of syncope 2015    Essential hypertension     Glaucoma     Hepatitis C 2008    Dr. Coleman Null    History of bone density study     Normal    History of chest x-ray 2010    normal    History of echocardiogram 2015    History of EKG 2013    normal    History of Holter monitoring 2015    occas PVC    History of nuclear stress test 10/06/2015    LCG; ischemia    Hypercalcemia     Hyperlipidemia     Leiomyoma of uterus     and fibroids    Osteoarthritis     Osteopenia     Postmenopausal     Pyelonephritis     Trigeminal neuralgia     Vitamin D deficiency         Past Surgical History:   Procedure Laterality Date     SECTION      COLONOSCOPY      COLONOSCOPY W/ POLYPECTOMY      Benign polyps.  Dr. Malone    CRANIOTOMY FOR NERVE DECOMPRESSION Right 2023    Procedure: RIGHT SIDED RETROSIGMOID CRANIOTOMY FOR MICROVASCULAR DECOMPRESSION;  Surgeon: Tripp Morse MD;  Location: [facility];  Service: Neurosurgery;  Laterality: Right;    HYSTERECTOMY  2004    took one ovary     LIVER BIOPSY      OOPHORECTOMY Bilateral age 33    left 1/4 of the right         No current facility-administered medications on file prior to encounter.     Current Outpatient Medications on  File Prior to Encounter   Medication Sig Dispense Refill    aspirin 81 MG EC tablet Take 1 tablet by mouth Daily.      atorvastatin (LIPITOR) 40 MG tablet Take 1 tablet by mouth Daily. For cholesterol 90 tablet 2    carBAMazepine XR (TEGretol  XR) 200 MG 12 hr tablet TAKE 600MG DURING THE DAY AND 400MG AT NIGHT. (Patient taking differently: Take 3 tablets by mouth 2 (Two) Times a Day.) 450 tablet 1    cholecalciferol (VITAMIN D3) 25 MCG (1000 UT) tablet 3 tablets daily (Patient taking differently: Take 1 tablet by mouth 3 (Three) Times a Day. 3 tablets daily) 270 tablet 2    folic acid (FOLVITE) 400 MCG tablet Take 1 tablet by mouth Daily. 90 tablet 4    furosemide (LASIX) 20 MG tablet TAKE 1 TABLET BY MOUTH EVERY DAY (Patient taking differently: Take 1 tablet by mouth Daily.) 90 tablet 2    latanoprost (XALATAN) 0.005 % ophthalmic solution Administer 1 drop to both eyes Every Night. Indications: Wide-Angle Glaucoma      pregabalin (LYRICA) 75 MG capsule Take 1 capsule by mouth 2 (Two) Times a Day. (Patient taking differently: Take 1 capsule by mouth Every Night.) 60 capsule 3    valsartan (DIOVAN) 160 MG tablet Take 1 tablet by mouth Daily. for blood pressure. (Patient taking differently: Take 1 tablet by mouth Daily. for blood pressure.  HOLD 24 HOURS PRIOR TO SURGERY) 90 tablet 1    vitamin B-12 (CYANOCOBALAMIN) 1000 MCG tablet Take 1 tablet by mouth Daily.      methocarbamol (ROBAXIN) 500 MG tablet Take 1 tablet by mouth Every 8 (Eight) Hours As Needed (neck pain). 30 tablet 0        No Known Allergies     Social History     Socioeconomic History    Marital status:    Tobacco Use    Smoking status: Former     Packs/day: 0.50     Years: 10.00     Additional pack years: 0.00     Total pack years: 5.00     Types: Cigarettes    Smokeless tobacco: Former     Quit date: 2011   Vaping Use    Vaping Use: Never used   Substance and Sexual Activity    Alcohol use: Not Currently     Comment: Seldom    Drug use: No     Sexual activity: Yes     Partners: Male     Birth control/protection: Post-menopausal, None     Comment: Hysterectomy        If the patient is a current tobacco consumer, then adequate time was spent counseling them to quit. If the patient does not currently consume tobacco products, then adequate time was spent encouraging continued abstinence from this product.     Review of Systems     Physical Examination:     Vitals:    12/24/23 1504 12/24/23 2025 12/24/23 2318 12/25/23 0723   BP: 180/68 178/82 150/72 166/72   BP Location: Right arm Right arm Right arm Right arm   Patient Position:  Lying Lying Lying   Pulse: 62 63 63 72   Resp: 16 16 16 16   Temp: 98.2 °F (36.8 °C) 98.4 °F (36.9 °C) 98.2 °F (36.8 °C) 99 °F (37.2 °C)   TempSrc: Oral Oral Oral Oral   SpO2: 100% 96% 95% 100%   Weight:       Height:            Physical Exam     Neurological Exam   Neurological examination is consistent with House-Brackman grade 4 facial nerve palsy.  Incision is well-healing without any signs of expressible drainage or significant erythema.  There is some overlying swelling.  Otherwise neurologic function is intact.    Result Review  The following data was reviewed by: Gilberto Ascencio MD on 12/24/2023:    Data reviewed : Radiologic studies CT of the temporal bone is consistent with connection of the intracranial space to the mastoid air cells.  MRI does show some indication of possible pseudomeningocele.  There is some contrast-enhancement in the surgical bed.  There is opacification of the mastoid air cells.      Assessment/plan:  This is a 63-year-old woman status post right-sided microvascular decompression for trigeminal neuralgia.  She has had significant improvement in her facial pain.  She does have indications of a chronic CSF leak involving the mastoid air cells.  She is still pending beta-2 transferrin testing however her history is pretty consistent.  She does have a right-sided House-Brackman grade 4 facial nerve  palsy.  In order to minimize intracranial hypotension then traction on the cisternal nerves I recommend lying flat.  I recommend regular hydration including bolus hydration if necessary.  She can be given Tylenol for headaches.  She can be given caffeine if the headaches are persistent.  I will hold off on any steroids at this juncture for the facial nerve palsy as a believe she will need surgical reexploration.  I do recommend starting her on antibiotics prophylactically to minimize risk of meningitis.  Please call neurosurgery with any questions or concerns.  The primary team will return tomorrow and manage care at that juncture.

## 2023-12-26 PROCEDURE — 25810000003 SODIUM CHLORIDE 0.9 % SOLUTION 250 ML FLEX CONT: Performed by: STUDENT IN AN ORGANIZED HEALTH CARE EDUCATION/TRAINING PROGRAM

## 2023-12-26 PROCEDURE — 25010000002 VANCOMYCIN 750 MG RECONSTITUTED SOLUTION 1 EACH VIAL: Performed by: STUDENT IN AN ORGANIZED HEALTH CARE EDUCATION/TRAINING PROGRAM

## 2023-12-26 PROCEDURE — 99024 POSTOP FOLLOW-UP VISIT: CPT | Performed by: NURSE PRACTITIONER

## 2023-12-26 PROCEDURE — 25010000002 CEFEPIME PER 500 MG: Performed by: STUDENT IN AN ORGANIZED HEALTH CARE EDUCATION/TRAINING PROGRAM

## 2023-12-26 PROCEDURE — G0378 HOSPITAL OBSERVATION PER HR: HCPCS

## 2023-12-26 PROCEDURE — 25010000002 THIAMINE HCL 200 MG/2ML SOLUTION: Performed by: STUDENT IN AN ORGANIZED HEALTH CARE EDUCATION/TRAINING PROGRAM

## 2023-12-26 PROCEDURE — 25810000003 SODIUM CHLORIDE 0.9 % SOLUTION: Performed by: EMERGENCY MEDICINE

## 2023-12-26 RX ORDER — VALSARTAN 160 MG/1
160 TABLET ORAL
Status: DISCONTINUED | OUTPATIENT
Start: 2023-12-26 | End: 2023-12-29 | Stop reason: HOSPADM

## 2023-12-26 RX ADMIN — PREGABALIN 75 MG: 75 CAPSULE ORAL at 20:12

## 2023-12-26 RX ADMIN — VANCOMYCIN HYDROCHLORIDE 750 MG: 750 INJECTION, POWDER, LYOPHILIZED, FOR SOLUTION INTRAVENOUS at 01:55

## 2023-12-26 RX ADMIN — CARBAMAZEPINE 600 MG: 200 TABLET, EXTENDED RELEASE ORAL at 20:11

## 2023-12-26 RX ADMIN — CEFEPIME 2000 MG: 2 INJECTION, POWDER, FOR SOLUTION INTRAVENOUS at 04:28

## 2023-12-26 RX ADMIN — VANCOMYCIN HYDROCHLORIDE 750 MG: 750 INJECTION, POWDER, LYOPHILIZED, FOR SOLUTION INTRAVENOUS at 14:22

## 2023-12-26 RX ADMIN — CARBAMAZEPINE 600 MG: 200 TABLET, EXTENDED RELEASE ORAL at 09:14

## 2023-12-26 RX ADMIN — CEFEPIME 2000 MG: 2 INJECTION, POWDER, FOR SOLUTION INTRAVENOUS at 11:50

## 2023-12-26 RX ADMIN — Medication 1000 MCG: at 09:14

## 2023-12-26 RX ADMIN — Medication 400 MCG: at 09:14

## 2023-12-26 RX ADMIN — THIAMINE HYDROCHLORIDE 200 MG: 100 INJECTION, SOLUTION INTRAMUSCULAR; INTRAVENOUS at 09:14

## 2023-12-26 RX ADMIN — CEFEPIME 2000 MG: 2 INJECTION, POWDER, FOR SOLUTION INTRAVENOUS at 20:11

## 2023-12-26 RX ADMIN — SODIUM CHLORIDE 125 ML/HR: 9 INJECTION, SOLUTION INTRAVENOUS at 07:24

## 2023-12-26 RX ADMIN — ATORVASTATIN CALCIUM 40 MG: 20 TABLET, FILM COATED ORAL at 09:14

## 2023-12-26 RX ADMIN — ACETAMINOPHEN 650 MG: 325 TABLET, FILM COATED ORAL at 07:23

## 2023-12-26 RX ADMIN — LATANOPROST 1 DROP: 50 SOLUTION OPHTHALMIC at 20:12

## 2023-12-26 RX ADMIN — VALSARTAN 160 MG: 160 TABLET, FILM COATED ORAL at 11:50

## 2023-12-26 RX ADMIN — SODIUM CHLORIDE 125 ML/HR: 9 INJECTION, SOLUTION INTRAVENOUS at 00:05

## 2023-12-26 NOTE — CONSULTS
Visited with patient and fiancee in room.     Discussed hospitalization and emotional support. Provided copies of the advanced directive form and explained it.     Patient and fiancee want some time to discuss it, but would welcome a return visit to complete it. They are also aware that they can ask the nurse to page the  if they are ready to complete the A.D.    5

## 2023-12-26 NOTE — PROGRESS NOTES
[facility] NEUROSURGERY INTRACRANIAL PROGRESS NOTE    PATIENT IDENTIFICATION:   Name:  Kristina Kang      MRN:  [id number]     63 y.o.  female               CC: Status post MVD with postoperative CSF leak      Subjective     Interval History: No new complaints or events overnight    ROS:  Constitutional: No fever, chills  HEENT: No headache, no vision changes, no tinnitus, no hearing difficulties.  Right craniotomy incision well-approximated, no erythema, swelling or drainage  Neck: no stiffness  GI: No nausea, vomiting, no swallow difficulties  Neuro: No numbness, tingling, or weakness, no speech difficulties, no balance difficulties, right facial droop from Bell's palsy    Objective     Vital signs in last 24 hours:  Temp:  [98.1 °F (36.7 °C)-98.7 °F (37.1 °C)] 98.1 °F (36.7 °C)  Heart Rate:  [65-76] 75  Resp:  [16-18] 18  BP: (141-178)/(66-79) 178/76    Intake/Output this shift:  I/O this shift:  In: 1152.5 [P.O.:240; I.V.:912.5]  Out: -     Intake/Output last 3 shifts:  I/O last 3 completed shifts:  In: 5729.6 [P.O.:540; I.V.:4389.6; IV Piggyback:800]  Out: 940 [Urine:940]    LABS:     Results from last 7 days   Lab Units 12/25/23  0650 12/24/23  1247   WBC 10*3/mm3 9.23 10.44   HEMOGLOBIN g/dL 11.3* 12.2   HEMATOCRIT % 34.2 38.8   PLATELETS 10*3/mm3 224 287       Results from last 7 days   Lab Units 12/25/23  0650 12/24/23  1247   SODIUM mmol/L 142 140   POTASSIUM mmol/L 4.1 5.2   CHLORIDE mmol/L 108* 104   CO2 mmol/L 22.0 27.0   BUN mg/dL 9 10   CREATININE mg/dL 0.75 0.88   GLUCOSE mg/dL 74 94   CALCIUM mg/dL 9.6 10.7*       IMAGING STUDIES:    No new imaging to review    I personally viewed and interpreted the patient's chart.    Meds reviewed/changed: Yes      Physical exam  Awake, alert, oriented x3  Pupils equal round reactive to light  Extraocular muscles intact  Face symmetric  Speech is fluent and clear  No pronator drift  Motor exam  Bilateral deltoids 5/5, bilateral biceps 5/5, bilateral triceps  "5/5, bilateral wrist extension 5/5 bilateral hand  5/5  Bilateral hip flexion 5/5, bilateral knee extension 5/5, bilateral DF/PF 5/5.  Bell's palsy, right.  gait deferred  Able to detect  light touch in all 4 extremities      Assessment & Plan     ASSESSMENT:      Facial droop    Essential hypertension    Hyperlipidemia    Hypocalciuric hypercalcemia    Trigeminal neuralgia    CSF rhinorrhea    63-year-old woman with a history of recent right-sided microvascular decompression for trigeminal neuralgia who returns with clear fluid leaking from her right nostril most consistent with CSF.    PLAN:     Continue antibiotic prophylaxis  Hold off on steroids at this time  Okay to sit up  Beta-2 transferrin is pending  N.p.o. after midnight, OR tomorrow to repair leak    I discussed the patient's findings and my recommendations with patient and nursing staff    I spent 35 minutes caring for Kristina Kang on this date of service. This time includes time spent by me in the following activities: preparing for the visit, reviewing tests, obtaining and/or reviewing a separately obtained history, performing a medically appropriate examination and/or evaluation, counseling and educating the patient/family/caregiver, ordering medications, tests, or procedures, referring and communicating with other health care professionals, documenting information in the medical record, independently interpreting results and communicating that information with the patient/family/caregiver, and care coordination.          LOS: 0 days       Ana Escoto, APRN  12/26/2023  11:51 EST    \"Dictated utilizing Dragon dictation\".      "

## 2023-12-26 NOTE — PLAN OF CARE
Goal Outcome Evaluation:    Patient alert and oriented, VSS, on room air. Patient valsartan restarted due to elevated BP. Per Neurosurgery patient may be up in chair and have bathroom privileges. Monitor for CSF leakage.Plans to go to surgery tomorrow morning. NPO at midnight. Consent in chart. Call light within reach.

## 2023-12-26 NOTE — SIGNIFICANT NOTE
12/26/23 0806   OTHER   Discipline physical therapist   Therapy Assessment/Plan (PT)   Criteria for Skilled Interventions Met (PT) no problems identified which require skilled intervention  (pt currently on strict bedrest with facial issues; No indication for acute PT needs at this time, please reconsult if mobility issues arise post bedrest.)

## 2023-12-26 NOTE — CASE MANAGEMENT/SOCIAL WORK
Discharge Planning Assessment  [facility]     Patient Name: Kristina Kang  MRN: [id number]  Today's Date: 12/26/2023    Admit Date: 12/24/2023    Plan: Home with significant other and possible home health.   Discharge Needs Assessment       Row Name 12/26/23 1747       Living Environment    People in Home significant other    Current Living Arrangements home    Potentially Unsafe Housing Conditions none    Primary Care Provided by self;spouse/significant other    Provides Primary Care For no one    Family Caregiver if Needed significant other    Quality of Family Relationships helpful;involved;supportive    Able to Return to Prior Arrangements yes       Resource/Environmental Concerns    Resource/Environmental Concerns none       Transition Planning    Patient/Family Anticipates Transition to home with help/services    Patient/Family Anticipated Services at Transition home health care    Transportation Anticipated family or friend will provide       Discharge Needs Assessment    Equipment Currently Used at Home walker, rolling    Concerns to be Addressed discharge planning    Provided Post Acute Provider List? N/A    Provided Post Acute Provider Quality & Resource List? N/A                   Discharge Plan       Row Name 12/26/23 8390       Plan    Plan Home with significant other and possible home health.    Plan Comments S/W pt and her significant other Angelo at bedside. Pt gave permission to discuss DC planning with s/w present.  Facesheet info confirmed.  Pt lives in a 3 level house w/ Angelo who can assist her as needed.  He does work full time, so pt is alone at home when he is at work.  Pt's only home DME is a walker.  Pt has used [facility]  recently after her last surgery and would want to use them again if needed.  Referral sent to [facility] - ana rosa pending.  No hx of inpt rehab.  Pt is going to surgery tomorrow. CCP will followup and assist as needed. ..........Mildred ALFORD/ CHRIS                   Continued Care and Services - Admitted Since 12/24/2023       Home Medical Care       Service Provider Request Status Selected Services Address Phone Fax Patient Preferred    [facility] Pending - Request Sent N/A [address] [address] [phone] --                  Selected Continued Care - Prior Encounters Includes continued care and service providers with selected services from prior encounters from 9/25/2023 to 12/26/2023      Discharged on 12/9/2023 Admission date: 12/7/2023 - Discharge disposition: Home-Health Care Svc      Durable Medical Equipment       Service Provider Selected Services Address Phone Fax Patient Preferred    [facility] [address] [id number] [phone] --              Home Medical Care       Service Provider Selected Services Address Phone Fax Patient Preferred    [facility] [address] [address] [phone] --                          Expected Discharge Date and Time       Expected Discharge Date Expected Discharge Time    Dec 27, 2023            Demographic Summary       Row Name 12/26/23 1747       General Information    Admission Type observation    Arrived From home    Referral Source admission list    Reason for Consult discharge planning    Preferred Language English                   Functional Status       Row Name 12/26/23 1745       Functional Status    Usual Activity Tolerance good       Functional Status, IADL    Medications independent    Meal Preparation assistive person    Housekeeping assistive person;independent    Laundry assistive person;independent    Shopping assistive person;independent       Mental Status    General Appearance WDL WDL       Mental Status Summary    Recent Changes in Mental Status/Cognitive Functioning no changes       Employment/     Employment Status employed full-time                               Mildred Chaudhry RN

## 2023-12-26 NOTE — PLAN OF CARE
Goal Outcome Evaluation:      No acute events overnight at this time.  VSS, alert and oriented x 4, and compliant with laying flat at all times, except to take medication.  IV abx provided without complications at this time.  Strict bed rest.  Spouse at bedside.  Bed in lowest position, call bell and personal items in reach.  Will continue to monitor and provide care as appropriate and ordered.

## 2023-12-26 NOTE — PLAN OF CARE
Goal Outcome Evaluation:      Pt A&Ox4, VSS, complains of a headache which as been resolved. See MAR for treatment. Pt to lay flat at all times. Pt has no further complaints  Problem: Adult Inpatient Plan of Care  Goal: Plan of Care Review  Outcome: Ongoing, Progressing

## 2023-12-26 NOTE — PROGRESS NOTES
Name: Kristina Kang ADMIT: 2023   : 1960  PCP: Joanne Maurice PA-C    MRN: [id number] LOS: 0 days   AGE/SEX: 63 y.o. female  ROOM: [facility], [place]     Subjective   Subjective   No acute events overnight.  Patient sitting up in chair at bedside today, as neurosurgery has cleared her to sit.  States that she is feeling the same.  Continues to have right-sided facial paralysis but no other issues have developed.  Blood pressure has been high but losartan now restarted.  Headache has improved.    Objective   Objective     Vital Signs  Temp:  [98.1 °F (36.7 °C)-98.7 °F (37.1 °C)] 98.1 °F (36.7 °C)  Heart Rate:  [65-76] 74  Resp:  [16-18] 18  BP: (141-178)/(56-79) 141/56  SpO2:  [96 %-100 %] 100 %  on   ;   Device (Oxygen Therapy): room air  Body mass index is 27.11 kg/m².    Physical Exam  General: Alert, no acute distress.  Sitting up in chair at bedside.  ENT: No conjunctival injection or scleral icterus. Moist mucous membranes.   Neuro: Right-sided facial nerve palsy.  Strength normal in all extremities.  Other than right-sided facial nerve palsy, no additional focal neurologic deficits.  Lungs: Clear to auscultation bilaterally. No wheeze or crackles. No distress.   Heart: RRR, no murmurs. No edema.  Abdomen: Soft, non-tender, non-distended. Normal bowel sounds.   Ext: Warm and well-perfused. No edema.   Skin: No rashes or lesions. IV site without swelling or erythema.     Results Review     I reviewed the patient's new clinical results:  Results from last 7 days   Lab Units 23  0650 23  1247   WBC 10*3/mm3 9.23 10.44   HEMOGLOBIN g/dL 11.3* 12.2   PLATELETS 10*3/mm3 224 287     Results from last 7 days   Lab Units 23  0650 23  1247   SODIUM mmol/L 142 140   POTASSIUM mmol/L 4.1 5.2   CHLORIDE mmol/L 108* 104   CO2 mmol/L 22.0 27.0   BUN mg/dL 9 10   CREATININE mg/dL 0.75 0.88   GLUCOSE mg/dL 74 94   EGFR mL/min/1.73 89.6 73.9     Results from last 7 days   Lab Units  "12/24/23  1247   ALBUMIN g/dL 4.3   BILIRUBIN mg/dL 0.2   ALK PHOS U/L 106   AST (SGOT) U/L 15   ALT (SGPT) U/L 11     Results from last 7 days   Lab Units 12/25/23  0650 12/24/23  1247   CALCIUM mg/dL 9.6 10.7*   ALBUMIN g/dL  --  4.3       No results found for: \"HGBA1C\", \"POCGLU\"    MRI Brain With & Without Contrast    Result Date: 12/24/2023  1. No evidence of restricted diffusion to suggest acute infarct. 2. Stable right lateral cerebellar hygroma. 3. There is some fluid seen superficial to the craniectomy. This may represent a seroma, although small pseudomeningocele is not excluded. There does appear to be some peripheral enhancement inferiorly on the postcontrast imaging, and correlation with any evidence of infection is recommended. 4. Extensive opacification of the right mastoid air cells and right middle ear. 5. Intracranial vessels appear to be patent.   This report was finalized on 12/24/2023 10:26 PM by Dr. Orly Seals M.D on Workstation: [id number]      MRI Angiogram Head Without Contrast    Result Date: 12/24/2023  1. No evidence of restricted diffusion to suggest acute infarct. 2. Stable right lateral cerebellar hygroma. 3. There is some fluid seen superficial to the craniectomy. This may represent a seroma, although small pseudomeningocele is not excluded. There does appear to be some peripheral enhancement inferiorly on the postcontrast imaging, and correlation with any evidence of infection is recommended. 4. Extensive opacification of the right mastoid air cells and right middle ear. 5. Intracranial vessels appear to be patent.   This report was finalized on 12/24/2023 10:26 PM by Dr. Orly Seals M.D on Workstation: [id number]      CT Temporal Bones Without Contrast    Result Date: 12/24/2023   The patient is status post a right lateral occipital craniotomy for the purposes of a right trigeminal nerve microvascular decompression for symptoms of trigeminal neuralgia. The craniotomy " traverses a few right mastoid air cells. The right mastoid air cells and right middle ear cavity are subtotally opacified. This opacification of the mastoid air cells and middle ear cavity is new when compared to prior preoperative CT scan dated 12/05/2023. If there is a CSF leak, this craniotomy traversing the right mastoid air cells is highly suspect for the cause of the CSF leak. Please correlate with clinical data.  These findings were discussed with Narciso Castro on 12/24/2023 at approximately 2:19 p.m.   Radiation dose reduction techniques were utilized, including automated exposure control and exposure modulation based on body size.   This report was finalized on 12/24/2023 2:53 PM by Dr. Lorenzo Samuel M.D on Workstation: [email or web address]       I have personally reviewed all medications:  Scheduled Medications  atorvastatin, 40 mg, Oral, Daily  carBAMazepine XR, 600 mg, Oral, BID  cefepime, 2,000 mg, Intravenous, Q8H  enoxaparin, 40 mg, Subcutaneous, Q24H  folic acid, 400 mcg, Oral, Daily  latanoprost, 1 drop, Both Eyes, Nightly  pregabalin, 75 mg, Oral, Nightly  thiamine (B-1) IV, 200 mg, Intravenous, Daily  valsartan, 160 mg, Oral, Q24H  vancomycin, 750 mg, Intravenous, Q12H  vitamin B-12, 1,000 mcg, Oral, Daily    Infusions  Pharmacy to dose vancomycin,   sodium chloride, 125 mL/hr, Last Rate: 125 mL/hr (12/26/23 0724)    Diet  NPO Diet NPO Type: Sips with Meds    Assessment/Plan     Active Hospital Problems    Diagnosis  POA    **Facial droop [R29.810]  Yes    CSF rhinorrhea [G96.01]  Yes    Trigeminal neuralgia [G50.0]  Yes    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       63 y.o. female with Facial droop.    Ms. Kang is a 63 y.o. woman with a history of v3 trigeminal neuralgia s/p right retrosigmoid craniotomy for microvascular decompression earlier this month (12/7/23) with  neurosurgery, hypertension, hyperlipidemia, hypocalciuric hypercalcemia who presents with persistent clear drainage from her nose and right facial palsy as well as possible 8th and 9th cranial nerve dysfunction     Possible CSF rhinorrhea with right facial palsy, rightward tongue deviation on protrusion, and possible partial right hearing loss following right retrosigmoid craniotomy for microvascular decompression earlier this month-it seems that these symptoms might be secondary to low CSF pressure per the ED's discussion with neurosurgery.  MRI brain and MRA were done with no evidence of acute infarction.  Once again concerning for CSF leak.  Neurosurgery and neurology consulted and following.  Neurology feels that patient has a right-sided Bell's palsy which could be postsurgical or idiopathic.  If no surgery is planned, the recommendation is for steroids and eyepatch.   Per neurosurgery, hold off on steroids at this time.  Primary neurosurgery team to evaluate today.  Continue vancomycin and cefepime per neurosurgery recommendations as prophylaxis against meningitis.  HTN-BP has been elevated.  Restart home valsartan, hydralazine as needed.  Continue holding Lasix.  If BP remains elevated, can start scheduled hydralazine or increase the losartan.  Trigeminal neuralgia-continue home Tegretol and pregabalin.  Remainder of management as above.  I discussed the patient's findings and my recommendations with patient, nursing staff, and consulting provider.    SCDs for DVT prophylaxis.  Full code.  Discussed with patient, nursing staff, and consulting provider.  Anticipate discharge home timing yet to be determined.    Jany Cerrato MD  [facility]  12/26/23  11:57 EST

## 2023-12-26 NOTE — NURSING NOTE
Verbal orders from [facility] to allow patient to ambulate around room with bathroom privileges. Monitor for CSF leakage.

## 2023-12-26 NOTE — DISCHARGE PLACEMENT REQUEST
"Kristina Ramirez (63 y.o. Female)       Date of Birth   1960    Social Security Number       Address   [address] [place] [address]    Home Phone   [phone]    MRN   [id number]       Adventism   Other    Marital Status                               Admission Date   12/24/23    Admission Type   Emergency    Admitting Provider   Torres Ascencio MD    Attending Provider   Jany Cerrato MD    Department, Room/Bed   [address], S[address]/1       Discharge Date       Discharge Disposition       Discharge Destination                                 Attending Provider: Jany Cerrato MD    Allergies: No Known Allergies    Isolation: None   Infection: None   Code Status: CPR    Ht: 157.5 cm (62\")   Wt: 67.2 kg (148 lb 3.2 oz)    Admission Cmt: None   Principal Problem: Facial droop [R29.810]                   Active Insurance as of 12/24/2023       Primary Coverage       Payor Plan Insurance Group Employer/Plan Group    [employer] PPO [id number]       Payor Plan Address Payor Plan Phone Number Payor Plan Fax Number Effective Dates    [address] [phone]  1/1/2014 - None Entered    [place] [address]         Subscriber Name Subscriber Birth Date Member ID       KRISTINA RAMIREZ 1960 [id number]                     Emergency Contacts        (Rel.) Home Phone Work Phone Mobile Phone    Aaron Alonso (Daughter) -- -- [phone]                "

## 2023-12-27 ENCOUNTER — ANESTHESIA EVENT (OUTPATIENT)
Dept: PERIOP | Facility: HOSPITAL | Age: 63
End: 2023-12-27
Payer: COMMERCIAL

## 2023-12-27 ENCOUNTER — ANESTHESIA (OUTPATIENT)
Dept: PERIOP | Facility: HOSPITAL | Age: 63
End: 2023-12-27
Payer: COMMERCIAL

## 2023-12-27 LAB
GLUCOSE BLDC GLUCOMTR-MCNC: 138 MG/DL (ref 70–130)
GLUCOSE BLDC GLUCOMTR-MCNC: 142 MG/DL (ref 70–130)
VANCOMYCIN TROUGH SERPL-MCNC: 4.7 MCG/ML (ref 5–20)

## 2023-12-27 PROCEDURE — 62100 REPAIR BRAIN FLUID LEAKAGE: CPT | Performed by: NEUROLOGICAL SURGERY

## 2023-12-27 PROCEDURE — C1713 ANCHOR/SCREW BN/BN,TIS/BN: HCPCS | Performed by: NEUROLOGICAL SURGERY

## 2023-12-27 PROCEDURE — 25810000003 SODIUM CHLORIDE 0.9 % SOLUTION 250 ML FLEX CONT: Performed by: STUDENT IN AN ORGANIZED HEALTH CARE EDUCATION/TRAINING PROGRAM

## 2023-12-27 PROCEDURE — 25810000003 SODIUM CHLORIDE 0.9 % SOLUTION

## 2023-12-27 PROCEDURE — 25010000002 FENTANYL CITRATE (PF) 50 MCG/ML SOLUTION: Performed by: STUDENT IN AN ORGANIZED HEALTH CARE EDUCATION/TRAINING PROGRAM

## 2023-12-27 PROCEDURE — 87075 CULTR BACTERIA EXCEPT BLOOD: CPT | Performed by: NEUROLOGICAL SURGERY

## 2023-12-27 PROCEDURE — 0WJ10ZZ INSPECTION OF CRANIAL CAVITY, OPEN APPROACH: ICD-10-PCS | Performed by: NEUROLOGICAL SURGERY

## 2023-12-27 PROCEDURE — 25010000002 CEFAZOLIN PER 500 MG: Performed by: NEUROLOGICAL SURGERY

## 2023-12-27 PROCEDURE — 25010000002 ONDANSETRON PER 1 MG: Performed by: STUDENT IN AN ORGANIZED HEALTH CARE EDUCATION/TRAINING PROGRAM

## 2023-12-27 PROCEDURE — 25010000002 CEFEPIME PER 500 MG: Performed by: NEUROLOGICAL SURGERY

## 2023-12-27 PROCEDURE — 25010000002 CEFAZOLIN IN DEXTROSE 2-4 GM/100ML-% SOLUTION: Performed by: STUDENT IN AN ORGANIZED HEALTH CARE EDUCATION/TRAINING PROGRAM

## 2023-12-27 PROCEDURE — 87015 SPECIMEN INFECT AGNT CONCNTJ: CPT | Performed by: NEUROLOGICAL SURGERY

## 2023-12-27 PROCEDURE — 87205 SMEAR GRAM STAIN: CPT | Performed by: NEUROLOGICAL SURGERY

## 2023-12-27 PROCEDURE — 25010000002 VANCOMYCIN PER 500 MG: Performed by: HOSPITALIST

## 2023-12-27 PROCEDURE — 25010000002 SUGAMMADEX 200 MG/2ML SOLUTION: Performed by: STUDENT IN AN ORGANIZED HEALTH CARE EDUCATION/TRAINING PROGRAM

## 2023-12-27 PROCEDURE — 25010000002 POTASSIUM CHLORIDE PER 2 MEQ: Performed by: NEUROLOGICAL SURGERY

## 2023-12-27 PROCEDURE — 25010000002 MAGNESIUM SULFATE PER 500 MG OF MAGNESIUM: Performed by: STUDENT IN AN ORGANIZED HEALTH CARE EDUCATION/TRAINING PROGRAM

## 2023-12-27 PROCEDURE — 0NH004Z INSERTION OF INTERNAL FIXATION DEVICE INTO SKULL, OPEN APPROACH: ICD-10-PCS | Performed by: NEUROLOGICAL SURGERY

## 2023-12-27 PROCEDURE — C1889 IMPLANT/INSERT DEVICE, NOC: HCPCS | Performed by: NEUROLOGICAL SURGERY

## 2023-12-27 PROCEDURE — 25010000002 HYDROMORPHONE PER 4 MG: Performed by: STUDENT IN AN ORGANIZED HEALTH CARE EDUCATION/TRAINING PROGRAM

## 2023-12-27 PROCEDURE — 25010000002 MIDAZOLAM PER 1 MG: Performed by: ANESTHESIOLOGY

## 2023-12-27 PROCEDURE — 25010000002 PROPOFOL 200 MG/20ML EMULSION: Performed by: STUDENT IN AN ORGANIZED HEALTH CARE EDUCATION/TRAINING PROGRAM

## 2023-12-27 PROCEDURE — C9250 ARTISS FIBRIN SEALANT: HCPCS | Performed by: NEUROLOGICAL SURGERY

## 2023-12-27 PROCEDURE — 0NP00JZ REMOVAL OF SYNTHETIC SUBSTITUTE FROM SKULL, OPEN APPROACH: ICD-10-PCS | Performed by: NEUROLOGICAL SURGERY

## 2023-12-27 PROCEDURE — 87070 CULTURE OTHR SPECIMN AEROBIC: CPT | Performed by: NEUROLOGICAL SURGERY

## 2023-12-27 PROCEDURE — 25010000002 THIAMINE HCL 200 MG/2ML SOLUTION: Performed by: INTERNAL MEDICINE

## 2023-12-27 PROCEDURE — 25010000002 VANCOMYCIN 750 MG RECONSTITUTED SOLUTION 1 EACH VIAL: Performed by: STUDENT IN AN ORGANIZED HEALTH CARE EDUCATION/TRAINING PROGRAM

## 2023-12-27 PROCEDURE — 25010000002 PHENYLEPHRINE 10 MG/ML SOLUTION 5 ML VIAL: Performed by: STUDENT IN AN ORGANIZED HEALTH CARE EDUCATION/TRAINING PROGRAM

## 2023-12-27 PROCEDURE — 82948 REAGENT STRIP/BLOOD GLUCOSE: CPT

## 2023-12-27 PROCEDURE — 25010000002 CEFEPIME PER 500 MG: Performed by: STUDENT IN AN ORGANIZED HEALTH CARE EDUCATION/TRAINING PROGRAM

## 2023-12-27 PROCEDURE — 62100 REPAIR BRAIN FLUID LEAKAGE: CPT | Performed by: SPECIALIST/TECHNOLOGIST, OTHER

## 2023-12-27 PROCEDURE — 25810000003 LACTATED RINGERS PER 1000 ML: Performed by: ANESTHESIOLOGY

## 2023-12-27 PROCEDURE — 25010000002 DROPERIDOL PER 5 MG: Performed by: STUDENT IN AN ORGANIZED HEALTH CARE EDUCATION/TRAINING PROGRAM

## 2023-12-27 PROCEDURE — 80202 ASSAY OF VANCOMYCIN: CPT | Performed by: NEUROLOGICAL SURGERY

## 2023-12-27 PROCEDURE — 25010000002 DEXAMETHASONE SODIUM PHOSPHATE 20 MG/5ML SOLUTION: Performed by: STUDENT IN AN ORGANIZED HEALTH CARE EDUCATION/TRAINING PROGRAM

## 2023-12-27 DEVICE — HEMOST ABS SURGIFOAM SZ100 8X12 10MM: Type: IMPLANTABLE DEVICE | Site: BRAIN | Status: FUNCTIONAL

## 2023-12-27 DEVICE — DURAGEN® PLUS DURAL REGENERATION MATRIX, 2 IN X 2 IN (5 CM X 5 CM)
Type: IMPLANTABLE DEVICE | Site: BRAIN | Status: FUNCTIONAL
Brand: DURAGEN® PLUS

## 2023-12-27 DEVICE — SCRW CRS/DRV DRL FREE MICRO TI 1.5X4MM: Type: IMPLANTABLE DEVICE | Site: BRAIN | Status: FUNCTIONAL

## 2023-12-27 DEVICE — MEDLINE BONEWAX YELLOW
Type: IMPLANTABLE DEVICE | Site: BRAIN | Status: FUNCTIONAL
Brand: MEDLINE BONEWAX YELLOW

## 2023-12-27 DEVICE — MESH SCREEN PITTSBURG TI .3MM REG: Type: IMPLANTABLE DEVICE | Site: BRAIN | Status: FUNCTIONAL

## 2023-12-27 DEVICE — SSC BONE WAX
Type: IMPLANTABLE DEVICE | Site: BRAIN | Status: FUNCTIONAL
Brand: SSC BONE WAX

## 2023-12-27 RX ORDER — VANCOMYCIN HYDROCHLORIDE 1 G/200ML
1000 INJECTION, SOLUTION INTRAVENOUS EVERY 12 HOURS
Status: DISCONTINUED | OUTPATIENT
Start: 2023-12-27 | End: 2023-12-28

## 2023-12-27 RX ORDER — SODIUM CHLORIDE AND POTASSIUM CHLORIDE 150; 450 MG/100ML; MG/100ML
75 INJECTION, SOLUTION INTRAVENOUS CONTINUOUS
Status: DISCONTINUED | OUTPATIENT
Start: 2023-12-27 | End: 2023-12-29 | Stop reason: HOSPADM

## 2023-12-27 RX ORDER — DEXAMETHASONE SODIUM PHOSPHATE 4 MG/ML
INJECTION, SOLUTION INTRA-ARTICULAR; INTRALESIONAL; INTRAMUSCULAR; INTRAVENOUS; SOFT TISSUE AS NEEDED
Status: DISCONTINUED | OUTPATIENT
Start: 2023-12-27 | End: 2023-12-27 | Stop reason: SURG

## 2023-12-27 RX ORDER — SODIUM CHLORIDE, SODIUM LACTATE, POTASSIUM CHLORIDE, CALCIUM CHLORIDE 600; 310; 30; 20 MG/100ML; MG/100ML; MG/100ML; MG/100ML
9 INJECTION, SOLUTION INTRAVENOUS CONTINUOUS
Status: DISCONTINUED | OUTPATIENT
Start: 2023-12-27 | End: 2023-12-29 | Stop reason: HOSPADM

## 2023-12-27 RX ORDER — LIDOCAINE HYDROCHLORIDE 20 MG/ML
INJECTION, SOLUTION INFILTRATION; PERINEURAL AS NEEDED
Status: DISCONTINUED | OUTPATIENT
Start: 2023-12-27 | End: 2023-12-27 | Stop reason: SURG

## 2023-12-27 RX ORDER — LIDOCAINE HYDROCHLORIDE 10 MG/ML
0.5 INJECTION, SOLUTION INFILTRATION; PERINEURAL ONCE AS NEEDED
Status: DISCONTINUED | OUTPATIENT
Start: 2023-12-27 | End: 2023-12-27 | Stop reason: HOSPADM

## 2023-12-27 RX ORDER — FAMOTIDINE 10 MG/ML
20 INJECTION, SOLUTION INTRAVENOUS ONCE
Status: COMPLETED | OUTPATIENT
Start: 2023-12-27 | End: 2023-12-27

## 2023-12-27 RX ORDER — DOCUSATE SODIUM 100 MG/1
100 CAPSULE, LIQUID FILLED ORAL 2 TIMES DAILY PRN
Status: DISCONTINUED | OUTPATIENT
Start: 2023-12-27 | End: 2023-12-29 | Stop reason: HOSPADM

## 2023-12-27 RX ORDER — PHENYLEPHRINE HCL IN 0.9% NACL 1 MG/10 ML
SYRINGE (ML) INTRAVENOUS AS NEEDED
Status: DISCONTINUED | OUTPATIENT
Start: 2023-12-27 | End: 2023-12-27 | Stop reason: SURG

## 2023-12-27 RX ORDER — HYDRALAZINE HYDROCHLORIDE 20 MG/ML
5 INJECTION INTRAMUSCULAR; INTRAVENOUS
Status: DISCONTINUED | OUTPATIENT
Start: 2023-12-27 | End: 2023-12-27 | Stop reason: HOSPADM

## 2023-12-27 RX ORDER — ONDANSETRON 2 MG/ML
4 INJECTION INTRAMUSCULAR; INTRAVENOUS ONCE AS NEEDED
Status: COMPLETED | OUTPATIENT
Start: 2023-12-27 | End: 2023-12-27

## 2023-12-27 RX ORDER — HYDROCODONE BITARTRATE AND ACETAMINOPHEN 5; 325 MG/1; MG/1
1 TABLET ORAL ONCE AS NEEDED
Status: DISCONTINUED | OUTPATIENT
Start: 2023-12-27 | End: 2023-12-27 | Stop reason: HOSPADM

## 2023-12-27 RX ORDER — OXYCODONE AND ACETAMINOPHEN 7.5; 325 MG/1; MG/1
1 TABLET ORAL EVERY 4 HOURS PRN
Status: DISCONTINUED | OUTPATIENT
Start: 2023-12-27 | End: 2023-12-27 | Stop reason: HOSPADM

## 2023-12-27 RX ORDER — PROMETHAZINE HYDROCHLORIDE 25 MG/1
25 SUPPOSITORY RECTAL ONCE AS NEEDED
Status: DISCONTINUED | OUTPATIENT
Start: 2023-12-27 | End: 2023-12-27 | Stop reason: HOSPADM

## 2023-12-27 RX ORDER — SODIUM CHLORIDE 0.9 % (FLUSH) 0.9 %
3-10 SYRINGE (ML) INJECTION AS NEEDED
Status: DISCONTINUED | OUTPATIENT
Start: 2023-12-27 | End: 2023-12-27 | Stop reason: HOSPADM

## 2023-12-27 RX ORDER — NALOXONE HCL 0.4 MG/ML
0.4 VIAL (ML) INJECTION
Status: DISCONTINUED | OUTPATIENT
Start: 2023-12-27 | End: 2023-12-29 | Stop reason: HOSPADM

## 2023-12-27 RX ORDER — ROCURONIUM BROMIDE 10 MG/ML
INJECTION, SOLUTION INTRAVENOUS AS NEEDED
Status: DISCONTINUED | OUTPATIENT
Start: 2023-12-27 | End: 2023-12-27 | Stop reason: SURG

## 2023-12-27 RX ORDER — FENTANYL CITRATE 50 UG/ML
INJECTION, SOLUTION INTRAMUSCULAR; INTRAVENOUS AS NEEDED
Status: DISCONTINUED | OUTPATIENT
Start: 2023-12-27 | End: 2023-12-27 | Stop reason: SURG

## 2023-12-27 RX ORDER — MAGNESIUM HYDROXIDE 1200 MG/15ML
LIQUID ORAL AS NEEDED
Status: DISCONTINUED | OUTPATIENT
Start: 2023-12-27 | End: 2023-12-27 | Stop reason: HOSPADM

## 2023-12-27 RX ORDER — EPHEDRINE SULFATE 50 MG/ML
5 INJECTION, SOLUTION INTRAVENOUS ONCE AS NEEDED
Status: DISCONTINUED | OUTPATIENT
Start: 2023-12-27 | End: 2023-12-27 | Stop reason: HOSPADM

## 2023-12-27 RX ORDER — DROPERIDOL 2.5 MG/ML
0.62 INJECTION, SOLUTION INTRAMUSCULAR; INTRAVENOUS
Status: COMPLETED | OUTPATIENT
Start: 2023-12-27 | End: 2023-12-27

## 2023-12-27 RX ORDER — HYDROMORPHONE HYDROCHLORIDE 1 MG/ML
0.5 INJECTION, SOLUTION INTRAMUSCULAR; INTRAVENOUS; SUBCUTANEOUS
Status: DISCONTINUED | OUTPATIENT
Start: 2023-12-27 | End: 2023-12-27 | Stop reason: HOSPADM

## 2023-12-27 RX ORDER — THIAMINE HYDROCHLORIDE 100 MG/ML
200 INJECTION, SOLUTION INTRAMUSCULAR; INTRAVENOUS ONCE
Status: COMPLETED | OUTPATIENT
Start: 2023-12-27 | End: 2023-12-27

## 2023-12-27 RX ORDER — EPHEDRINE SULFATE 50 MG/ML
INJECTION INTRAVENOUS AS NEEDED
Status: DISCONTINUED | OUTPATIENT
Start: 2023-12-27 | End: 2023-12-27 | Stop reason: SURG

## 2023-12-27 RX ORDER — CEFAZOLIN SODIUM 2 G/100ML
INJECTION, SOLUTION INTRAVENOUS AS NEEDED
Status: DISCONTINUED | OUTPATIENT
Start: 2023-12-27 | End: 2023-12-27 | Stop reason: SURG

## 2023-12-27 RX ORDER — LABETALOL HYDROCHLORIDE 5 MG/ML
5 INJECTION, SOLUTION INTRAVENOUS
Status: DISCONTINUED | OUTPATIENT
Start: 2023-12-27 | End: 2023-12-27 | Stop reason: HOSPADM

## 2023-12-27 RX ORDER — ONDANSETRON 4 MG/1
4 TABLET, ORALLY DISINTEGRATING ORAL EVERY 6 HOURS PRN
Status: DISCONTINUED | OUTPATIENT
Start: 2023-12-27 | End: 2023-12-29 | Stop reason: HOSPADM

## 2023-12-27 RX ORDER — MAGNESIUM SULFATE HEPTAHYDRATE 500 MG/ML
INJECTION, SOLUTION INTRAMUSCULAR; INTRAVENOUS AS NEEDED
Status: DISCONTINUED | OUTPATIENT
Start: 2023-12-27 | End: 2023-12-27 | Stop reason: SURG

## 2023-12-27 RX ORDER — DIPHENHYDRAMINE HYDROCHLORIDE 50 MG/ML
12.5 INJECTION INTRAMUSCULAR; INTRAVENOUS
Status: DISCONTINUED | OUTPATIENT
Start: 2023-12-27 | End: 2023-12-27 | Stop reason: HOSPADM

## 2023-12-27 RX ORDER — SODIUM CHLORIDE 0.9 % (FLUSH) 0.9 %
3 SYRINGE (ML) INJECTION EVERY 12 HOURS SCHEDULED
Status: DISCONTINUED | OUTPATIENT
Start: 2023-12-27 | End: 2023-12-27 | Stop reason: HOSPADM

## 2023-12-27 RX ORDER — NALOXONE HCL 0.4 MG/ML
0.2 VIAL (ML) INJECTION AS NEEDED
Status: DISCONTINUED | OUTPATIENT
Start: 2023-12-27 | End: 2023-12-27 | Stop reason: HOSPADM

## 2023-12-27 RX ORDER — PROPOFOL 10 MG/ML
INJECTION, EMULSION INTRAVENOUS AS NEEDED
Status: DISCONTINUED | OUTPATIENT
Start: 2023-12-27 | End: 2023-12-27 | Stop reason: SURG

## 2023-12-27 RX ORDER — OXYCODONE HYDROCHLORIDE AND ACETAMINOPHEN 5; 325 MG/1; MG/1
2 TABLET ORAL EVERY 6 HOURS PRN
Status: DISCONTINUED | OUTPATIENT
Start: 2023-12-27 | End: 2023-12-29 | Stop reason: HOSPADM

## 2023-12-27 RX ORDER — ONDANSETRON 2 MG/ML
INJECTION INTRAMUSCULAR; INTRAVENOUS AS NEEDED
Status: DISCONTINUED | OUTPATIENT
Start: 2023-12-27 | End: 2023-12-27 | Stop reason: SURG

## 2023-12-27 RX ORDER — MIDAZOLAM HYDROCHLORIDE 1 MG/ML
1 INJECTION INTRAMUSCULAR; INTRAVENOUS
Status: DISCONTINUED | OUTPATIENT
Start: 2023-12-27 | End: 2023-12-27 | Stop reason: HOSPADM

## 2023-12-27 RX ORDER — LIDOCAINE HYDROCHLORIDE AND EPINEPHRINE 10; 10 MG/ML; UG/ML
INJECTION, SOLUTION INFILTRATION; PERINEURAL AS NEEDED
Status: DISCONTINUED | OUTPATIENT
Start: 2023-12-27 | End: 2023-12-27 | Stop reason: HOSPADM

## 2023-12-27 RX ORDER — FLUMAZENIL 0.1 MG/ML
0.2 INJECTION INTRAVENOUS AS NEEDED
Status: DISCONTINUED | OUTPATIENT
Start: 2023-12-27 | End: 2023-12-27 | Stop reason: HOSPADM

## 2023-12-27 RX ORDER — MORPHINE SULFATE 2 MG/ML
2 INJECTION, SOLUTION INTRAMUSCULAR; INTRAVENOUS
Status: DISCONTINUED | OUTPATIENT
Start: 2023-12-27 | End: 2023-12-29 | Stop reason: HOSPADM

## 2023-12-27 RX ORDER — AMOXICILLIN 250 MG
1 CAPSULE ORAL NIGHTLY PRN
Status: DISCONTINUED | OUTPATIENT
Start: 2023-12-27 | End: 2023-12-29 | Stop reason: HOSPADM

## 2023-12-27 RX ORDER — FENTANYL CITRATE 50 UG/ML
50 INJECTION, SOLUTION INTRAMUSCULAR; INTRAVENOUS
Status: DISCONTINUED | OUTPATIENT
Start: 2023-12-27 | End: 2023-12-27 | Stop reason: HOSPADM

## 2023-12-27 RX ORDER — IPRATROPIUM BROMIDE AND ALBUTEROL SULFATE 2.5; .5 MG/3ML; MG/3ML
3 SOLUTION RESPIRATORY (INHALATION) ONCE AS NEEDED
Status: DISCONTINUED | OUTPATIENT
Start: 2023-12-27 | End: 2023-12-27 | Stop reason: HOSPADM

## 2023-12-27 RX ORDER — PROMETHAZINE HYDROCHLORIDE 25 MG/1
25 TABLET ORAL ONCE AS NEEDED
Status: DISCONTINUED | OUTPATIENT
Start: 2023-12-27 | End: 2023-12-27 | Stop reason: HOSPADM

## 2023-12-27 RX ORDER — ONDANSETRON 2 MG/ML
4 INJECTION INTRAMUSCULAR; INTRAVENOUS EVERY 6 HOURS PRN
Status: DISCONTINUED | OUTPATIENT
Start: 2023-12-27 | End: 2023-12-29 | Stop reason: HOSPADM

## 2023-12-27 RX ORDER — FENTANYL CITRATE 50 UG/ML
50 INJECTION, SOLUTION INTRAMUSCULAR; INTRAVENOUS ONCE AS NEEDED
Status: DISCONTINUED | OUTPATIENT
Start: 2023-12-27 | End: 2023-12-27 | Stop reason: HOSPADM

## 2023-12-27 RX ADMIN — PROPOFOL 150 MG: 10 INJECTION, EMULSION INTRAVENOUS at 07:33

## 2023-12-27 RX ADMIN — VANCOMYCIN HYDROCHLORIDE 750 MG: 750 INJECTION, POWDER, LYOPHILIZED, FOR SOLUTION INTRAVENOUS at 01:42

## 2023-12-27 RX ADMIN — Medication 5 MG/HR: at 09:29

## 2023-12-27 RX ADMIN — MAGNESIUM SULFATE HEPTAHYDRATE 1 G: 500 INJECTION, SOLUTION INTRAMUSCULAR; INTRAVENOUS at 08:41

## 2023-12-27 RX ADMIN — VALSARTAN 160 MG: 160 TABLET, FILM COATED ORAL at 15:48

## 2023-12-27 RX ADMIN — DROPERIDOL 0.62 MG: 2.5 INJECTION, SOLUTION INTRAMUSCULAR; INTRAVENOUS at 09:32

## 2023-12-27 RX ADMIN — THIAMINE HYDROCHLORIDE 200 MG: 100 INJECTION, SOLUTION INTRAMUSCULAR; INTRAVENOUS at 16:50

## 2023-12-27 RX ADMIN — CEFEPIME 2000 MG: 2 INJECTION, POWDER, FOR SOLUTION INTRAVENOUS at 14:32

## 2023-12-27 RX ADMIN — CEFEPIME 2000 MG: 2 INJECTION, POWDER, FOR SOLUTION INTRAVENOUS at 05:00

## 2023-12-27 RX ADMIN — Medication 100 MCG: at 07:56

## 2023-12-27 RX ADMIN — PREGABALIN 75 MG: 75 CAPSULE ORAL at 20:45

## 2023-12-27 RX ADMIN — Medication 100 MCG: at 07:51

## 2023-12-27 RX ADMIN — ONDANSETRON HYDROCHLORIDE 4 MG: 2 INJECTION, SOLUTION INTRAMUSCULAR; INTRAVENOUS at 09:28

## 2023-12-27 RX ADMIN — DROPERIDOL 0.62 MG: 2.5 INJECTION, SOLUTION INTRAMUSCULAR; INTRAVENOUS at 09:50

## 2023-12-27 RX ADMIN — ROCURONIUM BROMIDE 10 MG: 10 INJECTION, SOLUTION INTRAVENOUS at 08:11

## 2023-12-27 RX ADMIN — LATANOPROST 1 DROP: 50 SOLUTION OPHTHALMIC at 23:33

## 2023-12-27 RX ADMIN — SODIUM CHLORIDE, POTASSIUM CHLORIDE, SODIUM LACTATE AND CALCIUM CHLORIDE 9 ML/HR: 600; 310; 30; 20 INJECTION, SOLUTION INTRAVENOUS at 06:46

## 2023-12-27 RX ADMIN — ROCURONIUM BROMIDE 50 MG: 10 INJECTION, SOLUTION INTRAVENOUS at 07:34

## 2023-12-27 RX ADMIN — VANCOMYCIN HYDROCHLORIDE 1000 MG: 1 INJECTION, SOLUTION INTRAVENOUS at 16:51

## 2023-12-27 RX ADMIN — CARBAMAZEPINE 600 MG: 200 TABLET, EXTENDED RELEASE ORAL at 20:45

## 2023-12-27 RX ADMIN — CEFEPIME 2000 MG: 2 INJECTION, POWDER, FOR SOLUTION INTRAVENOUS at 20:45

## 2023-12-27 RX ADMIN — LIDOCAINE HYDROCHLORIDE 80 MG: 20 INJECTION, SOLUTION INFILTRATION; PERINEURAL at 07:33

## 2023-12-27 RX ADMIN — SUGAMMADEX 200 MG: 100 INJECTION, SOLUTION INTRAVENOUS at 09:03

## 2023-12-27 RX ADMIN — EPHEDRINE SULFATE 5 MG: 50 INJECTION INTRAVENOUS at 07:59

## 2023-12-27 RX ADMIN — CEFAZOLIN SODIUM 2 G: 2 INJECTION, SOLUTION INTRAVENOUS at 07:55

## 2023-12-27 RX ADMIN — ONDANSETRON 4 MG: 2 INJECTION INTRAMUSCULAR; INTRAVENOUS at 08:56

## 2023-12-27 RX ADMIN — MIDAZOLAM HYDROCHLORIDE 1 MG: 1 INJECTION, SOLUTION INTRAMUSCULAR; INTRAVENOUS at 07:08

## 2023-12-27 RX ADMIN — POTASSIUM CHLORIDE AND SODIUM CHLORIDE 75 ML/HR: 450; 150 INJECTION, SOLUTION INTRAVENOUS at 14:13

## 2023-12-27 RX ADMIN — FENTANYL CITRATE 50 MCG: 50 INJECTION, SOLUTION INTRAMUSCULAR; INTRAVENOUS at 08:06

## 2023-12-27 RX ADMIN — PHENYLEPHRINE HYDROCHLORIDE 0.3 MCG/KG/MIN: 10 INJECTION, SOLUTION INTRAVENOUS at 08:17

## 2023-12-27 RX ADMIN — DEXAMETHASONE SODIUM PHOSPHATE 8 MG: 4 INJECTION, SOLUTION INTRAMUSCULAR; INTRAVENOUS at 07:46

## 2023-12-27 RX ADMIN — HYDROMORPHONE HYDROCHLORIDE 0.5 MG: 1 INJECTION, SOLUTION INTRAMUSCULAR; INTRAVENOUS; SUBCUTANEOUS at 09:28

## 2023-12-27 RX ADMIN — NICARDIPINE HYDROCHLORIDE 5 MG/HR: 25 INJECTION, SOLUTION INTRAVENOUS at 09:29

## 2023-12-27 RX ADMIN — FAMOTIDINE 20 MG: 10 INJECTION INTRAVENOUS at 06:43

## 2023-12-27 RX ADMIN — FENTANYL CITRATE 50 MCG: 50 INJECTION, SOLUTION INTRAMUSCULAR; INTRAVENOUS at 09:02

## 2023-12-27 NOTE — PAYOR COMM NOTE
"Kristina Ramirez (63 y.o. Female)     PLEASE SEE ATTACHED FOR INPT AUTH     PT WAS OBSERVATION ON 12/24  CHANGED TO INPT ON 12/27     PT ID       [id number]       PLEASE CALL MAICOL KUNZ RN/ DEPT @ [phone]  OR FAX  DEPARTMENT @  [phone]    THANK YOU   MAICOL KUNZ RN  [facility]          Date of Birth   1960    Social Security Number       Address   [address] [place] [address]    Home Phone   [phone]    MRN   [id number]       Restoration   Other    Marital Status                               Admission Date   12/24/23    Admission Type   Emergency    Admitting Provider   Torres Ascencio MD    Attending Provider   Jany Cerrato MD    Department, Room/Bed   [facility] MAIN OR, [facility] Main OR/MAIN OR       Discharge Date       Discharge Disposition       Discharge Destination                                 Attending Provider: Jany Cerrato MD    Allergies: No Known Allergies    Isolation: None   Infection: None   Code Status: CPR    Ht: 157.5 cm (62\")   Wt: 67.2 kg (148 lb 3.2 oz)    Admission Cmt: None   Principal Problem: Facial droop [R29.810]                   Active Insurance as of 12/24/2023       Primary Coverage       Payor Plan Insurance Group Employer/Plan Group    [employer]HE[employer] BLUE CROSS ANTH BLUE CROSS BLUE SHIELD PPO [id number]       Payor Plan Address Payor Plan Phone Number Payor Plan Fax Number Effective Dates    [address] [phone]  1/1/2014 - None Entered    [place] [address]         Subscriber Name Subscriber Birth Date Member ID       KRISTINA RAMIREZ 1960 [id number]                     Emergency Contacts        (Rel.) Home Phone Work Phone Mobile Phone    Aaron Alonso (Daughter) -- -- [phone]              Benedict: NPI [id number]  Tax ID [id number]     History & Physical        Torres Ascencio MD at 12/24/23 1333              Patient Name:  Kristina Ramirez  Date of " Birth:  1960  MRN:  [id number]  Admit Date:  12/24/2023  Patient Care Team:  Joanne Maurice PA-C as PCP - General  Joanne Maurice PA-C as PCP - Family Medicine  Lew Malone MD as Consulting Physician (Gastroenterology)  Conor Avendaño MD as Consulting Physician (Endocrinology)  Kendall De La Garza MD as Consulting Physician (Ophthalmology)  Aletha Sher MD (Dermatology)  Layton Izaguirre MD as Consulting Physician (Neurology)  Carole Gee MD as Consulting Physician (Dermatology)      Subjective  History Present Illness     Chief Complaint   Patient presents with    Post-op Problem       Ms. Kang is a 63 y.o. woman with a history of v3 trigeminal neuralgia s/p right retrosigmoid craniotomy for microvascular decompression earlier this month (12/7/23) with neurosurgery, hypertension, hyperlipidemia, hypocalciuric hypercalcemia who presents with persistent clear drainage from her nose and right facial palsy.    History of Present Illness  Review of Systems   Constitutional:  Negative for chills, diaphoresis, fatigue and fever.   HENT:  Positive for rhinorrhea. Negative for sinus pain.    Eyes: Negative.    Respiratory:  Positive for cough. Negative for choking and shortness of breath.    Cardiovascular:  Negative for chest pain and palpitations.   Gastrointestinal:  Negative for abdominal distention, constipation, diarrhea and vomiting.   Endocrine: Negative.    Genitourinary:  Negative for dysuria, frequency and urgency.   Musculoskeletal:  Negative for arthralgias and myalgias.   Skin:  Negative for rash and wound.   Allergic/Immunologic: Negative.    Neurological:  Positive for headaches. Negative for light-headedness.   Hematological: Negative.    Psychiatric/Behavioral:  Negative for confusion.         Personal History     Past Medical History:   Diagnosis Date    Allergic     Anemia     Colon polyp     Degeneration of lumbar intervertebral disc     Episode of syncope  2015    Essential hypertension     Glaucoma     Hepatitis C 2008    Dr. Coleman Null    History of bone density study     Normal    History of chest x-ray 2010    normal    History of echocardiogram 2015    History of EKG 2013    normal    History of Holter monitoring 2015    occas PVC    History of nuclear stress test 10/06/2015    LCG; ischemia    Hypercalcemia     Hyperlipidemia     Leiomyoma of uterus     and fibroids    Osteoarthritis     Osteopenia     Postmenopausal     Pyelonephritis     Trigeminal neuralgia     Vitamin D deficiency      Past Surgical History:   Procedure Laterality Date     SECTION      COLONOSCOPY      COLONOSCOPY W/ POLYPECTOMY      Benign polyps.  Dr. Malone    CRANIOTOMY FOR NERVE DECOMPRESSION Right 2023    Procedure: RIGHT SIDED RETROSIGMOID CRANIOTOMY FOR MICROVASCULAR DECOMPRESSION;  Surgeon: Tripp Morse MD;  Location: [facility];  Service: Neurosurgery;  Laterality: Right;    HYSTERECTOMY  2004    took one ovary     LIVER BIOPSY      OOPHORECTOMY Bilateral age 33    left 1/4 of the right      Family History   Adopted: Yes   Problem Relation Age of Onset    Malig Hyperthermia Neg Hx      Social History     Tobacco Use    Smoking status: Former     Packs/day: 0.50     Years: 10.00     Additional pack years: 0.00     Total pack years: 5.00     Types: Cigarettes    Smokeless tobacco: Former     Quit date:    Vaping Use    Vaping Use: Never used   Substance Use Topics    Alcohol use: Not Currently     Comment: Seldom    Drug use: No     No current facility-administered medications on file prior to encounter.     Current Outpatient Medications on File Prior to Encounter   Medication Sig Dispense Refill    atorvastatin (LIPITOR) 40 MG tablet Take 1 tablet by mouth Daily. For cholesterol 90 tablet 2    carBAMazepine XR (TEGretol  XR) 200 MG 12 hr tablet TAKE 600MG DURING THE DAY AND 400MG AT NIGHT. (Patient taking  differently: Take 3 tablets by mouth 2 (Two) Times a Day.) 450 tablet 1    cholecalciferol (VITAMIN D3) 25 MCG (1000 UT) tablet 3 tablets daily (Patient taking differently: Take 1 tablet by mouth 3 (Three) Times a Day. 3 tablets daily) 270 tablet 2    folic acid (FOLVITE) 400 MCG tablet Take 1 tablet by mouth Daily. 90 tablet 4    furosemide (LASIX) 20 MG tablet TAKE 1 TABLET BY MOUTH EVERY DAY (Patient taking differently: Take 1 tablet by mouth Daily.) 90 tablet 2    latanoprost (XALATAN) 0.005 % ophthalmic solution Administer 1 drop to both eyes Every Night. Indications: Wide-Angle Glaucoma      methocarbamol (ROBAXIN) 500 MG tablet Take 1 tablet by mouth Every 8 (Eight) Hours As Needed (neck pain). 30 tablet 0    oxyCODONE-acetaminophen (PERCOCET) 5-325 MG per tablet 1 PO q 4 hrs PRN moderate pain, 2 PO q 4 hrs PRN severe pain 36 tablet 0    pregabalin (LYRICA) 75 MG capsule Take 1 capsule by mouth 2 (Two) Times a Day. (Patient taking differently: Take 1 capsule by mouth Every Night.) 60 capsule 3    sennosides-docusate (PERICOLACE) 8.6-50 MG per tablet Take 2 tablets by mouth 2 (Two) Times a Day.      valsartan (DIOVAN) 160 MG tablet Take 1 tablet by mouth Daily. for blood pressure. (Patient taking differently: Take 1 tablet by mouth Daily. for blood pressure.  HOLD 24 HOURS PRIOR TO SURGERY) 90 tablet 1     No Known Allergies    Objective   Objective     Vital Signs  Temp:  [98.6 °F (37 °C)] 98.6 °F (37 °C)  Heart Rate:  [64-89] 64  Resp:  [16] 16  BP: (139-188)/(70-77) 155/73  SpO2:  [92 %-96 %] 96 %  on   ;   Device (Oxygen Therapy): room air  Body mass index is 26.16 kg/m².    Physical Exam  Vitals and nursing note reviewed.   Constitutional:       General: She is not in acute distress.     Appearance: She is not toxic-appearing.   HENT:      Head: Normocephalic and atraumatic.      Mouth/Throat:      Mouth: Mucous membranes are moist.      Pharynx: Oropharynx is clear. No oropharyngeal exudate.   Eyes:       General: No visual field deficit.     Extraocular Movements: Extraocular movements intact.      Conjunctiva/sclera: Conjunctivae normal.      Pupils: Pupils are equal, round, and reactive to light.   Cardiovascular:      Rate and Rhythm: Normal rate and regular rhythm.      Heart sounds: Normal heart sounds.   Pulmonary:      Effort: Pulmonary effort is normal. No respiratory distress.      Breath sounds: Normal breath sounds. No wheezing, rhonchi or rales.   Abdominal:      General: Bowel sounds are normal. There is no distension.      Palpations: Abdomen is soft.      Tenderness: There is no abdominal tenderness. There is no guarding or rebound.   Musculoskeletal:      Cervical back: Normal range of motion and neck supple.      Right lower leg: No edema.      Left lower leg: No edema.   Skin:     General: Skin is warm and dry.      Findings: No erythema or rash.   Neurological:      Mental Status: She is alert and oriented to person, place, and time.      Cranial Nerves: Cranial nerve deficit and facial asymmetry present. No dysarthria.      Sensory: Sensation is intact.      Motor: Motor function is intact.      Coordination: Coordination is intact.      Comments: Rightward tongue deviation  Partial hearing loss out of right ear   Psychiatric:         Mood and Affect: Mood normal.         Behavior: Behavior normal.         Results Review:  I reviewed the patient's new clinical results.  I reviewed the patient's new imaging results and agree with the interpretation.  I reviewed the patient's other test results and agree with the interpretation  I personally viewed and interpreted the patient's EKG/Telemetry data  Discussed with ED provider.    Lab Results (last 24 hours)       Procedure Component Value Units Date/Time    CBC & Differential [[id number]]  (Abnormal) Collected: 12/24/23 1247    Specimen: Blood Updated: 12/24/23 1302    Narrative:      The following orders were created for panel order CBC &  Differential.  Procedure                               Abnormality         Status                     ---------                               -----------         ------                     CBC Auto Differential[[id number]]        Abnormal            Final result                 Please view results for these tests on the individual orders.    Comprehensive Metabolic Panel [[id number]]  (Abnormal) Collected: 12/24/23 1247    Specimen: Blood Updated: 12/24/23 1327     Glucose 94 mg/dL      BUN 10 mg/dL      Creatinine 0.88 mg/dL      Sodium 140 mmol/L      Potassium 5.2 mmol/L      Comment: Specimen hemolyzed.  Result may be falsely elevated.        Chloride 104 mmol/L      CO2 27.0 mmol/L      Calcium 10.7 mg/dL      Total Protein 7.6 g/dL      Albumin 4.3 g/dL      ALT (SGPT) 11 U/L      Comment: Specimen hemolyzed.  Result may  be falsely elevated.        AST (SGOT) 15 U/L      Comment: Specimen hemolyzed.  Result may be falsely elevated.        Alkaline Phosphatase 106 U/L      Total Bilirubin 0.2 mg/dL      Globulin 3.3 gm/dL      A/G Ratio 1.3 g/dL      BUN/Creatinine Ratio 11.4     Anion Gap 9.0 mmol/L      eGFR 73.9 mL/min/1.73     Narrative:      GFR Normal >60  Chronic Kidney Disease <60  Kidney Failure <15      Protime-INR [[id number]]  (Normal) Collected: 12/24/23 1247    Specimen: Blood Updated: 12/24/23 1321     Protime 13.6 Seconds      INR 1.03    aPTT [[id number]]  (Normal) Collected: 12/24/23 1247    Specimen: Blood Updated: 12/24/23 1321     PTT 30.5 seconds     CBC Auto Differential [[id number]]  (Abnormal) Collected: 12/24/23 1247    Specimen: Blood Updated: 12/24/23 1305     WBC 10.44 10*3/mm3      RBC 4.22 10*6/mm3      Hemoglobin 12.2 g/dL      Hematocrit 38.8 %      MCV 91.9 fL      MCH 28.9 pg      MCHC 31.4 g/dL      RDW 12.1 %      RDW-SD 40.4 fl      MPV 8.8 fL      Platelets 287 10*3/mm3      Neutrophil % 73.6 %      Lymphocyte % 21.1 %      Monocyte % 3.0 %      Eosinophil % 1.6 %       Basophil % 0.3 %      Immature Grans % 0.4 %      Neutrophils, Absolute 7.69 10*3/mm3      Lymphocytes, Absolute 2.20 10*3/mm3      Monocytes, Absolute 0.31 10*3/mm3      Eosinophils, Absolute 0.17 10*3/mm3      Basophils, Absolute 0.03 10*3/mm3      Immature Grans, Absolute 0.04 10*3/mm3      nRBC 0.0 /100 WBC     Beta 2 Transferrin - Body Fluid, [[id number]] Collected: 12/24/23 1248    Specimen: Body Fluid Updated: 12/24/23 1310            Imaging Results (Last 24 Hours)       Procedure Component Value Units Date/Time    CT Temporal Bones Without Contrast [[id number]] Resulted: 12/24/23 1253     Updated: 12/24/23 1253            Results for orders placed in visit on 10/06/15    SCANNED - ECHOCARDIOGRAM      No orders to display        Assessment/Plan     Active Hospital Problems    Diagnosis  POA    **Facial droop [R29.810]  Yes    CSF rhinorrhea [G96.01]  Yes    Trigeminal neuralgia [G50.0]  Yes    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       Ms. Kang is a 63 y.o. woman with a history of v3 trigeminal neuralgia s/p right retrosigmoid craniotomy for microvascular decompression earlier this month (12/7/23) with neurosurgery, hypertension, hyperlipidemia, hypocalciuric hypercalcemia who presents with persistent clear drainage from her nose and right facial palsy as well as possible 8th and 9th cranial nerve dysfunction    Possible CSF rhinorrhea with right facial palsy, rightward tongue deviation on protrusion, and possible partial right hearing loss following right retrosigmoid craniotomy for microvascular decompression earlier this month-it seems that these symptoms might be secondary to low CSF pressure per the ED's discussion with neurosurgery. I spoke with neurology regarding the possible multiple cranial nerve involvement and a MRI brain with and without contrast and an MRA  brain without contrast were recommended. A CT of the temporal bones is also pending per neurosurgery recommendations. The clear liquid substance draining from her nose has been sent for beta 2 transferrin testing to determine if it's CSF. Keep patient flat per neurosurgery recommendations. Consults to neurosurgery and neurology   Restart home medications once reconciled, but will hold antihypertensives until stroke is ruled out.  I discussed the patient's findings and my recommendations with patient, family, consulting provider, and ED provider.    VTE Prophylaxis - SCDs.  Code Status - Full code.       Torres Ascencio MD  [facility]  12/24/23  13:33 EST     Electronically signed by Torres sAcencio MD at 12/24/23 1345          Emergency Department Notes        Ana Hein, RN at 12/24/23 1359          Nursing report ED to floor  Kristina EFE Kang  63 y.o.  female    HPI :   Chief Complaint   Patient presents with    Post-op Problem       Admitting doctor:   Torres Ascencio MD    Admitting diagnosis:   The primary encounter diagnosis was Postoperative CSF leak. Diagnoses of Facial droop, History of craniotomy, and Elevated blood pressure reading in office with diagnosis of hypertension were also pertinent to this visit.    Code status:   Current Code Status       Date Active Code Status Order ID Comments User Context       Prior            Allergies:   Patient has no known allergies.    Isolation:   No active isolations    Intake and Output    Intake/Output Summary (Last 24 hours) at 12/24/2023 1359  Last data filed at 12/24/2023 1329  Gross per 24 hour   Intake 500 ml   Output --   Net 500 ml       Weight:       12/24/23  1136   Weight: 64.9 kg (143 lb)       Most recent vitals:   Vitals:    12/24/23 1142 12/24/23 1143 12/24/23 1201 12/24/23 1258   BP: (!) 188/70 (!) 188/77 139/73 155/73   Patient Position: Sitting      Pulse:  80 68 64   Resp:       Temp:       TempSrc:       SpO2:  92%  96% 96%   Weight:       Height:           Active LDAs/IV Access:   Lines, Drains & Airways       Active LDAs       Name Placement date Placement time Site Days    Peripheral IV 12/24/23 1248 Posterior;Right Hand 12/24/23  1248  Hand  less than 1                    Labs (abnormal labs have a star):   Labs Reviewed   COMPREHENSIVE METABOLIC PANEL - Abnormal; Notable for the following components:       Result Value    Calcium 10.7 (*)     All other components within normal limits    Narrative:     GFR Normal >60  Chronic Kidney Disease <60  Kidney Failure <15     CBC WITH AUTO DIFFERENTIAL - Abnormal; Notable for the following components:    MCHC 31.4 (*)     RDW 12.1 (*)     Monocyte % 3.0 (*)     Neutrophils, Absolute 7.69 (*)     All other components within normal limits   PROTIME-INR - Normal   APTT - Normal   BETA 2 TRANSFERRIN FLUID   CBC AND DIFFERENTIAL    Narrative:     The following orders were created for panel order CBC & Differential.  Procedure                               Abnormality         Status                     ---------                               -----------         ------                     CBC Auto Differential[[id number]]        Abnormal            Final result                 Please view results for these tests on the individual orders.       EKG:   No orders to display       Meds given in ED:   Medications   sodium chloride 0.9 % infusion (125 mL/hr Intravenous New Bag 12/24/23 1258)   acetaminophen (TYLENOL) tablet 650 mg (has no administration in time range)   ondansetron ODT (ZOFRAN-ODT) disintegrating tablet 4 mg (has no administration in time range)     Or   ondansetron (ZOFRAN) injection 4 mg (has no administration in time range)   melatonin tablet 3 mg (has no administration in time range)   sodium chloride 0.9 % bolus 500 mL (0 mL Intravenous Stopped 12/24/23 1329)       Imaging results:  No radiology results for the last day    Ambulatory status:   - standby    Social issues:    Social History     Socioeconomic History    Marital status:    Tobacco Use    Smoking status: Former     Packs/day: 0.50     Years: 10.00     Additional pack years: 0.00     Total pack years: 5.00     Types: Cigarettes    Smokeless tobacco: Former     Quit date: 2011   Vaping Use    Vaping Use: Never used   Substance and Sexual Activity    Alcohol use: Not Currently     Comment: Seldom    Drug use: No    Sexual activity: Yes     Partners: Male     Birth control/protection: Post-menopausal, None     Comment: Hysterectomy       NIH Stroke Scale:  Interval: baseline    Ana Hein RN  12/24/23 13:59 EST          Electronically signed by Ana Hein RN at 12/24/23 1359       Ana Hein RN at 12/24/23 1144          Pt had craniotomy 12/7 for trigeminal neuralgia. Pt reports right sided nasal drainage 12/9. Pt states that it is worse over the last couple days. Reports right facial numbness.  Pt saw neurology in regards to nasal drainage    Electronically signed by Ana Hein RN at 12/24/23 1146       Narciso Castro MD at 12/24/23 1139           EMERGENCY DEPARTMENT ENCOUNTER    Room Number:  14/14  Date of encounter:  12/24/2023  PCP: Joanne Maurice PA-C  Historian: Patient    Patient was placed in face mask during triage process. Patient was wearing facemask when I entered the room and throughout our encounter. I wore full protective equipment throughout this patient encounter including a face mask, eye protection, and gloves. Hand hygiene was performed before donning protective equipment and again following doffing of PPE after leaving the room.    HPI:  Chief Complaint: Right facial droop  A complete HPI/ROS/PMH/PSH/SH/FH are unobtainable due to: N/A   Context: Kristina Kang is a 63 y.o. female who presents to the ED c/o right facial droop that began yesterday and worsened today with inability to completely close her right eye and some right mouth involvement.   Patient denies significant headache.  She has had no fevers reported.  She continues to have some rhinorrhea of clear liquid.  No other focal neurologic complaints at this time including altered mental status, gait disturbance, focal numbness weakness of the extremities.      MEDICAL HISTORY REVIEW  Additional sources:  - Discussed/ obtained information from independent historians: Patient family    - External (non-ED) record review:   Operative note 12/7/2023 by Dr. Beckwiht reviewed: Patient with history of trigeminal neuralgia right V3 failing treatment with multiple medications underwent right retrosigmoid craniectomy for microvascular decompression  Neurosurgery office note 12/15/2023 by Dr. Krishna.  Patient complaining of clear drainage from her nose at that time concerning for CSF rhinorrhea    - Chronic or social conditions impacting care:       PAST MEDICAL HISTORY  Active Ambulatory Problems     Diagnosis Date Noted    Degeneration of lumbar intervertebral disc     Hepatitis C 12/17/2008    Essential hypertension     Hyperlipidemia     Vitamin D deficiency     Osteoarthritis     Episode of syncope 11/30/2015    Amaurosis fugax of right eye 09/09/2016    Osteopenia of both hips 12/01/2017    Chronic seasonal allergic rhinitis 12/01/2017    Hypocalciuric hypercalcemia 08/10/2018    Vaginal atrophy 01/22/2019    Generalized anxiety disorder 06/27/2019    Abnormal MRI 05/25/2022    Trigeminal neuralgia 05/25/2022    Elevated hemoglobin A1c 05/02/2023    Preop examination 11/22/2023     Resolved Ambulatory Problems     Diagnosis Date Noted    Pyelonephritis      Past Medical History:   Diagnosis Date    Allergic     Anemia     Colon polyp     Glaucoma     History of bone density study 2015    History of chest x-ray 11/01/2010    History of echocardiogram 09/26/2015    History of EKG 06/26/2013    History of Holter monitoring 09/2015    History of nuclear stress test 10/06/2015    Hypercalcemia     Leiomyoma of  uterus     Osteopenia     Postmenopausal          PAST SURGICAL HISTORY  Past Surgical History:   Procedure Laterality Date     SECTION      COLONOSCOPY      COLONOSCOPY W/ POLYPECTOMY      Benign polyps.  Dr. Malone    CRANIOTOMY FOR NERVE DECOMPRESSION Right 2023    Procedure: RIGHT SIDED RETROSIGMOID CRANIOTOMY FOR MICROVASCULAR DECOMPRESSION;  Surgeon: Tripp Morse MD;  Location: [facility] OR;  Service: Neurosurgery;  Laterality: Right;    HYSTERECTOMY  2004    took one ovary     LIVER BIOPSY      OOPHORECTOMY Bilateral age 33    left 1/4 of the right          FAMILY HISTORY  Family History   Adopted: Yes   Problem Relation Age of Onset    Malig Hyperthermia Neg Hx          SOCIAL HISTORY  Social History     Socioeconomic History    Marital status:    Tobacco Use    Smoking status: Former     Packs/day: 0.50     Years: 10.00     Additional pack years: 0.00     Total pack years: 5.00     Types: Cigarettes    Smokeless tobacco: Former     Quit date:    Vaping Use    Vaping Use: Never used   Substance and Sexual Activity    Alcohol use: Not Currently     Comment: Seldom    Drug use: No    Sexual activity: Yes     Partners: Male     Birth control/protection: Post-menopausal, None     Comment: Hysterectomy         ALLERGIES  Patient has no known allergies.        REVIEW OF SYSTEMS  Review of Systems     All systems reviewed and negative except for those discussed in HPI.       PHYSICAL EXAM    I have reviewed the triage vital signs and nursing notes.    ED Triage Vitals [23 1136]   Temp Heart Rate Resp BP SpO2   98.6 °F (37 °C) 89 16 -- 93 %      Temp src Heart Rate Source Patient Position BP Location FiO2 (%)   Tympanic Monitor -- -- --       Physical Exam    Physical Exam   Constitutional: No distress.   HENT:  Head: Normocephalic.  Right mastoid area incision is clean dry and intact without fluctuance or drainage.  Face is asymmetric with right facial droop noted  involving the right eyelid and corner of the right mouth.  Sensation to light touch however intact and equal bilateral face in all 3 distributions of the trigeminal nerve.  Oropharynx: Mucous membranes are moist.  Tongue midline  Eyes: No scleral icterus.  PERRL, EOMI  Neck: Neck supple.   Cardiovascular: Pink, warm and well-perfused throughout  Pulmonary/Chest: No respiratory distress.  No tachypnea or increased work of breathing  Abdominal: Soft.  No rebound or rigidity  Musculoskeletal: Moves all extremities equally.   Neurological: Alert.  Speech fluent and easily intelligible  Skin: Skin is pink, warm, and dry. No pallor.   Psychiatric: Mood and affect normal.   Nursing note and vitals reviewed.    LAB RESULTS  Recent Results (from the past 24 hour(s))   CBC Auto Differential    Collection Time: 12/24/23 12:47 PM    Specimen: Blood   Result Value Ref Range    WBC 10.44 3.40 - 10.80 10*3/mm3    RBC 4.22 3.77 - 5.28 10*6/mm3    Hemoglobin 12.2 12.0 - 15.9 g/dL    Hematocrit 38.8 34.0 - 46.6 %    MCV 91.9 79.0 - 97.0 fL    MCH 28.9 26.6 - 33.0 pg    MCHC 31.4 (L) 31.5 - 35.7 g/dL    RDW 12.1 (L) 12.3 - 15.4 %    RDW-SD 40.4 37.0 - 54.0 fl    MPV 8.8 6.0 - 12.0 fL    Platelets 287 140 - 450 10*3/mm3    Neutrophil % 73.6 42.7 - 76.0 %    Lymphocyte % 21.1 19.6 - 45.3 %    Monocyte % 3.0 (L) 5.0 - 12.0 %    Eosinophil % 1.6 0.3 - 6.2 %    Basophil % 0.3 0.0 - 1.5 %    Immature Grans % 0.4 0.0 - 0.5 %    Neutrophils, Absolute 7.69 (H) 1.70 - 7.00 10*3/mm3    Lymphocytes, Absolute 2.20 0.70 - 3.10 10*3/mm3    Monocytes, Absolute 0.31 0.10 - 0.90 10*3/mm3    Eosinophils, Absolute 0.17 0.00 - 0.40 10*3/mm3    Basophils, Absolute 0.03 0.00 - 0.20 10*3/mm3    Immature Grans, Absolute 0.04 0.00 - 0.05 10*3/mm3    nRBC 0.0 0.0 - 0.2 /100 WBC       Ordered the above labs and independently reviewed the results.        RADIOLOGY  No Radiology Exams Resulted Within Past 24 Hours    I ordered the above noted radiological studies.  Reviewed by me and discussed with radiologist.  See dictation for official radiology interpretation.      PROCEDURES    Procedures        MEDICATIONS GIVEN IN ER    Medications   sodium chloride 0.9 % bolus 500 mL (500 mL Intravenous New Bag 12/24/23 1259)   sodium chloride 0.9 % infusion (125 mL/hr Intravenous New Bag 12/24/23 1258)   cloNIDine (CATAPRES) tablet 0.1 mg (has no administration in time range)         PROGRESS, DATA ANALYSIS, CONSULTS, AND MEDICAL DECISION MAKING    My differential diagnosis includes but is not limited to generalized weakness, electrolyte abnormality, CVA, TIA, Bell's palsy, acute MI, GI bleed, urinary tract infection, systemic infections including sepsis, alcohol abuse, drug abuse including prescription and street drug.      All labs have been independently reviewed by me.  All radiology studies have been reviewed by me and discussed with radiologist dictating the report.   EKG's independently viewed and interpreted by me.  Discussion below represents my analysis of pertinent findings related to patient's condition, differential diagnosis, treatment plan and final disposition.      ED Course as of 12/24/23 1305   Sun Dec 24, 2023   1155 Patient outside the window for any TNK due to onset of symptoms greater than 6 hours.  Symptoms also very likely could be secondary to recent neurosurgery.  I briefly discussed the patient history and ED findings with on-call stroke neurologist, Dr. Minaya.  He agrees that he would defer to neurosurgery for recommendations on further evaluation and treatment.  If they say that this is not atypical finding after the surgery, MRI/MRA could be undertaken for stroke evaluation if needed. [RS]   1156 Neurosurgery consultation requested. [RS]   1216 CONSULT        Provider: Dr. Ascencio-neurosurgery    Discussion: The patient history, ED presentation and evaluation.  His suspicion is that the patient's weakness on the right side is due to ongoing CSF leak  with low intracranial CSF levels.  He recommends the patient be laid flat, that fluid from the nose to be collected for beta-2 transferrin test immediately, and request CT temporal bones to evaluate for right-sided CSF leak.  He does request however that the patient be admitted through the medicine service with neurosurgery consultation.    Agreeable c treatment and planned disposition.         [RS]   1253 BP: 139/73  Improved without intervention [RS]   1305 CONSULT        [facility]    Discussion: Reviewed patient history, ED presentation and evaluation as well as consultation with neurosurgery.  Agreeable to accept patient for admission.    Agreeable c treatment and planned disposition.         [RS]      ED Course User Index  [RS] Narciso Castro MD       AS OF 13:05 EST VITALS:    BP - 155/73  HR - 64  TEMP - 98.6 °F (37 °C) (Tympanic)  O2 SATS - 96%        DIAGNOSIS  Final diagnoses:   Postoperative CSF leak   Facial droop   History of craniotomy   Elevated blood pressure reading in office with diagnosis of hypertension         DISPOSITION  ADMISSION    Discussed treatment plan and reason for admission with pt/family and admitting physician.  Pt/family voiced understanding of the plan for admission for further testing/treatment as needed.       Please note that portions of this were completed with a voice recognition program.       Note Disclaimer: At [facility], we believe that sharing information builds trust and better relationships. You are receiving this note because you are receiving care at [facility] or recently visited. It is possible you will see health information before a provider has talked with you about it. This kind of information can be easy to misunderstand. To help you fully understand what it means for your health, we urge you to discuss this note with your provider.     Narciso Castro MD  12/24/23 1306      Electronically signed by Narciso Castro MD at 12/24/23 1306           Physician Progress Notes (last 72 hours)        Jany Cerrato MD at 23 1153              Name: Kristina Kang ADMIT: 2023   : 1960  PCP: Joanne Maurice PA-C    MRN: [id number] LOS: 0 days   AGE/SEX: 63 y.o. female  ROOM: [facility]     Subjective   Subjective   No acute events overnight.  Patient sitting up in chair at bedside today, as neurosurgery has cleared her to sit.  States that she is feeling the same.  Continues to have right-sided facial paralysis but no other issues have developed.  Blood pressure has been high but losartan now restarted.  Headache has improved.    Objective   Objective     Vital Signs  Temp:  [98.1 °F (36.7 °C)-98.7 °F (37.1 °C)] 98.1 °F (36.7 °C)  Heart Rate:  [65-76] 74  Resp:  [16-18] 18  BP: (141-178)/(56-79) 141/56  SpO2:  [96 %-100 %] 100 %  on   ;   Device (Oxygen Therapy): room air  Body mass index is 27.11 kg/m².    Physical Exam  General: Alert, no acute distress.  Sitting up in chair at bedside.  ENT: No conjunctival injection or scleral icterus. Moist mucous membranes.   Neuro: Right-sided facial nerve palsy.  Strength normal in all extremities.  Other than right-sided facial nerve palsy, no additional focal neurologic deficits.  Lungs: Clear to auscultation bilaterally. No wheeze or crackles. No distress.   Heart: RRR, no murmurs. No edema.  Abdomen: Soft, non-tender, non-distended. Normal bowel sounds.   Ext: Warm and well-perfused. No edema.   Skin: No rashes or lesions. IV site without swelling or erythema.     Results Review     I reviewed the patient's new clinical results:  Results from last 7 days   Lab Units 23  0650 23  1247   WBC 10*3/mm3 9.23 10.44   HEMOGLOBIN g/dL 11.3* 12.2   PLATELETS 10*3/mm3 224 287     Results from last 7 days   Lab Units 23  0650 23  1247   SODIUM mmol/L 142 140   POTASSIUM mmol/L 4.1 5.2   CHLORIDE mmol/L 108* 104   CO2 mmol/L 22.0 27.0   BUN mg/dL 9 10   CREATININE mg/dL 0.75 0.88  "  GLUCOSE mg/dL 74 94   EGFR mL/min/1.73 89.6 73.9     Results from last 7 days   Lab Units 12/24/23  1247   ALBUMIN g/dL 4.3   BILIRUBIN mg/dL 0.2   ALK PHOS U/L 106   AST (SGOT) U/L 15   ALT (SGPT) U/L 11     Results from last 7 days   Lab Units 12/25/23  0650 12/24/23  1247   CALCIUM mg/dL 9.6 10.7*   ALBUMIN g/dL  --  4.3       No results found for: \"HGBA1C\", \"POCGLU\"    MRI Brain With & Without Contrast    Result Date: 12/24/2023  1. No evidence of restricted diffusion to suggest acute infarct. 2. Stable right lateral cerebellar hygroma. 3. There is some fluid seen superficial to the craniectomy. This may represent a seroma, although small pseudomeningocele is not excluded. There does appear to be some peripheral enhancement inferiorly on the postcontrast imaging, and correlation with any evidence of infection is recommended. 4. Extensive opacification of the right mastoid air cells and right middle ear. 5. Intracranial vessels appear to be patent.   This report was finalized on 12/24/2023 10:26 PM by Dr. Orly Seals M.D on Workstation: [id number]      MRI Angiogram Head Without Contrast    Result Date: 12/24/2023  1. No evidence of restricted diffusion to suggest acute infarct. 2. Stable right lateral cerebellar hygroma. 3. There is some fluid seen superficial to the craniectomy. This may represent a seroma, although small pseudomeningocele is not excluded. There does appear to be some peripheral enhancement inferiorly on the postcontrast imaging, and correlation with any evidence of infection is recommended. 4. Extensive opacification of the right mastoid air cells and right middle ear. 5. Intracranial vessels appear to be patent.   This report was finalized on 12/24/2023 10:26 PM by Dr. Orly Seals M.D on Workstation: [id number]      CT Temporal Bones Without Contrast    Result Date: 12/24/2023   The patient is status post a right lateral occipital craniotomy for the purposes of a right " trigeminal nerve microvascular decompression for symptoms of trigeminal neuralgia. The craniotomy traverses a few right mastoid air cells. The right mastoid air cells and right middle ear cavity are subtotally opacified. This opacification of the mastoid air cells and middle ear cavity is new when compared to prior preoperative CT scan dated 12/05/2023. If there is a CSF leak, this craniotomy traversing the right mastoid air cells is highly suspect for the cause of the CSF leak. Please correlate with clinical data.  These findings were discussed with Narciso Castro on 12/24/2023 at approximately 2:19 p.m.   Radiation dose reduction techniques were utilized, including automated exposure control and exposure modulation based on body size.   This report was finalized on 12/24/2023 2:53 PM by Dr. Lorenzo Samuel M.D on Workstation: [id number]       I have personally reviewed all medications:  Scheduled Medications  atorvastatin, 40 mg, Oral, Daily  carBAMazepine XR, 600 mg, Oral, BID  cefepime, 2,000 mg, Intravenous, Q8H  enoxaparin, 40 mg, Subcutaneous, Q24H  folic acid, 400 mcg, Oral, Daily  latanoprost, 1 drop, Both Eyes, Nightly  pregabalin, 75 mg, Oral, Nightly  thiamine (B-1) IV, 200 mg, Intravenous, Daily  valsartan, 160 mg, Oral, Q24H  vancomycin, 750 mg, Intravenous, Q12H  vitamin B-12, 1,000 mcg, Oral, Daily    Infusions  Pharmacy to dose vancomycin,   sodium chloride, 125 mL/hr, Last Rate: 125 mL/hr (12/26/23 0724)    Diet  NPO Diet NPO Type: Sips with Meds    Assessment/Plan     Active Hospital Problems    Diagnosis  POA    **Facial droop [R29.810]  Yes    CSF rhinorrhea [G96.01]  Yes    Trigeminal neuralgia [G50.0]  Yes    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       63 y.o. female with Facial droop.    Ms. Kang is a 63 y.o. woman with a history of v3 trigeminal neuralgia s/p  right retrosigmoid craniotomy for microvascular decompression earlier this month (12/7/23) with neurosurgery, hypertension, hyperlipidemia, hypocalciuric hypercalcemia who presents with persistent clear drainage from her nose and right facial palsy as well as possible 8th and 9th cranial nerve dysfunction     Possible CSF rhinorrhea with right facial palsy, rightward tongue deviation on protrusion, and possible partial right hearing loss following right retrosigmoid craniotomy for microvascular decompression earlier this month-it seems that these symptoms might be secondary to low CSF pressure per the ED's discussion with neurosurgery.  MRI brain and MRA were done with no evidence of acute infarction.  Once again concerning for CSF leak.  Neurosurgery and neurology consulted and following.  Neurology feels that patient has a right-sided Bell's palsy which could be postsurgical or idiopathic.  If no surgery is planned, the recommendation is for steroids and eyepatch.   Per neurosurgery, hold off on steroids at this time.  Primary neurosurgery team to evaluate today.  Continue vancomycin and cefepime per neurosurgery recommendations as prophylaxis against meningitis.  HTN-BP has been elevated.  Restart home valsartan, hydralazine as needed.  Continue holding Lasix.  If BP remains elevated, can start scheduled hydralazine or increase the losartan.  Trigeminal neuralgia-continue home Tegretol and pregabalin.  Remainder of management as above.  I discussed the patient's findings and my recommendations with patient, nursing staff, and consulting provider.    SCDs for DVT prophylaxis.  Full code.  Discussed with patient, nursing staff, and consulting provider.  Anticipate discharge home timing yet to be determined.    Jany Cerrato MD  [facility]  12/26/23  11:57 EST      Electronically signed by Jany Cerrato MD at 12/26/23 5127       Ana Escoto APRN at 12/26/23 2655            [facility]  NEUROSURGERY INTRACRANIAL PROGRESS NOTE    PATIENT IDENTIFICATION:   Name:  Kristina Kang      MRN:  [id number]     63 y.o.  female               CC: Status post MVD with postoperative CSF leak      Subjective     Interval History: No new complaints or events overnight    ROS:  Constitutional: No fever, chills  HEENT: No headache, no vision changes, no tinnitus, no hearing difficulties.  Right craniotomy incision well-approximated, no erythema, swelling or drainage  Neck: no stiffness  GI: No nausea, vomiting, no swallow difficulties  Neuro: No numbness, tingling, or weakness, no speech difficulties, no balance difficulties, right facial droop from Bell's palsy    Objective     Vital signs in last 24 hours:  Temp:  [98.1 °F (36.7 °C)-98.7 °F (37.1 °C)] 98.1 °F (36.7 °C)  Heart Rate:  [65-76] 75  Resp:  [16-18] 18  BP: (141-178)/(66-79) 178/76    Intake/Output this shift:  I/O this shift:  In: 1152.5 [P.O.:240; I.V.:912.5]  Out: -     Intake/Output last 3 shifts:  I/O last 3 completed shifts:  In: 5729.6 [P.O.:540; I.V.:4389.6; IV Piggyback:800]  Out: 940 [Urine:940]    LABS:     Results from last 7 days   Lab Units 12/25/23  0650 12/24/23  1247   WBC 10*3/mm3 9.23 10.44   HEMOGLOBIN g/dL 11.3* 12.2   HEMATOCRIT % 34.2 38.8   PLATELETS 10*3/mm3 224 287       Results from last 7 days   Lab Units 12/25/23  0650 12/24/23  1247   SODIUM mmol/L 142 140   POTASSIUM mmol/L 4.1 5.2   CHLORIDE mmol/L 108* 104   CO2 mmol/L 22.0 27.0   BUN mg/dL 9 10   CREATININE mg/dL 0.75 0.88   GLUCOSE mg/dL 74 94   CALCIUM mg/dL 9.6 10.7*       IMAGING STUDIES:    No new imaging to review    I personally viewed and interpreted the patient's chart.    Meds reviewed/changed: Yes      Physical exam  Awake, alert, oriented x3  Pupils equal round reactive to light  Extraocular muscles intact  Face symmetric  Speech is fluent and clear  No pronator drift  Motor exam  Bilateral deltoids 5/5, bilateral biceps 5/5, bilateral triceps 5/5,  "bilateral wrist extension 5/5 bilateral hand  5/5  Bilateral hip flexion 5/5, bilateral knee extension 5/5, bilateral DF/PF 5/5.  Bell's palsy, right.  gait deferred  Able to detect  light touch in all 4 extremities      Assessment & Plan     ASSESSMENT:      Facial droop    Essential hypertension    Hyperlipidemia    Hypocalciuric hypercalcemia    Trigeminal neuralgia    CSF rhinorrhea    63-year-old woman with a history of recent right-sided microvascular decompression for trigeminal neuralgia who returns with clear fluid leaking from her right nostril most consistent with CSF.    PLAN:     Continue antibiotic prophylaxis  Hold off on steroids at this time  Okay to sit up  Beta-2 transferrin is pending  N.p.o. after midnight, OR tomorrow to repair leak    I discussed the patient's findings and my recommendations with patient and nursing staff    I spent 35 minutes caring for Kristina Kang on this date of service. This time includes time spent by me in the following activities: preparing for the visit, reviewing tests, obtaining and/or reviewing a separately obtained history, performing a medically appropriate examination and/or evaluation, counseling and educating the patient/family/caregiver, ordering medications, tests, or procedures, referring and communicating with other health care professionals, documenting information in the medical record, independently interpreting results and communicating that information with the patient/family/caregiver, and care coordination.          LOS: 0 days       TERELL Cee  2023  11:51 EST    \"Dictated utilizing Dragon dictation\".        Electronically signed by Ana Escoto APRN at 23 1202       Jany Cerrato MD at 23 1121              Name: Kristina Kang ADMIT: 2023   : 1960  PCP: Joanne Maurice PA-C    MRN: [id number] LOS: 0 days   AGE/SEX: 63 y.o. female  ROOM: [facility]     Subjective   Subjective   No " "acute events overnight.  Patient states that she is feeling pretty well.  Continuing to have a headache and right-sided facial numbness, but otherwise no complaints.  No chest pain or shortness of breath.    Objective   Objective     Vital Signs  Temp:  [98.2 °F (36.8 °C)-99 °F (37.2 °C)] 99 °F (37.2 °C)  Heart Rate:  [60-89] 72  Resp:  [16] 16  BP: (139-188)/(68-90) 166/72  SpO2:  [92 %-100 %] 100 %  on   ;   Device (Oxygen Therapy): room air  Body mass index is 26.16 kg/m².    Physical Exam  General: Alert, no acute distress.  ENT: No conjunctival injection or scleral icterus. Moist mucous membranes. No JVD.   Neuro: Right-sided facial nerve palsy.  Strength normal in all extremities.  Other than right-sided facial nerve palsy, no additional focal neurologic deficits.  Lungs: Clear to auscultation bilaterally. No wheeze or crackles. No distress.   Heart: RRR, no murmurs. No edema.  Abdomen: Soft, non-tender, non-distended. Normal bowel sounds. No hepatosplenomegaly.   Ext: Warm and well-perfused. No edema.   Skin: No rashes or lesions. IV site without swelling or erythema.     Results Review     I reviewed the patient's new clinical results:  Results from last 7 days   Lab Units 12/25/23  0650 12/24/23  1247   WBC 10*3/mm3 9.23 10.44   HEMOGLOBIN g/dL 11.3* 12.2   PLATELETS 10*3/mm3 224 287     Results from last 7 days   Lab Units 12/25/23  0650 12/24/23  1247   SODIUM mmol/L 142 140   POTASSIUM mmol/L 4.1 5.2   CHLORIDE mmol/L 108* 104   CO2 mmol/L 22.0 27.0   BUN mg/dL 9 10   CREATININE mg/dL 0.75 0.88   GLUCOSE mg/dL 74 94   EGFR mL/min/1.73 89.6 73.9     Results from last 7 days   Lab Units 12/24/23  1247   ALBUMIN g/dL 4.3   BILIRUBIN mg/dL 0.2   ALK PHOS U/L 106   AST (SGOT) U/L 15   ALT (SGPT) U/L 11     Results from last 7 days   Lab Units 12/25/23  0650 12/24/23  1247   CALCIUM mg/dL 9.6 10.7*   ALBUMIN g/dL  --  4.3       No results found for: \"HGBA1C\", \"POCGLU\"    MRI Brain With & Without " Contrast    Result Date: 12/24/2023  1. No evidence of restricted diffusion to suggest acute infarct. 2. Stable right lateral cerebellar hygroma. 3. There is some fluid seen superficial to the craniectomy. This may represent a seroma, although small pseudomeningocele is not excluded. There does appear to be some peripheral enhancement inferiorly on the postcontrast imaging, and correlation with any evidence of infection is recommended. 4. Extensive opacification of the right mastoid air cells and right middle ear. 5. Intracranial vessels appear to be patent.   This report was finalized on 12/24/2023 10:26 PM by Dr. Orly Seals M.D on Workstation: [id number]      MRI Angiogram Head Without Contrast    Result Date: 12/24/2023  1. No evidence of restricted diffusion to suggest acute infarct. 2. Stable right lateral cerebellar hygroma. 3. There is some fluid seen superficial to the craniectomy. This may represent a seroma, although small pseudomeningocele is not excluded. There does appear to be some peripheral enhancement inferiorly on the postcontrast imaging, and correlation with any evidence of infection is recommended. 4. Extensive opacification of the right mastoid air cells and right middle ear. 5. Intracranial vessels appear to be patent.   This report was finalized on 12/24/2023 10:26 PM by Dr. Orly Seals M.D on Workstation: [id number]      CT Temporal Bones Without Contrast    Result Date: 12/24/2023   The patient is status post a right lateral occipital craniotomy for the purposes of a right trigeminal nerve microvascular decompression for symptoms of trigeminal neuralgia. The craniotomy traverses a few right mastoid air cells. The right mastoid air cells and right middle ear cavity are subtotally opacified. This opacification of the mastoid air cells and middle ear cavity is new when compared to prior preoperative CT scan dated 12/05/2023. If there is a CSF leak, this craniotomy traversing  the right mastoid air cells is highly suspect for the cause of the CSF leak. Please correlate with clinical data.  These findings were discussed with Narciso Castro on 12/24/2023 at approximately 2:19 p.m.   Radiation dose reduction techniques were utilized, including automated exposure control and exposure modulation based on body size.   This report was finalized on 12/24/2023 2:53 PM by Dr. Lorenzo Samuel M.D on Workstation: [id number]       I have personally reviewed all medications:  Scheduled Medications  atorvastatin, 40 mg, Oral, Daily  carBAMazepine XR, 600 mg, Oral, BID  enoxaparin, 40 mg, Subcutaneous, Q24H  folic acid, 400 mcg, Oral, Daily  latanoprost, 1 drop, Both Eyes, Nightly  pregabalin, 75 mg, Oral, Nightly  thiamine (B-1) IV, 200 mg, Intravenous, Daily  vitamin B-12, 1,000 mcg, Oral, Daily    Infusions  Pharmacy Consult - Pharmacy to dose,   sodium chloride, 125 mL/hr, Last Rate: 125 mL/hr (12/25/23 0924)    Diet  Diet: Regular/House Diet; Texture: Regular Texture (IDDSI 7); Fluid Consistency: Thin (IDDSI 0)    Assessment/Plan     Active Hospital Problems    Diagnosis  POA    **Facial droop [R29.810]  Yes    CSF rhinorrhea [G96.01]  Yes    Trigeminal neuralgia [G50.0]  Yes    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       63 y.o. female with Facial droop.    Ms. Kang is a 63 y.o. woman with a history of v3 trigeminal neuralgia s/p right retrosigmoid craniotomy for microvascular decompression earlier this month (12/7/23) with neurosurgery, hypertension, hyperlipidemia, hypocalciuric hypercalcemia who presents with persistent clear drainage from her nose and right facial palsy as well as possible 8th and 9th cranial nerve dysfunction     Possible CSF rhinorrhea with right facial palsy, rightward tongue deviation on protrusion, and possible partial right hearing loss  following right retrosigmoid craniotomy for microvascular decompression earlier this month-it seems that these symptoms might be secondary to low CSF pressure per the ED's discussion with neurosurgery.  MRI brain and MRA were done with no evidence of acute infarction.  Once again concerning for CSF leak.  Neurosurgery and neurology consulted and following.  Neurology feels that patient has a right-sided Bell's palsy which could be postsurgical or idiopathic.  If no surgery is planned, the recommendation is for steroids and eyepatch.  Discussed with neurology today.  Per neurosurgery, hold off on steroids at this time.  Primary neurosurgery team to evaluate tomorrow.  SIMI also recommending started prophylactic meningitic antibiotics.  Will start Vanco and cefepime.  HTN-hold home valsartan and Lasix for now.  BP slightly elevated, but neurosurgery wanting to avoid intracranial hypotension.  Add hydralazine as needed.  Trigeminal neuralgia-continue home Tegretol and pregabalin.  Remainder of management as above.  I discussed the patient's findings and my recommendations with patient, nursing staff, and consulting provider.    SCDs for DVT prophylaxis.  Full code.  Discussed with patient, nursing staff, and consulting provider.  Anticipate discharge home timing yet to be determined.      Jany Cerrato MD  [facility]  12/25/23  11:34 EST      Electronically signed by Jany Cerrato MD at 12/25/23 6887       Cuco Minaya MD at 12/24/23 7427          Neurology update: discussed with neuroradiology. High suspicion for CSF leak; craniotomy extends to mastoid.    Regarding other reported cranial nerve deficits I have ordered an MRI to further evaluate this. In my opinion the risk of undergoing MRI which her recent neurosurgical procedure is exceedingly low given that neurosurgeons do not use MRI incompatible material when doing microvascular decompressions. I have reached out to her  neurosurgeon but have not been able to definitively confirm. I recommend proceeding with MRI.    Electronically signed by Cuco Minaya MD at 12/24/23 1756          Consult Notes (last 72 hours)        Jaymie Orona at 12/26/23 1055        Consult Orders    1. Inpatient Consult to Advance Care Planning [[id number]] ordered by Torres Ascencio MD at 12/24/23 1457                 Visited with patient and fiancee in room.     Discussed hospitalization and emotional support. Provided copies of the advanced directive form and explained it.     Patient and fiancee want some time to discuss it, but would welcome a return visit to complete it. They are also aware that they can ask the nurse to page the  if they are ready to complete the A.D.     Electronically signed by Jaymie Orona at 12/26/23 1332       Gilberto Ascencio MD at 12/25/23 1058        Consult Orders    1. Inpatient Neurosurgery Consult [[id number]] ordered by Jany Cerrato MD at 12/25/23 0720    2. Neurosurgery (on-call MD unless specified) [[id number]] ordered by Narciso Castro MD at 12/24/23 1154                 Neurosurgical Consultation    Chief Complaint   Patient presents with    Post-op Problem        HPI: This is a 63-year-old woman with a history of a right-sided microvascular decompression for trigeminal neuralgia on December 7, 2023.  Postoperatively she has had clear fluid leaking from her right nose.  She followed up with the surgeon in outpatient setting and workup was attempted.  She returns with right sided House-Brackman grade 4 facial nerve palsy.  She does not have any indication of infection.  Her incision has not been leaking.  There is no erythema.    Past Medical History:   Diagnosis Date    Allergic     Anemia     Colon polyp     Degeneration of lumbar intervertebral disc     Episode of syncope 11/30/2015    Essential hypertension     Glaucoma     Hepatitis C 12/17/2008    Dr. Coleman Null    History of bone density  study     Normal    History of chest x-ray 2010    normal    History of echocardiogram 2015    History of EKG 2013    normal    History of Holter monitoring 2015    occas PVC    History of nuclear stress test 10/06/2015    LCG; ischemia    Hypercalcemia     Hyperlipidemia     Leiomyoma of uterus     and fibroids    Osteoarthritis     Osteopenia     Postmenopausal     Pyelonephritis     Trigeminal neuralgia     Vitamin D deficiency         Past Surgical History:   Procedure Laterality Date     SECTION      COLONOSCOPY      COLONOSCOPY W/ POLYPECTOMY      Benign polyps.  Dr. Malone    CRANIOTOMY FOR NERVE DECOMPRESSION Right 2023    Procedure: RIGHT SIDED RETROSIGMOID CRANIOTOMY FOR MICROVASCULAR DECOMPRESSION;  Surgeon: Tripp Morse MD;  Location: [facility];  Service: Neurosurgery;  Laterality: Right;    HYSTERECTOMY  2004    took one ovary     LIVER BIOPSY      OOPHORECTOMY Bilateral age 33    left 1/4 of the right         No current facility-administered medications on file prior to encounter.     Current Outpatient Medications on File Prior to Encounter   Medication Sig Dispense Refill    aspirin 81 MG EC tablet Take 1 tablet by mouth Daily.      atorvastatin (LIPITOR) 40 MG tablet Take 1 tablet by mouth Daily. For cholesterol 90 tablet 2    carBAMazepine XR (TEGretol  XR) 200 MG 12 hr tablet TAKE 600MG DURING THE DAY AND 400MG AT NIGHT. (Patient taking differently: Take 3 tablets by mouth 2 (Two) Times a Day.) 450 tablet 1    cholecalciferol (VITAMIN D3) 25 MCG (1000 UT) tablet 3 tablets daily (Patient taking differently: Take 1 tablet by mouth 3 (Three) Times a Day. 3 tablets daily) 270 tablet 2    folic acid (FOLVITE) 400 MCG tablet Take 1 tablet by mouth Daily. 90 tablet 4    furosemide (LASIX) 20 MG tablet TAKE 1 TABLET BY MOUTH EVERY DAY (Patient taking differently: Take 1 tablet by mouth Daily.) 90 tablet 2    latanoprost (XALATAN) 0.005 %  ophthalmic solution Administer 1 drop to both eyes Every Night. Indications: Wide-Angle Glaucoma      pregabalin (LYRICA) 75 MG capsule Take 1 capsule by mouth 2 (Two) Times a Day. (Patient taking differently: Take 1 capsule by mouth Every Night.) 60 capsule 3    valsartan (DIOVAN) 160 MG tablet Take 1 tablet by mouth Daily. for blood pressure. (Patient taking differently: Take 1 tablet by mouth Daily. for blood pressure.  HOLD 24 HOURS PRIOR TO SURGERY) 90 tablet 1    vitamin B-12 (CYANOCOBALAMIN) 1000 MCG tablet Take 1 tablet by mouth Daily.      methocarbamol (ROBAXIN) 500 MG tablet Take 1 tablet by mouth Every 8 (Eight) Hours As Needed (neck pain). 30 tablet 0        No Known Allergies     Social History     Socioeconomic History    Marital status:    Tobacco Use    Smoking status: Former     Packs/day: 0.50     Years: 10.00     Additional pack years: 0.00     Total pack years: 5.00     Types: Cigarettes    Smokeless tobacco: Former     Quit date: 2011   Vaping Use    Vaping Use: Never used   Substance and Sexual Activity    Alcohol use: Not Currently     Comment: Seldom    Drug use: No    Sexual activity: Yes     Partners: Male     Birth control/protection: Post-menopausal, None     Comment: Hysterectomy        If the patient is a current tobacco consumer, then adequate time was spent counseling them to quit. If the patient does not currently consume tobacco products, then adequate time was spent encouraging continued abstinence from this product.     Review of Systems     Physical Examination:     Vitals:    12/24/23 1504 12/24/23 2025 12/24/23 2318 12/25/23 0723   BP: 180/68 178/82 150/72 166/72   BP Location: Right arm Right arm Right arm Right arm   Patient Position:  Lying Lying Lying   Pulse: 62 63 63 72   Resp: 16 16 16 16   Temp: 98.2 °F (36.8 °C) 98.4 °F (36.9 °C) 98.2 °F (36.8 °C) 99 °F (37.2 °C)   TempSrc: Oral Oral Oral Oral   SpO2: 100% 96% 95% 100%   Weight:       Height:             Physical Exam     Neurological Exam   Neurological examination is consistent with House-Brackman grade 4 facial nerve palsy.  Incision is well-healing without any signs of expressible drainage or significant erythema.  There is some overlying swelling.  Otherwise neurologic function is intact.    Result Review  The following data was reviewed by: Gilberto Ascencio MD on 12/24/2023:    Data reviewed : Radiologic studies CT of the temporal bone is consistent with connection of the intracranial space to the mastoid air cells.  MRI does show some indication of possible pseudomeningocele.  There is some contrast-enhancement in the surgical bed.  There is opacification of the mastoid air cells.      Assessment/plan:  This is a 63-year-old woman status post right-sided microvascular decompression for trigeminal neuralgia.  She has had significant improvement in her facial pain.  She does have indications of a chronic CSF leak involving the mastoid air cells.  She is still pending beta-2 transferrin testing however her history is pretty consistent.  She does have a right-sided House-Brackman grade 4 facial nerve palsy.  In order to minimize intracranial hypotension then traction on the cisternal nerves I recommend lying flat.  I recommend regular hydration including bolus hydration if necessary.  She can be given Tylenol for headaches.  She can be given caffeine if the headaches are persistent.  I will hold off on any steroids at this juncture for the facial nerve palsy as a believe she will need surgical reexploration.  I do recommend starting her on antibiotics prophylactically to minimize risk of meningitis.  Please call neurosurgery with any questions or concerns.  The primary team will return tomorrow and manage care at that juncture.      Electronically signed by Gilberto Ascencio MD at 12/25/23 3711       Horacio Bailey MD at 12/25/23 9376        Consult Orders    1. Inpatient Neurology Consult Stroke  [[id number]] ordered by Torres Ascencio MD at 12/24/23 1332                 Neurology Consult Note    Consult Date: 12/25/2023    Referring MD: Torres Ascencio MD    Reason for Consult I have been asked to see the patient in neurological consultation to render advice and opinion regarding right facial droop    Kristinasa EFE Kang is a 63 y.o. female with past medical history of trigeminal neuralgia s/p microvascular decompression done earlier this month on 12/7/2023, pretension, hyperlipidemia, hypercalcemia who presented to the ER yesterday with chief complaints of clear drainage from her nose and also right facial nerve palsy.  Neurology was consulted for the same.    Patient states that ever since her surgery on 7 she has been having clear watery fluid coming out of her nose, what brought her into the hospital was family noticed right facial droop and also she was complaining of right ear hearing loss.  She denies any headache and states she has complete resolution of her trigeminal neuralgia pain.    Denies any weakness numbness speech or vision problems.    Past Medical History:   Diagnosis Date    Allergic     Anemia     Colon polyp     Degeneration of lumbar intervertebral disc     Episode of syncope 11/30/2015    Essential hypertension     Glaucoma     Hepatitis C 12/17/2008    Dr. Coleman Null    History of bone density study 2015    Normal    History of chest x-ray 11/01/2010    normal    History of echocardiogram 09/26/2015    History of EKG 06/26/2013    normal    History of Holter monitoring 09/2015    occas PVC    History of nuclear stress test 10/06/2015    LCG; ischemia    Hypercalcemia     Hyperlipidemia     Leiomyoma of uterus     and fibroids    Osteoarthritis     Osteopenia     Postmenopausal     Pyelonephritis     Trigeminal neuralgia     Vitamin D deficiency        Exam  /72 (BP Location: Right arm, Patient Position: Lying)   Pulse 63   Temp 98.2 °F (36.8 °C) (Oral)   Resp 16   Ht 157.5 cm  "(62\")   Wt 64.9 kg (143 lb)   LMP  (LMP Unknown)   SpO2 95%   BMI 26.16 kg/m²   Gen: NAD, vitals reviewed  MS: oriented x3, recent/remote memory intact, normal attention/concentration, language intact, no neglect.  CN: visual acuity grossly normal, PERRL, EOMI, complete right Bell's palsy  May be slight deviation of her tongue to the left  Right ear hearing loss  Motor: 5/5 throughout upper and lower extremities, normal tone    DATA:    Lab Results   Component Value Date    GLUCOSE 74 12/25/2023    CALCIUM 9.6 12/25/2023     12/25/2023    K 4.1 12/25/2023    CO2 22.0 12/25/2023     (H) 12/25/2023    BUN 9 12/25/2023    CREATININE 0.75 12/25/2023    EGFRIFAFRI 82 09/02/2021    EGFRIFNONA 67 09/02/2021    BCR 12.0 12/25/2023    ANIONGAP 12.0 12/25/2023     Lab Results   Component Value Date    WBC 10.44 12/24/2023    HGB 12.2 12/24/2023    HCT 38.8 12/24/2023    MCV 91.9 12/24/2023     12/24/2023       Lab review:   Sodium 142  Creatinine 0.75  Normal LFTs    WBC 10.44  Hemoglobin 12.2 and platelets 287    Imaging review:   MRI brain did not show any acute abnormality    CT temporal bones-mastoid and right middle ear cavity opacified and also right occipital craniotomy postsurgical changes    Diagnoses:  Right Bell's palsy  In the setting of recent right occipital craniotomy for microvascular decompression of trigeminal nerve    Trigeminal neuralgia medically intractable s/p surgery    Other medical problems  Hypertension  Hyperlipidemia    Pre-stroke MRS: 0  NIHSS: 0      She does have a complete right Bell's palsy this might be postsurgical from her recent surgery or idiopathic, but in the setting of right mastoid and right middle ear changes on CT temporal bones most likely postsurgical.     Neurosurgery is evaluating the patient if no surgical intervention is planned for the CSF leak might benefit from steroids 7 to 14 days along with an eye patch    Spoke about the plan with the hospitalist " and also the patient    No further workup from neurology will sign off.      MDM   Reviewed: Previous charts, nursing notes and vitals   Reviewed: Previous labs and CT scan and MRI scan   Interpretation: Labs and CT scan and MRI scan  Total time providing care is :30-74 minutes. This excluded time spent performing separately reportable procedures and services  Consults :Neurology/Stroke    Please note that portions of this note were completed with a voice recognition program.     Horacio Bailye MD  Neuro Hospitalist /Vascular Neurology.                 Electronically signed by Horacio Bailey MD at 12/25/23 0938           Tripp Morse MD   Physician  Neurosurgery     Op Note     Signed     Date of Service: 12/27/23 0807  Creation Time: 12/27/23 0850  Case Time: Procedures: Surgeons:   12/27/23 0807 CRANIECTOMY for CSF rhinorrhea repair    Tripp Morse MD               Signed         Surgeon: João Morse MD  Assistant: first sariah Zhang        Procedure:   1.  Exploration of left-sided craniectomy for repair of cranial defect and CSF rhinorrhea  2.  Packing of mastoid air cells with muscle, Betadine soaked Gelfoam, and bone wax  3.  Intraoperative cultures of dura and CSF     Preoperative diagnosis: CSF rhinorrhea and recent right-sided retrosigmoid craniectomy with suspected mastoiditis  Postoperative diagnosis: Same        Anesthesia: General endotracheal anesthesia        Indications/consent: The patient is a 63-year-old female s/p right-sided retrosigmoid craniectomy for microvascular decompression on 12/7/2023 for trigeminal neuralgia.  She was recently admitted to the hospital with right-sided facial weakness and CSF drainage from her nose.  She was consented for exploration of her recent craniectomy site with occlusion of the mastoid air cells to stop the CSF rhinorrhea.  The risks and benefits of the procedure were discussed with the patient including, but  not limited to the risk of infection, hemorrhage, injury to surrounding structures.  She expressed understanding of the risks and benefits and elected to proceed with the intervention.     Description of the procedure: The patient was brought into the operating room a timeout was performed.  The patient was put to sleep with general endotracheal anesthesia in the supine position.  The bed was turned 90 degrees.  The patient was then positioned with a large roll under her right shoulder.  Her head was turned to the left and placed on a doughnut head hein.  The previous incision was cleaned with alcohol, shaved, prepped in the usual sterile fashion.  She was given 2 g of Ancef for prophylaxis.  A timeout was performed and the previous incision was opened using a 10 blade knife.  A subperiosteal dissection was carried out using Bovie electrocautery.  There was no obvious underlying signs of infection.  The previous titanium metal plate was removed by removing the several 4 mm screws.  Cultures were then taken of the dura and CSF.  The surrounding skull was palpated using a nerve hook and a mastoid air cell was found open.  This was then packed with muscle, Gelfoam soaked with Betadine and packed and sealed with bone wax.  The dura was then covered with Tisseel and a new piece of DuraGen.  A new custom cut titanium plate was placed over the cranial defect and secured using several 4 mm titanium metal screws. The wound was irrigated with antibiotic irrigation.  The fascia was closed with 2 -0 Vicryl sutures, the subcutaneous tissue with 3-0 Vicryl sutures and the skin with 3-0 Monocryl.  Skin was then covered with Dermabond.  Patient was then extubated and transferred to the recovery unit in stable condition.  Marga Velázquez was the first assist who helped me during this case.  Her assistance was greatly appreciated and necessary for completion of the case with her help with suctioning, retraction, and irrigation.       Complications: None  Estimated blood loss: 50 mL     Disposition: We will plan to admit to the ICU to watch overnight and discharge home in 2 to 3 days when she is stable.

## 2023-12-27 NOTE — PLAN OF CARE
Goal Outcome Evaluation:      Admitted from PACU. Incision behind rt ear D/I no drng, open to air. Alert, oriented x4. Rt facial droop, speech clear and appropriate. Strength equal and strong all 4 extremities. Ate reg diet without difficulty no C/O of nausea or H/A this shift. Cardene drip off now, SBP <150. S.O and daughter updated @ [facility].

## 2023-12-27 NOTE — ANESTHESIA PROCEDURE NOTES
Airway  Urgency: elective    Date/Time: 12/27/2023 7:37 AM  Airway not difficult    General Information and Staff    Patient location during procedure: OR  Anesthesiologist: Marty Robles MD  CRNA/CAA: Chilango Asencio CRNA    Indications and Patient Condition  Indications for airway management: airway protection    Preoxygenated: yes  MILS not maintained throughout  Mask difficulty assessment: 1 - vent by mask    Final Airway Details  Final airway type: endotracheal airway      Successful airway: ETT  Cuffed: yes   Successful intubation technique: direct laryngoscopy  Endotracheal tube insertion site: oral  Blade: Nathaniel  Blade size: 3  ETT size (mm): 7.0  Cormack-Lehane Classification: grade IIb - view of arytenoids or posterior of glottis only  Placement verified by: chest auscultation and capnometry   Cuff volume (mL): 6  Measured from: lips  ETT/EBT  to lips (cm): 21  Number of attempts at approach: 1  Assessment: lips, teeth, and gum same as pre-op and atraumatic intubation

## 2023-12-27 NOTE — PROGRESS NOTES
"[facility] Clinical Pharmacy Services: Vancomycin Monitoring Note    Kristina Kang is a 63 y.o. female who is on day 3 of pharmacy to dose vancomycin for Surgical Prophylaxis/CNS Infection.s/p craniotomy on 12/7 with post-op complications; CSF rhinorrhea status post repair of cranial defect and mastoid air cell packing (12/27/2023).     Previous Vancomycin Dose: 750 mg IV Q12H  Updated Cultures and Sensitivities:   -12/25: Blood cultures x 2 --> NGTD @ 48 hours   -12/27/23: CSF culture--> pending     Results from last 7 days   Lab Units 12/27/23  1412   VANCOMYCIN TR mcg/mL 4.70*     Vitals/Labs  Ht: 157.5 cm (62\"); Wt: 67.2 kg (148 lb 3.2 oz)   Temp Readings from Last 1 Encounters:   12/27/23 98 °F (36.7 °C)     Estimated Creatinine Clearance: 69 mL/min (by C-G formula based on SCr of 0.75 mg/dL).     Results from last 7 days   Lab Units 12/25/23  0650 12/24/23  1247   CREATININE mg/dL 0.75 0.88   WBC 10*3/mm3 9.23 10.44     Assessment/Plan    Current Vancomycin Dose: Increase to vancomycin 1,000 mg IV Q12H which provides a predicted  mg/L.hr   Next Level Date and Time: Vanc Trough on 12/29 at 0400.   BMP ordered for tomorrow 12/28/23.   We will continue to monitor patient changes and renal function     Thank you for involving pharmacy in this patient's care. Please contact pharmacy with any questions or concerns.       Natali Kimble [facility]  Clinical Pharmacist  "

## 2023-12-27 NOTE — CONSULTS
Patient Identification:  Kristina Kang  63 y.o.  female  1960  [id number]          LOS 0    Requesting physician:   Jany Cerrato MD    Reason for Consultation:    Postoperative management in the ICU    History of Present Illness:   62-year-old female with a history of V3 trigeminal neuralgia status post right retrosigmoid craniotomy for microvascular decompression (12/7/2023), hypertension, hyperlipidemia, hypocalciuric hypercalcemia who initially presented to the hospital on 12/24 for clear nasal drainage and right facial palsy.    During hospitalization patient was found to have CSF rhinorrhea with right facial palsy and taken to the OR today with Dr. Morse for exploration of left-sided craniectomy for repair of cranial defect and CSF rhinorrhea, packing of mastoid air cells, cultures of the dura and CSF.  Admitted to the ICU postoperatively.    On evaluation of patient and the ICU, states that she is doing well without any significant acute complaints.  No headache, vision changes, rhinorrhea that she is complaining about.  Respiratory status is stable, denies any shortness of breath or cough.  Denies any chest pain or palpitations.  Denies any nausea, vomiting, diarrhea.  Denies any infectious symptoms including fever, chills, night sweats.    Past Medical History:  Past Medical History:   Diagnosis Date    Allergic     Anemia     Colon polyp     Degeneration of lumbar intervertebral disc     Episode of syncope 11/30/2015    Essential hypertension     Glaucoma     Hepatitis C 12/17/2008    Dr. Coleman Null    History of bone density study 2015    Normal    History of chest x-ray 11/01/2010    normal    History of echocardiogram 09/26/2015    History of EKG 06/26/2013    normal    History of Holter monitoring 09/2015    occas PVC    History of nuclear stress test 10/06/2015    LCG; ischemia    Hypercalcemia     Hyperlipidemia     Leiomyoma of uterus     and fibroids    Osteoarthritis      Osteopenia     Postmenopausal     Pyelonephritis     Trigeminal neuralgia     Vitamin D deficiency        Past Surgical History:  Past Surgical History:   Procedure Laterality Date     SECTION      COLONOSCOPY      COLONOSCOPY W/ POLYPECTOMY  2014    Benign polyps.  Dr. Malone    CRANIOTOMY FOR NERVE DECOMPRESSION Right 2023    Procedure: RIGHT SIDED RETROSIGMOID CRANIOTOMY FOR MICROVASCULAR DECOMPRESSION;  Surgeon: Tripp Morse MD;  Location: [facility] OR;  Service: Neurosurgery;  Laterality: Right;    HYSTERECTOMY  2004    took one ovary     LIVER BIOPSY      OOPHORECTOMY Bilateral age 33    left 1/4 of the right         Home Meds:  Medications Prior to Admission   Medication Sig Dispense Refill Last Dose    aspirin 81 MG EC tablet Take 1 tablet by mouth Daily.   2023    atorvastatin (LIPITOR) 40 MG tablet Take 1 tablet by mouth Daily. For cholesterol 90 tablet 2 2023 at [facility]    carBAMazepine XR (TEGretol  XR) 200 MG 12 hr tablet TAKE 600MG DURING THE DAY AND 400MG AT NIGHT. (Patient taking differently: Take 3 tablets by mouth 2 (Two) Times a Day.) 450 tablet 1 2023    cholecalciferol (VITAMIN D3) 25 MCG (1000 UT) tablet 3 tablets daily (Patient taking differently: Take 1 tablet by mouth 3 (Three) Times a Day. 3 tablets daily) 270 tablet 2 2023    folic acid (FOLVITE) 400 MCG tablet Take 1 tablet by mouth Daily. 90 tablet 4 2023    furosemide (LASIX) 20 MG tablet TAKE 1 TABLET BY MOUTH EVERY DAY (Patient taking differently: Take 1 tablet by mouth Daily.) 90 tablet 2 2023    latanoprost (XALATAN) 0.005 % ophthalmic solution Administer 1 drop to both eyes Every Night. Indications: Wide-Angle Glaucoma   2023    pregabalin (LYRICA) 75 MG capsule Take 1 capsule by mouth 2 (Two) Times a Day. (Patient taking differently: Take 1 capsule by mouth Every Night.) 60 capsule 3 2023    valsartan (DIOVAN) 160 MG tablet Take 1 tablet by mouth Daily. for  "blood pressure. (Patient taking differently: Take 1 tablet by mouth Daily. for blood pressure.  HOLD 24 HOURS PRIOR TO SURGERY) 90 tablet 1 12/24/2023    vitamin B-12 (CYANOCOBALAMIN) 1000 MCG tablet Take 1 tablet by mouth Daily.   12/24/2023    methocarbamol (ROBAXIN) 500 MG tablet Take 1 tablet by mouth Every 8 (Eight) Hours As Needed (neck pain). 30 tablet 0 More than a month         Allergies:  No Known Allergies    Social History:   Social History     Socioeconomic History    Marital status:    Tobacco Use    Smoking status: Former     Packs/day: 0.50     Years: 10.00     Additional pack years: 0.00     Total pack years: 5.00     Types: Cigarettes    Smokeless tobacco: Former     Quit date: 2011   Vaping Use    Vaping Use: Never used   Substance and Sexual Activity    Alcohol use: Not Currently     Comment: Seldom    Drug use: No    Sexual activity: Yes     Partners: Male     Birth control/protection: Post-menopausal, None     Comment: Hysterectomy       Family History:  Family History   Adopted: Yes   Problem Relation Age of Onset    Malig Hyperthermia Neg Hx        Review of Systems:  12 point review systems negative except as per HPI above  Objective:    PHYSICAL EXAM:    /68   Pulse 71   Temp 97.9 °F (36.6 °C) (Oral)   Resp 15   Ht 157.5 cm (62\")   Wt 67.2 kg (148 lb 3.2 oz)   LMP  (LMP Unknown)   SpO2 92%   BMI 27.11 kg/m²  Body mass index is 27.11 kg/m². 92% 67.2 kg (148 lb 3.2 oz)    General: Alert, nontoxic, NAD  HEENT: NC/AT, EOMI, MMM  Neck: Supple, trachea midline  Cardiac: RRR, no murmur, gallops, rubs  Pulmonary: Clear to auscultation bilaterally, no adventitious breath sounds, normal respiratory effort  GI: Soft, non-tender, non-distended, normal bowel sounds  Extremities: Warm, well perfused, no LE edema  Skin: no visible rash  Neuro: CN II - XII grossly intact  Psychiatry: Normal mood and affect    Lab Review:   Results from last 7 days   Lab Units 12/25/23  0650 " 12/24/23  1247   WBC 10*3/mm3 9.23 10.44   HEMOGLOBIN g/dL 11.3* 12.2   PLATELETS 10*3/mm3 224 287     Results from last 7 days   Lab Units 12/25/23  0650 12/24/23  1247   SODIUM mmol/L 142 140   POTASSIUM mmol/L 4.1 5.2   CHLORIDE mmol/L 108* 104   CO2 mmol/L 22.0 27.0   BUN mg/dL 9 10   CREATININE mg/dL 0.75 0.88   GLUCOSE mg/dL 74 94   CALCIUM mg/dL 9.6 10.7*   Estimated Creatinine Clearance: 69 mL/min (by C-G formula based on SCr of 0.75 mg/dL).    Results from last 7 days   Lab Units 12/25/23  0650 12/24/23  1247   AST (SGOT) U/L  --  15   ALT (SGPT) U/L  --  11   PLATELETS 10*3/mm3 224 287              Imaging reviewed  Cranial imaging reviewed.       Assessment / Recommendations:    CSF rhinorrhea status post repair of cranial defect and mastoid air cell packing (12/27/2023)  Hypertension  Trigeminal neuralgia    -Patient presented to the ICU after or for CSF rhinorrhea.  -Repeat CT head scheduled for tomorrow a.m.  -On 2 L nasal cannula, saturating appropriately, weaned to room air.  -Off Cardene drip, blood pressures controlled.  Goal SBP less than 140.  -Continue antibiotics with vancomycin and cefepime for concerns for CNS infection.  Cultures pending.  -Appreciate neurosurgery recommendations    GI prophylaxis: Not indicated  DVT prophylaxis: Enoxaparin  Eric catheter: No  Bowel regimen: Ordered  Diet: Regular    Discharge: Continue to monitor in the ICU due to critical status, will transfer to floor pending neurosurgery recommendations    Pardeep Bhatti MD  [facility], [facility]  Pulmonary and Critical Care Medicine, Interventional Pulmonology    12/27/2023  13:47 EST    Parts of this note may be an electronic transcription/translation of spoken language to printed text using the Dragon dictation system.

## 2023-12-27 NOTE — ANESTHESIA PREPROCEDURE EVALUATION
Anesthesia Evaluation     Patient summary reviewed and Nursing notes reviewed   NPO Solid Status: > 8 hours  NPO Liquid Status: > 4 hours           Airway   Mallampati: II  TM distance: >3 FB  Neck ROM: full  No difficulty expected  Comment: Grade IIa view with MAC 3, bougie used (3 weeks ago)  Dental - normal exam     Pulmonary - normal exam    breath sounds clear to auscultation  (+) a smoker Former,  Cardiovascular - normal exam    ECG reviewed  Rhythm: regular  Rate: normal    (+) hypertension 2 medications or greater, hyperlipidemia      Neuro/Psych  (+) TIA, syncope, numbness, psychiatric history Anxiety    ROS Comment: Hx amaurosis fugax due to trigeminal neuralgia, s/p crani for microvascular decompression 3 weeks ago, now having postop CSF rhinorrhea and right facial drooping  GI/Hepatic/Renal/Endo    (+) hepatitis C, liver disease    Musculoskeletal         ROS comment: Lumbar DDD  Abdominal  - normal exam   Substance History      OB/GYN          Other   arthritis,         Phys Exam Other: Right facial droop              Anesthesia Plan    ASA 3     general     (No art line needed today per surgeon)  intravenous induction     Anesthetic plan, risks, benefits, and alternatives have been provided, discussed and informed consent has been obtained with: patient.      CODE STATUS:    Code Status (Patient has no pulse and is not breathing): CPR (Attempt to Resuscitate)  Medical Interventions (Patient has pulse or is breathing): Full Support

## 2023-12-27 NOTE — PROGRESS NOTES
Attempted to see patient this morning but she had already been taken to the operating room.  After surgery, she was transferred to the ICU.  Intensivist has been consulted for medical management.  Will continue monitoring the chart daily and resume care when patient is transferred out of the ICU.    Jany Cerrato MD  [facility]

## 2023-12-27 NOTE — OP NOTE
Surgeon: João Morse MD  Assistant: first sariah Zhang      Procedure:   1.  Exploration of left-sided craniectomy for repair of cranial defect and CSF rhinorrhea  2.  Packing of mastoid air cells with muscle, Betadine soaked Gelfoam, and bone wax  3.  Intraoperative cultures of dura and CSF    Preoperative diagnosis: CSF rhinorrhea and recent right-sided retrosigmoid craniectomy with suspected mastoiditis  Postoperative diagnosis: Same      Anesthesia: General endotracheal anesthesia      Indications/consent: The patient is a 63-year-old female s/p right-sided retrosigmoid craniectomy for microvascular decompression on 12/7/2023 for trigeminal neuralgia.  She was recently admitted to the hospital with right-sided facial weakness and CSF drainage from her nose.  She was consented for exploration of her recent craniectomy site with occlusion of the mastoid air cells to stop the CSF rhinorrhea.  The risks and benefits of the procedure were discussed with the patient including, but not limited to the risk of infection, hemorrhage, injury to surrounding structures.  She expressed understanding of the risks and benefits and elected to proceed with the intervention.    Description of the procedure: The patient was brought into the operating room a timeout was performed.  The patient was put to sleep with general endotracheal anesthesia in the supine position.  The bed was turned 90 degrees.  The patient was then positioned with a large roll under her right shoulder.  Her head was turned to the left and placed on a doughnut head hein.  The previous incision was cleaned with alcohol, shaved, prepped in the usual sterile fashion.  She was given 2 g of Ancef for prophylaxis.  A timeout was performed and the previous incision was opened using a 10 blade knife.  A subperiosteal dissection was carried out using Bovie electrocautery.  There was no obvious underlying signs of infection.  The previous titanium metal  plate was removed by removing the several 4 mm screws.  Cultures were then taken of the dura and CSF.  The surrounding skull was palpated using a nerve hook and a mastoid air cell was found open.  This was then packed with muscle, Gelfoam soaked with Betadine and packed and sealed with bone wax.  The dura was then covered with Tisseel and a new piece of DuraGen.  A new custom cut titanium plate was placed over the cranial defect and secured using several 4 mm titanium metal screws. The wound was irrigated with antibiotic irrigation.  The fascia was closed with 2 -0 Vicryl sutures, the subcutaneous tissue with 3-0 Vicryl sutures and the skin with 3-0 Monocryl.  Skin was then covered with Dermabond.  Patient was then extubated and transferred to the recovery unit in stable condition.  Marga Velázquez was the first assist who helped me during this case.  Her assistance was greatly appreciated and necessary for completion of the case with her help with suctioning, retraction, and irrigation.     Complications: None  Estimated blood loss: 50 mL    Disposition: We will plan to admit to the ICU to watch overnight and discharge home in 2 to 3 days when she is stable.

## 2023-12-28 ENCOUNTER — APPOINTMENT (OUTPATIENT)
Dept: CT IMAGING | Facility: HOSPITAL | Age: 63
DRG: 027 | End: 2023-12-28
Payer: COMMERCIAL

## 2023-12-28 LAB
ANION GAP SERPL CALCULATED.3IONS-SCNC: 6 MMOL/L (ref 5–15)
APTT PPP: 31.7 SECONDS (ref 22.7–35.4)
BASOPHILS # BLD AUTO: 0.02 10*3/MM3 (ref 0–0.2)
BASOPHILS NFR BLD AUTO: 0.3 % (ref 0–1.5)
BUN SERPL-MCNC: 11 MG/DL (ref 8–23)
BUN/CREAT SERPL: 15.9 (ref 7–25)
CALCIUM SPEC-SCNC: 9.7 MG/DL (ref 8.6–10.5)
CHLORIDE SERPL-SCNC: 111 MMOL/L (ref 98–107)
CO2 SERPL-SCNC: 21 MMOL/L (ref 22–29)
CREAT SERPL-MCNC: 0.69 MG/DL (ref 0.57–1)
DEPRECATED RDW RBC AUTO: 40.2 FL (ref 37–54)
EGFRCR SERPLBLD CKD-EPI 2021: 97.7 ML/MIN/1.73
EOSINOPHIL # BLD AUTO: 0.17 10*3/MM3 (ref 0–0.4)
EOSINOPHIL NFR BLD AUTO: 2.3 % (ref 0.3–6.2)
ERYTHROCYTE [DISTWIDTH] IN BLOOD BY AUTOMATED COUNT: 12 % (ref 12.3–15.4)
GLUCOSE BLDC GLUCOMTR-MCNC: 101 MG/DL (ref 70–130)
GLUCOSE BLDC GLUCOMTR-MCNC: 96 MG/DL (ref 70–130)
GLUCOSE SERPL-MCNC: 89 MG/DL (ref 65–99)
HCT VFR BLD AUTO: 32.2 % (ref 34–46.6)
HGB BLD-MCNC: 10.4 G/DL (ref 12–15.9)
IMM GRANULOCYTES # BLD AUTO: 0.02 10*3/MM3 (ref 0–0.05)
IMM GRANULOCYTES NFR BLD AUTO: 0.3 % (ref 0–0.5)
INR PPP: 1.12 (ref 0.9–1.1)
LYMPHOCYTES # BLD AUTO: 2.62 10*3/MM3 (ref 0.7–3.1)
LYMPHOCYTES NFR BLD AUTO: 34.9 % (ref 19.6–45.3)
MCH RBC QN AUTO: 29.7 PG (ref 26.6–33)
MCHC RBC AUTO-ENTMCNC: 32.3 G/DL (ref 31.5–35.7)
MCV RBC AUTO: 92 FL (ref 79–97)
MONOCYTES # BLD AUTO: 0.38 10*3/MM3 (ref 0.1–0.9)
MONOCYTES NFR BLD AUTO: 5.1 % (ref 5–12)
NEUTROPHILS NFR BLD AUTO: 4.3 10*3/MM3 (ref 1.7–7)
NEUTROPHILS NFR BLD AUTO: 57.1 % (ref 42.7–76)
NRBC BLD AUTO-RTO: 0 /100 WBC (ref 0–0.2)
PLATELET # BLD AUTO: 202 10*3/MM3 (ref 140–450)
PMV BLD AUTO: 8.9 FL (ref 6–12)
POTASSIUM SERPL-SCNC: 4 MMOL/L (ref 3.5–5.2)
PROTHROMBIN TIME: 14.6 SECONDS (ref 11.7–14.2)
RBC # BLD AUTO: 3.5 10*6/MM3 (ref 3.77–5.28)
SODIUM SERPL-SCNC: 138 MMOL/L (ref 136–145)
WBC NRBC COR # BLD AUTO: 7.51 10*3/MM3 (ref 3.4–10.8)

## 2023-12-28 PROCEDURE — 70450 CT HEAD/BRAIN W/O DYE: CPT

## 2023-12-28 PROCEDURE — 99024 POSTOP FOLLOW-UP VISIT: CPT | Performed by: NURSE PRACTITIONER

## 2023-12-28 PROCEDURE — 85610 PROTHROMBIN TIME: CPT | Performed by: NEUROLOGICAL SURGERY

## 2023-12-28 PROCEDURE — 25010000002 POTASSIUM CHLORIDE PER 2 MEQ: Performed by: NEUROLOGICAL SURGERY

## 2023-12-28 PROCEDURE — 99223 1ST HOSP IP/OBS HIGH 75: CPT | Performed by: STUDENT IN AN ORGANIZED HEALTH CARE EDUCATION/TRAINING PROGRAM

## 2023-12-28 PROCEDURE — 25010000002 MORPHINE PER 10 MG: Performed by: NEUROLOGICAL SURGERY

## 2023-12-28 PROCEDURE — 82948 REAGENT STRIP/BLOOD GLUCOSE: CPT

## 2023-12-28 PROCEDURE — 80048 BASIC METABOLIC PNL TOTAL CA: CPT | Performed by: NEUROLOGICAL SURGERY

## 2023-12-28 PROCEDURE — 25010000002 VANCOMYCIN PER 500 MG: Performed by: HOSPITALIST

## 2023-12-28 PROCEDURE — 85025 COMPLETE CBC W/AUTO DIFF WBC: CPT | Performed by: NEUROLOGICAL SURGERY

## 2023-12-28 PROCEDURE — 25010000002 CEFEPIME PER 500 MG: Performed by: NEUROLOGICAL SURGERY

## 2023-12-28 PROCEDURE — 85730 THROMBOPLASTIN TIME PARTIAL: CPT | Performed by: NEUROLOGICAL SURGERY

## 2023-12-28 RX ORDER — ZIPRASIDONE MESYLATE 20 MG/ML
10 INJECTION, POWDER, LYOPHILIZED, FOR SOLUTION INTRAMUSCULAR ONCE
Status: DISCONTINUED | OUTPATIENT
Start: 2023-12-28 | End: 2023-12-28

## 2023-12-28 RX ORDER — LEVOFLOXACIN 500 MG/1
500 TABLET, FILM COATED ORAL EVERY 24 HOURS
Qty: 11 TABLET | Refills: 0 | Status: DISCONTINUED | OUTPATIENT
Start: 2023-12-28 | End: 2023-12-29 | Stop reason: HOSPADM

## 2023-12-28 RX ADMIN — CEFEPIME 2000 MG: 2 INJECTION, POWDER, FOR SOLUTION INTRAVENOUS at 03:50

## 2023-12-28 RX ADMIN — Medication 1000 MCG: at 07:49

## 2023-12-28 RX ADMIN — Medication 400 MCG: at 07:50

## 2023-12-28 RX ADMIN — CARBAMAZEPINE 600 MG: 200 TABLET, EXTENDED RELEASE ORAL at 07:50

## 2023-12-28 RX ADMIN — PREGABALIN 75 MG: 75 CAPSULE ORAL at 21:40

## 2023-12-28 RX ADMIN — POTASSIUM CHLORIDE AND SODIUM CHLORIDE 75 ML/HR: 450; 150 INJECTION, SOLUTION INTRAVENOUS at 03:39

## 2023-12-28 RX ADMIN — CARBAMAZEPINE 600 MG: 200 TABLET, EXTENDED RELEASE ORAL at 23:08

## 2023-12-28 RX ADMIN — OXYCODONE HYDROCHLORIDE AND ACETAMINOPHEN 2 TABLET: 5; 325 TABLET ORAL at 11:50

## 2023-12-28 RX ADMIN — ACETAMINOPHEN 650 MG: 325 TABLET, FILM COATED ORAL at 23:08

## 2023-12-28 RX ADMIN — LEVOFLOXACIN 500 MG: 500 TABLET, FILM COATED ORAL at 11:50

## 2023-12-28 RX ADMIN — ATORVASTATIN CALCIUM 40 MG: 20 TABLET, FILM COATED ORAL at 07:49

## 2023-12-28 RX ADMIN — MORPHINE SULFATE 2 MG: 2 INJECTION, SOLUTION INTRAMUSCULAR; INTRAVENOUS at 04:54

## 2023-12-28 RX ADMIN — POTASSIUM CHLORIDE AND SODIUM CHLORIDE 75 ML/HR: 450; 150 INJECTION, SOLUTION INTRAVENOUS at 23:09

## 2023-12-28 RX ADMIN — VALSARTAN 160 MG: 160 TABLET, FILM COATED ORAL at 07:50

## 2023-12-28 RX ADMIN — VANCOMYCIN HYDROCHLORIDE 1000 MG: 1 INJECTION, SOLUTION INTRAVENOUS at 04:47

## 2023-12-28 NOTE — PLAN OF CARE
Goal Outcome Evaluation:               Pt assessment per flowsheet. Medications per MAR. No acute events. Neuro status remains stable. Nicardipine restarted to maintain SBP <150. Report to Cindy PEREZ at 0727. Pt awake, Aox4.

## 2023-12-28 NOTE — PROGRESS NOTES
[facility] NEUROSURGERY INTRACRANIAL PROGRESS NOTE    PATIENT IDENTIFICATION:   Name:  Kristina Kang      MRN:  [id number]     63 y.o.  female               CC: Status post MVD with postoperative CSF leak.  POD #1 for right-sided craniotomy packing of mastoid air cells.      Subjective     Interval History: Doing well postoperatively.  She is experiencing some right jaw/cheek pain.  She denies any further rhinorrhea.    ROS:  Constitutional: No fever, chills  HEENT: No headache, no vision changes, no tinnitus, no hearing difficulties.  Right craniotomy incision well-approximated, no erythema, swelling or drainage.  Positive right jaw/cheek pain.  Neck: no stiffness  GI: No nausea, vomiting, no swallow difficulties  Neuro: No numbness, tingling, or weakness, no speech difficulties, no balance difficulties, right facial droop from Bell's palsy    Objective     Vital signs in last 24 hours:  Temp:  [97.7 °F (36.5 °C)-98.7 °F (37.1 °C)] 98.7 °F (37.1 °C)  Heart Rate:  [57-94] 67  Resp:  [14-21] 20  BP: (102-171)/(58-88) 126/65    Intake/Output this shift:  No intake/output data recorded.    Intake/Output last 3 shifts:  I/O last 3 completed shifts:  In: 3941.6 [P.O.:600; I.V.:3041.6; IV Piggyback:300]  Out: 1750 [Urine:1750]    LABS:     Results from last 7 days   Lab Units 12/28/23  0344 12/25/23  0650 12/24/23  1247   WBC 10*3/mm3 7.51 9.23 10.44   HEMOGLOBIN g/dL 10.4* 11.3* 12.2   HEMATOCRIT % 32.2* 34.2 38.8   PLATELETS 10*3/mm3 202 224 287       Results from last 7 days   Lab Units 12/28/23  0344 12/25/23  0650 12/24/23  1247   SODIUM mmol/L 138 142 140   POTASSIUM mmol/L 4.0 4.1 5.2   CHLORIDE mmol/L 111* 108* 104   CO2 mmol/L 21.0* 22.0 27.0   BUN mg/dL 11 9 10   CREATININE mg/dL 0.69 0.75 0.88   GLUCOSE mg/dL 89 74 94   CALCIUM mg/dL 9.7 9.6 10.7*       IMAGING STUDIES:    No new imaging to review    I personally viewed and interpreted the patient's chart.    Meds reviewed/changed: Yes      Physical  "exam  Awake, alert, oriented x3  Pupils equal round reactive to light  Extraocular muscles intact  Speech is fluent and clear  Motor exam  Bilateral deltoids 5/5, bilateral biceps 5/5, bilateral triceps 5/5, bilateral wrist extension 5/5 bilateral hand  5/5  Bilateral hip flexion 5/5, bilateral knee extension 5/5, bilateral DF/PF 5/5.    Right facial weakness  gait deferred  Able to detect  light touch in all 4 extremities      Assessment & Plan     ASSESSMENT:      Facial droop    Essential hypertension    Hyperlipidemia    Hypocalciuric hypercalcemia    Trigeminal neuralgia    CSF rhinorrhea    63-year-old woman with a history of recent right-sided microvascular decompression for trigeminal neuralgia who returns with clear fluid leaking from her right nostril most consistent with CSF.    PLAN:     Infectious disease consulted, managing antibiotics.  They recommend 2 weeks of empiric antibiotic with levofloxacin 500 mg while awaiting culture.  Culture NGTD.  Okay to transfer to the floor  Hopefully home tomorrow    I discussed the patient's findings and my recommendations with patient and nursing staff    I spent 35 minutes caring for Kristina Kang on this date of service. This time includes time spent by me in the following activities: preparing for the visit, reviewing tests, obtaining and/or reviewing a separately obtained history, performing a medically appropriate examination and/or evaluation, counseling and educating the patient/family/caregiver, ordering medications, tests, or procedures, referring and communicating with other health care professionals, documenting information in the medical record, independently interpreting results and communicating that information with the patient/family/caregiver, and care coordination.          LOS: 1 day       Ana Escoto, APRN  12/28/2023  09:09 EST    \"Dictated utilizing Dragon dictation\".      "

## 2023-12-28 NOTE — CONSULTS
Referring Provider: Torres Ascencio MD  Reason for Consultation:     treatment for mastoiditis     Chief Complaint   Patient presents with    Post-op Problem         Subjective   History of present illness: Patient is a 63-year-old female with history of right-sided facial weakness and right microvascular decompression of trigeminal neuralgia on 2023 who presented with clear fluid draining from the nose.  ID consulted for treatment of mastoiditis.    On presentation patient has been afebrile with no leukocytosis.  Initially started on cefepime and vancomycin and neurosurgery was consulted for suspected CSF leak.  Patient underwent right craniectomy for repair of cranial defect and CSF leak on .  Operative note did not find any obvious evidence of infection and cultures were sent.    Patient reports she is feeling well today.  Continues to have right facial weakness and some shooting pains in the right cheek.  Denies any fevers or chills.  States she is tolerating antibiotics well.    Past Medical History:   Diagnosis Date    Allergic     Anemia     Colon polyp     Degeneration of lumbar intervertebral disc     Episode of syncope 2015    Essential hypertension     Glaucoma     Hepatitis C 2008    Dr. Coleman Null    History of bone density study     Normal    History of chest x-ray 2010    normal    History of echocardiogram 2015    History of EKG 2013    normal    History of Holter monitoring 2015    occas PVC    History of nuclear stress test 10/06/2015    LCG; ischemia    Hypercalcemia     Hyperlipidemia     Leiomyoma of uterus     and fibroids    Osteoarthritis     Osteopenia     Postmenopausal     Pyelonephritis     Trigeminal neuralgia     Vitamin D deficiency        Past Surgical History:   Procedure Laterality Date     SECTION      COLONOSCOPY      COLONOSCOPY W/ POLYPECTOMY      Benign polyps.  Dr. Malone    CRANIECTOMY N/A 2023    Procedure:  CRANIECTOMY for CSF rhinorrhea repair;  Surgeon: Tripp Morse MD;  Location: [facility] MAIN OR;  Service: Neurosurgery;  Laterality: N/A;    CRANIOTOMY FOR NERVE DECOMPRESSION Right 12/7/2023    Procedure: RIGHT SIDED RETROSIGMOID CRANIOTOMY FOR MICROVASCULAR DECOMPRESSION;  Surgeon: Tripp Morse MD;  Location: [facility] MAIN OR;  Service: Neurosurgery;  Laterality: Right;    HYSTERECTOMY  02/2004    took one ovary     LIVER BIOPSY      OOPHORECTOMY Bilateral age 33    left 1/4 of the right        family history is not on file. She was adopted.     reports that she has quit smoking. Her smoking use included cigarettes. She has a 5.00 pack-year smoking history. She quit smokeless tobacco use about 12 years ago. She reports that she does not currently use alcohol. She reports that she does not use drugs.     No Known Allergies    Medication:  Antibiotics:  Anti-Infectives (From admission, onward)      Ordered     Dose/Rate Route Frequency Start Stop    12/27/23 1514  vancomycin (VANCOCIN) 1000 mg/200 mL dextrose 5% IVPB        Ordering Provider: Pardeep Bhatti MD    1,000 mg  over 60 Minutes Intravenous Every 12 Hours 12/27/23 1630 01/01/24 0429    12/25/23 1135  cefepime 2000 mg IVPB in 100 ml NS (VTB)        Ordering Provider: Tripp Morse MD    2,000 mg  over 30 Minutes Intravenous Every 8 Hours 12/25/23 1230 01/01/24 1229    12/25/23 1135  Pharmacy to dose vancomycin        Ordering Provider: Tripp Morse MD     Does not apply Continuous PRN 12/25/23 1134 01/01/24 1133              Objective     Physical Exam:   Vital Signs   Temp:  [97.7 °F (36.5 °C)-98.7 °F (37.1 °C)] 98.7 °F (37.1 °C)  Heart Rate:  [57-94] 67  Resp:  [14-21] 20  BP: (102-171)/(58-88) 126/65    GENERAL: Awake and alert, in no acute distress.   HEENT: Oropharynx is clear. Hearing is grossly normal.   EYES: PERRL. No conjunctival injection. No lid lag.   LUNGS: Normal work of breathing  GI: Soft, nontender, nondistended.    SKIN: Warm and dry without cutaneous eruptions in exposed areas.  PSYCHIATRIC: Appropriate mood, affect, insight, and judgment.     Results Review:   I reviewed the patient's new clinical results.  I reviewed the patient's new imaging results and agree with the interpretation.  I reviewed the patient's other test results and agree with the interpretation    Lab Results   Component Value Date    WBC 7.51 12/28/2023    HGB 10.4 (L) 12/28/2023    HCT 32.2 (L) 12/28/2023    MCV 92.0 12/28/2023     12/28/2023       Lab Results   Component Value Date    VANCOTROUGH 4.70 (L) 12/27/2023       Lab Results   Component Value Date    GLUCOSE 89 12/28/2023    BUN 11 12/28/2023    CREATININE 0.69 12/28/2023    EGFRIFNONA 67 09/02/2021    EGFRIFAFRI 82 09/02/2021    BCR 15.9 12/28/2023    CO2 21.0 (L) 12/28/2023    CALCIUM 9.7 12/28/2023    PROTENTOTREF 7.1 11/13/2023    ALBUMIN 4.3 12/24/2023    LABIL2 1.6 11/13/2023    AST 15 12/24/2023    ALT 11 12/24/2023         Estimated Creatinine Clearance: 74.4 mL/min (by C-G formula based on SCr of 0.69 mg/dL).      Microbiology:  12/25 blood cultures no growth to date  12/27 CSF culture no growth to date      Radiology:  12/24 CT temporal bone report reviewed with new opacification of the right mastoid air cells and middle ear cavity compared to prior.    12/24 MRI brain report reviewed with no evidence of acute infarct.  Superficial fluid over the craniectomy site.  Peripheral enhancement inferiorly on the postcontrast imaging with extensive opacification of the right mastoid air cells and right middle ear.    Assessment       #Trigeminal neuralgia status post microvascular decompression on 12/7/2023 with subsequent CSF leak  #CSF rhinorrhea status post craniectomy with repair of cranial defect on 12/27/2023  #Possible mastoiditis    Patient's been on empiric antibiotics and I suspect that the cultures from the OR will be negative.  Case discussed with Dr. Morse today and  will plan for empiric coverage of mastoiditis given the clinical presentation.  Plan to stop vancomycin and cefepime with transition to levofloxacin 500 mg daily for 14-day course through end date 1/7.  QTc reviewed today and within normal limits.     ID will plan to follow-up cultures peripherally and adjust therapy if needed.  Plan to sign off for now.

## 2023-12-28 NOTE — PROGRESS NOTES
Name: Kristina Kang ADMIT: 2023   : 1960  PCP: Joanne Maurice PA-C    MRN: [id number] LOS: 1 days   AGE/SEX: 63 y.o. female  ROOM: [facility]     Subjective   Subjective   No acute events overnight.  Patient went to the OR with neurosurgery yesterday.  Seen in the ICU this morning.  She should transfer out later today.  Had a headache earlier this morning but it has resolved.  No other complaints.    Objective   Objective     Vital Signs  Temp:  [97.7 °F (36.5 °C)-98.7 °F (37.1 °C)] 97.8 °F (36.6 °C)  Heart Rate:  [57-85] 66  Resp:  [12-21] 12  BP: (102-161)/(58-88) 136/70  SpO2:  [90 %-98 %] 91 %  on  Flow (L/min):  [2] 2;   Device (Oxygen Therapy): nasal cannula  Body mass index is 26.65 kg/m².    Physical Exam  General: Alert, no acute distress.  Sitting up in bed.  ENT: No conjunctival injection or scleral icterus. Moist mucous membranes.   Neuro: Right-sided facial nerve palsy.  Strength normal in all extremities.  Other than right-sided facial nerve palsy, no additional focal neurologic deficits.  Lungs: Clear to auscultation bilaterally. No wheeze or crackles. No distress.   Heart: RRR, no murmurs. No edema.  Abdomen: Soft, non-tender, non-distended. Normal bowel sounds.   Ext: Warm and well-perfused. No edema.   Skin: No rashes or lesions. IV site without swelling or erythema.     Results Review     I reviewed the patient's new clinical results:  Results from last 7 days   Lab Units 23  0344 23  0650 23  1247   WBC 10*3/mm3 7.51 9.23 10.44   HEMOGLOBIN g/dL 10.4* 11.3* 12.2   PLATELETS 10*3/mm3 202 224 287     Results from last 7 days   Lab Units 23  0344 23  0650 23  1247   SODIUM mmol/L 138 142 140   POTASSIUM mmol/L 4.0 4.1 5.2   CHLORIDE mmol/L 111* 108* 104   CO2 mmol/L 21.0* 22.0 27.0   BUN mg/dL 11 9 10   CREATININE mg/dL 0.69 0.75 0.88   GLUCOSE mg/dL 89 74 94   EGFR mL/min/1.73 97.7 89.6 73.9     Results from last 7 days   Lab Units  12/24/23  1247   ALBUMIN g/dL 4.3   BILIRUBIN mg/dL 0.2   ALK PHOS U/L 106   AST (SGOT) U/L 15   ALT (SGPT) U/L 11     Results from last 7 days   Lab Units 12/28/23  0344 12/25/23  0650 12/24/23  1247   CALCIUM mg/dL 9.7 9.6 10.7*   ALBUMIN g/dL  --   --  4.3       Glucose   Date/Time Value Ref Range Status   12/28/2023 0642 101 70 - 130 mg/dL Final   12/28/2023 0011 96 70 - 130 mg/dL Final   12/27/2023 1452 138 (H) 70 - 130 mg/dL Final   12/27/2023 1127 142 (H) 70 - 130 mg/dL Final       CT Head Without Contrast    Result Date: 12/28/2023  Postoperative findings, as noted above.  Radiation dose reduction techniques were utilized, including automated exposure control and exposure modulation based on body size.   This report was finalized on 12/28/2023 5:33 AM by Dr. Orly Seals M.D on Workstation: [id number]       I have personally reviewed all medications:  Scheduled Medications  atorvastatin, 40 mg, Oral, Daily  carBAMazepine XR, 600 mg, Oral, BID  folic acid, 400 mcg, Oral, Daily  latanoprost, 1 drop, Both Eyes, Nightly  levoFLOXacin, 500 mg, Oral, Q24H  pregabalin, 75 mg, Oral, Nightly  valsartan, 160 mg, Oral, Q24H  vitamin B-12, 1,000 mcg, Oral, Daily    Infusions  lactated ringers, 9 mL/hr, Last Rate: Stopped (12/27/23 0911)  niCARdipine, 5-15 mg/hr, Last Rate: Stopped (12/28/23 0800)  sodium chloride 0.45 % with KCl 20 mEq, 75 mL/hr, Last Rate: 75 mL/hr (12/28/23 0339)    Diet  Diet: Regular/House Diet; Texture: Regular Texture (IDDSI 7); Fluid Consistency: Thin (IDDSI 0)    Assessment/Plan     Active Hospital Problems    Diagnosis  POA    **Facial droop [R29.810]  Yes    CSF rhinorrhea [G96.01]  Yes    Trigeminal neuralgia [G50.0]  Yes    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       63 y.o. female with Facial droop.    Ms. Kang is a 63 y.o. woman with a history  of v3 trigeminal neuralgia s/p right retrosigmoid craniotomy for microvascular decompression earlier this month (12/7/23) with neurosurgery, hypertension, hyperlipidemia, hypocalciuric hypercalcemia who presents with persistent clear drainage from her nose and right facial palsy as well as possible 8th and 9th cranial nerve dysfunction     Possible CSF rhinorrhea with right facial palsy, rightward tongue deviation on protrusion, and possible partial right hearing loss following right retrosigmoid craniotomy for microvascular decompression earlier this month- MRI brain and MRA were done with no evidence of acute infarction.  Once again concerning for CSF leak.  Neurosurgery and neurology consulted and following.  Neurology feels that patient has a right-sided Bell's palsy which could be postsurgical or idiopathic.  Patient taken to the OR on 12/27 with SIMI for repair of cranial defect and CSF rhinorrhea.  Infectious disease now consulted.  CSF cultures pending.  Planning to discontinue vancomycin and cefepime today.  Start Levaquin 500 mg daily for 14 days to treat for mastoiditis.  Discharge pending SIMI clearance at this time.    HTN-BP has been better controlled.  Continue home valsartan, hydralazine as needed.  Continue holding Lasix.     Anemia-Hgb 10.4 today, decreased from yesterday and baseline.  No signs of active bleeding.  Patient to the OR on 12/27.  Monitor closely with daily CBC.  Transfuse for Hgb less than 7.    Trigeminal neuralgia-continue home Tegretol and pregabalin.  Remainder of management as above.    SCDs for DVT prophylaxis.  Full code.  Discussed with patient, nursing staff, and consulting provider.  Anticipate discharge home timing yet to be determined.    Jany Cerrato MD  [facility]  12/28/23  09:25 EST

## 2023-12-28 NOTE — PROGRESS NOTES
"  Intensive Care Unit Daily Progress Note.   [facility] INTENSIVE CARE  12/28/2023    Patient Name:  Kristina Kang  MRN:  [id number]  YOB: 1960  Age: 63 y.o.  Sex: female         Reason for Admission / Chief Complaint:  Postoperative management in the ICU    Hospital Course:   62-year-old female with a history of V3 trigeminal neuralgia status post right retrosigmoid craniotomy for microvascular decompression (12/7/2023), hypertension, hyperlipidemia, hypocalciuric hypercalcemia who presented to the hospital on 12/24 for clear nasal drainage and right facial palsy status post left-sided craniectomy for repair of cranial defect and CSF rhinorrhea (12/27/2023).  Admitted to the ICU postop for further management.    Interval History:  OR yesterday for CSF rhinorrhea  Admitted to the ICU after surgery  Doing well on evaluation, no acute events overnight  Denies any chest pain or palpitations  Denies any nausea, vomiting, diarrhea  Denies any fevers or chills  Denies any shortness of breath or cough    Physical Exam:  /65 (BP Location: Right arm, Patient Position: Lying)   Pulse 67   Temp 98.7 °F (37.1 °C) (Oral)   Resp 20   Ht 157.5 cm (62\")   Wt 66.1 kg (145 lb 11.6 oz)   LMP  (LMP Unknown)   SpO2 93%   BMI 26.65 kg/m²   Body mass index is 26.65 kg/m².    Intake/Output    Intake/Output Summary (Last 24 hours) at 12/28/2023 0906  Last data filed at 12/28/2023 0700  Gross per 24 hour   Intake 3701.61 ml   Output 1750 ml   Net 1951.61 ml     General: Alert, nontoxic, NAD  HEENT: NC/AT, EOMI, MMM  Neck: Supple, trachea midline  Cardiac: RRR, no murmur, gallops, rubs  Pulmonary: Clear to auscultation bilaterally, no adventitious breath sounds, normal respiratory effort  GI: Soft, non-tender, non-distended, normal bowel sounds  Extremities: Warm, well perfused, no LE edema  Skin: no visible rash  Neuro: CN II - XII grossly intact  Psychiatry: Normal mood and affect    Data " Review:  Notable Labs:  Results from last 7 days   Lab Units 12/28/23  0344 12/25/23  0650 12/24/23  1247   WBC 10*3/mm3 7.51 9.23 10.44   HEMOGLOBIN g/dL 10.4* 11.3* 12.2   PLATELETS 10*3/mm3 202 224 287     Results from last 7 days   Lab Units 12/28/23  0344 12/25/23  0650 12/24/23  1247   SODIUM mmol/L 138 142 140   POTASSIUM mmol/L 4.0 4.1 5.2   CHLORIDE mmol/L 111* 108* 104   CO2 mmol/L 21.0* 22.0 27.0   BUN mg/dL 11 9 10   CREATININE mg/dL 0.69 0.75 0.88   GLUCOSE mg/dL 89 74 94   CALCIUM mg/dL 9.7 9.6 10.7*   Estimated Creatinine Clearance: 74.4 mL/min (by C-G formula based on SCr of 0.69 mg/dL).    Results from last 7 days   Lab Units 12/28/23  0344 12/25/23  0650 12/24/23  1247   AST (SGOT) U/L  --   --  15   ALT (SGPT) U/L  --   --  11   PLATELETS 10*3/mm3 202 224 287             Imaging:  Reviewed chest images personally from past 3 days    ASSESSMENT  /  PLAN:    CSF rhinorrhea status post repair of cranial defect and mastoid air cell packing (12/27/2023)  Concern for mastoiditis  Hypertension  Trigeminal neuralgia     -Patient presented to the ICU after or for CSF rhinorrhea.  -CT chest this morning stable  -Off Cardene drip, blood pressures controlled.  Goal SBP less than 140.  -Concern for mastoiditis, seen by infectious disease and transition to Levaquin for therapy for 14-day course (end date 1/7/2024).  Suspicion is that cultures will be negative as patient was already on empiric antibiotics.    -Appreciate neurosurgery recommendations     GI prophylaxis: Not indicated  DVT prophylaxis: Enoxaparin  Eric catheter: No  Bowel regimen: Ordered  Diet: Regular     Discharge: Continue to monitor in the ICU due to critical status, will transfer to floor pending neurosurgery recommendations    Pardeep Bhatti MD  [facility]  Pulmonary and Critical Care Medicine, Interventional Pulmonology    Parts of this note may be an electronic transcription/translation of spoken language to printed text  using the Dragon dictation system.

## 2023-12-29 ENCOUNTER — HOME HEALTH ADMISSION (OUTPATIENT)
Dept: HOME HEALTH SERVICES | Facility: HOME HEALTHCARE | Age: 63
End: 2023-12-29
Payer: COMMERCIAL

## 2023-12-29 ENCOUNTER — READMISSION MANAGEMENT (OUTPATIENT)
Dept: CALL CENTER | Facility: HOSPITAL | Age: 63
End: 2023-12-29
Payer: COMMERCIAL

## 2023-12-29 ENCOUNTER — DOCUMENTATION (OUTPATIENT)
Dept: HOME HEALTH SERVICES | Facility: HOME HEALTHCARE | Age: 63
End: 2023-12-29
Payer: COMMERCIAL

## 2023-12-29 VITALS
HEIGHT: 62 IN | TEMPERATURE: 98.4 F | SYSTOLIC BLOOD PRESSURE: 141 MMHG | RESPIRATION RATE: 16 BRPM | DIASTOLIC BLOOD PRESSURE: 69 MMHG | WEIGHT: 147.93 LBS | HEART RATE: 66 BPM | OXYGEN SATURATION: 100 % | BODY MASS INDEX: 27.22 KG/M2

## 2023-12-29 LAB
B2 TRANSFERRIN FLD QL: ABNORMAL
DEPRECATED RDW RBC AUTO: 39.9 FL (ref 37–54)
ERYTHROCYTE [DISTWIDTH] IN BLOOD BY AUTOMATED COUNT: 11.9 % (ref 12.3–15.4)
HCT VFR BLD AUTO: 32.2 % (ref 34–46.6)
HGB BLD-MCNC: 10.6 G/DL (ref 12–15.9)
MCH RBC QN AUTO: 30.3 PG (ref 26.6–33)
MCHC RBC AUTO-ENTMCNC: 32.9 G/DL (ref 31.5–35.7)
MCV RBC AUTO: 92 FL (ref 79–97)
PLATELET # BLD AUTO: 184 10*3/MM3 (ref 140–450)
PMV BLD AUTO: 8.8 FL (ref 6–12)
RBC # BLD AUTO: 3.5 10*6/MM3 (ref 3.77–5.28)
WBC NRBC COR # BLD AUTO: 7.82 10*3/MM3 (ref 3.4–10.8)

## 2023-12-29 PROCEDURE — 99024 POSTOP FOLLOW-UP VISIT: CPT | Performed by: NURSE PRACTITIONER

## 2023-12-29 PROCEDURE — 85027 COMPLETE CBC AUTOMATED: CPT | Performed by: NEUROLOGICAL SURGERY

## 2023-12-29 RX ORDER — LEVOFLOXACIN 500 MG/1
500 TABLET, FILM COATED ORAL EVERY 24 HOURS
Qty: 11 TABLET | Refills: 0 | Status: SHIPPED | OUTPATIENT
Start: 2023-12-30 | End: 2024-01-10

## 2023-12-29 RX ADMIN — VALSARTAN 160 MG: 160 TABLET, FILM COATED ORAL at 08:26

## 2023-12-29 RX ADMIN — Medication 1000 MCG: at 08:26

## 2023-12-29 RX ADMIN — LEVOFLOXACIN 500 MG: 500 TABLET, FILM COATED ORAL at 11:13

## 2023-12-29 RX ADMIN — ATORVASTATIN CALCIUM 40 MG: 20 TABLET, FILM COATED ORAL at 08:26

## 2023-12-29 RX ADMIN — ACETAMINOPHEN 650 MG: 325 TABLET, FILM COATED ORAL at 11:13

## 2023-12-29 RX ADMIN — DOCUSATE SODIUM 100 MG: 100 CAPSULE, LIQUID FILLED ORAL at 08:30

## 2023-12-29 RX ADMIN — CARBAMAZEPINE 600 MG: 200 TABLET, EXTENDED RELEASE ORAL at 08:26

## 2023-12-29 RX ADMIN — Medication 400 MCG: at 08:26

## 2023-12-29 NOTE — OUTREACH NOTE
Prep Survey      Flowsheet Row Responses   [facility] patient discharged from? [place]   Is LACE score < 7 ? No   Eligibility [facility]   Date of Admission 12/24/23   Date of Discharge 12/29/23   [place] [facility] [place]charge diagnosis Facial droop- CRANIECTOMY for CSF rhinorrhea repair   Does the patient have one of the following disease processes/diagnoses(primary or secondary)? General Surgery   Does the patient have Home health ordered? Yes   What is the Home health agency?  [facility]   Is there a DME ordered? No   Prep survey completed? Yes            Savita GERARDO - Registered Nurse

## 2023-12-29 NOTE — PROGRESS NOTES
"  Intensive Care Unit Daily Progress Note.   [address]  12/29/2023    Patient Name:  Kristina Kang  MRN:  [id number]  YOB: 1960  Age: 63 y.o.  Sex: female         Reason for Admission / Chief Complaint:  Postoperative management in the ICU    Hospital Course:   62-year-old female with a history of V3 trigeminal neuralgia status post right retrosigmoid craniotomy for microvascular decompression (12/7/2023), hypertension, hyperlipidemia, hypocalciuric hypercalcemia who presented to the hospital on 12/24 for clear nasal drainage and right facial palsy status post left-sided craniectomy for repair of cranial defect and CSF rhinorrhea (12/27/2023).  Admitted to the ICU postop for further management.  Transferred to floor 12/28.    Interval History:  No acute events overnight  Transfer to the floor yesterday  States that overall she is feeling better  Denies any shortness of breath or cough  Denies any chest pain or palpitations  Denies any nausea, vomiting or diarrhea    Physical Exam:  /69 (BP Location: Right arm, Patient Position: Lying)   Pulse 66   Temp 98.4 °F (36.9 °C) (Oral)   Resp 16   Ht 157.5 cm (62\")   Wt 67.1 kg (147 lb 14.9 oz)   LMP  (LMP Unknown)   SpO2 100%   BMI 27.06 kg/m²   Body mass index is 27.06 kg/m².    Intake/Output    Intake/Output Summary (Last 24 hours) at 12/29/2023 1518  Last data filed at 12/29/2023 0700  Gross per 24 hour   Intake 2000 ml   Output --   Net 2000 ml     General: Alert, nontoxic, NAD  HEENT: NC/AT, EOMI, MMM  Neck: Supple, trachea midline  Cardiac: RRR, no murmur, gallops, rubs  Pulmonary: Clear to auscultation bilaterally, no adventitious breath sounds, normal respiratory effort  GI: Soft, non-tender, non-distended, normal bowel sounds  Extremities: Warm, well perfused, no LE edema  Skin: no visible rash  Neuro: CN II - XII grossly intact  Psychiatry: Normal mood and affect    Data Review:  Notable Labs:  Results from " last 7 days   Lab Units 12/29/23  0514 12/28/23  0344 12/25/23  0650 12/24/23  1247   WBC 10*3/mm3 7.82 7.51 9.23 10.44   HEMOGLOBIN g/dL 10.6* 10.4* 11.3* 12.2   PLATELETS 10*3/mm3 184 202 224 287     Results from last 7 days   Lab Units 12/28/23  0344 12/25/23  0650 12/24/23  1247   SODIUM mmol/L 138 142 140   POTASSIUM mmol/L 4.0 4.1 5.2   CHLORIDE mmol/L 111* 108* 104   CO2 mmol/L 21.0* 22.0 27.0   BUN mg/dL 11 9 10   CREATININE mg/dL 0.69 0.75 0.88   GLUCOSE mg/dL 89 74 94   CALCIUM mg/dL 9.7 9.6 10.7*   Estimated Creatinine Clearance: 75 mL/min (by C-G formula based on SCr of 0.69 mg/dL).    Results from last 7 days   Lab Units 12/29/23  0514 12/28/23  0344 12/25/23  0650 12/24/23  1247   AST (SGOT) U/L  --   --   --  15   ALT (SGPT) U/L  --   --   --  11   PLATELETS 10*3/mm3 184 202 224 287             Imaging:  Reviewed chest images personally from past 3 days    ASSESSMENT  /  PLAN:    CSF rhinorrhea status post repair of cranial defect and mastoid air cell packing (12/27/2023)  Concern for mastoiditis  Hypertension  Trigeminal neuralgia     -Patient presented to the ICU after or for CSF rhinorrhea.  -CT chest stable  -Concern for mastoiditis, seen by infectious disease and transition to Levaquin for therapy for 14-day course (end date 1/7/2024).  Suspicion is that cultures will be negative as patient was already on empiric antibiotics.    -Plan for discharge home today.    Thank you for allowing us to help take care of this patient.  Pulmonary will sign off at this time.     Pardeep Bhatti MD  [facility]  Pulmonary and Critical Care Medicine, Interventional Pulmonology    Parts of this note may be an electronic transcription/translation of spoken language to printed text using the Dragon dictation system.

## 2023-12-29 NOTE — PROGRESS NOTES
[facility] NEUROSURGERY INTRACRANIAL PROGRESS NOTE    PATIENT IDENTIFICATION:   Name:  Kristina Kang      MRN:  [id number]     63 y.o.  female               CC: Status post MVD with postoperative CSF leak.  POD #2 for right-sided craniotomy packing of mastoid air cells.      Subjective     Interval History: Continues to do well.  No new complaints or events overnight.  Spouse present at bedside.  Denies any trigeminal nerve pain.    ROS:  Constitutional: No fever, chills  HEENT: No headache, no vision changes, no tinnitus, no hearing difficulties.  Right craniotomy incision well-approximated, no erythema, swelling or drainage.    Neck: no stiffness  GI: No nausea, vomiting, no swallow difficulties  Neuro: No numbness, tingling, or weakness, no speech difficulties, no balance difficulties, right facial droop from Bell's palsy    Objective     Vital signs in last 24 hours:  Temp:  [97.8 °F (36.6 °C)-98.8 °F (37.1 °C)] 98.6 °F (37 °C)  Heart Rate:  [61-74] 66  Resp:  [16-20] 18  BP: (126-174)/(62-85) 147/71    Intake/Output this shift:  No intake/output data recorded.    Intake/Output last 3 shifts:  I/O last 3 completed shifts:  In: 3610.6 [P.O.:440; I.V.:3170.6]  Out: 3250 [Urine:3250]    LABS:     Results from last 7 days   Lab Units 12/29/23  0514 12/28/23  0344 12/25/23  0650   WBC 10*3/mm3 7.82 7.51 9.23   HEMOGLOBIN g/dL 10.6* 10.4* 11.3*   HEMATOCRIT % 32.2* 32.2* 34.2   PLATELETS 10*3/mm3 184 202 224       Results from last 7 days   Lab Units 12/28/23  0344 12/25/23  0650 12/24/23  1247   SODIUM mmol/L 138 142 140   POTASSIUM mmol/L 4.0 4.1 5.2   CHLORIDE mmol/L 111* 108* 104   CO2 mmol/L 21.0* 22.0 27.0   BUN mg/dL 11 9 10   CREATININE mg/dL 0.69 0.75 0.88   GLUCOSE mg/dL 89 74 94   CALCIUM mg/dL 9.7 9.6 10.7*       IMAGING STUDIES:    No new imaging to review    I personally viewed and interpreted the patient's chart.    Meds reviewed/changed: Yes      Physical exam  Awake, alert, oriented x3  Pupils  "equal round reactive to light  Extraocular muscles intact  Speech is fluent and clear  Motor exam  Bilateral deltoids 5/5, bilateral biceps 5/5, bilateral triceps 5/5, bilateral wrist extension 5/5 bilateral hand  5/5  Bilateral hip flexion 5/5, bilateral knee extension 5/5, bilateral DF/PF 5/5.    Right facial weakness  gait deferred  Able to detect  light touch in all 4 extremities      Assessment & Plan     ASSESSMENT:      Facial droop    Essential hypertension    Hyperlipidemia    Hypocalciuric hypercalcemia    Trigeminal neuralgia    CSF rhinorrhea    63-year-old woman with a history of recent right-sided microvascular decompression for trigeminal neuralgia who returns with clear fluid leaking from her right nostril most consistent with CSF.    PLAN:     Infectious disease consulted, managing antibiotics.  They recommend 2 weeks of empiric antibiotic with levofloxacin 500 mg while awaiting culture.  Culture cammy NGTD.  okay for discharge home today     I discussed the patient's findings and my recommendations with patient and nursing staff    I spent 35 minutes caring for Kristina Kang on this date of service. This time includes time spent by me in the following activities: preparing for the visit, reviewing tests, obtaining and/or reviewing a separately obtained history, performing a medically appropriate examination and/or evaluation, counseling and educating the patient/family/caregiver, ordering medications, tests, or procedures, referring and communicating with other health care professionals, documenting information in the medical record, independently interpreting results and communicating that information with the patient/family/caregiver, and care coordination.          LOS: 2 days       Ana Escoto, APRN  12/29/2023  10:26 EST    \"Dictated utilizing Dragon dictation\".      "

## 2023-12-29 NOTE — DISCHARGE SUMMARY
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        Hospital Course     Ms. Kang is a 63 y.o. female with a history of trigeminal neuralgia s/p right retrosigmoid craniectomy for microvascular decompression on , HTN, HLD, hypocalciuric hypercalcemia who presented to [facility] initially complaining of right-sided facial paralysis and persistent clear drainage from her nose.  Please see the admitting history and physical for further details.  She was found to have concern for CSF leak and was admitted to the hospital for further evaluation and treatment.      Imaging done on admission was negative for acute intracranial process, but it was concerning for CSF leak.  Both neurosurgery and neurology were consulted.  The patient had a fairly significant right-sided facial paralysis, but neurologic exam was otherwise unremarkable.  Given the continued drainage from her nose, the decision was made to take patient back to the OR.  She went to the operating room with neurosurgery on  for repair of cranial defect and CSF rhinorrhea.  The patient tolerated the procedure well.  She was initially started on empiric antibiotics to cover for meningitis.  CSF cultures were obtained intraoperatively and no growth at time of discharge.  Infectious disease was consulted to assist in antibiotic management.  They ultimately recommended transitioning to Levaquin 500 mg daily for a total  antibiotic course of 14 days to treat empirically for mastoiditis.  Overall, the patient had continued improvement in her symptoms.  She did remain with right-sided facial paralysis but this was to be expected.  The patient was counseled extensively on reasons to return to the hospital and conveyed understanding.  Neurosurgery was agreeable with discharge home.  The patient was discharged home in good condition.    Day of Discharge     Subjective:  No acute events overnight.  Patient states that she is feeling well.  Complaining of a slight headache but otherwise no issues.  Denies any chest pain, shortness of breath.  Eating and drinking well.  She would like to be discharged home today.    Physical Exam:  Temp:  [97.8 °F (36.6 °C)-98.8 °F (37.1 °C)] 98.4 °F (36.9 °C)  Heart Rate:  [61-74] 66  Resp:  [16-20] 16  BP: (126-174)/(62-85) 141/69  Body mass index is 27.06 kg/m².  Physical Exam  General: Alert, no acute distress.  Sitting up in bed.  ENT: No conjunctival injection or scleral icterus. Moist mucous membranes.   Neuro: Right-sided facial nerve palsy.  Strength normal in all extremities.  Other than right-sided facial nerve palsy, no additional focal neurologic deficits.  Lungs: Clear to auscultation bilaterally. No wheeze or crackles. No distress.   Heart: RRR, no murmurs. No edema.  Abdomen: Soft, non-tender, non-distended. Normal bowel sounds.   Ext: Warm and well-perfused. No edema.   Skin: No rashes or lesions. IV site without swelling or erythema.     Consultants     Consult Orders (all) (From admission, onward)       Start     Ordered    12/27/23 1651  Inpatient Infectious Diseases Consult  Once        Specialty:  Infectious Diseases  Provider:  Conner Maldonado,     12/27/23 1650    12/27/23 1102  Inpatient Intensivist Consult  Once        Specialty:  Intensive Care  Provider:  Pardeep Bhatti MD    12/27/23 1102    12/25/23 0721  Inpatient Neurosurgery Consult  Once        Specialty:   Neurosurgery  Provider:  Ana Escoto, APRN    12/25/23 0720    12/24/23 1458  Inpatient Case Management  Consult  Once        Provider:  (Not yet assigned)    12/24/23 1457    12/24/23 1453  Inpatient Consult to Advance Care Planning  Once        Provider:  (Not yet assigned)    12/24/23 1457    12/24/23 1333  Inpatient Neurology Consult Stroke  Once        Specialty:  Neurology  Provider:  Cuco Minaya MD    12/24/23 1332    12/24/23 1219  [facility] (on-call MD unless specified) Details  Once        Specialty:  Hospitalist  Provider:  Torres Ascencio MD    12/24/23 1218    12/24/23 1154  Neurosurgery (on-call MD unless specified)  Once        Specialty:  Neurosurgery  Provider:  Gilberto Ascencio MD    12/24/23 1154                  Procedures     CRANIECTOMY for CSF rhinorrhea repair    Imaging Results (All)       Procedure Component Value Units Date/Time    CT Head Without Contrast [[id number]] Collected: 12/28/23 0525     Updated: 12/28/23 0545    Narrative:      CT OF THE HEAD WITHOUT CONTRAST     HISTORY: Postop craniotomy     COMPARISON: December 24, 2023     TECHNIQUE: Axial CT imaging was obtained through the brain. No IV  contrast was administered.     FINDINGS:  Patient is again noted to be status post right lateral occipital  craniotomy. No acute intracranial hemorrhage is seen. There is a small  stable lateral right occipital hygroma. Maximum thickness is 6 mm. There  is a small amount of pneumocephalus noted within the operative bed, in  keeping with most recent surgery. There is some atrophy. There is  periventricular and deep white matter microangiopathic change. There is  no midline shift. There is some fluid which is noted superficial to the  craniotomy flap, as well as a small amount of subcutaneous air, again,  in keeping with most recent surgery. Opacification of the right mastoid  air cells is again seen. Mild mucosal thickening is noted within the  ethmoid sinuses.  Fluid within the middle ear appears improved.       Impression:      Postoperative findings, as noted above.     Radiation dose reduction techniques were utilized, including automated  exposure control and exposure modulation based on body size.        This report was finalized on 12/28/2023 5:33 AM by Dr. Orly Seals M.D on Workstation: [id number]       MRI Brain With & Without Contrast [[id number]] Collected: 12/24/23 2201     Updated: 12/24/23 2229    Narrative:      BRAIN MRI WITH AND WITHOUT CONTRAST; BRAIN MRA     HISTORY: s/p r sided crani 12/7, now with nasal leakage          Brain MRI:  HISTORY: s/p r sided crani 12/7, now with nasal leakage     COMPARISON: May 23, 2022.     FINDINGS:  Multiplanar images of the head were obtained without and with  gadolinium. No areas of restricted diffusion are seen to suggest acute  infarct. Ventricles are normal in size. There is some minimal  microangiopathic change. There is no midline shift or significant mass  effect. As was previously discussed, patient is status post right  lateral occipital craniotomy. There is a stable small lateral right  occipital hygroma, measuring up to 6 mm. Opacification of the right  mastoid air cells and right middle ear is again noted. There is some  mucosal thickening within the ethmoid sinuses. There does appear to be  some fluid which is superficial to the craniotomy, which may reflect  seroma, although small pseudomeningocele is not excluded. Postcontrast  imaging does show some peripheral enhancement, and correlation with any  evidence of infection is recommended. This area measures up to 2.2 x 0.6  cm I don't see any evidence of venous occlusion.     Brain MRA:  FINDINGS: Axial 3-D time-of-flight images of the intracranial arterial  circulation centered at the level of the [place] were obtained  without contrast. Reconstruction images were supplemented.     The intracranial vertebral arteries are patent, as is the  basilar  artery. Intracranial internal carotid arteries are unremarkable. There  is an anterior communicating artery. Anterior cerebral and middle  cerebral arteries appear normal bilaterally. Both posterior cerebral  arteries are patent, although the patient appears to have hypoplastic P1  segments bilaterally, with well-developed posterior communicating  arteries bilaterally. Prior study demonstrated contact between a branch  of the right superior cerebellar artery and the right trigeminal nerve  posteriorly. Both superior cerebellar arteries appear to be patent on  the current study.                Impression:      1. No evidence of restricted diffusion to suggest acute infarct.  2. Stable right lateral cerebellar hygroma.  3. There is some fluid seen superficial to the craniectomy. This may  represent a seroma, although small pseudomeningocele is not excluded.  There does appear to be some peripheral enhancement inferiorly on the  postcontrast imaging, and correlation with any evidence of infection is  recommended.  4. Extensive opacification of the right mastoid air cells and right  middle ear.  5. Intracranial vessels appear to be patent.        This report was finalized on 12/24/2023 10:26 PM by Dr. Orly Seals M.D on Workstation: [id number]       MRI Angiogram Head Without Contrast [[id number]] Collected: 12/24/23 2201     Updated: 12/24/23 2229    Narrative:      BRAIN MRI WITH AND WITHOUT CONTRAST; BRAIN MRA     HISTORY: s/p r sided crani 12/7, now with nasal leakage          Brain MRI:  HISTORY: s/p r sided crani 12/7, now with nasal leakage     COMPARISON: May 23, 2022.     FINDINGS:  Multiplanar images of the head were obtained without and with  gadolinium. No areas of restricted diffusion are seen to suggest acute  infarct. Ventricles are normal in size. There is some minimal  microangiopathic change. There is no midline shift or significant mass  effect. As was previously discussed, patient is  status post right  lateral occipital craniotomy. There is a stable small lateral right  occipital hygroma, measuring up to 6 mm. Opacification of the right  mastoid air cells and right middle ear is again noted. There is some  mucosal thickening within the ethmoid sinuses. There does appear to be  some fluid which is superficial to the craniotomy, which may reflect  seroma, although small pseudomeningocele is not excluded. Postcontrast  imaging does show some peripheral enhancement, and correlation with any  evidence of infection is recommended. This area measures up to 2.2 x 0.6  cm I don't see any evidence of venous occlusion.     Brain MRA:  FINDINGS: Axial 3-D time-of-flight images of the intracranial arterial  circulation centered at the level of the [place] were obtained  without contrast. Reconstruction images were supplemented.     The intracranial vertebral arteries are patent, as is the basilar  artery. Intracranial internal carotid arteries are unremarkable. There  is an anterior communicating artery. Anterior cerebral and middle  cerebral arteries appear normal bilaterally. Both posterior cerebral  arteries are patent, although the patient appears to have hypoplastic P1  segments bilaterally, with well-developed posterior communicating  arteries bilaterally. Prior study demonstrated contact between a branch  of the right superior cerebellar artery and the right trigeminal nerve  posteriorly. Both superior cerebellar arteries appear to be patent on  the current study.                Impression:      1. No evidence of restricted diffusion to suggest acute infarct.  2. Stable right lateral cerebellar hygroma.  3. There is some fluid seen superficial to the craniectomy. This may  represent a seroma, although small pseudomeningocele is not excluded.  There does appear to be some peripheral enhancement inferiorly on the  postcontrast imaging, and correlation with any evidence of infection  is  recommended.  4. Extensive opacification of the right mastoid air cells and right  middle ear.  5. Intracranial vessels appear to be patent.        This report was finalized on 12/24/2023 10:26 PM by Dr. Orly Seals M.D on Workstation: [id number]       CT Temporal Bones Without Contrast [[id number]] Collected: 12/24/23 1426     Updated: 12/24/23 1456    Narrative:      CT SCAN OF THE TEMPORAL BONES WITHOUT CONTRAST     CLINICAL HISTORY: Dizziness. Possible CSF leak.     TECHNIQUE: CT scan of the temporal bones was obtained with 1 mm axial,  coronal, and sagittal bone algorithm images.     COMPARISON: CT head dated 12/15/2023.     FINDINGS:     The patient is status post a right lateral occipital craniotomy for the  purposes of decompression of the right trigeminal nerve for symptoms of  trigeminal neuralgia. This craniotomy traverses a few mastoid air cells.  The right mastoid air cells are subtotally opacified as is the right  middle ear cavity. The mastoid air cells and right middle ear cavity are  newly opacified when compared to the prior head CT dated 12/05/2023.  Given the setting of a possible CSF leak, the findings are highly  concerning for a CSF leak secondary to this craniotomy site extending  into the mastoid air cells.     Incidental note is made of a small focus of increased density within the  expected location of the right trigeminal nerve that is presumably  representative of postoperative change from the microvascular  decompression.     The left temporal bone is unremarkable in appearance. The internal  auditory canals, cochlear aqueducts, and vestibular aqueducts are all  unremarkable in appearance.     Incidental note is made of marked arthritic changes within the left  temporomandibular joint. Also, incidental minor mucosal thickening is  seen within the ethmoid air cells.       Impression:         The patient is status post a right lateral occipital craniotomy for the  purposes of  a right trigeminal nerve microvascular decompression for  symptoms of trigeminal neuralgia. The craniotomy traverses a few right  mastoid air cells. The right mastoid air cells and right middle ear  cavity are subtotally opacified. This opacification of the mastoid air  cells and middle ear cavity is new when compared to prior preoperative  CT scan dated 12/05/2023. If there is a CSF leak, this craniotomy  traversing the right mastoid air cells is highly suspect for the cause  of the CSF leak. Please correlate with clinical data.     These findings were discussed with Narciso Castro on 12/24/2023 at  approximately 2:19 p.m.        Radiation dose reduction techniques were utilized, including automated  exposure control and exposure modulation based on body size.        This report was finalized on 12/24/2023 2:53 PM by Dr. Lorenzo Samuel M.D  on Workstation: [id number]             Results for orders placed during the hospital encounter of 09/16/16    Duplex carotid ultrasound CAR    Interpretation Summary  · Normal proximal right internal carotid artery.  · Normal proximal left internal carotid artery.  · Normal proximal right internal carotid artery.  · Normal proximal left internal carotid artery.  · Normal mid right internal carotid artery.  · Normal mid left internal carotid artery.    Results for orders placed in visit on 10/06/15    SCANNED - ECHOCARDIOGRAM    Pertinent Labs     Results from last 7 days   Lab Units 12/29/23  0514 12/28/23  0344 12/25/23  0650 12/24/23  1247   WBC 10*3/mm3 7.82 7.51 9.23 10.44   HEMOGLOBIN g/dL 10.6* 10.4* 11.3* 12.2   PLATELETS 10*3/mm3 184 202 224 287     Results from last 7 days   Lab Units 12/28/23  0344 12/25/23  0650 12/24/23  1247   SODIUM mmol/L 138 142 140   POTASSIUM mmol/L 4.0 4.1 5.2   CHLORIDE mmol/L 111* 108* 104   CO2 mmol/L 21.0* 22.0 27.0   BUN mg/dL 11 9 10   CREATININE mg/dL 0.69 0.75 0.88   GLUCOSE mg/dL 89 74 94   EGFR mL/min/1.73 97.7 89.6 73.9     Results from  "last 7 days   Lab Units 12/24/23  1247   ALBUMIN g/dL 4.3   BILIRUBIN mg/dL 0.2   ALK PHOS U/L 106   AST (SGOT) U/L 15   ALT (SGPT) U/L 11     Results from last 7 days   Lab Units 12/28/23  0344 12/25/23  0650 12/24/23  1247   CALCIUM mg/dL 9.7 9.6 10.7*   ALBUMIN g/dL  --   --  4.3               Invalid input(s): \"LDLCALC\"  Results from last 7 days   Lab Units 12/25/23  1206 12/25/23  1150   BLOODCX  No growth at 4 days No growth at 4 days         Test Results Pending at Discharge     Pending Labs       Order Current Status    Anaerobic Culture - Cerebrospinal Fluid, Brain In process    Beta 2 Transferrin - Body Fluid, In process    Blood Culture - Blood, Arm, Left Preliminary result    Blood Culture - Blood, Hand, Left Preliminary result    Culture, CSF - Cerebrospinal Fluid, Brain Preliminary result            Discharge Details        Discharge Medications        New Medications        Instructions Start Date   levoFLOXacin 500 MG tablet  Commonly known as: LEVAQUIN   500 mg, Oral, Every 24 Hours   Start Date: December 30, 2023            Changes to Medications        Instructions Start Date   carBAMazepine  MG 12 hr tablet  Commonly known as: TEGretol  XR  What changed:   how much to take  how to take this  when to take this  additional instructions   TAKE 600MG DURING THE DAY AND 400MG AT NIGHT.      cholecalciferol 25 MCG (1000 UT) tablet  Commonly known as: VITAMIN D3  What changed:   how much to take  how to take this  when to take this   3 tablets daily             Continue These Medications        Instructions Start Date   aspirin 81 MG EC tablet   81 mg, Oral, Daily      atorvastatin 40 MG tablet  Commonly known as: LIPITOR   40 mg, Oral, Daily, For cholesterol      folic acid 400 MCG tablet  Commonly known as: FOLVITE   400 mcg, Oral, Daily      furosemide 20 MG tablet  Commonly known as: LASIX   TAKE 1 TABLET BY MOUTH EVERY DAY      latanoprost 0.005 % ophthalmic solution  Commonly known as: " XALATAN   1 drop, Both Eyes, Nightly      methocarbamol 500 MG tablet  Commonly known as: ROBAXIN   500 mg, Oral, Every 8 Hours PRN      pregabalin 75 MG capsule  Commonly known as: LYRICA   75 mg, Oral, 2 Times Daily      valsartan 160 MG tablet  Commonly known as: DIOVAN   160 mg, Oral, Daily, for blood pressure.      vitamin B-12 1000 MCG tablet  Commonly known as: CYANOCOBALAMIN   1,000 mcg, Oral, Daily               No Known Allergies    Discharge Disposition:  Home or Self Care      Discharge Diet:  Diet Order   Procedures    Diet: Regular/House Diet; Texture: Regular Texture (IDDSI 7); Fluid Consistency: Thin (IDDSI 0)       Discharge Activity: Activity as tolerated      CODE STATUS:    Code Status and Medical Interventions:   Ordered at: 12/25/23 1135     Code Status (Patient has no pulse and is not breathing):    CPR (Attempt to Resuscitate)     Medical Interventions (Patient has pulse or is breathing):    Full Support       Future Appointments   Date Time Provider Department Center   1/26/2024  3:50 PM [id number]  [place]   3/7/2024  9:15 AM Joanne Maurice PA-C [id number] [place]   5/21/2024  9:30 AM Conor Avendaño MD [facility]  [place]      Contact information for follow-up providers       Joanne Maurice PA-C .    Specialty: Family Medicine  Contact information:  [address] [facility] [address]  [place] [address] [phone]                       Contact information for after-discharge care       Home Medical Care       [facility] .    Service: Home Health Services  Contact information:  [address] [facility] [address]  [facility] [address] [phone]                                   Time Spent on Discharge:  Greater than 30 minutes      Jany Cerrato MD  [facility]  12/29/23  13:17 EST

## 2023-12-29 NOTE — PROGRESS NOTES
[facility] will follow post hospital as requested. Patient agreeable to services. Contact information confirmed. [facility] last serviced patient 12/20/23.

## 2023-12-29 NOTE — PLAN OF CARE
Problem: Adult Inpatient Plan of Care  Goal: Plan of Care Review  Outcome: Ongoing, Not Progressing  Goal: Patient-Specific Goal (Individualized)  Outcome: Ongoing, Not Progressing  Goal: Absence of Hospital-Acquired Illness or Injury  Outcome: Ongoing, Not Progressing  Intervention: Identify and Manage Fall Risk  Recent Flowsheet Documentation  Taken 12/29/2023 1000 by Soo Rich RN  Safety Promotion/Fall Prevention:   activity supervised   fall prevention program maintained   lighting adjusted  Taken 12/29/2023 0800 by Soo Rich RN  Safety Promotion/Fall Prevention:   activity supervised   lighting adjusted   room organization consistent  Intervention: Prevent Skin Injury  Recent Flowsheet Documentation  Taken 12/29/2023 0800 by Soo Rich RN  Body Position: position changed independently  Intervention: Prevent and Manage VTE (Venous Thromboembolism) Risk  Recent Flowsheet Documentation  Taken 12/29/2023 0800 by Soo Rich RN  VTE Prevention/Management:   bilateral   sequential compression devices on  Range of Motion: active ROM (range of motion) encouraged  Intervention: Prevent Infection  Recent Flowsheet Documentation  Taken 12/29/2023 1000 by Soo Rich RN  Infection Prevention:   single patient room provided   hand hygiene promoted  Taken 12/29/2023 0800 by Soo Rich RN  Infection Prevention: rest/sleep promoted  Goal: Optimal Comfort and Wellbeing  Outcome: Ongoing, Not Progressing  Intervention: Provide Person-Centered Care  Recent Flowsheet Documentation  Taken 12/29/2023 0800 by Soo Rich RN  Trust Relationship/Rapport:   care explained   thoughts/feelings acknowledged  Goal: Readiness for Transition of Care  Outcome: Ongoing, Not Progressing     Problem: Fall Injury Risk  Goal: Absence of Fall and Fall-Related Injury  Outcome: Ongoing, Not Progressing  Intervention: Promote Injury-Free Environment  Recent Flowsheet Documentation  Taken  12/29/2023 1000 by Soo Rich RN  Safety Promotion/Fall Prevention:   activity supervised   fall prevention program maintained   lighting adjusted  Taken 12/29/2023 0800 by Soo Rich RN  Safety Promotion/Fall Prevention:   activity supervised   lighting adjusted   room organization consistent     Problem: Adjustment to Surgery (Craniotomy/Craniectomy/Cranioplasty)  Goal: Optimal Coping with Surgery  Outcome: Ongoing, Not Progressing  Intervention: Support Psychosocial Response to Surgery  Recent Flowsheet Documentation  Taken 12/29/2023 0800 by Soo Rich RN  Family/Support System Care: support provided     Problem: Bleeding (Craniotomy/Craniectomy/Cranioplasty)  Goal: Absence of Bleeding  Outcome: Ongoing, Not Progressing     Problem: Bowel Motility Impaired (Craniotomy/Craniectomy/Cranioplasty)  Goal: Effective Bowel Elimination  Outcome: Ongoing, Not Progressing     Problem: Cerebral Tissue Perfusion Risk (Craniotomy/Craniectomy/Cranioplasty)  Goal: Optimal Cerebral Tissue Perfusion  Outcome: Ongoing, Not Progressing     Problem: Fluid and Electrolyte Imbalance (Craniotomy/Craniectomy/Cranioplasty)  Goal: Fluid and Electrolyte Balance  Outcome: Ongoing, Not Progressing     Problem: Functional Ability Impaired (Craniotomy/Craniectomy/Cranioplasty)  Goal: Optimal Functional Ability  Outcome: Ongoing, Not Progressing     Problem: Infection (Craniotomy/Craniectomy/Cranioplasty)  Goal: Absence of Infection Signs and Symptoms  Outcome: Ongoing, Not Progressing     Problem: Ongoing Anesthesia Effects (Craniotomy/Craniectomy/Cranioplasty)  Goal: Anesthesia/Sedation Recovery  Outcome: Ongoing, Not Progressing     Problem: Pain (Craniotomy/Craniectomy/Cranioplasty)  Goal: Acceptable Pain Control  Outcome: Ongoing, Not Progressing  Intervention: Prevent or Manage Pain  Recent Flowsheet Documentation  Taken 12/29/2023 0800 by Soo Rich RN  Diversional Activities: television     Problem:  Postoperative Nausea and Vomiting (Craniotomy/Craniectomy/Cranioplasty)  Goal: Nausea and Vomiting Relief  Outcome: Ongoing, Not Progressing     Problem: Postoperative Urinary Retention (Craniotomy/Craniectomy/Cranioplasty)  Goal: Effective Urinary Elimination  Outcome: Ongoing, Not Progressing     Problem: Respiratory Compromise (Craniotomy/Craniectomy/Cranioplasty)  Goal: Effective Oxygenation and Ventilation  Outcome: Ongoing, Not Progressing  Intervention: Optimize Oxygenation and Ventilation  Recent Flowsheet Documentation  Taken 12/29/2023 0800 by Soo Rich RN  Head of Bed (HOB) Positioning: HOB at 20-30 degrees     Problem: Skin Injury Risk Increased  Goal: Skin Health and Integrity  Outcome: Ongoing, Not Progressing  Intervention: Optimize Skin Protection  Recent Flowsheet Documentation  Taken 12/29/2023 0800 by Soo Rich, RN  Head of Bed (HOB) Positioning: HOB at 20-30 degrees   Goal Outcome Evaluation:

## 2023-12-29 NOTE — NURSING NOTE
Report called to nurse on 5th floor prior to shift change, bed cleaned and ready, transport in for transfer. Pt is A&Ox4, sitting up in bed with family at bedside, no complaints at this time, on RA, VSS- awaiting transport

## 2023-12-30 ENCOUNTER — HOME CARE VISIT (OUTPATIENT)
Dept: HOME HEALTH SERVICES | Facility: HOME HEALTHCARE | Age: 63
End: 2023-12-30
Payer: COMMERCIAL

## 2023-12-30 VITALS
HEART RATE: 75 BPM | OXYGEN SATURATION: 98 % | SYSTOLIC BLOOD PRESSURE: 142 MMHG | DIASTOLIC BLOOD PRESSURE: 96 MMHG | TEMPERATURE: 96.1 F

## 2023-12-30 LAB
BACTERIA SPEC AEROBE CULT: NORMAL
GRAM STN SPEC: NORMAL

## 2023-12-30 PROCEDURE — G0151 HHCP-SERV OF PT,EA 15 MIN: HCPCS

## 2023-12-31 DIAGNOSIS — R29.810 FACIAL DROOP: ICD-10-CM

## 2023-12-31 DIAGNOSIS — G45.3 AMAUROSIS FUGAX OF RIGHT EYE: ICD-10-CM

## 2023-12-31 DIAGNOSIS — G50.0 TRIGEMINAL NEURALGIA: ICD-10-CM

## 2023-12-31 DIAGNOSIS — I10 ESSENTIAL HYPERTENSION: Primary | ICD-10-CM

## 2023-12-31 DIAGNOSIS — G96.01 CSF RHINORRHEA: ICD-10-CM

## 2023-12-31 NOTE — CASE COMMUNICATION
SOC Note  Home Health ordered for: disciplines PT, have requested order for ST  Reason for Hosp/Primary Dx/Co-morbidities:   Patient admitted to [facility] 12/24 to 12/29/23 with right sided facial weakness and CSF rhinorrhea, underwent repair of CSF leak on 12/27/23.  PMHx trigeminal neuralgia, s/p right retrosigmoid craniectomy for microvascular decompression on 12/7/23, HTN, hypocalciuric hypercalcemia  SHx Patient resides with fiance, has  6 steps with rail to enter home, has upstairs and basementwith railing.   Prior level of function Patient ambulated independently without assistive device, independent with ADLs, light meal prep, was going up and down steps.Patient had not returned to driving    Focus of Care: Physical therapy for ambulation and home safety following repair of CSF leak.     Patient's goal(s): Be independent with all activity     Current Functional statu s/mobility/DME: Patient is ambulating without assistive device, is independent with ADLs, transfers, is not going up and down steps. Patient continues to have right sided facial weakness and slurred speech, having some difficulty with chewing foods and swallowing medications. Patient would benefit from speech therapy evaluation.     Skin Integrity/wound status: incision is intact, no signs of infection     Fall Risk/Safety concerns: patience d     Educated on Emergency Plan, steps to take prior to going to the ER and when to Call [facility]     Medication issues/Concerns none     Additional Problems/Concerns: none     SDOH Barriers (i.e. caregiver concerns, social isolation, transportation, food insecurity, environment, income etc.)/Need for MSW: none     Plan for next visit:   Physical therapy to see 1x/wk x 2 wk for gait and home safety

## 2023-12-31 NOTE — HOME HEALTH
SOC Note  Home Health ordered for: disciplines PT, have requested order for ST  Reason for Hosp/Primary Dx/Co-morbidities:   Patient admitted to [facility] 12/24 to 12/29/23 with right sided facial weakness and CSF rhinorrhea, underwent repair of CSF leak on 12/27/23.  PMHx trigeminal neuralgia, s/p right retrosigmoid craniectomy for microvascular decompression on 12/7/23, HTN, hypocalciuric hypercalcemia  SHx Patient resides with fiance, has 6 steps with rail to enter home, has upstairs and basementwith railing.   Prior level of function Patient ambulated independently without assistive device, independent with ADLs, light meal prep, was going up and down steps.Patient had not returned to driving    Focus of Care: Physical therapy for ambulation and home safety following repair of CSF leak.     Patient's goal(s): Be independent with all activity     Current Functional status/mobility/DME: Patient is ambulating without assistive device, is independent with ADLs, transfers, is not going up and down steps. Patient continues to have right sided facial weakness and slurred speech, having some difficulty with chewing foods and swallowing medications. Patient would benefit from speech therapy evaluation.     Skin Integrity/wound status: incision is intact, no signs of infection     Fall Risk/Safety concerns: mild     Educated on Emergency Plan, steps to take prior to going to the ER and when to Call [facility]     Medication issues/Concerns none     Additional Problems/Concerns: none     SDOH Barriers (i.e. caregiver concerns, social isolation, transportation, food insecurity, environment, income etc.)/Need for MSW: none     Plan for next visit:   Physical therapy to see 1x/wk x 2 wk for gait and home safety

## 2023-12-31 NOTE — CASE COMMUNICATION
Patient was seen for physical therapy start of care, is doing well with mobility, will be seen 1x/wk x 2wk for gait and home safety. Patient is having right sided facial weakness, difficulty chewing foods and swallowing medications. Would benefit from speech therapy evaluation.

## 2023-12-31 NOTE — PAYOR COMM NOTE
"Kristina Ramirez (63 y.o. Female)     PLEASE SEE ATTACHED FOR DC NOTICE    REF #[id number]     THANK YOU  MAICOL KUNZ RN/ DEPT  [facility]   [phone]  [phone]          Date of Birth   1960    Social Security Number       Address   [address] [place] [address]    Home Phone   [phone]    MRN   [id number]       Jehovah's witness   Other    Marital Status                               Admission Date   23    Admission Type   Emergency    Admitting Provider   Torres Ascencio MD    Attending Provider       Department, Room/Bed   [facility] [address] [place], P586/1       Discharge Date   2023    Discharge Disposition   Home or Self Care    Discharge Destination                                 Attending Provider: (none)   Allergies: No Known Allergies    Isolation: None   Infection: None   Code Status: CPR    Ht: 157.5 cm (62\")   Wt: 67.1 kg (147 lb 14.9 oz)    Admission Cmt: None   Principal Problem: Facial droop [R29.810]                   Active Insurance as of 2023       Primary Coverage       Payor Plan Insurance Group Employer/Plan Group    [employer] BLUE CROSS AN[employer] BLUE CROSS BLUE SHIELD PPO [id number]       Payor Plan Address Payor Plan Phone Number Payor Plan Fax Number Effective Dates    [address] [phone]  2014 - None Entered    [place] [address]         Subscriber Name Subscriber Birth Date Member ID       KRISTINA RAMIREZ 1960 [id number]                     Emergency Contacts        (Rel.) Home Phone Work Phone Mobile Phone    Aaron Alonso (Daughter) -- -- [phone]              [place]: [facility] [id number]  Tax ID [id number]     Discharge Summary        Jany Cerrato MD at 23 1317              Patient Name: Kristnia Ramirez  : 1960  MRN: [id number]    Date of Admission: 2023  Date of Discharge:  2023  Primary Care Physician: Joanne Maurice, PABeatrizC      Chief " Complaint:   Post-op Problem      Discharge Diagnoses     Active Hospital Problems    Diagnosis  POA    **Facial droop [R29.810]  Yes    CSF rhinorrhea [G96.01]  Yes    Trigeminal neuralgia [G50.0]  Yes    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        Hospital Course     Ms. Kang is a 63 y.o. female with a history of trigeminal neuralgia s/p right retrosigmoid craniectomy for microvascular decompression on 12/7, HTN, HLD, hypocalciuric hypercalcemia who presented to [facility] initially complaining of right-sided facial paralysis and persistent clear drainage from her nose.  Please see the admitting history and physical for further details.  She was found to have concern for CSF leak and was admitted to the hospital for further evaluation and treatment.      Imaging done on admission was negative for acute intracranial process, but it was concerning for CSF leak.  Both neurosurgery and neurology were consulted.  The patient had a fairly significant right-sided facial paralysis, but neurologic exam was otherwise unremarkable.  Given the continued drainage from her nose, the decision was made to take patient back to the OR.  She went to the operating room with neurosurgery on 12/27 for repair of cranial defect and CSF rhinorrhea.  The patient tolerated the procedure well.  She was initially started on empiric antibiotics to cover for meningitis.  CSF cultures were obtained intraoperatively and no growth at time of discharge.  Infectious disease was consulted to assist in antibiotic management.  They ultimately recommended transitioning to Levaquin 500 mg daily for a total antibiotic course of 14 days to treat empirically for mastoiditis.  Overall, the patient had continued improvement in her symptoms.  She did remain with right-sided facial paralysis but this was  to be expected.  The patient was counseled extensively on reasons to return to the hospital and conveyed understanding.  Neurosurgery was agreeable with discharge home.  The patient was discharged home in good condition.    Day of Discharge     Subjective:  No acute events overnight.  Patient states that she is feeling well.  Complaining of a slight headache but otherwise no issues.  Denies any chest pain, shortness of breath.  Eating and drinking well.  She would like to be discharged home today.    Physical Exam:  Temp:  [97.8 °F (36.6 °C)-98.8 °F (37.1 °C)] 98.4 °F (36.9 °C)  Heart Rate:  [61-74] 66  Resp:  [16-20] 16  BP: (126-174)/(62-85) 141/69  Body mass index is 27.06 kg/m².  Physical Exam  General: Alert, no acute distress.  Sitting up in bed.  ENT: No conjunctival injection or scleral icterus. Moist mucous membranes.   Neuro: Right-sided facial nerve palsy.  Strength normal in all extremities.  Other than right-sided facial nerve palsy, no additional focal neurologic deficits.  Lungs: Clear to auscultation bilaterally. No wheeze or crackles. No distress.   Heart: RRR, no murmurs. No edema.  Abdomen: Soft, non-tender, non-distended. Normal bowel sounds.   Ext: Warm and well-perfused. No edema.   Skin: No rashes or lesions. IV site without swelling or erythema.     Consultants     Consult Orders (all) (From admission, onward)       Start     Ordered    12/27/23 1651  Inpatient Infectious Diseases Consult  Once        Specialty:  Infectious Diseases  Provider:  Conner Maldonado DO    12/27/23 1650    12/27/23 1102  Inpatient Intensivist Consult  Once        Specialty:  Intensive Care  Provider:  Pardeep Bhatti MD    12/27/23 1102    12/25/23 0721  Inpatient Neurosurgery Consult  Once        Specialty:  Neurosurgery  Provider:  Ana Escoto APRN    12/25/23 0720    12/24/23 1458  Inpatient Case Management  Consult  Once        Provider:  (Not yet assigned)    12/24/23 1455     12/24/23 1453  Inpatient Consult to Advance Care Planning  Once        Provider:  (Not yet assigned)    12/24/23 1457    12/24/23 1333  Inpatient Neurology Consult Stroke  Once        Specialty:  Neurology  Provider:  Cuco Minaya MD    12/24/23 1332    12/24/23 1219  [facility] (on-call MD unless specified) Details  Once        Specialty:  Hospitalist  Provider:  Torres Ascencio MD    12/24/23 1218    12/24/23 1154  Neurosurgery (on-call MD unless specified)  Once        Specialty:  Neurosurgery  Provider:  Gilberto Ascencio MD    12/24/23 1154                  Procedures     CRANIECTOMY for CSF rhinorrhea repair    Imaging Results (All)       Procedure Component Value Units Date/Time    CT Head Without Contrast [[id number]] Collected: 12/28/23 0525     Updated: 12/28/23 0545    Narrative:      CT OF THE HEAD WITHOUT CONTRAST     HISTORY: Postop craniotomy     COMPARISON: December 24, 2023     TECHNIQUE: Axial CT imaging was obtained through the brain. No IV  contrast was administered.     FINDINGS:  Patient is again noted to be status post right lateral occipital  craniotomy. No acute intracranial hemorrhage is seen. There is a small  stable lateral right occipital hygroma. Maximum thickness is 6 mm. There  is a small amount of pneumocephalus noted within the operative bed, in  keeping with most recent surgery. There is some atrophy. There is  periventricular and deep white matter microangiopathic change. There is  no midline shift. There is some fluid which is noted superficial to the  craniotomy flap, as well as a small amount of subcutaneous air, again,  in keeping with most recent surgery. Opacification of the right mastoid  air cells is again seen. Mild mucosal thickening is noted within the  ethmoid sinuses. Fluid within the middle ear appears improved.       Impression:      Postoperative findings, as noted above.     Radiation dose reduction techniques were utilized, including automated  exposure  control and exposure modulation based on body size.        This report was finalized on 12/28/2023 5:33 AM by Dr. Orly Seals M.D on Workstation: [id number]       MRI Brain With & Without Contrast [[id number]] Collected: 12/24/23 2201     Updated: 12/24/23 2229    Narrative:      BRAIN MRI WITH AND WITHOUT CONTRAST; BRAIN MRA     HISTORY: s/p r sided crani 12/7, now with nasal leakage          Brain MRI:  HISTORY: s/p r sided crani 12/7, now with nasal leakage     COMPARISON: May 23, 2022.     FINDINGS:  Multiplanar images of the head were obtained without and with  gadolinium. No areas of restricted diffusion are seen to suggest acute  infarct. Ventricles are normal in size. There is some minimal  microangiopathic change. There is no midline shift or significant mass  effect. As was previously discussed, patient is status post right  lateral occipital craniotomy. There is a stable small lateral right  occipital hygroma, measuring up to 6 mm. Opacification of the right  mastoid air cells and right middle ear is again noted. There is some  mucosal thickening within the ethmoid sinuses. There does appear to be  some fluid which is superficial to the craniotomy, which may reflect  seroma, although small pseudomeningocele is not excluded. Postcontrast  imaging does show some peripheral enhancement, and correlation with any  evidence of infection is recommended. This area measures up to 2.2 x 0.6  cm I don't see any evidence of venous occlusion.     Brain MRA:  FINDINGS: Axial 3-D time-of-flight images of the intracranial arterial  circulation centered at the level of the [place] were obtained  without contrast. Reconstruction images were supplemented.     The intracranial vertebral arteries are patent, as is the basilar  artery. Intracranial internal carotid arteries are unremarkable. There  is an anterior communicating artery. Anterior cerebral and middle  cerebral arteries appear normal bilaterally.  Both posterior cerebral  arteries are patent, although the patient appears to have hypoplastic P1  segments bilaterally, with well-developed posterior communicating  arteries bilaterally. Prior study demonstrated contact between a branch  of the right superior cerebellar artery and the right trigeminal nerve  posteriorly. Both superior cerebellar arteries appear to be patent on  the current study.                Impression:      1. No evidence of restricted diffusion to suggest acute infarct.  2. Stable right lateral cerebellar hygroma.  3. There is some fluid seen superficial to the craniectomy. This may  represent a seroma, although small pseudomeningocele is not excluded.  There does appear to be some peripheral enhancement inferiorly on the  postcontrast imaging, and correlation with any evidence of infection is  recommended.  4. Extensive opacification of the right mastoid air cells and right  middle ear.  5. Intracranial vessels appear to be patent.        This report was finalized on 12/24/2023 10:26 PM by Dr. Orly Seals M.D on Workstation: [id number]       MRI Angiogram Head Without Contrast [[id number]] Collected: 12/24/23 2201     Updated: 12/24/23 2229    Narrative:      BRAIN MRI WITH AND WITHOUT CONTRAST; BRAIN MRA     HISTORY: s/p r sided crani 12/7, now with nasal leakage          Brain MRI:  HISTORY: s/p r sided crani 12/7, now with nasal leakage     COMPARISON: May 23, 2022.     FINDINGS:  Multiplanar images of the head were obtained without and with  gadolinium. No areas of restricted diffusion are seen to suggest acute  infarct. Ventricles are normal in size. There is some minimal  microangiopathic change. There is no midline shift or significant mass  effect. As was previously discussed, patient is status post right  lateral occipital craniotomy. There is a stable small lateral right  occipital hygroma, measuring up to 6 mm. Opacification of the right  mastoid air cells and right middle  ear is again noted. There is some  mucosal thickening within the ethmoid sinuses. There does appear to be  some fluid which is superficial to the craniotomy, which may reflect  seroma, although small pseudomeningocele is not excluded. Postcontrast  imaging does show some peripheral enhancement, and correlation with any  evidence of infection is recommended. This area measures up to 2.2 x 0.6  cm I don't see any evidence of venous occlusion.     Brain MRA:  FINDINGS: Axial 3-D time-of-flight images of the intracranial arterial  circulation centered at the level of the [place] were obtained  without contrast. Reconstruction images were supplemented.     The intracranial vertebral arteries are patent, as is the basilar  artery. Intracranial internal carotid arteries are unremarkable. There  is an anterior communicating artery. Anterior cerebral and middle  cerebral arteries appear normal bilaterally. Both posterior cerebral  arteries are patent, although the patient appears to have hypoplastic P1  segments bilaterally, with well-developed posterior communicating  arteries bilaterally. Prior study demonstrated contact between a branch  of the right superior cerebellar artery and the right trigeminal nerve  posteriorly. Both superior cerebellar arteries appear to be patent on  the current study.                Impression:      1. No evidence of restricted diffusion to suggest acute infarct.  2. Stable right lateral cerebellar hygroma.  3. There is some fluid seen superficial to the craniectomy. This may  represent a seroma, although small pseudomeningocele is not excluded.  There does appear to be some peripheral enhancement inferiorly on the  postcontrast imaging, and correlation with any evidence of infection is  recommended.  4. Extensive opacification of the right mastoid air cells and right  middle ear.  5. Intracranial vessels appear to be patent.        This report was finalized on 12/24/2023 10:26 PM by  Dr. Orly Seals M.D on Workstation: [id number]       CT Temporal Bones Without Contrast [[id number]] Collected: 12/24/23 1426     Updated: 12/24/23 1456    Narrative:      CT SCAN OF THE TEMPORAL BONES WITHOUT CONTRAST     CLINICAL HISTORY: Dizziness. Possible CSF leak.     TECHNIQUE: CT scan of the temporal bones was obtained with 1 mm axial,  coronal, and sagittal bone algorithm images.     COMPARISON: CT head dated 12/15/2023.     FINDINGS:     The patient is status post a right lateral occipital craniotomy for the  purposes of decompression of the right trigeminal nerve for symptoms of  trigeminal neuralgia. This craniotomy traverses a few mastoid air cells.  The right mastoid air cells are subtotally opacified as is the right  middle ear cavity. The mastoid air cells and right middle ear cavity are  newly opacified when compared to the prior head CT dated 12/05/2023.  Given the setting of a possible CSF leak, the findings are highly  concerning for a CSF leak secondary to this craniotomy site extending  into the mastoid air cells.     Incidental note is made of a small focus of increased density within the  expected location of the right trigeminal nerve that is presumably  representative of postoperative change from the microvascular  decompression.     The left temporal bone is unremarkable in appearance. The internal  auditory canals, cochlear aqueducts, and vestibular aqueducts are all  unremarkable in appearance.     Incidental note is made of marked arthritic changes within the left  temporomandibular joint. Also, incidental minor mucosal thickening is  seen within the ethmoid air cells.       Impression:         The patient is status post a right lateral occipital craniotomy for the  purposes of a right trigeminal nerve microvascular decompression for  symptoms of trigeminal neuralgia. The craniotomy traverses a few right  mastoid air cells. The right mastoid air cells and right middle  ear  cavity are subtotally opacified. This opacification of the mastoid air  cells and middle ear cavity is new when compared to prior preoperative  CT scan dated 12/05/2023. If there is a CSF leak, this craniotomy  traversing the right mastoid air cells is highly suspect for the cause  of the CSF leak. Please correlate with clinical data.     These findings were discussed with Narciso Castro on 12/24/2023 at  approximately 2:19 p.m.        Radiation dose reduction techniques were utilized, including automated  exposure control and exposure modulation based on body size.        This report was finalized on 12/24/2023 2:53 PM by Dr. Lorenzo Samuel M.D  on Workstation: [id number]             Results for orders placed during the hospital encounter of 09/16/16    Duplex carotid ultrasound CAR    Interpretation Summary  · Normal proximal right internal carotid artery.  · Normal proximal left internal carotid artery.  · Normal proximal right internal carotid artery.  · Normal proximal left internal carotid artery.  · Normal mid right internal carotid artery.  · Normal mid left internal carotid artery.    Results for orders placed in visit on 10/06/15    SCANNED - ECHOCARDIOGRAM    Pertinent Labs     Results from last 7 days   Lab Units 12/29/23  0514 12/28/23  0344 12/25/23  0650 12/24/23  1247   WBC 10*3/mm3 7.82 7.51 9.23 10.44   HEMOGLOBIN g/dL 10.6* 10.4* 11.3* 12.2   PLATELETS 10*3/mm3 184 202 224 287     Results from last 7 days   Lab Units 12/28/23  0344 12/25/23  0650 12/24/23  1247   SODIUM mmol/L 138 142 140   POTASSIUM mmol/L 4.0 4.1 5.2   CHLORIDE mmol/L 111* 108* 104   CO2 mmol/L 21.0* 22.0 27.0   BUN mg/dL 11 9 10   CREATININE mg/dL 0.69 0.75 0.88   GLUCOSE mg/dL 89 74 94   EGFR mL/min/1.73 97.7 89.6 73.9     Results from last 7 days   Lab Units 12/24/23  1247   ALBUMIN g/dL 4.3   BILIRUBIN mg/dL 0.2   ALK PHOS U/L 106   AST (SGOT) U/L 15   ALT (SGPT) U/L 11     Results from last 7 days   Lab Units 12/28/23  0344  "12/25/23  0650 12/24/23  1247   CALCIUM mg/dL 9.7 9.6 10.7*   ALBUMIN g/dL  --   --  4.3               Invalid input(s): \"LDLCALC\"  Results from last 7 days   Lab Units 12/25/23  1206 12/25/23  1150   BLOODCX  No growth at 4 days No growth at 4 days         Test Results Pending at Discharge     Pending Labs       Order Current Status    Anaerobic Culture - Cerebrospinal Fluid, Brain In process    Beta 2 Transferrin - Body Fluid, In process    Blood Culture - Blood, Arm, Left Preliminary result    Blood Culture - Blood, Hand, Left Preliminary result    Culture, CSF - Cerebrospinal Fluid, Brain Preliminary result            Discharge Details        Discharge Medications        New Medications        Instructions Start Date   levoFLOXacin 500 MG tablet  Commonly known as: LEVAQUIN   500 mg, Oral, Every 24 Hours   Start Date: December 30, 2023            Changes to Medications        Instructions Start Date   carBAMazepine  MG 12 hr tablet  Commonly known as: TEGretol  XR  What changed:   how much to take  how to take this  when to take this  additional instructions   TAKE 600MG DURING THE DAY AND 400MG AT NIGHT.      cholecalciferol 25 MCG (1000 UT) tablet  Commonly known as: VITAMIN D3  What changed:   how much to take  how to take this  when to take this   3 tablets daily             Continue These Medications        Instructions Start Date   aspirin 81 MG EC tablet   81 mg, Oral, Daily      atorvastatin 40 MG tablet  Commonly known as: LIPITOR   40 mg, Oral, Daily, For cholesterol      folic acid 400 MCG tablet  Commonly known as: FOLVITE   400 mcg, Oral, Daily      furosemide 20 MG tablet  Commonly known as: LASIX   TAKE 1 TABLET BY MOUTH EVERY DAY      latanoprost 0.005 % ophthalmic solution  Commonly known as: XALATAN   1 drop, Both Eyes, Nightly      methocarbamol 500 MG tablet  Commonly known as: ROBAXIN   500 mg, Oral, Every 8 Hours PRN      pregabalin 75 MG capsule  Commonly known as: LYRICA   75 mg, " Oral, 2 Times Daily      valsartan 160 MG tablet  Commonly known as: DIOVAN   160 mg, Oral, Daily, for blood pressure.      vitamin B-12 1000 MCG tablet  Commonly known as: CYANOCOBALAMIN   1,000 mcg, Oral, Daily               No Known Allergies    Discharge Disposition:  Home or Self Care      Discharge Diet:  Diet Order   Procedures    Diet: Regular/House Diet; Texture: Regular Texture (IDDSI 7); Fluid Consistency: Thin (IDDSI 0)       Discharge Activity: Activity as tolerated      CODE STATUS:    Code Status and Medical Interventions:   Ordered at: 12/25/23 1135     Code Status (Patient has no pulse and is not breathing):    CPR (Attempt to Resuscitate)     Medical Interventions (Patient has pulse or is breathing):    Full Support       Future Appointments   Date Time Provider Department Center   1/26/2024  3:50 PM [id number]  [place]   3/7/2024  9:15 AM Joanne Maurice PA-C [id number] [place]   5/21/2024  9:30 AM Conor Avendaño MD [facility]  [place]      Contact information for follow-up providers       Joanne Maurice PA-C .    Specialty: Family Medicine  Contact information:  [address] [facility] [address]  [place] [address] [phone]                       Contact information for after-discharge care       Home Medical Care       [facility] CARE .    Service: Home Health Services  Contact information:  [address] [facility] [address]  [facility] [address] [phone]                                   Time Spent on Discharge:  Greater than 30 minutes      Jany Cerrato MD  [facility]  12/29/23  13:17 EST                Electronically signed by Jany Cerrato MD at 12/29/23 1323       Discharge Order (From admission, onward)       Start     Ordered    12/29/23 0846  Discharge patient  Once        Expected Discharge Date: 12/29/23   Discharge Disposition: Home or Self Care   Physician of Record for Attribution - Please select from  Treatment Team: ANNIA OLIVER [[id number]]   Review needed by CMO to determine Physician of Record: No      Question Answer Comment   Physician of Record for Attribution - Please select from Treatment Team ANNIA OLIVER    Review needed by CMO to determine Physician of Record No        12/29/23 0847

## 2024-01-01 ENCOUNTER — HOME CARE VISIT (OUTPATIENT)
Dept: HOME HEALTH SERVICES | Facility: HOME HEALTHCARE | Age: 64
End: 2024-01-01
Payer: COMMERCIAL

## 2024-01-01 LAB — BACTERIA SPEC ANAEROBE CULT: NORMAL

## 2024-01-02 ENCOUNTER — TELEPHONE (OUTPATIENT)
Dept: NEUROSURGERY | Facility: CLINIC | Age: 64
End: 2024-01-02
Payer: COMMERCIAL

## 2024-01-02 ENCOUNTER — HOME CARE VISIT (OUTPATIENT)
Dept: HOME HEALTH SERVICES | Facility: HOME HEALTHCARE | Age: 64
End: 2024-01-02
Payer: COMMERCIAL

## 2024-01-02 ENCOUNTER — TELEPHONE (OUTPATIENT)
Dept: NEUROLOGY | Facility: CLINIC | Age: 64
End: 2024-01-02

## 2024-01-02 ENCOUNTER — TRANSITIONAL CARE MANAGEMENT TELEPHONE ENCOUNTER (OUTPATIENT)
Dept: CALL CENTER | Facility: HOSPITAL | Age: 64
End: 2024-01-02
Payer: COMMERCIAL

## 2024-01-02 VITALS
SYSTOLIC BLOOD PRESSURE: 144 MMHG | DIASTOLIC BLOOD PRESSURE: 82 MMHG | HEART RATE: 77 BPM | OXYGEN SATURATION: 97 % | TEMPERATURE: 97.9 F | RESPIRATION RATE: 17 BRPM

## 2024-01-02 PROCEDURE — G0152 HHCP-SERV OF OT,EA 15 MIN: HCPCS

## 2024-01-02 NOTE — HOME HEALTH
"CURRENT SITUATION: Patient admitted to [facility] 12-24-23 to 12-29-23 w/right sided facial weakness and CSF rhinorrhea, underwent repair of CSF leak on 12-27-23.   PMHx: trigeminal neuralgia, s/p right retrosigmoid craniectomy for microvascular decompression on 12-07-23, HTN, hypocalciuric hypercalcemia.  OT's FOCUS OF CARE: ADL safety  SUBJECTIVE: \"Some days are good, others are not so good.\" Agreeable to OT evaluation.  SOCIAL & ENVIRONMENTAL SITUATION: Pt lives w/fiance, 6 steps to enter 2 story home. All her needs being met on main level of home.  PATIENT'S GOAL: \"I need to get my face working right again.\"  INTERVENTIONS: OT Eval, ADL training, Home Safety, Therapeutic Exercise/Activity, Manual Therapy, Transfer/Mobility training, Monitor vitals including SPO2 via pulse-oximeter (notifiy MD if resting O2 <90% on room air), Patient/CG education, Falls-risk prevention, Recommendations on AE, DME, AD, environmental adaptations.  ASSESSMENT: Pt is not in need of OT at this time. She is Indpendent w/her ADLs, home mobilities, transfers, and has assistance from her support system on a regular basis. She is in agreement to this. OT eval only."

## 2024-01-02 NOTE — OUTREACH NOTE
Call Center TCM Note      Flowsheet Row Responses   [facility], [place], operated by [facility] patient discharged from? [place]   Does the patient have one of the following disease processes/diagnoses(primary or secondary)? General Surgery   TCM attempt successful? Yes   Call start time 1216   Call end time 1226   Discharge diagnosis Facial droop- CRANIECTOMY for CSF rhinorrhea repair   Meds reviewed with patient/caregiver? Yes   Is the patient having any side effects they believe may be caused by any medication additions or changes? No   Does the patient have all medications related to this admission filled (includes all antibiotics, pain medications, etc.) Yes   Is the patient taking all medications as directed (includes completed medication regime)? Yes   Does the patient have an appointment with their PCP within 7-14 days of discharge? No appointments available   Nursing Interventions PCP office requested to make appointment - message sent   What is the Home health agency?   [facility]   Has home health visited the patient within 72 hours of discharge? Yes   Home health comments  has visited pt since hosp dc   What DME was ordered? [employer] for rolling walker   Has all DME been delivered? Yes   Did the patient receive a copy of their discharge instructions? Yes   Nursing interventions Reviewed instructions with patient   What is the patient's perception of their health status since discharge? Improving   Nursing interventions Nurse provided patient education   Is the patient /caregiver able to teach back basic post-op care? Drive as instructed by MD in discharge instructions, Take showers only when approved by MD-sponge bathe until then, No tub bath, swimming, or hot tub until instructed by MD, Keep incision areas clean,dry and protected, Lifting as instructed by MD in discharge instructions, Continue use of incentive spirometry at least 1 week post discharge   Is the patient/caregiver able to teach back signs and symptoms of  incisional infection? Increased redness, swelling or pain at the incisonal site, Increased drainage or bleeding, Incisional warmth, Pus or odor from incision, Fever   Is the patient/caregiver able to teach back steps to recovery at home? Set small, achievable goals for return to baseline health, Rest and rebuild strength, gradually increase activity, Eat a well-balance diet, Make a list of questions for surgeon's appointment, Practice good oral hygiene   If the patient is a current smoker, are they able to teach back resources for cessation? Not a smoker   Is the patient/caregiver able to teach back the hierarchy of who to call/visit for symptoms/problems? PCP, Specialist, Home health nurse, Urgent Care, ED, 911 Yes   TCM call completed? Yes   Call end time 1226            Dede Esteban RN    1/2/2024, 12:27 EST

## 2024-01-02 NOTE — TELEPHONE ENCOUNTER
PATIENT CALLING TO ADVISE, SHE HAD A 12/2023 APPT SCHEDULE THAT WAS CANCELLED.    DOES SHE NEED TO RESCHEDULE?    PLEASE ADVISE PATIENT

## 2024-01-02 NOTE — TELEPHONE ENCOUNTER
Caller: Aaron Alonso    Relationship to patient: Emergency Contact    Best call back number: [phone]    Chief complaint: POST OP VISIT    Type of visit: POST OP    Requested date: ?     If rescheduling, when is the original appointment: NA     Additional notes:PLEASE CALL PT OR DAUGHTER TO SCHEDULE.     THANK YOU,     HUB DID TRY TO WT

## 2024-01-02 NOTE — PROGRESS NOTES
Case Management Discharge Note      Final Note: Home with SO and [facility]    Provided Post Acute Provider List?: N/A  Provided Post Acute Provider Quality & Resource List?: N/A    Selected Continued Care - Discharged on 12/29/2023 Admission date: 12/24/2023 - Discharge disposition: Home or Self Care      Destination    No services have been selected for the patient.                Durable Medical Equipment    No services have been selected for the patient.                Dialysis/Infusion    No services have been selected for the patient.                Home Medical Care Coordination complete.      Service Provider Selected Services Address Phone Fax Patient Preferred    [facility] [address] [id number] [phone] --              Therapy    No services have been selected for the patient.                Community Resources    No services have been selected for the patient.                Community & DME    No services have been selected for the patient.                    Selected Continued Care - Prior Encounters Includes continued care and service providers with selected services from prior encounters from 9/25/2023 to 12/29/2023      Discharged on 12/9/2023 Admission date: 12/7/2023 - Discharge disposition: Home-Health Care Svc      Durable Medical Equipment       Service Provider Selected Services Address Phone Fax Patient Preferred    [facility] [address], [facility] [id number] [phone] --              Home Medical Care       Service Provider Selected Services Address Phone Fax Patient Preferred     [facility] [address] [id number] [phone] --                          Transportation Services  Private: Car    Final Discharge Disposition Code: 06 - home with home health care

## 2024-01-03 ENCOUNTER — REFERRAL TRIAGE (OUTPATIENT)
Dept: CASE MANAGEMENT | Facility: OTHER | Age: 64
End: 2024-01-03
Payer: COMMERCIAL

## 2024-01-03 ENCOUNTER — TELEPHONE (OUTPATIENT)
Dept: FAMILY MEDICINE CLINIC | Facility: CLINIC | Age: 64
End: 2024-01-03
Payer: COMMERCIAL

## 2024-01-03 ENCOUNTER — HOME CARE VISIT (OUTPATIENT)
Dept: HOME HEALTH SERVICES | Facility: HOME HEALTHCARE | Age: 64
End: 2024-01-03
Payer: COMMERCIAL

## 2024-01-03 VITALS
RESPIRATION RATE: 16 BRPM | TEMPERATURE: 97.7 F | HEART RATE: 68 BPM | DIASTOLIC BLOOD PRESSURE: 70 MMHG | OXYGEN SATURATION: 97 % | SYSTOLIC BLOOD PRESSURE: 98 MMHG

## 2024-01-03 PROCEDURE — G0151 HHCP-SERV OF PT,EA 15 MIN: HCPCS

## 2024-01-03 NOTE — CASE COMMUNICATION
Patient to be discharged from PT to self and family.  All goals met.  Patient will be seen by ST which is her main need.

## 2024-01-03 NOTE — HOME HEALTH
Subjective:  I feel like from the neck down I'm fine.  The echo in my ear is gone.  My nose is not draining.    Falls since last visit:  none   Home/Social Environment:  lives with fiance in home with steps to second floor and basement and to enter/ exit

## 2024-01-03 NOTE — TELEPHONE ENCOUNTER
ST Katarzyna requesting first eval of pt for speech be next wk of Jan vs this week r/t staffing.    Do you agree with request?

## 2024-01-03 NOTE — TELEPHONE ENCOUNTER
Lvm for jimmy stating if she is still is having problems to contact our office to schedule an appt

## 2024-01-04 ENCOUNTER — TELEPHONE (OUTPATIENT)
Dept: NEUROLOGY | Facility: CLINIC | Age: 64
End: 2024-01-04

## 2024-01-04 NOTE — TELEPHONE ENCOUNTER
Caller: Kristina Kang A    Relationship: Self    Best call back number: [phone]     Which medication are you concerned about: ARBAMAZEPINE XR (TEGRETOL EX) 200 MG & PREGABLIN (LYRICA).    Who prescribed you this medication: CLINE    What are your concerns: PATIENT TELEPHONED TO ADVISE SHE IS TAKING  CARBAMAZEPINE XR (TEGRETOL EX) 200 MG & PREGABLIN (LYRICA). SHE RECENTLY HAD A SURGICAL PROCEDURE ON 12/7/23 & THE SURGERY CORRECTED WHAT SHE WAS ON THESE MEDICINES FOR. SHOULD SHE BE WEANED OFF OR CONTINUE TAKING MEDS.     PLEASE CALL & ADVISE-THANK YOU

## 2024-01-06 RX ORDER — CARBAMAZEPINE 400 MG/1
TABLET, EXTENDED RELEASE ORAL
Qty: 180 TABLET | Refills: 3 | OUTPATIENT
Start: 2024-01-06

## 2024-01-09 ENCOUNTER — HOME CARE VISIT (OUTPATIENT)
Dept: HOME HEALTH SERVICES | Facility: HOME HEALTHCARE | Age: 64
End: 2024-01-09
Payer: COMMERCIAL

## 2024-01-09 ENCOUNTER — READMISSION MANAGEMENT (OUTPATIENT)
Dept: CALL CENTER | Facility: HOSPITAL | Age: 64
End: 2024-01-09
Payer: COMMERCIAL

## 2024-01-09 VITALS
SYSTOLIC BLOOD PRESSURE: 130 MMHG | OXYGEN SATURATION: 98 % | HEART RATE: 69 BPM | TEMPERATURE: 97.5 F | DIASTOLIC BLOOD PRESSURE: 88 MMHG

## 2024-01-09 PROCEDURE — G0153 HHCP-SVS OF S/L PATH,EA 15MN: HCPCS

## 2024-01-09 NOTE — Clinical Note
1/9/24  ST evaluation completed today. ST will follow patient 1w3 for oral motor therapy/exercise program and expressive language therapy.

## 2024-01-09 NOTE — OUTREACH NOTE
General Surgery Week 2 Survey      Flowsheet Row Responses   List of hospitals in [place] patient discharged from? [facility]   Does the patient have one of the following disease processes/diagnoses(primary or secondary)? General Surgery   Week 2 attempt successful? Yes   Call start time 1151   Call end time 1155   List who call center can speak with pt   Medication alerts for this patient abx complete.   Meds reviewed with patient/caregiver? Yes   Is the patient taking all medications as directed (includes completed medication regime)? Yes   Does the patient have a follow up appointment scheduled with their surgeon? Yes   Has the patient kept scheduled appointments due by today? Yes   Has home health visited the patient within 72 hours of discharge? Yes   What is the patient's perception of their health status since discharge? Improving   Week 2 call completed? Yes   Wrap up additional comments Pt is improving. Speech therapy is visiting. PT reports facial movement is improving as well.   Call end time 1155            Savita LEE - Registered Nurse

## 2024-01-10 ENCOUNTER — PATIENT OUTREACH (OUTPATIENT)
Dept: CASE MANAGEMENT | Facility: OTHER | Age: 64
End: 2024-01-10
Payer: COMMERCIAL

## 2024-01-10 DIAGNOSIS — G50.0 TRIGEMINAL NEURALGIA: ICD-10-CM

## 2024-01-10 DIAGNOSIS — G45.3 AMAUROSIS FUGAX OF RIGHT EYE: Primary | ICD-10-CM

## 2024-01-10 NOTE — HOME HEALTH
REASON FOR REFERRAL: Speech Therapy referral s/p right-sided retrosigmoid craniectomy for microvascular decompression on 12/7/2023. Speech Therapy to evaluate and establish a plan of care.   PRIMARY DIAGNOSIS: Facial weakness/droop  SECONDARY DIAGNOSIS: Right-sided retrosigmoid craniectomy  VFSS OR FEES: NA  PERTINENT HISTORY: Facial droop, CSF rhinorrhea, Trigeminal neuralgia CFS leak  MRI---5-23-22A branch of the superior cerebellar artery on the right   abuts and mildly flattens the medial aspect of the right trigeminal, Essential hypertension, Hyperlipidemia, Vitamin D deficiency, Pyelonephritis   PRIOR LEVEL OF FUNCTION: Patient was driving, working and performing ADLs independently.     FOCUS OF CARE: ST to focus on instructions in oral motor exercise program to improve facial weakness/droop and expressive language therapy to increase communication s/p right-sided retrosigmoid craniectomy for microvascular decompression.     PATIENT'S GOAL: Patient's goal is to return to work and be able to perform her job duties that require her to verbally communicate, problem solve, manage other employees.     PLAN FOR NEXT VISIT MEDICAL NECESSITY FOR ONGOING SKILLED THERAPY: Oral motor therapy to increase oral motor/facial strength and function. Expressive language therapy to increase communication with family/employees.  SPECIFIC INTERVENTIONS AND GOALS TO ADDRESS ON NEXT VISIT:   Instructions in oral motor exercise program.   Expressive language tasks  FREQUENCY AND DURATION: 1w3  ANY OTHER FOLLOW UP NEEDED: None  REASSESSMENT DUE DATE: 1/27/24

## 2024-01-10 NOTE — OUTREACH NOTE
AMBULATORY CASE MANAGEMENT NOTE    Name and Relationship of Patient/Support Person: Kang, Kristina EFE - Self    CCM Interim Update    Spoke with patient today, verified by name and date of birth.    Patient states that she is agreeable with meeting ACM during her office visit.     Verbalizing no needs at this time      Care Coordination    ACM completed thorough chart review.  Introduced self and CCM program, discussing benefits.    ACM will meet with patient on 1/18/24 during PCP Hospital f/u.    Plan: follow up 1-2 weeks  - complete CCM enrollment  - address ACP/ and any SDOH of concern        Education Documentation  No documentation found.        Ariela COTA  Ambulatory Case Management    1/10/2024, 16:04 EST

## 2024-01-11 NOTE — PROGRESS NOTES
Refill approved as requested.   Subjective   History of Present Illness: Kristina Kang is a 63 y.o. female is here today for follow-up. She is s/p right sided retrosigmoid craniotomy for microvascular decompression done on 23 and CSF rhinorrhea repair 23.  She is doing well today.  No new complaints.  Denies any changes in the frequency or severity of her headaches.  Denies any changes in vision.  Denies any strokelike episodes.  Denies any changes in strength or sensation.  She reports that her smile has become more symmetric.  Her right-sided hemifacial weakness has resolved.  She reports that the clear fluid draining from her nose has stopped except for what she describes as possible sinus drainage      History of Present Illness    The following portions of the patient's history were reviewed and updated as appropriate: allergies, current medications, past family history, past medical history, past social history, past surgical history, and problem list.    Past Medical History:   Diagnosis Date    Allergic     Anemia     Colon polyp     Degeneration of lumbar intervertebral disc     Episode of syncope 2015    Essential hypertension     Glaucoma     Hepatitis C 2008    Dr. Coleman Null    History of bone density study     Normal    History of chest x-ray 2010    normal    History of echocardiogram 2015    History of EKG 2013    normal    History of Holter monitoring 2015    occas PVC    History of nuclear stress test 10/06/2015    LCG; ischemia    Hypercalcemia     Hyperlipidemia     Leiomyoma of uterus     and fibroids    Osteoarthritis     Osteopenia     Postmenopausal     Pyelonephritis     Trigeminal neuralgia     Vitamin D deficiency         Past Surgical History:   Procedure Laterality Date     SECTION      COLONOSCOPY      COLONOSCOPY W/ POLYPECTOMY      Benign polyps.  Dr. Malone    CRANIECTOMY N/A 2023    Procedure: CRANIECTOMY for CSF rhinorrhea repair;   Surgeon: Tripp Morse MD;  Location: [facility] MAIN OR;  Service: Neurosurgery;  Laterality: N/A;    CRANIOTOMY FOR NERVE DECOMPRESSION Right 12/7/2023    Procedure: RIGHT SIDED RETROSIGMOID CRANIOTOMY FOR MICROVASCULAR DECOMPRESSION;  Surgeon: Tripp Morse MD;  Location: [facility] MAIN OR;  Service: Neurosurgery;  Laterality: Right;    HYSTERECTOMY  02/2004    took one ovary     LIVER BIOPSY      OOPHORECTOMY Bilateral age 33    left 1/4 of the right           Current Outpatient Medications:     aspirin 81 MG EC tablet, Take 1 tablet by mouth Daily., Disp: , Rfl:     atorvastatin (LIPITOR) 40 MG tablet, Take 1 tablet by mouth Daily. For cholesterol, Disp: 90 tablet, Rfl: 2    cholecalciferol (VITAMIN D3) 25 MCG (1000 UT) tablet, 3 tablets daily (Patient taking differently: Take 1 tablet by mouth 3 (Three) Times a Day. 3 tablets daily), Disp: 270 tablet, Rfl: 2    diphenhydrAMINE-acetaminophen (Tylenol PM Extra Strength)  MG tablet per tablet, Take 1 tablet by mouth 4 (Four) Times a Day As Needed., Disp: , Rfl:     folic acid (FOLVITE) 400 MCG tablet, Take 1 tablet by mouth Daily., Disp: 90 tablet, Rfl: 4    furosemide (LASIX) 20 MG tablet, TAKE 1 TABLET BY MOUTH EVERY DAY (Patient taking differently: Take 1 tablet by mouth Daily.), Disp: 90 tablet, Rfl: 2    latanoprost (XALATAN) 0.005 % ophthalmic solution, Administer 1 drop to both eyes Every Night. Indications: Wide-Angle Glaucoma, Disp: , Rfl:     pregabalin (LYRICA) 75 MG capsule, Take 1 capsule by mouth 2 (Two) Times a Day. (Patient taking differently: Take 1 capsule by mouth Every Night.), Disp: 60 capsule, Rfl: 3    valsartan (DIOVAN) 160 MG tablet, Take 1 tablet by mouth Daily. for blood pressure. (Patient taking differently: Take 1 tablet by mouth Daily. for blood pressure. HOLD 24 HOURS PRIOR TO SURGERY), Disp: 90 tablet, Rfl: 1    vitamin B-12 (CYANOCOBALAMIN) 1000 MCG tablet, Take 1 tablet by mouth Daily., Disp: , Rfl:     carBAMazepine XR  "(TEGretol  XR) 200 MG 12 hr tablet, TAKE 600MG DURING THE DAY AND 400MG AT NIGHT. (Patient not taking: Reported on 1/15/2024), Disp: 450 tablet, Rfl: 1    methocarbamol (ROBAXIN) 500 MG tablet, Take 1 tablet by mouth Every 8 (Eight) Hours As Needed (neck pain). (Patient not taking: Reported on 1/15/2024), Disp: 30 tablet, Rfl: 0     No Known Allergies     Social History     Socioeconomic History    Marital status:    Tobacco Use    Smoking status: Former     Packs/day: 0.50     Years: 10.00     Additional pack years: 0.00     Total pack years: 5.00     Types: Cigarettes    Smokeless tobacco: Former     Quit date:    Vaping Use    Vaping Use: Never used   Substance and Sexual Activity    Alcohol use: Not Currently     Comment: Seldom    Drug use: No    Sexual activity: Yes     Partners: Male     Birth control/protection: Post-menopausal, None     Comment: Hysterectomy        Family History   Adopted: Yes   Problem Relation Age of Onset    Malig Hyperthermia Neg Hx         Review of Systems   Neurological:  Positive for headaches. Negative for dizziness, speech difficulty and light-headedness.       Objective     Vitals:    01/15/24 0903   BP: 122/78   Temp: 96.9 °F (36.1 °C)   Weight: 67.3 kg (148 lb 6.4 oz)   Height: 157.5 cm (62\")     Body mass index is 27.14 kg/m².      Physical Exam  Neurologic Exam  Awake, alert, oriented x3  Pupils equal round reactive to light  Extraocular muscles intact  Slight right hemifacial weakness  Speech is fluent and clear  No pronator drift  Motor exam  Bilateral deltoids 5/5, bilateral biceps 5/5, bilateral triceps 5/5, bilateral wrist extension 5/5 bilateral hand  5/5  Bilateral hip flexion 5/5, bilateral knee extension 5/5, bilateral DF/PF 5/5  No clonus  No Terry's reflex  Steady normal gait  Able to detect  light touch in all 4 extremities  Incision is clean dry and intact        Assessment & Plan     Medical Decision Makin-year-old female s/p revision " of a right sided sigmoid craniectomy for occlusion of the mastoid air cells and treatment of CSF rhinorrhea  -She is doing well today.  Her hemifacial weakness is resolving.  Her headaches are resolving.  She denies any constant clear drainage from her nose.  -Will plan to see her back in 4 weeks with a repeat CT head to evaluate for any changes  Diagnoses and all orders for this visit:    1. CSF rhinorrhea (Primary)  -     CT Head Without Contrast; Future    2. Trigeminal neuralgia  -     CT Head Without Contrast; Future      Return in about 4 weeks (around 2/12/2024).

## 2024-01-15 ENCOUNTER — OFFICE VISIT (OUTPATIENT)
Dept: NEUROSURGERY | Facility: CLINIC | Age: 64
End: 2024-01-15
Payer: COMMERCIAL

## 2024-01-15 ENCOUNTER — TELEPHONE (OUTPATIENT)
Dept: NEUROLOGY | Facility: CLINIC | Age: 64
End: 2024-01-15
Payer: COMMERCIAL

## 2024-01-15 VITALS
WEIGHT: 148.4 LBS | BODY MASS INDEX: 27.31 KG/M2 | SYSTOLIC BLOOD PRESSURE: 122 MMHG | TEMPERATURE: 96.9 F | HEIGHT: 62 IN | DIASTOLIC BLOOD PRESSURE: 78 MMHG

## 2024-01-15 DIAGNOSIS — G50.0 TRIGEMINAL NEURALGIA: ICD-10-CM

## 2024-01-15 DIAGNOSIS — G96.01 CSF RHINORRHEA: Primary | ICD-10-CM

## 2024-01-15 PROCEDURE — 99024 POSTOP FOLLOW-UP VISIT: CPT | Performed by: NEUROLOGICAL SURGERY

## 2024-01-15 NOTE — TELEPHONE ENCOUNTER
Patient came in the office unsure if she should still be taking her CARBAMAZEPINE XR (TEGRETOL EX) 200 MG & PREGABLIN (LYRICA). She believes she stopped taking the carbamazepine in December.    Per Dr. Izaguirre's last message on 1/4/2024, I reminded patient that he said she should still be taking both together and that they would discuss changing the medications at her next appt in February. Patient had her daughter with her and I instructed them to get a refill from the pharmacy to continue taking the medication as Dr Izaguirre ordered and keep a log of any symptoms, then bring that to the appt with them, and they would go over it all at the appt. Patient and her daughter verbailzed understanding.

## 2024-01-16 ENCOUNTER — HOME CARE VISIT (OUTPATIENT)
Dept: HOME HEALTH SERVICES | Facility: HOME HEALTHCARE | Age: 64
End: 2024-01-16
Payer: COMMERCIAL

## 2024-01-16 VITALS
TEMPERATURE: 97.4 F | SYSTOLIC BLOOD PRESSURE: 124 MMHG | DIASTOLIC BLOOD PRESSURE: 90 MMHG | OXYGEN SATURATION: 100 % | HEART RATE: 51 BPM

## 2024-01-16 PROCEDURE — G0153 HHCP-SVS OF S/L PATH,EA 15MN: HCPCS

## 2024-01-17 NOTE — HOME HEALTH
Routine Visit Note:    Skill/education provided: SLP provided skilled reinstructions in oral motor/speech motor exercises through demonstration and verbal instructions.     Patient/caregiver response: Oral motor exercises performed at 90 percent accuracy with minimal cueing. Patient reported that she performs the home exercises daily.    Plan for next visit:     Other pertinent info: Patient saw her Dr. Gonzalez, was given ok to drive and go back to work, if she felt ready. Patient has an appointment with SAM Mahoney on Thursday, 1/18/24.

## 2024-01-18 ENCOUNTER — OFFICE VISIT (OUTPATIENT)
Dept: FAMILY MEDICINE CLINIC | Facility: CLINIC | Age: 64
End: 2024-01-18
Payer: COMMERCIAL

## 2024-01-18 ENCOUNTER — TELEPHONE (OUTPATIENT)
Dept: NEUROLOGY | Facility: CLINIC | Age: 64
End: 2024-01-18
Payer: COMMERCIAL

## 2024-01-18 VITALS
BODY MASS INDEX: 26.31 KG/M2 | SYSTOLIC BLOOD PRESSURE: 130 MMHG | TEMPERATURE: 97.4 F | HEART RATE: 62 BPM | DIASTOLIC BLOOD PRESSURE: 88 MMHG | HEIGHT: 62 IN | RESPIRATION RATE: 16 BRPM | WEIGHT: 143 LBS | OXYGEN SATURATION: 99 %

## 2024-01-18 DIAGNOSIS — E55.9 VITAMIN D DEFICIENCY: ICD-10-CM

## 2024-01-18 DIAGNOSIS — D64.9 LOW HEMOGLOBIN: ICD-10-CM

## 2024-01-18 DIAGNOSIS — I10 ESSENTIAL HYPERTENSION: ICD-10-CM

## 2024-01-18 DIAGNOSIS — Z13.6 ENCOUNTER FOR SCREENING FOR VASCULAR DISEASE: ICD-10-CM

## 2024-01-18 DIAGNOSIS — E53.8 LOW FOLIC ACID: ICD-10-CM

## 2024-01-18 DIAGNOSIS — E53.8 LOW SERUM VITAMIN B12: ICD-10-CM

## 2024-01-18 DIAGNOSIS — Z09 HOSPITAL DISCHARGE FOLLOW-UP: Primary | ICD-10-CM

## 2024-01-18 RX ORDER — VALSARTAN 160 MG/1
160 TABLET ORAL DAILY
Qty: 90 TABLET | Refills: 1 | Status: SHIPPED | OUTPATIENT
Start: 2024-01-18

## 2024-01-18 NOTE — TELEPHONE ENCOUNTER
Caller: Kristina Kang A    Relationship: Self    Best call back number: [phone]    Which medication are you concerned about: CARBAMAZEPINE    Who prescribed you this medication: DR. CLINE    When did you start taking this medication: MONDAY    What are your concerns: PATIENT WAS OUT OF MEDICATION FOR 3 DAYS. SHE STARTED BACK ON IT ON MONDAY. SINCE THEN SHE'S BEEN CONFUSED, DROPPING THINGS, FOGGY MIND, SPEAKING SLOWLY AND SHE WANTS TO KNOW IF THAT'S A NORMAL REACTION TO RE-STARTING THIS MEDICATION.    How long have you had these concerns: WORSE TODAY

## 2024-01-18 NOTE — PROGRESS NOTES
Subjective   Kristina Kang is a 63 y.o. female.     Trigeminal Neuralgia  Pertinent negatives include no diaphoresis.        Kristina Kang 63 y.o. female presents today for hosptial follow up.  she was treated 12-24 to 12-29-23 for post op problem.  .  I reviewed all of the labs and diagnostic testing and noted:  labs and MRI  The patient's medications were not changed:  Current outpatient and discharge medications have been reconciled for the patient.  Reviewed by: Joanne Maurice PA-C    she does have a follow up appointment with a specialist:   ---today had few episodes of dizziness-----  She had first surgery was 12/7/2023 for right-sided retrosigmoid craniotomy for microvascular decompression--- trigeminal nerve  Second week starting leaking  Then a second surgery---for postop CSF leak---- when she went back to the hospital with the cerebrospinal fluid rhinorrhea and the right-sided facial paralysis..  Dr. Huerta took her back to the OR on 12/27/2023 and noted to that she had mastoiditis and he packed her mastoid air cells with muscle, Betadine soaked gel form, and bone wax--- noting she had mastoiditis--suspected.... He took cultures of her cerebral spinal fluid and no she saw infectious disease while she was inpatient the second time  DR Morse aware of the rhinorrhea and patient at that time of follow-up in his office on 12/15/2023 noted this in was good to see if it healed on its own and then when she developed the right-sided facial paralysis she went to the ER and Dr. Morin consulted d/t Dr Morse out of town and he saw her and did surgery 12-27-23,    Imaging done on admission was negative for acute intracranial process, but it was concerning for CSF leak.  Both neurosurgery and neurology were consulted.  The patient had a fairly significant right-sided facial paralysis, but neurologic exam was otherwise unremarkable.  Given the continued drainage from her nose, the decision was made to take  patient back to the OR.  She went to the operating room with neurosurgery on 12/27 for repair of cranial defect and CSF rhinorrhea.  The patient tolerated the procedure well.  She was initially started on empiric antibiotics to cover for meningitis.  CSF cultures were obtained intraoperatively and no growth at time of discharge.  Infectious disease was consulted to assist in antibiotic management.  They ultimately recommended transitioning to Levaquin 500 mg daily for a total antibiotic course of 14 days to treat empirically for mastoiditis.  Overall, the patient had continued improvement in her symptoms.  She did remain with right-sided facial paralysis but this was to be expected.  The patient was counseled extensively on reasons to return to the hospital and conveyed understanding.  Neurosurgery was agreeable with discharge home.  The patient was discharged home in good condition   Had follow-up with DR Morse on 1/15/2024 and noted she was doing great and her right-sided hemofacial weakness has resolving and less h/a. ----to f/u with him in 4 weeks with CT    She is still on Carbamazepine XR and Lyrica ---she called DR Izaguirre about her medications and unable to get the Tegretol and I can see in a phone message on 1/15/2024 patient was still to be taking carbamazepine and Lyrica and has a follow-up appointment with Dr. Izaguirre on 2/6/2024.----restarted after few days  Had some PT  Has also been ST----speech to f/u..         July 2016---Amaurosis Fugax---carotid doppler neg--neg work up---not reoccured.  I want her LDL <70 d/t this and on ASA 81mg  Hep C in remission---watch LFTs  Dr Avendaño--endocrine---monitors Ca+;  Hx hypercalcemia-----now off HCTZ and watch Ca+;  Also watch K+----saw DR Avendaño 11-13-23-----had labs and he noted  Calcium 10.4 mg/dl.  PTH 36 pg/ml.  Normal phosphorus  Vit D insufficient on vitamin D3 2000 units/day.  Increase vitamin D3 to 1000 units 3 capsules daily.  .  HDL 57.  LDL higher.   Increase Lipitor to 40 mg/day.  Prescription sent to pharmacy.  Reduce dietary fat and try to lose 5 lbs. Repeat CMP, vitamin D and lipid panel in 2 months.  Orders placed on chart.  Copy of labs sent to [facility].  Doing well off Zoloft and not having any anxiety issues---still off  GI Dr Malone---polyps --colon--f/u scope 1 yr----to do in June---this will be done this yr.  Estrace crm for atrophy  Watching lymph count--folate       The following portions of the patient's history were reviewed and updated as appropriate: allergies, current medications, past family history, past medical history, past social history, past surgical history, and problem list.    Review of Systems   Constitutional:  Negative for diaphoresis.   HENT:  Negative for nosebleeds and trouble swallowing.    Eyes:  Negative for blurred vision and visual disturbance.   Respiratory:  Negative for choking.    Gastrointestinal:  Negative for blood in stool.   Allergic/Immunologic: Negative for immunocompromised state.   Neurological:  Negative for facial asymmetry and speech difficulty.   Psychiatric/Behavioral:  Negative for self-injury and suicidal ideas.        Objective   Physical Exam  Vitals and nursing note reviewed.   Constitutional:       General: She is not in acute distress.     Appearance: She is well-developed. She is not ill-appearing or toxic-appearing.   HENT:      Head: Normocephalic.      Right Ear: External ear normal.      Left Ear: External ear normal.      Nose: Nose normal.      Mouth/Throat:      Pharynx: Oropharynx is clear.   Eyes:      General: No scleral icterus.     Conjunctiva/sclera: Conjunctivae normal.      Pupils: Pupils are equal, round, and reactive to light.   Neck:      Thyroid: No thyromegaly.   Cardiovascular:      Rate and Rhythm: Normal rate and regular rhythm.      Heart sounds: Normal heart sounds. No murmur heard.  Pulmonary:      Effort: Pulmonary effort is normal. No respiratory distress.       Breath sounds: Normal breath sounds.   Musculoskeletal:         General: No deformity. Normal range of motion.      Cervical back: Normal range of motion and neck supple.   Skin:     General: Skin is warm and dry.      Findings: No rash.   Neurological:      General: No focal deficit present.      Mental Status: She is alert and oriented to person, place, and time. Mental status is at baseline.   Psychiatric:         Mood and Affect: Mood normal.         Behavior: Behavior normal.         Thought Content: Thought content normal.         Judgment: Judgment normal.           Assessment & Plan   Diagnoses and all orders for this visit:    1. Hospital discharge follow-up (Primary)  -     Comprehensive Metabolic Panel  -     CBC & Differential  -     TSH  -     Vitamin B12  -     Folate  -     Urinalysis With Microscopic - Urine, Clean Catch    2. Encounter for screening for vascular disease  -     Vascular Screening (Carotid) CAR  -     Comprehensive Metabolic Panel  -     CBC & Differential  -     TSH  -     Vitamin B12  -     Folate  -     Urinalysis With Microscopic - Urine, Clean Catch    3. Essential hypertension  -     Comprehensive Metabolic Panel  -     CBC & Differential  -     TSH  -     Vitamin B12  -     Folate  -     Urinalysis With Microscopic - Urine, Clean Catch    4. Vitamin D deficiency  -     Comprehensive Metabolic Panel  -     CBC & Differential  -     TSH  -     Vitamin B12  -     Folate  -     Urinalysis With Microscopic - Urine, Clean Catch    5. Low serum vitamin B12  -     Comprehensive Metabolic Panel  -     CBC & Differential  -     TSH  -     Vitamin B12  -     Folate  -     Urinalysis With Microscopic - Urine, Clean Catch    6. Low folic acid  -     Comprehensive Metabolic Panel  -     CBC & Differential  -     TSH  -     Vitamin B12  -     Folate  -     Urinalysis With Microscopic - Urine, Clean Catch    7. Low hemoglobin  -     Comprehensive Metabolic Panel  -     CBC & Differential  -      TSH  -     Vitamin B12  -     Folate  -     Urinalysis With Microscopic - Urine, Clean Catch    Other orders  -     valsartan (DIOVAN) 160 MG tablet; Take 1 tablet by mouth Daily. for blood pressure.  Indications: High Blood Pressure Disorder  Dispense: 90 tablet; Refill: 1      Also noting her visit with endocrine DR Avendaño, 11/13/2023 she had labs following up on the hypercalcemia and he also checked cholesterol and her LDL was not at goal at 110 our goal is to be less than 70 due to her history of the Amaurosis Fugax-and he increased her Lipitor to 40 mg daily with follow-up labs in 2 months ordered  Still need follow-up labs for the increased dose and Lipitor per DR Avendaño  Screen carotid doppler screen  Takes OTC folic acid----and check on B12  Episodes of dizziness and feeling off balance question if this is from restarting the Tegretol 3 days ago we will have her message Dr. Izaguirre  Also updating her labs the 1 update her hemoglobin was a little bit low due to her second surgery make sure that her hemoglobin is coming back up I also want an updated TSH thyroid lab check urine micro to make sure there is no protein or blood just for routine screening and update her B12 and folic acid she still taking the folic acid but not the B12 and we will see if she needs it.   Plan, Kristina Kang, was seen today.  she was seen for Imparied fasting glucose and plan follow up labs, diet, and exercise, Hyperlipidemia and will continue current medication, Vitamin D deficiency and will update labs , and primary hypertension blood pressure is borderline controlled with 130/88 she has had some readings at home through speech therapy and... Before I increase dose of valsartan she will get speech therapy to take her blood pressure when they come out tomorrow and message me .     outpatient therapy with diastolic in the 80s and 90

## 2024-01-18 NOTE — PATIENT INSTRUCTIONS
"SHypertension, Adult  High blood pressure (hypertension) is when the force of blood pumping through the arteries is too strong. The arteries are the blood vessels that carry blood from the heart throughout the body. Hypertension forces the heart to work harder to pump blood and may cause arteries to become narrow or stiff. Untreated or uncontrolled hypertension can lead to a heart attack, heart failure, a stroke, kidney disease, and other problems.  A blood pressure reading consists of a higher number over a lower number. Ideally, your blood pressure should be below 120/80. The first (\"top\") number is called the systolic pressure. It is a measure of the pressure in your arteries as your heart beats. The second (\"bottom\") number is called the diastolic pressure. It is a measure of the pressure in your arteries as the heart relaxes.  What are the causes?  The exact cause of this condition is not known. There are some conditions that result in high blood pressure.  What increases the risk?  Certain factors may make you more likely to develop high blood pressure. Some of these risk factors are under your control, including:  Smoking.  Not getting enough exercise or physical activity.  Being overweight.  Having too much fat, sugar, calories, or salt (sodium) in your diet.  Drinking too much alcohol.  Other risk factors include:  Having a personal history of heart disease, diabetes, high cholesterol, or kidney disease.  Stress.  Having a family history of high blood pressure and high cholesterol.  Having obstructive sleep apnea.  Age. The risk increases with age.  What are the signs or symptoms?  High blood pressure may not cause symptoms. Very high blood pressure (hypertensive crisis) may cause:  Headache.  Fast or irregular heartbeats (palpitations).  Shortness of breath.  Nosebleed.  Nausea and vomiting.  Vision changes.  Severe chest pain, dizziness, and seizures.  How is this diagnosed?  This condition is diagnosed " by measuring your blood pressure while you are seated, with your arm resting on a flat surface, your legs uncrossed, and your feet flat on the floor. The cuff of the blood pressure monitor will be placed directly against the skin of your upper arm at the level of your heart. Blood pressure should be measured at least twice using the same arm. Certain conditions can cause a difference in blood pressure between your right and left arms.  If you have a high blood pressure reading during one visit or you have normal blood pressure with other risk factors, you may be asked to:  Return on a different day to have your blood pressure checked again.  Monitor your blood pressure at home for 1 week or longer.  If you are diagnosed with hypertension, you may have other blood or imaging tests to help your health care provider understand your overall risk for other conditions.  How is this treated?  This condition is treated by making healthy lifestyle changes, such as eating healthy foods, exercising more, and reducing your alcohol intake. You may be referred for counseling on a healthy diet and physical activity.  Your health care provider may prescribe medicine if lifestyle changes are not enough to get your blood pressure under control and if:  Your systolic blood pressure is above 130.  Your diastolic blood pressure is above 80.  Your personal target blood pressure may vary depending on your medical conditions, your age, and other factors.  Follow these instructions at home:  Eating and drinking    Eat a diet that is high in fiber and potassium, and low in sodium, added sugar, and fat. An example of this eating plan is called the DASH diet. DASH stands for Dietary Approaches to Stop Hypertension. To eat this way:  Eat plenty of fresh fruits and vegetables. Try to fill one half of your plate at each meal with fruits and vegetables.  Eat whole grains, such as whole-wheat pasta, brown rice, or whole-grain bread. Fill about one  fourth of your plate with whole grains.  Eat or drink low-fat dairy products, such as skim milk or low-fat yogurt.  Avoid fatty cuts of meat, processed or cured meats, and poultry with skin. Fill about one fourth of your plate with lean proteins, such as fish, chicken without skin, beans, eggs, or tofu.  Avoid pre-made and processed foods. These tend to be higher in sodium, added sugar, and fat.  Reduce your daily sodium intake. Many people with hypertension should eat less than 1,500 mg of sodium a day.  Do not drink alcohol if:  Your health care provider tells you not to drink.  You are pregnant, may be pregnant, or are planning to become pregnant.  If you drink alcohol:  Limit how much you have to:  0-1 drink a day for women.  0-2 drinks a day for men.  Know how much alcohol is in your drink. In the [place] one drink equals one 12 oz bottle of beer (355 mL), one 5 oz glass of wine (148 mL), or one 1½ oz glass of hard liquor (44 mL).  Lifestyle    Work with your health care provider to maintain a healthy body weight or to lose weight. Ask what an ideal weight is for you.  Get at least 30 minutes of exercise that causes your heart to beat faster (aerobic exercise) most days of the week. Activities may include walking, swimming, or biking.  Include exercise to strengthen your muscles (resistance exercise), such as Pilates or lifting weights, as part of your weekly exercise routine. Try to do these types of exercises for 30 minutes at least 3 days a week.  Do not use any products that contain nicotine or tobacco. These products include cigarettes, chewing tobacco, and vaping devices, such as e-cigarettes. If you need help quitting, ask your health care provider.  Monitor your blood pressure at home as told by your health care provider.  Keep all follow-up visits. This is important.  Medicines  Take over-the-counter and prescription medicines only as told by your health care provider. Follow directions carefully. Blood  pressure medicines must be taken as prescribed.  Do not skip doses of blood pressure medicine. Doing this puts you at risk for problems and can make the medicine less effective.  Ask your health care provider about side effects or reactions to medicines that you should watch for.  Contact a health care provider if you:  Think you are having a reaction to a medicine you are taking.  Have headaches that keep coming back (recurring).  Feel dizzy.  Have swelling in your ankles.  Have trouble with your vision.  Get help right away if you:  Develop a severe headache or confusion.  Have unusual weakness or numbness.  Feel faint.  Have severe pain in your chest or abdomen.  Vomit repeatedly.  Have trouble breathing.  These symptoms may be an emergency. Get help right away. Call 911.  Do not wait to see if the symptoms will go away.  Do not drive yourself to the hospital.  Summary  Hypertension is when the force of blood pumping through your arteries is too strong. If this condition is not controlled, it may put you at risk for serious complications.  Your personal target blood pressure may vary depending on your medical conditions, your age, and other factors. For most people, a normal blood pressure is less than 120/80.  Hypertension is treated with lifestyle changes, medicines, or a combination of both. Lifestyle changes include losing weight, eating a healthy, low-sodium diet, exercising more, and limiting alcohol.  This information is not intended to replace advice given to you by your health care provider. Make sure you discuss any questions you have with your health care provider.  Document Revised: 10/25/2022 Document Reviewed: 10/25/2022  Elsevier Patient Education © 2023 [employer]     within normal limits

## 2024-01-19 ENCOUNTER — PATIENT OUTREACH (OUTPATIENT)
Dept: CASE MANAGEMENT | Facility: OTHER | Age: 64
End: 2024-01-19
Payer: COMMERCIAL

## 2024-01-19 DIAGNOSIS — G50.0 TRIGEMINAL NEURALGIA: ICD-10-CM

## 2024-01-19 DIAGNOSIS — G45.3 AMAUROSIS FUGAX OF RIGHT EYE: Primary | ICD-10-CM

## 2024-01-19 LAB
ALBUMIN SERPL-MCNC: 4.7 G/DL (ref 3.5–5.2)
ALBUMIN/GLOB SERPL: 1.9 G/DL
ALP SERPL-CCNC: 104 U/L (ref 39–117)
ALT SERPL-CCNC: 17 U/L (ref 1–33)
APPEARANCE UR: CLEAR
AST SERPL-CCNC: 15 U/L (ref 1–32)
BACTERIA #/AREA URNS HPF: NORMAL /HPF
BASOPHILS # BLD AUTO: 0.03 10*3/MM3 (ref 0–0.2)
BASOPHILS NFR BLD AUTO: 0.5 % (ref 0–1.5)
BILIRUB SERPL-MCNC: 0.2 MG/DL (ref 0–1.2)
BILIRUB UR QL STRIP: NEGATIVE
BUN SERPL-MCNC: 15 MG/DL (ref 8–23)
BUN/CREAT SERPL: 15.8 (ref 7–25)
CALCIUM SERPL-MCNC: 10.7 MG/DL (ref 8.6–10.5)
CASTS URNS MICRO: NORMAL
CHLORIDE SERPL-SCNC: 105 MMOL/L (ref 98–107)
CO2 SERPL-SCNC: 30.7 MMOL/L (ref 22–29)
COLOR UR: YELLOW
CREAT SERPL-MCNC: 0.95 MG/DL (ref 0.57–1)
EGFRCR SERPLBLD CKD-EPI 2021: 67.5 ML/MIN/1.73
EOSINOPHIL # BLD AUTO: 0.24 10*3/MM3 (ref 0–0.4)
EOSINOPHIL NFR BLD AUTO: 4.1 % (ref 0.3–6.2)
EPI CELLS #/AREA URNS HPF: NORMAL /HPF
ERYTHROCYTE [DISTWIDTH] IN BLOOD BY AUTOMATED COUNT: 11.7 % (ref 12.3–15.4)
FOLATE SERPL-MCNC: >20 NG/ML (ref 4.78–24.2)
GLOBULIN SER CALC-MCNC: 2.5 GM/DL
GLUCOSE SERPL-MCNC: 84 MG/DL (ref 65–99)
GLUCOSE UR QL STRIP: NEGATIVE
HCT VFR BLD AUTO: 37 % (ref 34–46.6)
HGB BLD-MCNC: 12.1 G/DL (ref 12–15.9)
HGB UR QL STRIP: NEGATIVE
IMM GRANULOCYTES # BLD AUTO: 0.02 10*3/MM3 (ref 0–0.05)
IMM GRANULOCYTES NFR BLD AUTO: 0.3 % (ref 0–0.5)
KETONES UR QL STRIP: NEGATIVE
LEUKOCYTE ESTERASE UR QL STRIP: NEGATIVE
LYMPHOCYTES # BLD AUTO: 2.93 10*3/MM3 (ref 0.7–3.1)
LYMPHOCYTES NFR BLD AUTO: 49.6 % (ref 19.6–45.3)
MCH RBC QN AUTO: 29.7 PG (ref 26.6–33)
MCHC RBC AUTO-ENTMCNC: 32.7 G/DL (ref 31.5–35.7)
MCV RBC AUTO: 90.9 FL (ref 79–97)
MONOCYTES # BLD AUTO: 0.3 10*3/MM3 (ref 0.1–0.9)
MONOCYTES NFR BLD AUTO: 5.1 % (ref 5–12)
NEUTROPHILS # BLD AUTO: 2.39 10*3/MM3 (ref 1.7–7)
NEUTROPHILS NFR BLD AUTO: 40.4 % (ref 42.7–76)
NITRITE UR QL STRIP: NEGATIVE
NRBC BLD AUTO-RTO: 0 /100 WBC (ref 0–0.2)
PH UR STRIP: 6 [PH] (ref 5–8)
PLATELET # BLD AUTO: 257 10*3/MM3 (ref 140–450)
POTASSIUM SERPL-SCNC: 4.3 MMOL/L (ref 3.5–5.2)
PROT SERPL-MCNC: 7.2 G/DL (ref 6–8.5)
PROT UR QL STRIP: NEGATIVE
RBC # BLD AUTO: 4.07 10*6/MM3 (ref 3.77–5.28)
RBC #/AREA URNS HPF: NORMAL /HPF
SODIUM SERPL-SCNC: 145 MMOL/L (ref 136–145)
SP GR UR STRIP: 1.01 (ref 1–1.03)
TSH SERPL DL<=0.005 MIU/L-ACNC: 0.59 UIU/ML (ref 0.27–4.2)
UROBILINOGEN UR STRIP-MCNC: NORMAL MG/DL
VIT B12 SERPL-MCNC: 648 PG/ML (ref 211–946)
WBC # BLD AUTO: 5.91 10*3/MM3 (ref 3.4–10.8)
WBC #/AREA URNS HPF: NORMAL /HPF

## 2024-01-19 NOTE — TELEPHONE ENCOUNTER
Spoke with patient and gave her information about medicine. She stated she was doing a little better today.

## 2024-01-19 NOTE — OUTREACH NOTE
AMBULATORY CASE MANAGEMENT NOTE    Name and Relationship of Patient/Support Person: Kristina Kang A - Self    Adult Patient Profile  Questions/Answers      Flowsheet Row Most Recent Value   Symptoms/Conditions Managed at Home neurological, HEENT (head, eyes, ears, nose, throat), cardiovascular   Barriers to Managing Health understanding health advice   Cardiovascular Symptoms/Conditions hypertension   Cardiovascular Management Strategies routine screening, medication therapy   Cardiovascular Self-Management Outcome 3 (uncertain)   Cardiovascular Comment BP monitoring   HEENT Symptoms/Conditions other (see comments), pain  [Amaurosis Fugax right eye/ facial droop/ CSF rhinorrhea]   Pain Location eye, head   HEENT Management Strategies routine screening   HEENT Self-Management Outcome 3 (uncertain)   HEENT Comment did not discuss   Neurological Symptoms/Conditions other (see comments), seizures  [trigeminal neuralgia/ CSF rhinorrhea]   Neurological Management Strategies medication therapy   Neurological Self-Management Outcome 3 (uncertain)   Neurological Comment discussed her forgetfulness, SLP,  apologizes for speaking or completing a sentence slower.  States that she does mix up her times and days.        [facility] Interim Update    Spoke to patient, verified by name and date of birth.  Agreeable to [facility] program at this time.    States that she does currently have a SLP who comes to the house and takes her pressures.  This coming Tuesday is her last visit.    States that she does get confused and gets her days, and times mixed up.  Further states that she does get speak slowly and tries to make sure that as a person speaks that she is writing it down correctly.  Verbalizing that she apologizes a lot because she has to speak slower sometimes and does get things mixed up.    Was not able to provide ACM with blood pressure readings.  Further stated that she has been taught how to use the BP cuff.      Stating her  gratefulness and gratitude for [facility] outreach and continued healthcare assistance.      Care Coordination    ACM introduced self and purpose for call.  Discussed CCM and benefits.    Instructed on BP collection and when the best time to check pressures.  Will monitor them and send them via [employer].    Provided active listening and reassurance during CCM conversation.  Encouraged patient to reach out with questions and concerns any time, and did provide contact information.    Plan: follow up 1 week   - BP readings   - Will later discuss ACP  - Chart review of upcoming appointments.  - Disease management information      - will further discuss at next outreach        Education Documentation  No documentation found.        Ariela COTA  Ambulatory Case Management    1/19/2024, 16:07 EST

## 2024-01-22 ENCOUNTER — HOME CARE VISIT (OUTPATIENT)
Dept: HOME HEALTH SERVICES | Facility: HOME HEALTHCARE | Age: 64
End: 2024-01-22
Payer: COMMERCIAL

## 2024-01-22 VITALS — DIASTOLIC BLOOD PRESSURE: 80 MMHG | HEART RATE: 68 BPM | SYSTOLIC BLOOD PRESSURE: 140 MMHG | OXYGEN SATURATION: 98 %

## 2024-01-22 PROCEDURE — G0153 HHCP-SVS OF S/L PATH,EA 15MN: HCPCS

## 2024-01-22 NOTE — HOME HEALTH
Discharge Summary/Summary of Care Provided:   Patient was evaluated by OT, PT and ST. Physical therapy for ambulation and home safety following repair of CSF leak.  OT Eval for ADL training, Home Safety, Therapeutic Exercise/Activity, Manual Therapy, Transfer/Mobility training. Evaluation only. ST focused on instructions in oral motor exercise program to improve facial weakness/droop and expressive language therapy to increase communication s/p right-sided retrosigmoid craniectomy for microvascular decompression.   Patient received home health for diagnosis: Trigeminal neuralgia, s/p right retrosigmoid craniectomy for microvascular decompression on 12/7/23, HTN, hypocalciuric hypercalcemia  Current level of functional ability: Patient discharged from PT to self and family. All goals met. OT evaluation only. Patient demonstrates improved facial/oral musculature strength and function. Patient demonstrates functional  verbal expressive skills to communicate wants, needs and intentions. Goals met.  Living arrangements: Patient lives in her single family home with her dog.   Progress towards goals and/or Were all goals met? All goals met  If not all goals met, barriers that prevented patient from meeting goals: NA  SDOH concerns (i.e. Caregiver availability, social isolation, environment, income, transportation access, food insecurity etc.) No concerns  Follow-up appointment plans and community resources provided: NA  Other imporant information. NA

## 2024-01-23 ENCOUNTER — PATIENT OUTREACH (OUTPATIENT)
Dept: CASE MANAGEMENT | Facility: OTHER | Age: 64
End: 2024-01-23
Payer: COMMERCIAL

## 2024-01-23 DIAGNOSIS — G45.3 AMAUROSIS FUGAX OF RIGHT EYE: ICD-10-CM

## 2024-01-23 DIAGNOSIS — R29.810 FACIAL DROOP: ICD-10-CM

## 2024-01-23 DIAGNOSIS — I10 ESSENTIAL HYPERTENSION: Primary | ICD-10-CM

## 2024-01-23 DIAGNOSIS — G50.0 TRIGEMINAL NEURALGIA: ICD-10-CM

## 2024-01-23 NOTE — OUTREACH NOTE
AMBULATORY CASE MANAGEMENT NOTE    Name and Relationship of Patient/Support Person: Jorge Luis Kristina A - Self    CCM Interim Update    Spoke with patient today, verified by name and date of birth.    Collection of BP lo/23  0700 139/78 1230  138/79    1200 153/79 1500 158/84    1200 124/64 1500 134/69    0800 144/77 1522 148/77     Encouraged patient to continue monitoring as some results were elevated.  Will f/u with provider.  Advising patient to try to collect at the same time daily, patient agreeable.    Plan: follow up 2-3 weeks  - BP log  - discuss dietary causes of elevated results  - discuss patient personal goals from CCM/ realistic achievable goals      Education Documentation  No documentation found.        Ariela COTA  Ambulatory Case Management    2024, 14:16 EST

## 2024-01-23 NOTE — PROGRESS NOTES
Lets watch blood pressure another day or 2..  Some of her readings are normal... I will increase her valsartan dose if blood pressure is staying consistently above 140/90 either number

## 2024-01-26 DIAGNOSIS — E78.2 MIXED HYPERLIPIDEMIA: ICD-10-CM

## 2024-01-26 DIAGNOSIS — E55.9 VITAMIN D DEFICIENCY: ICD-10-CM

## 2024-01-28 LAB
25(OH)D3+25(OH)D2 SERPL-MCNC: 33.7 NG/ML (ref 30–100)
ALBUMIN SERPL-MCNC: 4.5 G/DL (ref 3.9–4.9)
ALBUMIN/GLOB SERPL: 2 {RATIO} (ref 1.2–2.2)
ALP SERPL-CCNC: 100 IU/L (ref 44–121)
ALT SERPL-CCNC: 11 IU/L (ref 0–32)
AST SERPL-CCNC: 17 IU/L (ref 0–40)
BILIRUB SERPL-MCNC: <0.2 MG/DL (ref 0–1.2)
BUN SERPL-MCNC: 14 MG/DL (ref 8–27)
BUN/CREAT SERPL: 15 (ref 12–28)
CALCIUM SERPL-MCNC: 10.3 MG/DL (ref 8.7–10.3)
CHLORIDE SERPL-SCNC: 106 MMOL/L (ref 96–106)
CHOLEST SERPL-MCNC: 165 MG/DL (ref 100–199)
CO2 SERPL-SCNC: 26 MMOL/L (ref 20–29)
CREAT SERPL-MCNC: 0.96 MG/DL (ref 0.57–1)
EGFRCR SERPLBLD CKD-EPI 2021: 66 ML/MIN/1.73
GLOBULIN SER CALC-MCNC: 2.2 G/DL (ref 1.5–4.5)
GLUCOSE SERPL-MCNC: 86 MG/DL (ref 70–99)
HDLC SERPL-MCNC: 75 MG/DL
IMP & REVIEW OF LAB RESULTS: NORMAL
LDLC SERPL CALC-MCNC: 73 MG/DL (ref 0–99)
POTASSIUM SERPL-SCNC: 4.7 MMOL/L (ref 3.5–5.2)
PROT SERPL-MCNC: 6.7 G/DL (ref 6–8.5)
PTH-INTACT SERPL-MCNC: 61 PG/ML (ref 15–65)
SODIUM SERPL-SCNC: 145 MMOL/L (ref 134–144)
TRIGL SERPL-MCNC: 94 MG/DL (ref 0–149)
VLDLC SERPL CALC-MCNC: 17 MG/DL (ref 5–40)

## 2024-01-29 DIAGNOSIS — I10 ESSENTIAL HYPERTENSION: ICD-10-CM

## 2024-01-29 RX ORDER — FUROSEMIDE 20 MG/1
20 TABLET ORAL DAILY
Qty: 90 TABLET | Refills: 1 | Status: SHIPPED | OUTPATIENT
Start: 2024-01-29

## 2024-01-30 ENCOUNTER — PATIENT OUTREACH (OUTPATIENT)
Dept: CASE MANAGEMENT | Facility: OTHER | Age: 64
End: 2024-01-30
Payer: COMMERCIAL

## 2024-01-30 DIAGNOSIS — I10 ESSENTIAL HYPERTENSION: Primary | ICD-10-CM

## 2024-01-30 DIAGNOSIS — G50.0 TRIGEMINAL NEURALGIA: ICD-10-CM

## 2024-01-30 DIAGNOSIS — G45.3 AMAUROSIS FUGAX OF RIGHT EYE: ICD-10-CM

## 2024-01-30 NOTE — OUTREACH NOTE
[facility] End of Month Documentation    This Chronic Medical Management Care Plan for Kristina Kang, 63 y.o. female, has been monitored and managed; reviewed; established and a new plan of care implemented for the month of January.  A cumulative time of 33  minutes was spent on this patient record this month, including phone call with patient; chart review; electronic communication with primary care provider, Blood pressure/ prescription review.    Regarding the patient's problems: has Degeneration of lumbar intervertebral disc; Hepatitis C; Essential hypertension; Hyperlipidemia; Vitamin D deficiency; Osteoarthritis; Episode of syncope; Amaurosis fugax of right eye; Osteopenia of both hips; Chronic seasonal allergic rhinitis; Hypocalciuric hypercalcemia; Vaginal atrophy; Generalized anxiety disorder; Abnormal MRI; Trigeminal neuralgia; Elevated hemoglobin A1c; Preop examination; Facial droop; and CSF rhinorrhea on their problem list., the following items were addressed: medical records; medications, Disease education and management/ continuity of care/ medication management/ infection prevention and any changes can be found within the plan section of the note.  A detailed listing of time spent for chronic care management is tracked within each outreach encounter.  Current medications include:  has a current medication list which includes the following prescription(s): aspirin, atorvastatin, carbamazepine xr, cholecalciferol, tylenol pm extra strength, folic acid, furosemide, latanoprost, methocarbamol, pregabalin, and valsartan. and the patient is reported to be patient is compliant with medication protocol,  Medications are reported to be effective.  Regarding these diagnoses, referrals were made to the following provider(s):  none.  All notes on chart for PCP to review.    The patient was monitored remotely for blood pressure.    The patient's physical needs include:  physical healthcare.     The patient's mental  "support needs include:  coordination of community providers; continued support    The patient's cognitive support needs include:  not applicable    The patient's psychosocial support needs include:  not applicable    The patient's functional needs include: resources for disability needs; physical healthcare    The patient's environmental needs include:  not applicable, na    Care Plan overall comments:  No data recorded    Refer to previous outreach notes for more information on the areas listed above.    Monthly Billing Diagnoses  (I10) Essential hypertension    (G45.3) Amaurosis fugax of right eye    (G50.0) Trigeminal neuralgia    Medications   Medications have been reconciled    Care Plan progress this month:      Recently Modified Care Plans Updates made since 12/30/2023 12:00 AM       Hypertension (Adult)           Problem Priority Last Modified     Hypertension (Hypertension) --  1/19/2024  3:42 PM by Ariela Romo RN              Goal Recent Progress Last Modified     Hypertension Monitored --  1/19/2024  3:42 PM by Ariela Romo RN     Evidence-based guidance:   Promote initial use of ambulatory blood pressure measurements (for 3 days) to rule out \"white-coat\" effect; identify masked hypertension and presence or absence of nocturnal \"dipping\" of blood pressure.    Encourage continued use of home blood pressure monitoring and recording in blood pressure log; include symptoms of hypotension or potential medication side effects in log.   Review blood pressure measurements taken inside and outside of the provider office; establish baseline and monitor trends; compare to target ranges or patient goal.   Share overall cardiovascular risk with patient; encourage changes to lifestyle risk factors, including alcohol consumption, smoking, inadequate exercise, poor dietary habits and stress.    Notes:            Task Due Date Last Modified     Identify and Monitor Blood Pressure Elevation --  1/19/2024  4:04 PM by " Ariela Romo RN     Care Management Activities:      - home or ambulatory blood pressure monitoring encouraged      Notes: will monitor 2-3 times a day to review trends for medication evaluation               Problem Priority Last Modified     Disease Progression (Hypertension) --  1/19/2024  3:42 PM by Ariela Romo RN              Goal Recent Progress Last Modified     Disease Progression Prevented or Minimized --  1/19/2024  3:42 PM by Ariela Romo RN     Evidence-based guidance:   Tailor lifestyle advice to individual; review progress regularly; give frequent encouragement and respond positively to incremental successes.   Assess for and promote awareness of worsening disease or development of comorbidity.   Prepare patient for laboratory and diagnostic exams based on risk and presentation.   Prepare patient for use of pharmacologic therapy that may include diuretic, beta-blocker, beta-blocker/thiazide combination, angiotensin-converting enzyme inhibitor, renin-angiotensin blocker or calcium-channel blocker.   Expect periodic adjustments to pharmacologic therapy; manage side effects.   Promote a healthy diet that includes primarily plant-based foods, such as fruits, vegetables, whole grains, beans and legumes, low-fat dairy and lean meats.    Consider moderate reduction in sodium intake by avoiding the addition of salt to prepared foods and limiting processed meats, canned soup, frozen meals and salty snacks.    Promote a regular, daily exercise goal of 150 minutes per week of moderate exercise based on tolerance, ability and patient choice; consider referral to physical therapist, community wellness and/or activity program.   Encourage the avoidance of no more than 2 hours per day of sedentary activity, such as recreational screen time.   Review sources of stress; explore current coping strategies and encourage use of mindfulness, yoga, meditation or exercise to manage stress.    Notes:            Task Due  Date Last Modified     Alleviate Barriers to Hypertension Treatment --  1/19/2024  4:05 PM by Ariela Romo RN     Care Management Activities:      - not discussed during this outreach      Notes:                Problem Priority Last Modified     Resistant Hypertension (Hypertension) --  1/19/2024  3:42 PM by Ariela Romo RN              Goal Recent Progress Last Modified     Response to Treatment Maximized --  1/19/2024  3:42 PM by Ariela Romo RN     Evidence-based guidance:   Assess patient response to treatment, including presence or absence of medication side effects, degree of blood pressure control and patient satisfaction.   Assess technique (including cuff size and placement), measurement times, condition and calibration of blood pressure cuff set (both at-home and in-office equipment).   Assess factors that may influence response to treatment, including nonadherence to pharmacologic treatment plan, diet or activity changes and/or presence of pain, stress or sleep disturbance.   Screen for signs and symptoms of depression; if present, refer for or complete a comprehensive assessment.   Evaluate social and economic barriers that may affect adherence to treatment plan   Address pharmacologic nonadherence by simplifying dosing regimen, counseling or support by pharmacist, financial assistance, self-monitoring of blood pressure, use of motivational interviewing, voice or text messages.   Encourage behavioral adherence strategies, like habit-based interventions that link medication taking with existing daily routines.   Assess barriers to regular, daily physical activity; support family or support person-oriented activity changes and utilization of community activity or sports program.   Address barriers to dietary changes, especially sodium restriction, with referrals to community programs, like cooking classes, meal services or intensive education when available.   Refer to community-based peer support  program or nurse home-visiting program.   Assess for chronic pain; when present add additional goals (Chronic Pain Care Plan Guide) as needed.   Provide frequent follow-up by telephone, telemonitoring, patient-practice portal or with home visit.   Review alcohol use screen; address using brief intervention beginning with risk that interferes with blood pressure control; refer for treatment when excessive alcohol use is noted.   Screen for obstructive sleep apnea; prepare patient for polysomnography based on risk and presentation and use of noninvasive ventilation to relieve obstructive sleep apnea when present.    Notes:            Task Due Date Last Modified     Facilitate Adherence to Lifestyle Change --  1/19/2024  4:05 PM by Ariela Romo RN     Care Management Activities:      - not discussed during this outreach      Notes:                          Instructions   Patient was provided an electronic copy of care plan  CCM services were explained and offered and patient has accepted these services.  Patient has given their written consent to receive CCM services and understands that this includes the authorization of electronic communication of medical information with the other treating providers.  Patient understands that they may stop CCM services at any time and these changes will be effective at the end of the calendar month and will effectively revocate the agreement of CCM services.  Patient understands that only one practitioner can furnish and be paid for CCM services during one calendar month.  Patient also understands that there may be co-payment or deductible fees in association with CCM services.  Patient will continue with at least monthly follow-up calls with the Ambulatory .    Ariela COTA  Ambulatory Case Management    1/30/2024, 15:26 EST

## 2024-02-01 ENCOUNTER — TELEPHONE (OUTPATIENT)
Dept: NEUROSURGERY | Facility: CLINIC | Age: 64
End: 2024-02-01
Payer: COMMERCIAL

## 2024-02-01 NOTE — TELEPHONE ENCOUNTER
PT CALLED: MICHELE SCHEDULED HER CT HEAD ON 4/1/24 AND HER APPT WITH DR. SPARKS IS ON 2/21/24. OKAY TO MOVE RESCHEDULE APPT OUT UNTIL 4/1/24. PLEASE ADVISE! THANKS!    LAST SEEN:   Office Visit with Tripp Sparks MD (01/15/2024)     SX: Surgery with Tripp Sparks MD (12/27/2023)     PT CONTACT: [phone]  BEST TIME TO CALL: ANYTIME

## 2024-02-06 ENCOUNTER — OFFICE VISIT (OUTPATIENT)
Dept: NEUROLOGY | Facility: CLINIC | Age: 64
End: 2024-02-06
Payer: COMMERCIAL

## 2024-02-06 VITALS
BODY MASS INDEX: 28.52 KG/M2 | HEIGHT: 62 IN | DIASTOLIC BLOOD PRESSURE: 70 MMHG | SYSTOLIC BLOOD PRESSURE: 124 MMHG | WEIGHT: 155 LBS

## 2024-02-06 DIAGNOSIS — G50.0 TRIGEMINAL NEURALGIA: Primary | ICD-10-CM

## 2024-02-06 PROCEDURE — 99213 OFFICE O/P EST LOW 20 MIN: CPT | Performed by: STUDENT IN AN ORGANIZED HEALTH CARE EDUCATION/TRAINING PROGRAM

## 2024-02-06 NOTE — PROGRESS NOTES
Chief Complaint   Patient presents with    Trigeminal neuralgia of right side of face        Patient ID: Kristina Kang is a 63 y.o. female.    HPI:    The following portions of the patient's history were reviewed and updated as appropriate: allergies, current medications, past family history, past medical history, past social history, past surgical history and problem list.    Interval history:  Ms. Kristina Kang is a 63-year-old female who presents to neurology clinic for follow-up of trigeminal neuralgia. She underwent a right-sided craniectomy for microvascular decompression. They found a vein that was adhered to the surface of the trigeminal nerve as well as a loop of an artery found coursing underneath the trigeminal nerve causing an indentation. She continues on the pregabalin as well as the carbamazepine extended release, and she is interested in decreasing the medication today. She does not have any pain. I looked over her lab work. Her sodiums have actually been high. She was seen several weeks after the procedure in the hospital for facial droop as well as potential CSF leak. She was seen in 01/2024 by Dr. Morse, and her hemifacial weakness was resolving at that time, and she did not have any clear drainage from the nose at that time. She is accompanied by her fiancé and daughter during today's visit.    Today, the patient confirms she had surgery in 12/2023 and had some complications afterwards. She went to the emergency room due to fluid leaking as well as facial weakness. She reports that her facial weakness has improved externally, but internally, she still feels some weakness. She notes that she is feeling much better now.    The patient reports that she was on her medications in the hospital. Once she got home, she was taking her medications as she is supposed to and noticed that the carbamazepine extended release was getting low. She called the pharmacy to get a refill, and they told her that  "a physician said it was no longer needed. She was off of the carbamazepine extended release for 5 to 7 days because she ran out. When they saw Dr. Morse, he recommended she check with my office to make sure that is correct. She restarted the medication quickly and felt dizzy. She was falling into the walls and had a feeling of \"being high.\" During the 5 to 7 days where she was not on the carbamazepine extended release, she did not express that she was experiencing pain. She notes that she did not notice any side effects coming off of it \"cold turkey.\"    The patient reports that she has been experiencing issues where if she is grasping something with her right hand, it will fall out of her hand. She states she dropped her phone the other day because it fell out of her hand.    Review of Systems   Neurological:  Negative for dizziness, tremors, seizures, syncope, facial asymmetry, speech difficulty, weakness, light-headedness, numbness and headaches.          Vitals:    24 1337   BP: 124/70       Neurologic Exam     Mental Status   Speech: speech is normal   Level of consciousness: alert    Cranial Nerves     CN II   Visual fields full to confrontation.     CN III, IV, VI   Pupils are equal, round, and reactive to light.  Extraocular motions are normal.     CN V   Facial sensation intact.     CN VIII   Hearing: intact    CN IX, X   Palate: symmetric    CN XI   Right trapezius strength: normal  Left trapezius strength: normal    CN XII   Tongue: not atrophic  Fasciculations: absent  Tongue deviation: none  No facial pain on palpation on right or left side. Minimal strength on full smile     Motor Exam   Muscle bulk: normal    Strength   Right deltoid: 5/5  Left deltoid: 5/5  Right biceps: 5/5  Left biceps: 5/5  Right triceps: 5/5  Left triceps: 5/5  Right iliopsoas: 5/5  Left iliopsoas: 5/5  Right quadriceps: 5/5  Left quadriceps: 5/5  Right hamstrin/5  Left hamstrin/5  Right anterior tibial: 5/5  Left " anterior tibial: 5/5  Right gastroc: 5/5  Left gastroc: 5/5Grip 5 out of 5 bilaterally     Sensory Exam   Right arm light touch: normal  Left arm light touch: normal  Right leg light touch: normal  Left leg light touch: normal    Gait, Coordination, and Reflexes     Coordination   Finger to nose coordination: normal  Heel to shin coordination: normal      Physical Exam  Eyes:      Extraocular Movements: EOM normal.      Pupils: Pupils are equal, round, and reactive to light.   Neurological:      Coordination: Finger-Nose-Finger Test and Heel to Shin Test normal.   Psychiatric:         Speech: Speech normal.       Procedures    Assessment/Plan:         Diagnoses and all orders for this visit:    1. Trigeminal neuralgia (Primary)           Edna Ryan    I spent 20 minutes in the care of this patient today. She has trigeminal neuralgia, which is potentially resolved after surgery. She does have some minimal strength decreased on the right side of her face with full smile. She notices some right sided coordination issues, which I have difficulty explaining given the site of her craniectomy. We will continue to monitor this. It may be a medication side effect she is noticing more on the right side. The patient will continue pregabalin for now. She will follow up with me in 3 months to discuss going off of the pregabalin, but we gave instructions to go down by 200 mg every 3 days on the carbamazepine extended release until she is off the medication.    Transcribed from ambient dictation for Layton Izaguirre MD by Edna Ryan.  02/06/24   15:23 EST    Patient or patient representative verbalized consent to the visit recording.  I have personally performed the services described in this document as transcribed by the above individual, and it is both accurate and complete.  Layton Izaguirre MD  2/6/2024  17:32 EST     I spent 20 minutes caring for this patient on this date of service. This time includes time spent by me in the  following activities as necessary: preparing for the visit, reviewing tests, medical records and previous visits, obtaining and/or reviewing a separately obtained history, performing a medically appropriate exam and/or evaluation, counseling and educating the patient, and/or communicating with other healthcare professionals, documenting information in the medical record, independently interpreting results and communicating that information with the patient, and developing a medically appropriate treatment plan with consideration of other conditions, medications, and treatments.

## 2024-02-06 NOTE — LETTER
February 6, 2024       No Recipients    Patient: Kristina Kang   YOB: 1960   Date of Visit: 2/6/2024     Dear Joanne Maurice PABeatrizC:       Thank you for referring Kristina Kang to me for evaluation. Below are the relevant portions of my assessment and plan of care.    If you have questions, please do not hesitate to call me. I look forward to following Kristina along with you.         Sincerely,        Layton Izaguirre MD        CC:   No Recipients

## 2024-02-07 ENCOUNTER — TELEPHONE (OUTPATIENT)
Dept: CASE MANAGEMENT | Facility: OTHER | Age: 64
End: 2024-02-07
Payer: COMMERCIAL

## 2024-02-08 ENCOUNTER — TELEPHONE (OUTPATIENT)
Dept: NEUROSURGERY | Facility: CLINIC | Age: 64
End: 2024-02-08
Payer: COMMERCIAL

## 2024-02-15 ENCOUNTER — HOSPITAL ENCOUNTER (OUTPATIENT)
Dept: CT IMAGING | Facility: HOSPITAL | Age: 64
Discharge: HOME | End: 2024-02-15
Admitting: NEUROLOGICAL SURGERY
Payer: COMMERCIAL

## 2024-02-15 DIAGNOSIS — G50.0 TRIGEMINAL NEURALGIA: ICD-10-CM

## 2024-02-15 DIAGNOSIS — G96.01 CSF RHINORRHEA: ICD-10-CM

## 2024-02-15 PROCEDURE — 70450 CT HEAD/BRAIN W/O DYE: CPT

## 2024-02-15 NOTE — PROGRESS NOTES
Subjective   History of Present Illness: Kristina Kang is a 63 y.o. female is here today for follow-up. He is s/p revision of a right sided retrosigmoid craniotomy for microvascular decompression done on 23 and CSF rhinorrhea repair 23.  CT head was completed on 2/15/24.  She is doing well today.  No new complaints.  Denies any changes in the frequency or severity of her headaches.  Denies any changes in vision.  Denies any strokelike episodes.  Denies any changes in strength or sensation.      History of Present Illness    The following portions of the patient's history were reviewed and updated as appropriate: allergies, current medications, past family history, past medical history, past social history, past surgical history, and problem list.    Past Medical History:   Diagnosis Date    Allergic     Anemia     Colon polyp     Degeneration of lumbar intervertebral disc     Episode of syncope 2015    Essential hypertension     Glaucoma     Hepatitis C 2008    Dr. Coleman Null    History of bone density study     Normal    History of chest x-ray 2010    normal    History of echocardiogram 2015    History of EKG 2013    normal    History of Holter monitoring 2015    occas PVC    History of nuclear stress test 10/06/2015    LCG; ischemia    Hypercalcemia     Hyperlipidemia     Leiomyoma of uterus     and fibroids    Osteoarthritis     Osteopenia     Postmenopausal     Pyelonephritis     Trigeminal neuralgia     Vitamin D deficiency         Past Surgical History:   Procedure Laterality Date     SECTION      COLONOSCOPY      COLONOSCOPY W/ POLYPECTOMY      Benign polyps.  Dr. Malone    CRANIECTOMY N/A 2023    Procedure: CRANIECTOMY for CSF rhinorrhea repair;  Surgeon: Tripp Morse MD;  Location: [facility];  Service: Neurosurgery;  Laterality: N/A;    CRANIOTOMY FOR NERVE DECOMPRESSION Right 2023    Procedure: RIGHT SIDED  RETROSIGMOID CRANIOTOMY FOR MICROVASCULAR DECOMPRESSION;  Surgeon: Tripp Morse MD;  Location: [facility] OR;  Service: Neurosurgery;  Laterality: Right;    HYSTERECTOMY  02/2004    took one ovary     LIVER BIOPSY      OOPHORECTOMY Bilateral age 33    left 1/4 of the right           Current Outpatient Medications:     aspirin 81 MG EC tablet, Take 1 tablet by mouth Daily., Disp: , Rfl:     atorvastatin (LIPITOR) 40 MG tablet, Take 1 tablet by mouth Daily. For cholesterol, Disp: 90 tablet, Rfl: 2    cholecalciferol (VITAMIN D3) 25 MCG (1000 UT) tablet, 3 tablets daily (Patient taking differently: Take 1 tablet by mouth 3 (Three) Times a Day. 3 tablets daily), Disp: 270 tablet, Rfl: 2    diphenhydrAMINE-acetaminophen (Tylenol PM Extra Strength)  MG tablet per tablet, Take 1 tablet by mouth 4 (Four) Times a Day As Needed., Disp: , Rfl:     folic acid (FOLVITE) 400 MCG tablet, Take 1 tablet by mouth Daily., Disp: 90 tablet, Rfl: 4    furosemide (LASIX) 20 MG tablet, Take 1 tablet by mouth Daily. Indications: High Blood Pressure Disorder, Disp: 90 tablet, Rfl: 1    latanoprost (XALATAN) 0.005 % ophthalmic solution, Administer 1 drop to both eyes Every Night. Indications: Wide-Angle Glaucoma, Disp: , Rfl:     valsartan (DIOVAN) 160 MG tablet, Take 1 tablet by mouth Daily. for blood pressure.  Indications: High Blood Pressure Disorder, Disp: 90 tablet, Rfl: 1    carBAMazepine XR (TEGretol  XR) 200 MG 12 hr tablet, TAKE 600MG DURING THE DAY AND 400MG AT NIGHT. (Patient not taking: Reported on 2/21/2024), Disp: 450 tablet, Rfl: 1    methocarbamol (ROBAXIN) 500 MG tablet, Take 1 tablet by mouth Every 8 (Eight) Hours As Needed (neck pain). (Patient not taking: Reported on 2/21/2024), Disp: 30 tablet, Rfl: 0    pregabalin (LYRICA) 75 MG capsule, Take 1 capsule by mouth 2 (Two) Times a Day. Indications: Generalized Anxiety Disorder, Disp: 60 capsule, Rfl: 3     No Known Allergies     Social History     Socioeconomic  "History    Marital status:    Tobacco Use    Smoking status: Former     Packs/day: 0.50     Years: 10.00     Additional pack years: 0.00     Total pack years: 5.00     Types: Cigarettes    Smokeless tobacco: Former     Quit date:    Vaping Use    Vaping Use: Never used   Substance and Sexual Activity    Alcohol use: Not Currently     Comment: Seldom    Drug use: No    Sexual activity: Yes     Partners: Male     Birth control/protection: Post-menopausal, None     Comment: Hysterectomy        Family History   Adopted: Yes   Problem Relation Age of Onset    Malig Hyperthermia Neg Hx         Review of Systems   Neurological:  Positive for headaches. Negative for light-headedness.        Tingling in right side of face    All other systems reviewed and are negative.      Objective     Vitals:    24 1408   BP: 122/82   Pulse: 76   Temp: 98 °F (36.7 °C)   SpO2: 96%   Weight: 70.6 kg (155 lb 9.6 oz)   Height: 157.5 cm (62\")     Body mass index is 28.46 kg/m².      Physical Exam  Neurologic Exam  Awake, alert, oriented x3  Pupils equal round reactive to light  Extraocular muscles intact  Face symmetric  Speech is fluent and clear  No pronator drift  Motor exam  Bilateral deltoids 5/5, bilateral biceps 5/5, bilateral triceps 5/5, bilateral wrist extension 5/5 bilateral hand  5/5  Bilateral hip flexion 5/5, bilateral knee extension 5/5, bilateral DF/PF 5/5  No clonus  No Terry's reflex  Steady normal gait  Able to detect  light touch in all 4 extremities          Assessment & Plan   Independent Review of Radiographic Studies:      I personally reviewed the images from the following studies.  CT head without contrast from February 15, 2024  The CT head findings show expected postoperative changes with no significant fluid within the mastoid air cells    Medical Decision Makin-year-old female s/p revision of a right-sided sigmoid craniectomy for occlusion of the mastoid air cells and treatment of " CSF rhinorrhea  -She is recovering well from her procedure.  The follow-up CT shows expected postoperative changes with no significant residual fluid within the mastoid air cells.  She reports that her headaches have resolved as well as her facial weakness  -I will plan to see her back in 4 months to evaluate her progress.  Of note she also reports no residual trigeminal neuralgia  Diagnoses and all orders for this visit:    1. Trigeminal neuralgia (Primary)    2. S/P craniotomy      Return in about 4 months (around 6/21/2024).

## 2024-02-19 ENCOUNTER — TELEPHONE (OUTPATIENT)
Dept: CASE MANAGEMENT | Facility: OTHER | Age: 64
End: 2024-02-19
Payer: COMMERCIAL

## 2024-02-19 ENCOUNTER — PATIENT OUTREACH (OUTPATIENT)
Dept: CASE MANAGEMENT | Facility: OTHER | Age: 64
End: 2024-02-19
Payer: COMMERCIAL

## 2024-02-19 DIAGNOSIS — I10 ESSENTIAL HYPERTENSION: Primary | ICD-10-CM

## 2024-02-19 DIAGNOSIS — G50.0 TRIGEMINAL NEURALGIA OF RIGHT SIDE OF FACE: ICD-10-CM

## 2024-02-19 DIAGNOSIS — G50.0 TRIGEMINAL NEURALGIA: ICD-10-CM

## 2024-02-19 PROCEDURE — 99490 CHRNC CARE MGMT STAFF 1ST 20: CPT | Performed by: PHYSICIAN ASSISTANT

## 2024-02-19 RX ORDER — PREGABALIN 75 MG/1
75 CAPSULE ORAL 2 TIMES DAILY
Qty: 60 CAPSULE | Refills: 3 | Status: CANCELLED | OUTPATIENT
Start: 2024-02-19

## 2024-02-19 NOTE — OUTREACH NOTE
AMBULATORY CASE MANAGEMENT NOTE    Name and Relationship of Patient/Support Person: Kristina Kang A - Self    CCM Interim Update    Return call from patient this afternoon, verified patient by name and date of birth.    States that her blood pressures have been a fluctuating more than usual.  Stating most recent log with morning SBP range of 123-151 with evening SBP ranging from 106-167, forwarded to PCP for review.  Denies claim of N/V unusual H/A or pain.    Further stating that she needs refill of her Lyrica 75mg, stating that she only has one pill left.    Thorough chart review completed with patient with review of recent office visit with neurologist.  CarBAMazepine XR d/c'd .    Denies further needs.    ACM plans to follow up with PCP review and further recommendations with recent pressure log.    Plan: follow up 1-2 week  - pressure log  - need ACP  - further needs            Education Documentation  No documentation found.        Ariela COTA  Ambulatory Case Management    2/19/2024, 15:21 EST

## 2024-02-19 NOTE — PROGRESS NOTES
The Lyrica is prescribed through Dr. Izaguirre--- neurologist and I do not have a controlled substance contract with this patient on this medication to prescribe it  Her blood pressure is not consistently elevated.... My plan would be to increase her valsartan but with her readings at 106/62, 123/71... I am concerned about increasing that dose

## 2024-02-21 ENCOUNTER — OFFICE VISIT (OUTPATIENT)
Dept: NEUROSURGERY | Facility: CLINIC | Age: 64
End: 2024-02-21
Payer: COMMERCIAL

## 2024-02-21 VITALS
BODY MASS INDEX: 28.63 KG/M2 | TEMPERATURE: 98 F | OXYGEN SATURATION: 96 % | HEIGHT: 62 IN | DIASTOLIC BLOOD PRESSURE: 82 MMHG | WEIGHT: 155.6 LBS | HEART RATE: 76 BPM | SYSTOLIC BLOOD PRESSURE: 122 MMHG

## 2024-02-21 DIAGNOSIS — G50.0 TRIGEMINAL NEURALGIA OF RIGHT SIDE OF FACE: ICD-10-CM

## 2024-02-21 DIAGNOSIS — Z98.890 S/P CRANIOTOMY: ICD-10-CM

## 2024-02-21 DIAGNOSIS — G50.0 TRIGEMINAL NEURALGIA: Primary | ICD-10-CM

## 2024-02-21 RX ORDER — PREGABALIN 75 MG/1
75 CAPSULE ORAL 2 TIMES DAILY
Qty: 60 CAPSULE | Refills: 3 | Status: SHIPPED | OUTPATIENT
Start: 2024-02-21

## 2024-02-21 NOTE — TELEPHONE ENCOUNTER
Left a voice message for patient to call back about RX. It was recorded today patient was no longer taking Lyrica.

## 2024-02-28 ENCOUNTER — PATIENT OUTREACH (OUTPATIENT)
Dept: CASE MANAGEMENT | Facility: OTHER | Age: 64
End: 2024-02-28
Payer: COMMERCIAL

## 2024-02-28 ENCOUNTER — TELEPHONE (OUTPATIENT)
Dept: CASE MANAGEMENT | Facility: OTHER | Age: 64
End: 2024-02-28
Payer: COMMERCIAL

## 2024-02-28 DIAGNOSIS — G50.0 TRIGEMINAL NEURALGIA OF RIGHT SIDE OF FACE: ICD-10-CM

## 2024-02-28 DIAGNOSIS — I10 ESSENTIAL HYPERTENSION: Primary | ICD-10-CM

## 2024-02-28 NOTE — TELEPHONE ENCOUNTER
Thorough chart review today.  Seen by neurosurgeon on 2/21/24, CT scan from 2/15/24 completed by surgeon all findings are WNL and with expected changes.  No c/o trigeminal neuralgia symptoms no fluid within the mastoid air cells, and patient reported that all headaches and facial weakness have resolved.  Planned f/u was scheduled for 4 months, and Dr. Izaguirre refilled patient's Lyrica.  Close monitoring of pressures by PCP, as pressures are not consistently elevated but show frequent fluctuations.  ACM will continue to monitor.

## 2024-02-28 NOTE — OUTREACH NOTE
AMBULATORY CASE MANAGEMENT NOTE    Name and Relationship of Patient/Support Person: Kristina Kang A - Self    CCM Interim Update    Spoke with patient today, verified patient by name and date of birth.    States that she is at work and unable to provide pressure log.  States that she will call with log once she gets home.    Discussed recent neurosurgery visit and upcoming pcp visit, addressing open care gaps.    Plan: follow up 1-2 weeks  - BP log  - AWV schedule  - ACP updated in chart            Education Documentation  No documentation found.        Ariela COTA  Ambulatory Case Management    2/28/2024, 09:02 EST

## 2024-02-28 NOTE — OUTREACH NOTE
[facility] End of Month Documentation    This Chronic Medical Management Care Plan for Kristina Kang, 63 y.o. female, has been monitored and managed; reviewed; established and a new plan of care implemented for the month of February.  A cumulative time of 38  minutes was spent on this patient record this month, including phone call with patient; chart review; electronic communication with primary care provider, Blood pressure/ prescription refill/ chart review/ Care Gaps discussed along with upcoming appointment with PCP and neurosurgeon.    Regarding the patient's problems: has Degeneration of lumbar intervertebral disc; Hepatitis C; Essential hypertension; Hyperlipidemia; Vitamin D deficiency; Osteoarthritis; Episode of syncope; Amaurosis fugax of right eye; Osteopenia of both hips; Chronic seasonal allergic rhinitis; Hypocalciuric hypercalcemia; Vaginal atrophy; Generalized anxiety disorder; Abnormal MRI; Trigeminal neuralgia; Elevated hemoglobin A1c; Preop examination; Facial droop; and CSF rhinorrhea on their problem list., the following items were addressed: medical records; medications, Disease education and management/ continuity of care/ medication management/ infection prevention and any changes can be found within the plan section of the note.  A detailed listing of time spent for chronic care management is tracked within each outreach encounter.  Current medications include:  has a current medication list which includes the following prescription(s): aspirin, atorvastatin, carbamazepine xr, cholecalciferol, tylenol pm extra strength, folic acid, furosemide, latanoprost, methocarbamol, pregabalin, and valsartan. and the patient is reported to be patient is compliant with medication protocol,  Medications are reported to be effective.  Regarding these diagnoses, referrals were made to the following provider(s):  none.  All notes on chart for PCP to review.    The patient was monitored remotely for blood  pressure.    The patient's physical needs include:  physical healthcare.     The patient's mental support needs include:  coordination of community providers; continued support    The patient's cognitive support needs include:  not applicable    The patient's psychosocial support needs include:  not applicable    The patient's functional needs include: physical healthcare    The patient's environmental needs include:  not applicable, na    Care Plan overall comments:  No data recorded    Refer to previous outreach notes for more information on the areas listed above.    Monthly Billing Diagnoses  (I10) Essential hypertension    (G50.0) Trigeminal neuralgia of right side of face    Medications   Medications have been reconciled    Care Plan progress this month:      Recently Modified Care Plans Updates made since 1/28/2024 12:00 AM      No recently modified care plans.          Instructions   Patient was provided an electronic copy of care plan  CCM services were explained and offered and patient has accepted these services.  Patient has given their written consent to receive CCM services and understands that this includes the authorization of electronic communication of medical information with the other treating providers.  Patient understands that they may stop CCM services at any time and these changes will be effective at the end of the calendar month and will effectively revocate the agreement of CCM services.  Patient understands that only one practitioner can furnish and be paid for CCM services during one calendar month.  Patient also understands that there may be co-payment or deductible fees in association with CCM services.  Patient will continue with at least monthly follow-up calls with the Ambulatory .    Ariela COTA  Ambulatory Case Management    2/28/2024, 09:07 EST

## 2024-03-04 ENCOUNTER — TRANSCRIBE ORDERS (OUTPATIENT)
Dept: ADMINISTRATIVE | Facility: HOSPITAL | Age: 64
End: 2024-03-04
Payer: COMMERCIAL

## 2024-03-04 DIAGNOSIS — Z12.31 SCREENING MAMMOGRAM, ENCOUNTER FOR: Primary | ICD-10-CM

## 2024-03-07 ENCOUNTER — OFFICE VISIT (OUTPATIENT)
Dept: FAMILY MEDICINE CLINIC | Facility: CLINIC | Age: 64
End: 2024-03-07
Payer: COMMERCIAL

## 2024-03-07 ENCOUNTER — HOSPITAL ENCOUNTER (OUTPATIENT)
Dept: GENERAL RADIOLOGY | Facility: HOSPITAL | Age: 64
Discharge: HOME | End: 2024-03-07
Payer: COMMERCIAL

## 2024-03-07 VITALS
TEMPERATURE: 97.4 F | RESPIRATION RATE: 16 BRPM | OXYGEN SATURATION: 98 % | HEART RATE: 66 BPM | HEIGHT: 62 IN | WEIGHT: 155 LBS | SYSTOLIC BLOOD PRESSURE: 96 MMHG | DIASTOLIC BLOOD PRESSURE: 60 MMHG | BODY MASS INDEX: 28.52 KG/M2

## 2024-03-07 DIAGNOSIS — M79.605 LUMBAR PAIN WITH RADIATION DOWN LEFT LEG: ICD-10-CM

## 2024-03-07 DIAGNOSIS — I10 ESSENTIAL HYPERTENSION: ICD-10-CM

## 2024-03-07 DIAGNOSIS — Z78.0 POST-MENOPAUSAL: ICD-10-CM

## 2024-03-07 DIAGNOSIS — Z00.00 ROUTINE GENERAL MEDICAL EXAMINATION AT A HEALTH CARE FACILITY: Primary | ICD-10-CM

## 2024-03-07 DIAGNOSIS — M54.50 LUMBAR PAIN WITH RADIATION DOWN LEFT LEG: ICD-10-CM

## 2024-03-07 DIAGNOSIS — M25.552 BILATERAL HIP PAIN: ICD-10-CM

## 2024-03-07 DIAGNOSIS — M25.551 BILATERAL HIP PAIN: ICD-10-CM

## 2024-03-07 PROCEDURE — 72100 X-RAY EXAM L-S SPINE 2/3 VWS: CPT

## 2024-03-07 PROCEDURE — 73521 X-RAY EXAM HIPS BI 2 VIEWS: CPT

## 2024-03-07 RX ORDER — VALSARTAN 160 MG/1
160 TABLET ORAL DAILY
Qty: 90 TABLET | Refills: 1 | Status: SHIPPED | OUTPATIENT
Start: 2024-03-07 | End: 2024-03-07 | Stop reason: DRUGHIGH

## 2024-03-07 RX ORDER — VALSARTAN 80 MG/1
80 TABLET ORAL DAILY
Qty: 90 TABLET | Refills: 1 | Status: SHIPPED | OUTPATIENT
Start: 2024-03-07

## 2024-03-07 NOTE — PROGRESS NOTES
Subjective   Kristina Kang is a 63 y.o. female.     History of Present Illness   Kristina Kang 63 y.o. female who presents for an Annual Wellness Visit.  she has a history of   Patient Active Problem List   Diagnosis    Degeneration of lumbar intervertebral disc    Hepatitis C    Essential hypertension    Hyperlipidemia    Vitamin D deficiency    Osteoarthritis    Episode of syncope    Amaurosis fugax of right eye    Osteopenia of both hips    Chronic seasonal allergic rhinitis    Hypocalciuric hypercalcemia    Vaginal atrophy    Generalized anxiety disorder    Abnormal MRI    Trigeminal neuralgia    Elevated hemoglobin A1c    Preop examination    Facial droop    CSF rhinorrhea   .  she has been feeling well.   I  reviewed health maintenance with her as part of my preventative care plan.  Home [address]   sees DR GLENN De La Garza---glaucoma--on drops  Hyst  Engaged  Works in bank  Exercise by walking  Sees dentist regular  Smoke 10 yr ----5 pk yr hx;  quit 15 yrs ago  July 2016---Amaurosis Fugax---carotid doppler neg--neg work up---not reoccured.  I want her LDL <70 d/t this and on ASA 81mg  Hep C in remission---watch LFTs  Dr Avendaño--endocrine---monitors Ca+;  Hx hypercalcemia-----now off HCTZ and watch Ca+;  Also watch K+----saw DR Avendaño 11-13-23-----had labs and he noted  Calcium 10.4 mg/dl.  PTH 36 pg/ml.  Normal phosphorus  Vit D insufficient on vitamin D3 2000 units/day.  Increase vitamin D3 to 1000 units 3 capsules daily.  .  HDL 57.  LDL higher.  Increase Lipitor to 40 mg/day.  Prescription sent to pharmacy.  Reduce dietary fat and try to lose 5 lbs. Repeat CMP, vitamin D and lipid panel in 2 months.  Orders placed on chart.  Copy of labs sent to [facility].  Doing well off Zoloft and not having any anxiety issues---still off  GI Dr Malone---polyps --colon--f/u scope 1 yr----to do in June---this will be done this yr.----will do Aug  Estrace crm for atrophy  Had f/u Dr Morse---was 2-21-24--post op  ---f/u June  History of Present Illness: Kristina Kang is a 63 y.o. female is here today for follow-up. He is s/p revision of a right sided retrosigmoid craniotomy for microvascular decompression done on 12/7/23 and CSF rhinorrhea repair 12/27/23.  CT head was completed on 2/15/24.  She is doing well today.  No new complaints.  Denies any changes in the frequency or severity of her headaches.  Denies any changes in vision.  Denies any strokelike episodes.  Denies any changes in strength or sensation.  Has f/u Dr Izaguirre also ---neuro  5-14-24    DEXA 9-28-22 IMPRESSION:  1. Osteopenia.  2. When compared with the prior exam 12/30/2019, there has been no  statistically significant change in the bone mineral density of either  femoral neck.  3. The 10-year FRAX (World Health Organization) fracture risk for a  major osteoporotic fracture is 4.8% and for a hip fracture is 0.7%.  Notes from neuro Dr Izaguirre from 8-4-23     Ms. Kang reports that she has been experiencing numbness and tingling that starts in her left buttock and radiates down her left lower extremity. She notes that her left foot becomes weak while walking down steps, and she must stop because she cannot put weight on it. The patient states that lifting her left foot causes pain at the top of her ankle. She notes that she has a degenerative disc disease, and she experiences back pain. She denies any worsening of her back pain.    He ordered MRI lumbar----not done---I will order Xrays lumbar and bilat hips.  The following portions of the patient's history were reviewed and updated as appropriate: allergies, current medications, past family history, past medical history, past social history, past surgical history, and problem list.    Review of Systems   Constitutional:  Negative for diaphoresis.   HENT:  Negative for nosebleeds and trouble swallowing.    Eyes:  Negative for blurred vision and visual disturbance.   Respiratory:  Negative for choking.     Gastrointestinal:  Negative for blood in stool.   Musculoskeletal:  Positive for back pain.   Allergic/Immunologic: Negative for immunocompromised state.   Neurological:  Positive for numbness. Negative for facial asymmetry and speech difficulty.   Psychiatric/Behavioral:  Negative for self-injury and suicidal ideas.        Objective   Physical Exam  Vitals reviewed.   Constitutional:       General: She is not in acute distress.     Appearance: Normal appearance. She is well-developed. She is not diaphoretic.   HENT:      Head: Normocephalic and atraumatic. Hair is normal.      Right Ear: Hearing, tympanic membrane, ear canal and external ear normal. No decreased hearing noted. No drainage.      Left Ear: Hearing, tympanic membrane, ear canal and external ear normal. No decreased hearing noted.      Nose: Nose normal. No nasal deformity.      Mouth/Throat:      Mouth: Mucous membranes are moist.   Eyes:      General: Lids are normal.         Right eye: No discharge.         Left eye: No discharge.      Extraocular Movements: Extraocular movements intact.      Conjunctiva/sclera: Conjunctivae normal.      Pupils: Pupils are equal, round, and reactive to light.   Neck:      Thyroid: No thyromegaly.      Vascular: No JVD.      Trachea: No tracheal deviation.   Cardiovascular:      Rate and Rhythm: Normal rate and regular rhythm.      Pulses: Normal pulses.      Heart sounds: Normal heart sounds. No murmur heard.     No friction rub. No gallop.   Pulmonary:      Effort: Pulmonary effort is normal. No respiratory distress.      Breath sounds: Normal breath sounds. No wheezing or rales.   Chest:      Chest wall: No tenderness.   Abdominal:      General: Bowel sounds are normal. There is no distension.      Palpations: Abdomen is soft. There is no mass.      Tenderness: There is no abdominal tenderness. There is no guarding or rebound.      Hernia: No hernia is present.   Musculoskeletal:         General: No tenderness  or deformity. Normal range of motion.      Cervical back: Normal range of motion and neck supple.   Lymphadenopathy:      Cervical: No cervical adenopathy.   Skin:     General: Skin is warm and dry.      Findings: No erythema or rash.   Neurological:      Mental Status: She is alert and oriented to person, place, and time.      Cranial Nerves: No cranial nerve deficit.      Motor: No abnormal muscle tone.      Coordination: Coordination normal.      Deep Tendon Reflexes: Reflexes are normal and symmetric. Reflexes normal.   Psychiatric:         Mood and Affect: Mood normal.         Behavior: Behavior normal.         Thought Content: Thought content normal.         Judgment: Judgment normal.           Assessment & Plan   Diagnoses and all orders for this visit:    1. Routine general medical examination at a health care facility (Primary)    2. Lumbar pain with radiation down left leg  -     XR Spine Lumbar 2 or 3 View; Future  -     XR Hips Bilateral With or Without Pelvis 2 View; Future  -     XR Hips Bilateral With or Without Pelvis 2 View  -     XR Spine Lumbar 2 or 3 View    3. Bilateral hip pain  -     XR Spine Lumbar 2 or 3 View; Future  -     XR Hips Bilateral With or Without Pelvis 2 View; Future  -     XR Hips Bilateral With or Without Pelvis 2 View  -     XR Spine Lumbar 2 or 3 View    4. Post-menopausal  -     DEXA Bone Density Axial; Future    Other orders  -     Pneumococcal Conjugate Vaccine 20-Valent All      She does have primary hypertension and I suspect with her pain from trigeminal neuralgia now ablated blood pressure is a bit low and will decrease valsartan to 80 mg daily goal is to have systolic blood pressure at least 115 and less than 140  Prevnar 20 today;  get RSV at pharmacy  Xray lumbar and hips--refer to physical therapy for further evaluation of lumbar pain with left-sided sciatica  Has follow-up with neurology and neurosurgery for the trigeminal neuralgia-----now postop  Followed by  endocrinology for the hypercalcemia--Dr Avendaño  Bone density due in September  Has seen dermatology in the past Dr. Miley Gee  Low glycemic index diet  Exercise 30 minutes most days of the week  Make sure you get results on any labs or tests we ordered today  We discussed medications and how to take them as prescribed  Sleep 6-8 hours each night if possible  If you have not signed up for [employer], please activate your code ASAP from your After Visit Summary today    LDL goal <100  LDL goal if heart disease <70  HDL goal >60  Triglyceride goal <150  BP goal =<130/80  Fasting glucose <100

## 2024-03-07 NOTE — PROGRESS NOTES
Immunization  Immunization performed in Right deltoid by Christine Calderon. Patient tolerated the procedure well without complications.  03/07/24   Christine Calderon

## 2024-03-13 ENCOUNTER — TELEPHONE (OUTPATIENT)
Dept: CASE MANAGEMENT | Facility: OTHER | Age: 64
End: 2024-03-13
Payer: COMMERCIAL

## 2024-03-18 ENCOUNTER — PATIENT OUTREACH (OUTPATIENT)
Dept: CASE MANAGEMENT | Facility: OTHER | Age: 64
End: 2024-03-18
Payer: COMMERCIAL

## 2024-03-18 DIAGNOSIS — G50.0 TRIGEMINAL NEURALGIA OF RIGHT SIDE OF FACE: ICD-10-CM

## 2024-03-18 DIAGNOSIS — I10 ESSENTIAL HYPERTENSION: Primary | ICD-10-CM

## 2024-03-18 NOTE — OUTREACH NOTE
AMBULATORY CASE MANAGEMENT NOTE    Name and Relationship of Patient/Support Person: Kristina Kang A - Self    CCM Interim Update    Call to patient this morning, verified by name and date of birth.    Stating that she was seen by PCP, reviewed recent appointments along with imaging.    AWV completed, pressures were low and Losartan was degreased.  States that she will start keeping a log of her pressures for ACM for review over the next couple of weeks.    States that she is still having left side low back and hip pain.   Continued that she did start walking this past weekend.      Stating that facial weakness is still slightly noticeable to her along with a persistent dry mouth.  She has been accidentally biting the right side of her cheek.  Further stating that she does have a dental appointment next week.      Care Coordination    ACM did encourage patient to continue walking, importance of rotating use of dry heat for 15 - 20 min., along with use of ice and Diclofenac to assist with inflammation and pain.  Patient was agreeable, and will call if it doesn't seem to help.    May possibly want to go in a direction of OP therapy and TENS use.    Encouraging patient to continue her facial exercises and discussed surgical nerve damage is not unusual and reassured patient that it was still very early after surgery.  Patient is agreeable to continue to monitor, denying any worsening of numbness.      ACM advising to keep teeth brushed well, drink plenty of water, chew on the left side vise the right, importance of using dry mouth spray or mouth wash and making dentist aware of dry mouth symptoms.    Plan: follow up 2-3 weeks  - BP log  - check on pain  - ACP        Education Documentation  Medication Management, taught by Ariela Romo RN at 3/18/2024 10:45 AM.  Learner: Patient  Readiness: Acceptance  Method: Teach Back  Response: Verbalizes Understanding    Blood Pressure Monitoring, taught by Ariela Romo RN at  3/18/2024 10:45 AM.  Learner: Patient  Readiness: Acceptance  Method: Teach Back  Response: Verbalizes Understanding          Ariela COTA  Ambulatory Case Management    3/18/2024, 10:26 EDT

## 2024-03-22 ENCOUNTER — PATIENT OUTREACH (OUTPATIENT)
Dept: CASE MANAGEMENT | Facility: OTHER | Age: 64
End: 2024-03-22
Payer: COMMERCIAL

## 2024-03-22 DIAGNOSIS — G50.0 TRIGEMINAL NEURALGIA OF RIGHT SIDE OF FACE: ICD-10-CM

## 2024-03-22 DIAGNOSIS — I10 ESSENTIAL HYPERTENSION: Primary | ICD-10-CM

## 2024-03-22 NOTE — OUTREACH NOTE
AMBULATORY CASE MANAGEMENT NOTE    Name and Relationship of Patient/Support Person: Jorge Luis Kristina A - Self    CCM Interim Update    Received call from patient this morning.    States that she has been congested with runny nose with clear drainage.    Denies fever, but did have a headache yesterday on one side of her head, which has resolved.    ACM reviewed current medications with patient.  No new medications added.  Discussed worsening symptoms and when to call or schedule an appointment with PCP.  Discussed use of zyrtec OTC, patient was agreeable.        Education Documentation  No documentation found.        Ariela COTA  Ambulatory Case Management    3/22/2024, 09:33 EDT

## 2024-03-29 ENCOUNTER — PATIENT OUTREACH (OUTPATIENT)
Dept: CASE MANAGEMENT | Facility: OTHER | Age: 64
End: 2024-03-29
Payer: COMMERCIAL

## 2024-03-29 DIAGNOSIS — G50.0 TRIGEMINAL NEURALGIA OF RIGHT SIDE OF FACE: ICD-10-CM

## 2024-03-29 DIAGNOSIS — I10 ESSENTIAL HYPERTENSION: Primary | ICD-10-CM

## 2024-04-01 ENCOUNTER — APPOINTMENT (OUTPATIENT)
Dept: CT IMAGING | Facility: HOSPITAL | Age: 64
End: 2024-04-01

## 2024-04-01 ENCOUNTER — PATIENT OUTREACH (OUTPATIENT)
Dept: CASE MANAGEMENT | Facility: OTHER | Age: 64
End: 2024-04-01
Payer: COMMERCIAL

## 2024-04-01 ENCOUNTER — HOSPITAL ENCOUNTER (OUTPATIENT)
Dept: CARDIOLOGY | Facility: HOSPITAL | Age: 64
Discharge: HOME | End: 2024-04-01
Admitting: PHYSICIAN ASSISTANT

## 2024-04-01 VITALS
HEIGHT: 63 IN | BODY MASS INDEX: 27.46 KG/M2 | WEIGHT: 155 LBS | SYSTOLIC BLOOD PRESSURE: 115 MMHG | DIASTOLIC BLOOD PRESSURE: 73 MMHG | HEART RATE: 65 BPM

## 2024-04-01 DIAGNOSIS — I10 ESSENTIAL HYPERTENSION: Primary | ICD-10-CM

## 2024-04-01 DIAGNOSIS — G50.0 TRIGEMINAL NEURALGIA: ICD-10-CM

## 2024-04-01 LAB
BH CV VAS BP LEFT ARM: NORMAL MMHG
BH CV VAS BP RIGHT ARM: NORMAL MMHG
BH CV VAS SCREENING CAROTID CCA LEFT: NORMAL CM/SEC
BH CV VAS SCREENING CAROTID CCA RIGHT: NORMAL CM/SEC
BH CV VAS SCREENING CAROTID ICA LEFT: NORMAL CM/SEC
BH CV VAS SCREENING CAROTID ICA RIGHT: NORMAL CM/SEC
BH CV XLRA MEAS LEFT ICA/CCA RATIO: 1.18
BH CV XLRA MEAS LEFT PROX ECA PSV: NORMAL CM/SEC
BH CV XLRA MEAS RIGHT ICA/CCA RATIO: 0.92
BH CV XLRA MEAS RIGHT PROX ECA PSV: NORMAL CM/SEC
MAXIMAL PREDICTED HEART RATE: 157 BPM
STRESS TARGET HR: 133 BPM

## 2024-04-01 PROCEDURE — 93799 UNLISTED CV SVC/PROCEDURE: CPT

## 2024-04-01 NOTE — OUTREACH NOTE
AMBULATORY CASE MANAGEMENT NOTE    Name and Relationship of Patient/Support Person: Kristina Kang A - Self    CCM Interim Update    Spoke with patient this morning, verified by name and date of birth.    States congestion has improved, taking Zyrtec nightly.    Denies headaches, stating systolic blood pressures in 113-115 range.    Seen by vascular for screening today.  Received a negative screen.    No further needs at this time.      Care Coordination    ACM reviewed recent outreaches and goals.  Patient is agreeable with continued monitoring at this time.  Will schedule AWV on next outreach and further discuss ACP.    Plan: follow up 2-3 weeks  - chart review  - further needs  - ACP?  - schedule AWV              Education Documentation  No documentation found.        Ariela COTA  Ambulatory Case Management    4/1/2024, 10:17 EDT

## 2024-05-14 ENCOUNTER — OFFICE VISIT (OUTPATIENT)
Dept: NEUROLOGY | Facility: CLINIC | Age: 64
End: 2024-05-14
Payer: COMMERCIAL

## 2024-05-14 VITALS
OXYGEN SATURATION: 97 % | DIASTOLIC BLOOD PRESSURE: 68 MMHG | BODY MASS INDEX: 29.41 KG/M2 | SYSTOLIC BLOOD PRESSURE: 114 MMHG | WEIGHT: 166 LBS | HEART RATE: 57 BPM | HEIGHT: 63 IN

## 2024-05-14 DIAGNOSIS — G50.0 TRIGEMINAL NEURALGIA: Primary | ICD-10-CM

## 2024-05-14 PROCEDURE — 99213 OFFICE O/P EST LOW 20 MIN: CPT | Performed by: STUDENT IN AN ORGANIZED HEALTH CARE EDUCATION/TRAINING PROGRAM

## 2024-05-14 RX ORDER — PREGABALIN 50 MG/1
50 CAPSULE ORAL SEE ADMIN INSTRUCTIONS
Qty: 9 CAPSULE | Refills: 0 | Status: SHIPPED | OUTPATIENT
Start: 2024-05-14 | End: 2024-05-21

## 2024-05-14 NOTE — PROGRESS NOTES
Chief Complaint   Patient presents with    Trigeminal Neuralgia       Patient ID: Kristina Kang is a 63 y.o. female.    HPI:  Interval history:  The patient presents today for follow-up. She was given instructions to go down on the carbamazepine until she is off of the medication and she remains on pregabalin 75 mg twice a day. She had surgery and hopefully this has resolved her symptoms. We are going  to discuss going off pregabalin today.     The patient reports being pain-free while off carbamazepine and is currently on Lyrica. She experiences intermittent fluttering sensations on the right side of her face, akin to a tickling sensation, which are not associated with pain. She had right sided facial pain that is possibly  trigeminal neuralgia and it was treated with medication, pregabalin and the carbamazepine. The medication helped some but did not help enough. Despite her having breakthrough pain on 2 medications she continues to experience some weakness on the right side of her face, particularly when chewing her cheek. She sought dental consultation for a routine check-up, who suggested that her bite might be off due to her surgery. She has a follow-up appointment with Dr. Beckwith next month. She perceives a slight weakness on the left side of her cheek, which she initially perceived as normal. However, she reports a different sensation in her cheek, under her eye, and around her eye. She has some possible numbness to her face due to the surgery. Post-surgery, she noted a weakening of her left side, which seemed to improve with the prescribed exercises.    The following portions of the patient's history were reviewed and updated as appropriate: allergies, current medications, past family history, past medical history, past social history, past surgical history and problem list.        Review of Systems   Neurological:  Negative for dizziness, tremors, seizures, syncope, facial asymmetry, speech difficulty,  weakness, light-headedness, numbness and headaches.          Vitals:    24 0828   BP: 114/68   Pulse: 57   SpO2: 97%       Neurologic Exam     Mental Status   Speech: speech is normal   Level of consciousness: alert    Cranial Nerves     CN II   Visual fields full to confrontation.     CN III, IV, VI   Pupils are equal, round, and reactive to light.  Extraocular motions are normal.     CN V   Facial sensation intact.     CN VIII   Hearing: intact    CN IX, X   Palate: symmetric    CN XI   Right trapezius strength: normal  Left trapezius strength: normal    CN XII   Tongue: not atrophic  Fasciculations: absent  Tongue deviation: none  No facial pain on palpation on right or left side. Decreased vibratory sense on right cheek compared to the left     Motor Exam   Muscle bulk: normal    Strength   Right deltoid: 5/5  Left deltoid: 5/5  Right biceps: 5/5  Left biceps: 5/5  Right triceps: 5/5  Left triceps: 5/5  Right iliopsoas: 5/5  Left iliopsoas: 5/5  Right quadriceps: 5/5  Left quadriceps: 5/5  Right hamstrin/5  Left hamstrin/5  Right anterior tibial: 5/5  Left anterior tibial: 5/5  Right gastroc: 5/5  Left gastroc: 5/5Grip 5 out of 5 bilaterally     Sensory Exam   Right arm light touch: normal  Left arm light touch: normal  Right leg light touch: normal  Left leg light touch: normal    Gait, Coordination, and Reflexes     Coordination   Finger to nose coordination: normal  Heel to shin coordination: normal      Physical Exam  Eyes:      Extraocular Movements: EOM normal.      Pupils: Pupils are equal, round, and reactive to light.   Neurological:      Coordination: Finger-Nose-Finger Test and Heel to Shin Test normal.   Psychiatric:         Speech: Speech normal.       Procedures    Assessment/Plan:      1. Trigeminal neuralgia.  The patient's pain appears to have resolved post-surgery. She reported experiencing numbness, accompanied by decreased sensation and vibration, which I was able to elicit  today through tuning fork testing on her right cheek compared to her left cheek. I hypothesized that this could be a post-surgical effect of her issue, but she should consult with Dr. Morse regarding this. I noted the potential for the nerves to regenerate, however, it remains uncertain. Sheconcurred that the facial pain had improved compared to prior to the surgery. The patient's condition remains stable, and her prescription medication is managed today. Her (MDM) level is 3. We will attempt to discontinue pregabalin. She will take pregabalin 50 mg twice daily for 3 days, then 50 mg nightly for 3 days, and then discontinue.    Follow-up  The patient is scheduled for a follow-up visit in 6 months to monitor for recurrent pain, and it is hoped that she will not experience any further issues.       Diagnoses and all orders for this visit:    1. Trigeminal neuralgia (Primary)    Other orders  -     Discontinue: pregabalin (LYRICA) 50 MG capsule; Take 1 capsule by mouth See Admin Instructions for 6 days. Take 1 capsule twice a day for 3 days, then 1 capsule nightly for 3 days then stop.  Dispense: 9 capsule; Refill: 0           Layton Izaguirre MD        Transcribed from ambient dictation for Layton Izaguirre MD by Preeti Diaz.  05/14/24   09:58 EDT    Patient or patient representative verbalized consent to the visit recording.  I have personally performed the services described in this document as transcribed by the above individual, and it is both accurate and complete.  Layton Izaguirre MD  5/31/2024  06:09 EDT

## 2024-05-14 NOTE — LETTER
May 14, 2024       No Recipients    Patient: Kristina Kang   YOB: 1960   Date of Visit: 5/14/2024     Dear Joanne Maurice PA-C:       Thank you for referring Kristina Kang to me for evaluation. Below are the relevant portions of my assessment and plan of care.    If you have questions, please do not hesitate to call me. I look forward to following Kristina along with you.         Sincerely,        Layton Izaguirre MD        CC:   No Recipients    Layton Izaguirre MD  05/14/24 0901  Sign when Signing Visit  Chief Complaint   Patient presents with   • Trigeminal Neuralgia       Patient ID: Kristina Kang is a 63 y.o. female.    HPI:    The patient presents today for follow-up. She was given instructions to go down on the carbamazepine until she is off of the medication and she remains on pregabalin 75 mg twice a day. She had surgery and hopefully this has resolved her symptoms. We are going  to discuss going off pregabalin today. She has given verbal consent for this conversation be recorded today.    The patient reports being pain-free while off carbamazepine and is currently on Lyrica. She experiences intermittent fluttering sensations on the right side of her face, akin to a tickling sensation, which are not associated with pain. She had right sided facial pain that is possibly  trigeminal neuralgia and it was treated with medication, pregabalin and the carbamazepine. The medication helped some but did not help enough. Despite her having breakthrough pain on 2 medications she continues to experience some weakness on the right side of her face, particularly when chewing her cheek. She sought dental consultation for a routine check-up, who suggested that her bite might be off due to her surgery. She has a follow-up appointment with Dr. Beckwith next month. She perceives a slight weakness on the left side of her cheek, which she initially perceived as normal. However, she reports a different sensation in her  cheek, under her eye, and around her eye. She has some possible numbness to her face due to the surgery. Post-surgery, she noted a weakening of her left side, which seemed to improve with the prescribed exercises.    The following portions of the patient's history were reviewed and updated as appropriate: allergies, current medications, past family history, past medical history, past social history, past surgical history and problem list.    Interval history:    Review of Systems   Neurological:  Negative for dizziness, tremors, seizures, syncope, facial asymmetry, speech difficulty, weakness, light-headedness, numbness and headaches.        There were no vitals filed for this visit.    Neurologic Exam     Mental Status   Speech: speech is normal   Level of consciousness: alert    Cranial Nerves     CN II   Visual fields full to confrontation.     CN III, IV, VI   Pupils are equal, round, and reactive to light.  Extraocular motions are normal.     CN V   Facial sensation intact.     CN VIII   Hearing: intact    CN IX, X   Palate: symmetric    CN XI   Right trapezius strength: normal  Left trapezius strength: normal    CN XII   Tongue: not atrophic  Fasciculations: absent  Tongue deviation: none  No facial pain on palpation on right or left side. Decreased vibratory sense on right cheek compared to the left     Motor Exam   Muscle bulk: normal    Strength   Right deltoid: 5/5  Left deltoid: 5/5  Right biceps: 5/5  Left biceps: 5/5  Right triceps: 5/5  Left triceps: 5/5  Right iliopsoas: 5/5  Left iliopsoas: 5/5  Right quadriceps: 5/5  Left quadriceps: 5/5  Right hamstrin/5  Left hamstrin/5  Right anterior tibial: 5/5  Left anterior tibial: 5/5  Right gastroc: 5/5  Left gastroc: 5/5Grip 5 out of 5 bilaterally     Sensory Exam   Right arm light touch: normal  Left arm light touch: normal  Right leg light touch: normal  Left leg light touch: normal    Gait, Coordination, and Reflexes     Coordination   Finger to  "nose coordination: normal  Heel to shin coordination: normal    Physical Exam  Eyes:      Extraocular Movements: EOM normal.      Pupils: Pupils are equal, round, and reactive to light.   Neurological:      Coordination: Finger-Nose-Finger Test and Heel to Shin Test normal.   Psychiatric:         Speech: Speech normal.     Procedures    Assessment/Plan:      1. Trigeminal neuralgia.  The patient's pain appears to have resolved post-surgery. She reported experiencing numbness, accompanied by decreased sensation and vibration, which I was able to elicit today through tuning fork testing on her right cheek compared to her left cheek. I hypothesized that this could be a post-surgical effect of her issue, and she should consult with Dr. Beckwith regarding this. I noted the potential for the nerves to regenerate, however, it remains uncertain. Both discussions were held and concurred that the facial pain had improved compared to prior to the surgery. The patient's condition remains stable, and her prescription medication is managed today. Her [place] Cognitive Assessment (MDM) level is 3. We will attempt to discontinue pregabalin. She will take pregabalin 50 mg twice daily for 3 days, then 50 mg nightly for 3 days, and then discontinue.    Follow-up  The patient is scheduled for a follow-up visit in 6 months to monitor for recurrent pain, and it is hoped that she will not experience any further issues.       There are no diagnoses linked to this encounter.       Sheree Jaimes MA        Transcribed from ambient dictation for Layton Izaguirre MD by Preeti Diaz.  05/14/24   09:58 EDT    {JEN Provider Statement:[id number]::\"Patient or patient representative verbalized consent to the visit recording.\",\"I have personally performed the services described in this document as transcribed by the above individual, and it is both accurate and complete.\"}  "

## 2024-05-21 ENCOUNTER — OFFICE VISIT (OUTPATIENT)
Dept: ENDOCRINOLOGY | Age: 64
End: 2024-05-21
Payer: COMMERCIAL

## 2024-05-21 VITALS
HEIGHT: 63 IN | HEART RATE: 64 BPM | TEMPERATURE: 97.4 F | OXYGEN SATURATION: 95 % | SYSTOLIC BLOOD PRESSURE: 136 MMHG | DIASTOLIC BLOOD PRESSURE: 80 MMHG | WEIGHT: 164.4 LBS | BODY MASS INDEX: 29.13 KG/M2

## 2024-05-21 DIAGNOSIS — E78.2 MIXED HYPERLIPIDEMIA: ICD-10-CM

## 2024-05-21 DIAGNOSIS — R73.09 ELEVATED HEMOGLOBIN A1C: ICD-10-CM

## 2024-05-21 DIAGNOSIS — E83.52 HYPOCALCIURIC HYPERCALCEMIA: Primary | ICD-10-CM

## 2024-05-21 DIAGNOSIS — E55.9 VITAMIN D DEFICIENCY: ICD-10-CM

## 2024-05-21 DIAGNOSIS — I10 ESSENTIAL HYPERTENSION: ICD-10-CM

## 2024-05-21 PROCEDURE — 99214 OFFICE O/P EST MOD 30 MIN: CPT | Performed by: INTERNAL MEDICINE

## 2024-05-21 RX ORDER — MAGNESIUM 200 MG
TABLET ORAL
COMMUNITY

## 2024-05-21 NOTE — PROGRESS NOTES
Luis Kang is a 63 y.o. female.     History of Present Illness     Her serum calcium has ranged from 10.7-11.2 with the upper limit of normal of 10.5 mg per DL. She had a bone density done at [facility] in December 2015 which showed osteopenia.  Bone density done in December 2017 showed stable osteopenia compared to 2015.  Serum calcium was at 10.5 mg per DL in February 2021     Bone density done in September 2022 showed osteopenia without significant change since December 2019.  Her 10-year risk of major osteoporotic fracture is 4.8% and of hip fracture is 0.7%.     She has history of vitamin D deficiency and has been taking vitamin D 2000 units/day.  She has no previous history of kidney stones, peptic ulcer, or depression. She denies muscle weakness.          There is no family history of hypercalcemia or nephrolithiasis.     Workup done in January 2017 are as follows: Serum calcium at 11.0 mg per DL.  Intact PTH is normal at 42 pg per mL.  Undetectable PTH RP.  25-hydroxy vitamin D is 28 ng per mL.  24 urine calcium is low at 31.2 mg     Repeat 24-hour urine collection done after vitamin D repletion in 4/18 showed low calcium at 57 mg per 24 hours.  Calcium to creatinine clearance ratio is low at 0.004.     She has elevated hemoglobin A1c since 2023.  She has no history of gestational diabetes.  Hemoglobin A1c done in April 2023 is 5.8% with an elevated fasting glucose of 108 mg per DL.  She had coffee with creamer at 5:30 AM      She has history of hypertension and has been on furosemide 20  mg once a day and valsartan 160 mg/day.  Lisinopril was discontinued because of a dry cough.  No chest pain, shortness of breath or pedal edema.      She has hyperlipidemia and is on Lipitor 20 mg once a day. She denies any myalgia.. She has no history of diabetes mellitus.  She has gained 19 lbs since 11/23.      She had an episode of transient visual loss on the right eye in July 2016 thought to  "be due to amaurosis fugax. Carotid ultrasound was normal. She is on aspirin and Lipitor.  She has no recurrence of visual loss since.  She had an eye examination in 9/23 and was started on medication for glaucoma.     She has right trigeminal neuralgia which resolved after craniotomy for nerve decompression done by Dr. Morse.  She was taken off carbamazepine and pregabalin.      She had colon polyps removed by Dr. Malone in April 2019.  She had colonoscopy in May 2020 and 20 polyps were removed by Dr. Malone.  She had colonoscopy in June 2021 and had multiple polyps removed.  She had 8 polyps removed by colonoscopy in June 2022 by Dr. Malone.  She was advised to have a colonoscopy in 2024.  She denies bowel complaints.     She is adopted and does not know her family history.  Her children are age 40 and younger and have not had a colonoscopy.  She denies bowel complaints.     She has stopped walking for exercise.  She works as manager in a bank.    The following portions of the patient's history were reviewed and updated as appropriate: allergies, current medications, past family history, past medical history, past social history, past surgical history, and problem list.    Review of Systems   Eyes:  Negative for visual disturbance.   Respiratory:  Negative for shortness of breath and wheezing.    Cardiovascular:  Negative for chest pain and palpitations.   Gastrointestinal: Negative.    Genitourinary: Negative.    Musculoskeletal:  Negative for myalgias.   Neurological:  Negative for numbness.     Vitals:    05/21/24 0934   BP: 136/80   Pulse: 64   Temp: 97.4 °F (36.3 °C)   TempSrc: Temporal   SpO2: 95%   Weight: 74.6 kg (164 lb 6.4 oz)   Height: 160 cm (62.99\")      Objective   Physical Exam  Constitutional:       General: She is not in acute distress.     Appearance: She is not ill-appearing or toxic-appearing.   Eyes:      General: No scleral icterus.        Right eye: No discharge.         Left eye: " No discharge.      Extraocular Movements: Extraocular movements intact.   Neck:      Vascular: No carotid bruit.   Cardiovascular:      Rate and Rhythm: Normal rate and regular rhythm.      Heart sounds: Normal heart sounds.   Pulmonary:      Breath sounds: Normal breath sounds. No rales.   Chest:      Chest wall: No tenderness.   Abdominal:      Palpations: Abdomen is soft.      Tenderness: There is no right CVA tenderness or left CVA tenderness.   Musculoskeletal:      Right lower leg: No edema.      Left lower leg: No edema.   Lymphadenopathy:      Cervical: No cervical adenopathy.   Neurological:      Mental Status: She is alert and oriented to person, place, and time.       Orders Only on 01/26/2024   Component Date Value Ref Range Status    PTH, Intact 01/26/2024 61  15 - 65 pg/mL Final    25 Hydroxy, Vitamin D 01/26/2024 33.7  30.0 - 100.0 ng/mL Final    Comment: Vitamin D deficiency has been defined by the [place] of  Medicine and an Endocrine Society practice guideline as a  level of serum 25-OH vitamin D less than 20 ng/mL (1,2).  The Endocrine Society went on to further define vitamin D  insufficiency as a level between 21 and 29 ng/mL (2).  1. IOM ([place] of [employer]). 2010. Dietary reference     intakes for calcium and D. [place]: The     National Academies Press.  2. Jerod MF, Rafa NC, Qi HILTON, et al.     Evaluation, treatment, and prevention of vitamin D     deficiency: an Endocrine Society clinical practice     guideline. JCEM. 2011 Jul; 96(7):1911-30.      Total Cholesterol 01/26/2024 165  100 - 199 mg/dL Final    Triglycerides 01/26/2024 94  0 - 149 mg/dL Final    HDL Cholesterol 01/26/2024 75  >39 mg/dL Final    VLDL Cholesterol Alexis 01/26/2024 17  5 - 40 mg/dL Final    LDL Chol Calc ([facility]) 01/26/2024 73  0 - 99 mg/dL Final    Glucose 01/26/2024 86  70 - 99 mg/dL Final    BUN 01/26/2024 14  8 - 27 mg/dL Final    Creatinine 01/26/2024 0.96  0.57 - 1.00 mg/dL Final    EGFR  Result 01/26/2024 66  >59 mL/min/1.73 Final    BUN/Creatinine Ratio 01/26/2024 15  12 - 28 Final    Sodium 01/26/2024 145 (H)  134 - 144 mmol/L Final    Potassium 01/26/2024 4.7  3.5 - 5.2 mmol/L Final    Chloride 01/26/2024 106  96 - 106 mmol/L Final    Total CO2 01/26/2024 26  20 - 29 mmol/L Final    Calcium 01/26/2024 10.3  8.7 - 10.3 mg/dL Final    Total Protein 01/26/2024 6.7  6.0 - 8.5 g/dL Final    Albumin 01/26/2024 4.5  3.9 - 4.9 g/dL Final    Globulin 01/26/2024 2.2  1.5 - 4.5 g/dL Final    A/G Ratio 01/26/2024 2.0  1.2 - 2.2 Final    Total Bilirubin 01/26/2024 <0.2  0.0 - 1.2 mg/dL Final    Alkaline Phosphatase 01/26/2024 100  44 - 121 IU/L Final    AST (SGOT) 01/26/2024 17  0 - 40 IU/L Final    ALT (SGPT) 01/26/2024 11  0 - 32 IU/L Final    Interpretation 01/26/2024 Note   Final    Supplemental report is available.     Assessment & Plan   Diagnoses and all orders for this visit:    1. Hypocalciuric hypercalcemia (Primary)  -     Comprehensive Metabolic Panel  -     PTH, Intact  -     Phosphorus  -     Calcium, Ionized  -     Vitamin D,25-Hydroxy    2. Vitamin D deficiency  -     Vitamin D,25-Hydroxy    3. Elevated hemoglobin A1c  -     Comprehensive Metabolic Panel  -     Hemoglobin A1c    4. Essential hypertension  -     Comprehensive Metabolic Panel    5. Mixed hyperlipidemia  -     Comprehensive Metabolic Panel  -     Lipid Panel  -     TSH Rfx On Abnormal To Free T4      Patient has hypercalciuric hypercalcemia.  No treatment is required.    Patient has osteopenia.  Continue vitamin D3 2000 units/day.  Advised to walk for 30 minutes 3-4 times a week.  Repeat bone density in September 2024.    Continue valsartan 160 mg/day furosemide 20 mg/day.  Will defer blood pressure control to PCP.    Continue Lipitor 20 mg/day.    Continue aspirin.    Follow-up colonoscopy with Dr. Malone later this year.    Copy of my note sent to SAM Mahoney, Dr. Kendall Jaimes, Dr. Izaguirre, and Dr. Lew Malone.    RT 6  mos.

## 2024-05-22 LAB
25(OH)D3+25(OH)D2 SERPL-MCNC: 38.6 NG/ML (ref 30–100)
ALBUMIN SERPL-MCNC: 4.4 G/DL (ref 3.9–4.9)
ALBUMIN/GLOB SERPL: 1.6 {RATIO} (ref 1.2–2.2)
ALP SERPL-CCNC: 100 IU/L (ref 44–121)
ALT SERPL-CCNC: 9 IU/L (ref 0–32)
AST SERPL-CCNC: 16 IU/L (ref 0–40)
BILIRUB SERPL-MCNC: 0.3 MG/DL (ref 0–1.2)
BUN SERPL-MCNC: 9 MG/DL (ref 8–27)
BUN/CREAT SERPL: 10 (ref 12–28)
CA-I SERPL ISE-MCNC: 5.6 MG/DL (ref 4.5–5.6)
CALCIUM SERPL-MCNC: 10.8 MG/DL (ref 8.7–10.3)
CHLORIDE SERPL-SCNC: 107 MMOL/L (ref 96–106)
CHOLEST SERPL-MCNC: 133 MG/DL (ref 100–199)
CO2 SERPL-SCNC: 22 MMOL/L (ref 20–29)
CREAT SERPL-MCNC: 0.9 MG/DL (ref 0.57–1)
EGFRCR SERPLBLD CKD-EPI 2021: 72 ML/MIN/1.73
GLOBULIN SER CALC-MCNC: 2.7 G/DL (ref 1.5–4.5)
GLUCOSE SERPL-MCNC: 88 MG/DL (ref 70–99)
HBA1C MFR BLD: 5.8 % (ref 4.8–5.6)
HDLC SERPL-MCNC: 39 MG/DL
IMP & REVIEW OF LAB RESULTS: NORMAL
LDLC SERPL CALC-MCNC: 70 MG/DL (ref 0–99)
PHOSPHATE SERPL-MCNC: 3 MG/DL (ref 3–4.3)
POTASSIUM SERPL-SCNC: 4.3 MMOL/L (ref 3.5–5.2)
PROT SERPL-MCNC: 7.1 G/DL (ref 6–8.5)
PTH-INTACT SERPL-MCNC: 45 PG/ML (ref 15–65)
SODIUM SERPL-SCNC: 144 MMOL/L (ref 134–144)
TRIGL SERPL-MCNC: 133 MG/DL (ref 0–149)
TSH SERPL DL<=0.005 MIU/L-ACNC: 0.52 UIU/ML (ref 0.45–4.5)
VLDLC SERPL CALC-MCNC: 24 MG/DL (ref 5–40)

## 2024-05-27 NOTE — PROGRESS NOTES
Calcium mildly elevated at 10.8 mg per DL.  Normal ionized calcium.  Intact PTH 45 pg per mL.  Normal serum phosphorus.  Normal 25-hydroxy vitamin D.  Continue vitamin D3 2000 units/day.  Hemoglobin A1c 5.8%.  Hemoglobin A1c elevated but not in diabetic range.  Patient has prediabetes.  LDL 70.  HDL 39.  Continue Lipitor 20 mg/day.  Normal TSH.  Normal thyroid function tests.  Copy of labs sent to SAM Mahoney and [facility].

## 2024-05-30 DIAGNOSIS — E78.2 MIXED HYPERLIPIDEMIA: Primary | ICD-10-CM

## 2024-05-30 RX ORDER — ATORVASTATIN CALCIUM 40 MG/1
40 TABLET, FILM COATED ORAL DAILY
Qty: 90 TABLET | Refills: 2 | Status: SHIPPED | OUTPATIENT
Start: 2024-05-30

## 2024-06-10 NOTE — PROGRESS NOTES
Subjective   History of Present Illness: Kristina Kang is a 63 y.o. female is here today for follow-up she is s/p revision of a right sided retrosigmoid craniotomy for microvascular decompression  and CSF rhinorrhea repair 23.  She is doing well today.  No new complaints.  Denies any changes in the frequency or severity of her headaches.  Denies any changes in vision.  Denies any strokelike episodes.  Denies any changes in strength or sensation.  Denies any clear drainage from her nose    History of Present Illness    The following portions of the patient's history were reviewed and updated as appropriate: allergies, current medications, past family history, past medical history, past social history, past surgical history, and problem list.    Past Medical History:   Diagnosis Date    Allergic     Anemia     Colon polyp     Degeneration of lumbar intervertebral disc     Episode of syncope 2015    Essential hypertension     Glaucoma     Hepatitis C 2008    Dr. Coleman Null    History of bone density study     Normal    History of chest x-ray 2010    normal    History of echocardiogram 2015    History of EKG 2013    normal    History of Holter monitoring 2015    occas PVC    History of nuclear stress test 10/06/2015    LCG; ischemia    Hypercalcemia     Hyperlipidemia     Leiomyoma of uterus     and fibroids    Osteoarthritis     Osteopenia     Postmenopausal     Pyelonephritis     Trigeminal neuralgia     Vitamin D deficiency         Past Surgical History:   Procedure Laterality Date     SECTION      COLONOSCOPY      COLONOSCOPY W/ POLYPECTOMY      Benign polyps.  Dr. Malone    CRANIECTOMY N/A 2023    Procedure: CRANIECTOMY for CSF rhinorrhea repair;  Surgeon: Tripp Morse MD;  Location: [facility];  Service: Neurosurgery;  Laterality: N/A;    CRANIOTOMY FOR NERVE DECOMPRESSION Right 2023    Procedure: RIGHT SIDED RETROSIGMOID  CRANIOTOMY FOR MICROVASCULAR DECOMPRESSION;  Surgeon: Tripp Morse MD;  Location: [facility] OR;  Service: Neurosurgery;  Laterality: Right;    HYSTERECTOMY  02/2004    took one ovary     LIVER BIOPSY      OOPHORECTOMY Bilateral age 33    left 1/4 of the right           Current Outpatient Medications:     aspirin 81 MG EC tablet, Take 1 tablet by mouth Daily., Disp: , Rfl:     atorvastatin (LIPITOR) 40 MG tablet, Take 1 tablet by mouth Daily. For cholesterol  Indications: High Amount of Fats in the Blood, Disp: 90 tablet, Rfl: 2    cholecalciferol (VITAMIN D3) 25 MCG (1000 UT) tablet, 3 tablets daily (Patient taking differently: Take 1 tablet by mouth 3 (Three) Times a Day. 3 tablets daily), Disp: 270 tablet, Rfl: 2    Cyanocobalamin (Vitamin B-12) 1000 MCG sublingual tablet, Place  under the tongue., Disp: , Rfl:     folic acid (FOLVITE) 400 MCG tablet, Take 1 tablet by mouth Daily., Disp: 90 tablet, Rfl: 4    furosemide (LASIX) 20 MG tablet, Take 1 tablet by mouth Daily. Indications: High Blood Pressure Disorder, Disp: 90 tablet, Rfl: 1    latanoprost (XALATAN) 0.005 % ophthalmic solution, Administer 1 drop to both eyes Every Night. Indications: Wide-Angle Glaucoma, Disp: , Rfl:     valsartan (Diovan) 80 MG tablet, Take 1 tablet by mouth Daily. For bp, Disp: 90 tablet, Rfl: 1     No Known Allergies     Social History     Socioeconomic History    Marital status:    Tobacco Use    Smoking status: Former     Current packs/day: 0.50     Average packs/day: 0.5 packs/day for 10.0 years (5.0 ttl pk-yrs)     Types: Cigarettes    Smokeless tobacco: Former     Quit date: 2011   Vaping Use    Vaping status: Never Used   Substance and Sexual Activity    Alcohol use: Not Currently     Comment: Seldom    Drug use: No    Sexual activity: Yes     Partners: Male     Birth control/protection: Post-menopausal, None     Comment: Hysterectomy        Family History   Adopted: Yes   Problem Relation Age of Onset    Malcindy  "Hyperthermia Neg Hx         Review of Systems   Eyes:  Negative for visual disturbance.   Neurological:  Positive for headaches (Slight right sided  HA's). Negative for dizziness, speech difficulty and numbness.   All other systems reviewed and are negative.      Objective     Vitals:    24 1511   BP: 114/66   Pulse: 79   Temp: 97.2 °F (36.2 °C)   SpO2: 97%   Weight: 75.4 kg (166 lb 3.2 oz)   Height: 160 cm (63\")     Body mass index is 29.44 kg/m².      Physical Exam  Neurologic Exam  Awake, alert, oriented x3  Pupils equal round reactive to light  Extraocular muscles intact  Face symmetric  Speech is fluent and clear  No pronator drift  Motor exam  Bilateral deltoids 5/5, bilateral biceps 5/5, bilateral triceps 5/5, bilateral wrist extension 5/5 bilateral hand  5/5  Bilateral hip flexion 5/5, bilateral knee extension 5/5, bilateral DF/PF 5/5  No clonus  No Terry's reflex  Steady normal gait                Assessment & Plan   Independent Review of Radiographic Studies:      I personally reviewed the images from the following studies.  CT head without contrast from February 15, 2024  The follow-up CT images were reviewed and show no evidence of infarct, no evidence of hemorrhage, no new fluid collections    Medical Decision Makin-year-old female s/p revision of a right sided retro-sigmoid craniectomy for occlusion of the mastoid air cells and treatment of CSF rhinorrhea  -She is doing well today.  No evidence of recurrence of her CSF rhinorrhea.  Denies any changes in the frequency or severity of her headaches.  Her facial symmetry has returned to normal.  She denies any recurrence of her trigeminal nerve pain  -Follow-up as needed with any recurrence of her trigeminal nerve pain or clear drainage from her nose  Diagnoses and all orders for this visit:    1. Trigeminal neuralgia (Primary)    2. CSF rhinorrhea    3. S/P craniotomy      Return if symptoms worsen or fail to improve.         " [] : negative sitting straight leg raise

## 2024-06-13 ENCOUNTER — TELEPHONE (OUTPATIENT)
Dept: CASE MANAGEMENT | Facility: OTHER | Age: 64
End: 2024-06-13
Payer: COMMERCIAL

## 2024-06-13 NOTE — TELEPHONE ENCOUNTER
Chart review completed today, Patient has remained compliant with all care and treatments.  Remains stable A1C 5.8 and HDL dropped from 75 to 39, being monitored closely by PCP and further providers.

## 2024-06-14 ENCOUNTER — TELEPHONE (OUTPATIENT)
Dept: NEUROSURGERY | Facility: CLINIC | Age: 64
End: 2024-06-14
Payer: COMMERCIAL

## 2024-06-17 ENCOUNTER — OFFICE VISIT (OUTPATIENT)
Dept: NEUROSURGERY | Facility: CLINIC | Age: 64
End: 2024-06-17
Payer: COMMERCIAL

## 2024-06-17 VITALS
TEMPERATURE: 97.2 F | SYSTOLIC BLOOD PRESSURE: 114 MMHG | WEIGHT: 166.2 LBS | BODY MASS INDEX: 29.45 KG/M2 | HEIGHT: 63 IN | DIASTOLIC BLOOD PRESSURE: 66 MMHG | OXYGEN SATURATION: 97 % | HEART RATE: 79 BPM

## 2024-06-17 DIAGNOSIS — Z98.890 S/P CRANIOTOMY: ICD-10-CM

## 2024-06-17 DIAGNOSIS — G50.0 TRIGEMINAL NEURALGIA: Primary | ICD-10-CM

## 2024-06-17 DIAGNOSIS — G96.01 CSF RHINORRHEA: ICD-10-CM

## 2024-06-17 PROCEDURE — 99024 POSTOP FOLLOW-UP VISIT: CPT | Performed by: NEUROLOGICAL SURGERY

## 2024-07-16 ENCOUNTER — TELEPHONE (OUTPATIENT)
Dept: CASE MANAGEMENT | Facility: OTHER | Age: 64
End: 2024-07-16
Payer: COMMERCIAL

## 2024-07-16 NOTE — TELEPHONE ENCOUNTER
Chart review completed today.  Continues to remain compliant with all care.  Seen by Neurosurgery for follow up studies.  CT images showed no new evidence of hemorrhage, infarcts or fluid collections.  Patient continues to do well and she has had improvement of facial symmetry.  No further recurrence of trigeminal nerve pain or headaches.  We will move to graduate.

## 2024-07-29 RX ORDER — VALSARTAN 80 MG/1
80 TABLET ORAL DAILY
Qty: 90 TABLET | Refills: 0 | Status: SHIPPED | OUTPATIENT
Start: 2024-07-29

## 2024-08-26 DIAGNOSIS — I10 ESSENTIAL HYPERTENSION: ICD-10-CM

## 2024-08-26 RX ORDER — FUROSEMIDE 20 MG
20 TABLET ORAL DAILY
Qty: 90 TABLET | Refills: 1 | Status: SHIPPED | OUTPATIENT
Start: 2024-08-26

## 2024-08-26 NOTE — TELEPHONE ENCOUNTER
Rx Refill Note  Requested Prescriptions     Pending Prescriptions Disp Refills    furosemide (LASIX) 20 MG tablet [Pharmacy Med Name: FUROSEMIDE 20 MG TABLET] 90 tablet 1     Sig: TAKE 1 TABLET BY MOUTH DAILY FOR HIGH BLOOD PRESSURE      Last office visit with prescribing clinician: 5/21/2024   Last telemedicine visit with prescribing clinician: Visit date not found   Next office visit with prescribing clinician: 11/21/2024                         Would you like a call back once the refill request has been completed: [] Yes [] No    If the office needs to give you a call back, can they leave a voicemail: [] Yes [] No    Preeti June  08/26/24, 11:31 EDT

## 2024-09-09 ENCOUNTER — OFFICE VISIT (OUTPATIENT)
Dept: FAMILY MEDICINE CLINIC | Facility: CLINIC | Age: 64
End: 2024-09-09
Payer: COMMERCIAL

## 2024-09-09 VITALS
BODY MASS INDEX: 27.64 KG/M2 | RESPIRATION RATE: 16 BRPM | DIASTOLIC BLOOD PRESSURE: 80 MMHG | HEIGHT: 63 IN | OXYGEN SATURATION: 95 % | HEART RATE: 84 BPM | SYSTOLIC BLOOD PRESSURE: 118 MMHG | TEMPERATURE: 97.5 F | WEIGHT: 156 LBS

## 2024-09-09 DIAGNOSIS — J30.2 CHRONIC SEASONAL ALLERGIC RHINITIS: ICD-10-CM

## 2024-09-09 DIAGNOSIS — I10 ESSENTIAL HYPERTENSION: Primary | ICD-10-CM

## 2024-09-09 DIAGNOSIS — E78.2 MIXED HYPERLIPIDEMIA: ICD-10-CM

## 2024-09-09 DIAGNOSIS — G45.3 AMAUROSIS FUGAX OF RIGHT EYE: ICD-10-CM

## 2024-09-09 DIAGNOSIS — E83.52 HYPOCALCIURIC HYPERCALCEMIA: ICD-10-CM

## 2024-09-09 DIAGNOSIS — E55.9 VITAMIN D DEFICIENCY: ICD-10-CM

## 2024-09-09 PROCEDURE — 99214 OFFICE O/P EST MOD 30 MIN: CPT | Performed by: PHYSICIAN ASSISTANT

## 2024-09-09 RX ORDER — VALSARTAN 80 MG/1
80 TABLET ORAL DAILY
Qty: 90 TABLET | Refills: 1 | Status: SHIPPED | OUTPATIENT
Start: 2024-09-09

## 2024-09-09 NOTE — PROGRESS NOTES
"Subjective   Kristina Kang is a 63 y.o. female.     Hypertension  Associated symptoms include anxiety. Pertinent negatives include no blurred vision, chest pain, palpitations or shortness of breath.   Hyperlipidemia  Pertinent negatives include no chest pain or shortness of breath.   Anxiety   Patient reports no chest pain, palpitations, shortness of breath or suicidal ideas.       Since the last visit, she has overall felt well.  She has Primary Hypertension and well controlled on current medication, Hyperlipidemia with goals met with current Rx, and Vitamin D deficiency and labs are at goal >30 ng/mL.  she has been compliant with current medications have reviewed them.  The patient denies medication side effects.  Will refill medications. /76   Pulse 84   Temp 97.5 °F (36.4 °C)   Resp 16   Ht 160 cm (63\")   Wt 70.8 kg (156 lb)   LMP  (LMP Unknown)   SpO2 95%   BMI 27.63 kg/m² .  Weight down----trying    Lowered her valsartan dose last visit due to hypotension on 3/7/2024 visit  Has been to Dr. Carole Gee in the past for dermatology  Had vascular screening on 4/1/2024 and no plaque in the right or left carotid artery.  Some walking  Results for orders placed or performed in visit on 05/21/24   Comprehensive Metabolic Panel    Specimen: Blood    BLOOD   Result Value Ref Range    Glucose 88 70 - 99 mg/dL    BUN 9 8 - 27 mg/dL    Creatinine 0.90 0.57 - 1.00 mg/dL    EGFR Result 72 >59 mL/min/1.73    BUN/Creatinine Ratio 10 (L) 12 - 28    Sodium 144 134 - 144 mmol/L    Potassium 4.3 3.5 - 5.2 mmol/L    Chloride 107 (H) 96 - 106 mmol/L    Total CO2 22 20 - 29 mmol/L    Calcium 10.8 (H) 8.7 - 10.3 mg/dL    Total Protein 7.1 6.0 - 8.5 g/dL    Albumin 4.4 3.9 - 4.9 g/dL    Globulin 2.7 1.5 - 4.5 g/dL    A/G Ratio 1.6 1.2 - 2.2    Total Bilirubin 0.3 0.0 - 1.2 mg/dL    Alkaline Phosphatase 100 44 - 121 IU/L    AST (SGOT) 16 0 - 40 IU/L    ALT (SGPT) 9 0 - 32 IU/L   PTH, Intact    Specimen: Blood    " BLOOD   Result Value Ref Range    PTH, Intact 45 15 - 65 pg/mL   Phosphorus    Specimen: Blood    BLOOD   Result Value Ref Range    Phosphorus 3.0 3.0 - 4.3 mg/dL   Calcium, Ionized    Specimen: Blood    BLOOD   Result Value Ref Range    Ionized Calcium 5.6 4.5 - 5.6 mg/dL   Vitamin D,25-Hydroxy    Specimen: Blood    BLOOD   Result Value Ref Range    25 Hydroxy, Vitamin D 38.6 30.0 - 100.0 ng/mL   Hemoglobin A1c    Specimen: Blood    BLOOD   Result Value Ref Range    Hemoglobin A1C 5.8 (H) 4.8 - 5.6 %   Lipid Panel    Specimen: Blood    BLOOD   Result Value Ref Range    Total Cholesterol 133 100 - 199 mg/dL    Triglycerides 133 0 - 149 mg/dL    HDL Cholesterol 39 (L) >39 mg/dL    VLDL Cholesterol Alexis 24 5 - 40 mg/dL    LDL Chol Calc (NIH) 70 0 - 99 mg/dL   TSH Rfx On Abnormal To Free T4    Specimen: Blood    BLOOD   Result Value Ref Range    TSH 0.524 0.450 - 4.500 uIU/mL   Cardiovascular Risk Assessment    BLOOD   Result Value Ref Range    Interpretation Note      Saw neurosurgery  for Trigeminal Neuralgia----  she is s/p revision of a right sided retrosigmoid craniotomy for microvascular decompression  and CSF rhinorrhea repair 12/27/23     Saw Dr Avendaño 5-21-24---endocrine---  She has history of colon polyps and has a colonoscopy screening set up this Friday with   Repeat bone density in September 2024.---has this Sept 30 and mammo---has Osteopenia  Labs done 5-231-24  Calcium mildly elevated at 10.8 mg per DL.  Normal ionized calcium.  Intact PTH 45 pg per mL.  Normal serum phosphorus.  Normal 25-hydroxy vitamin D.  Continue vitamin D3 2000 units/day.  Hemoglobin A1c 5.8%.  Hemoglobin A1c elevated but not in diabetic range.  Patient has prediabetes.  LDL 70.  HDL 39.  Continue Lipitor 40 mg/day.  Normal TSH.  Normal thyroid function tests.  Copy of labs sent to [facility] and to patient through [facility].   Tapered off Lyrica ----was taking for trigeminal neuralgia through neurology   Smoke 10 yr  ----5 pk yr hx;  quit 15 yrs ago  July 2016---Amaurosis Fugax---carotid doppler neg--neg work up---not reoccured.  I want her LDL <70 d/t this and on ASA 81mg  Hep C in remission---watch LFTs  Doing okay off Zoloft for anxiety  Ms. Kang reports that she has been experiencing numbness and tingling that starts in her left buttock and radiates down her left lower extremity. She notes that her left foot becomes weak while walking down steps, and she must stop because she cannot put weight on it. The patient states that lifting her left foot causes pain at the top of her ankle. She notes that she has a degenerative disc disease, and she experiences back pain. She denies any worsening of her back pain.    He ordered MRI lumbar-  Had x-rays of bilateral hips which is mild osteoarthritis this was done on 3/7/2024 and same date had lumbar spine x-rays which degenerative changes.  FINDINGS: No compression deformity. There is to space narrowing at L4-5  and to the greatest degree at L5-S1 where there is anterior osteophyte  formation. There is lower lumbar facet hypertrophy seen. There is 8 mm  (grade 1) of anterior listhesis of L4 on L5 without associated pars  defect.     CONCLUSION: Degenerative change as described above.  - NORMAL ECG -  Sinus rhythm  No previous ECG available for comparison  Electronically Signed By: Jg Salinas ([facility]) 30-Aug-2023 11:43:02  Date and Time of Study: 2023-08-29 08:26:46  The following portions of the patient's history were reviewed and updated as appropriate: allergies, current medications, past family history, past medical history, past social history, past surgical history, and problem list.    Review of Systems   Constitutional:  Negative for diaphoresis.   HENT:  Negative for nosebleeds and trouble swallowing.    Eyes:  Negative for blurred vision and visual disturbance.   Respiratory:  Negative for choking and shortness of breath.    Cardiovascular:  Negative for chest pain and  palpitations.   Gastrointestinal:  Negative for blood in stool.   Musculoskeletal:  Positive for back pain.   Allergic/Immunologic: Negative for immunocompromised state.   Neurological:  Negative for facial asymmetry and speech difficulty.   Psychiatric/Behavioral:  Negative for self-injury and suicidal ideas.        Objective   Physical Exam  Vitals and nursing note reviewed.   Constitutional:       General: She is not in acute distress.     Appearance: Normal appearance. She is well-developed. She is not ill-appearing or toxic-appearing.   HENT:      Head: Normocephalic.      Right Ear: External ear normal.      Left Ear: External ear normal.      Nose: Nose normal.      Mouth/Throat:      Pharynx: Oropharynx is clear.   Eyes:      General: No scleral icterus.     Conjunctiva/sclera: Conjunctivae normal.      Pupils: Pupils are equal, round, and reactive to light.   Neck:      Thyroid: No thyromegaly.      Vascular: No carotid bruit.   Cardiovascular:      Rate and Rhythm: Normal rate and regular rhythm.      Pulses: Normal pulses.      Heart sounds: Normal heart sounds. No murmur heard.  Pulmonary:      Effort: Pulmonary effort is normal. No respiratory distress.      Breath sounds: Normal breath sounds. No rales.   Musculoskeletal:         General: No deformity. Normal range of motion.      Cervical back: Normal range of motion and neck supple.      Right lower leg: No edema.      Left lower leg: No edema.   Skin:     General: Skin is warm and dry.      Findings: No rash.   Neurological:      General: No focal deficit present.      Mental Status: She is alert and oriented to person, place, and time. Mental status is at baseline.   Psychiatric:         Mood and Affect: Mood normal.         Behavior: Behavior normal.         Thought Content: Thought content normal.         Judgment: Judgment normal.           Assessment & Plan   Diagnoses and all orders for this visit:    1. Essential hypertension (Primary)    2.  Mixed hyperlipidemia    3. Vitamin D deficiency    4. Chronic seasonal allergic rhinitis    5. Amaurosis fugax of right eye  Comments:  stay on ASA and statin    6. Hypocalciuric hypercalcemia  Comments:  per endocrine    Other orders  -     valsartan (DIOVAN) 80 MG tablet; Take 1 tablet by mouth Daily. for blood pressure  Dispense: 90 tablet; Refill: 1      Plan, Kristina Kang, was seen today.  she was seen for HTN and continue medication, Hyperlipidemia and will continue current medication, and Vitamin D deficiency and supplemented.  Released from neurosurgery she is no longer having trigeminal neuralgia pain since surgery  Will have her set up MRI lumbar spine--no more sciatica---still has back pain  Ok Flonase--- resume for chronic allergic rhinitis   Continue folic acid and next labs due in January we will see if endocrine will order and on Vit D  Sees DR GLENN De La Garza for glaucoma  Due to history of amaurosis fugax stay on low-dose aspirin 81 mg and statin with LDL goal less than =70 met  Answers submitted by the patient for this visit:  Primary Reason for Visit (Submitted on 9/2/2024)  What is the primary reason for your visit?: High Blood Pressure

## 2024-09-11 VITALS
RESPIRATION RATE: 21 BRPM | HEART RATE: 78 BPM | HEART RATE: 64 BPM | HEART RATE: 74 BPM | HEART RATE: 71 BPM | DIASTOLIC BLOOD PRESSURE: 70 MMHG | SYSTOLIC BLOOD PRESSURE: 136 MMHG | OXYGEN SATURATION: 100 % | DIASTOLIC BLOOD PRESSURE: 53 MMHG | HEART RATE: 75 BPM | SYSTOLIC BLOOD PRESSURE: 90 MMHG | OXYGEN SATURATION: 99 % | RESPIRATION RATE: 25 BRPM | DIASTOLIC BLOOD PRESSURE: 61 MMHG | DIASTOLIC BLOOD PRESSURE: 53 MMHG | DIASTOLIC BLOOD PRESSURE: 70 MMHG | HEART RATE: 69 BPM | SYSTOLIC BLOOD PRESSURE: 90 MMHG | DIASTOLIC BLOOD PRESSURE: 72 MMHG | HEART RATE: 71 BPM | RESPIRATION RATE: 16 BRPM | RESPIRATION RATE: 25 BRPM | HEART RATE: 64 BPM | TEMPERATURE: 97.5 F | RESPIRATION RATE: 25 BRPM | SYSTOLIC BLOOD PRESSURE: 125 MMHG | HEIGHT: 63 IN | RESPIRATION RATE: 23 BRPM | HEART RATE: 68 BPM | SYSTOLIC BLOOD PRESSURE: 87 MMHG | OXYGEN SATURATION: 100 % | SYSTOLIC BLOOD PRESSURE: 125 MMHG | DIASTOLIC BLOOD PRESSURE: 51 MMHG | HEART RATE: 75 BPM | OXYGEN SATURATION: 100 % | RESPIRATION RATE: 20 BRPM | DIASTOLIC BLOOD PRESSURE: 54 MMHG | OXYGEN SATURATION: 98 % | OXYGEN SATURATION: 98 % | HEART RATE: 75 BPM | DIASTOLIC BLOOD PRESSURE: 51 MMHG | HEART RATE: 78 BPM | DIASTOLIC BLOOD PRESSURE: 79 MMHG | RESPIRATION RATE: 27 BRPM | RESPIRATION RATE: 15 BRPM | DIASTOLIC BLOOD PRESSURE: 47 MMHG | RESPIRATION RATE: 19 BRPM | RESPIRATION RATE: 20 BRPM | TEMPERATURE: 97.5 F | DIASTOLIC BLOOD PRESSURE: 61 MMHG | RESPIRATION RATE: 16 BRPM | HEART RATE: 74 BPM | DIASTOLIC BLOOD PRESSURE: 70 MMHG | OXYGEN SATURATION: 99 % | HEART RATE: 69 BPM | RESPIRATION RATE: 25 BRPM | HEART RATE: 78 BPM | DIASTOLIC BLOOD PRESSURE: 61 MMHG | OXYGEN SATURATION: 98 % | HEART RATE: 68 BPM | RESPIRATION RATE: 23 BRPM | DIASTOLIC BLOOD PRESSURE: 47 MMHG | DIASTOLIC BLOOD PRESSURE: 79 MMHG | DIASTOLIC BLOOD PRESSURE: 49 MMHG | RESPIRATION RATE: 16 BRPM | SYSTOLIC BLOOD PRESSURE: 87 MMHG | RESPIRATION RATE: 21 BRPM | HEART RATE: 64 BPM | SYSTOLIC BLOOD PRESSURE: 126 MMHG | HEART RATE: 71 BPM | RESPIRATION RATE: 27 BRPM | SYSTOLIC BLOOD PRESSURE: 90 MMHG | DIASTOLIC BLOOD PRESSURE: 61 MMHG | OXYGEN SATURATION: 100 % | DIASTOLIC BLOOD PRESSURE: 46 MMHG | RESPIRATION RATE: 24 BRPM | SYSTOLIC BLOOD PRESSURE: 145 MMHG | DIASTOLIC BLOOD PRESSURE: 54 MMHG | HEART RATE: 74 BPM | HEART RATE: 67 BPM | HEART RATE: 78 BPM | SYSTOLIC BLOOD PRESSURE: 79 MMHG | RESPIRATION RATE: 16 BRPM | DIASTOLIC BLOOD PRESSURE: 70 MMHG | OXYGEN SATURATION: 99 % | SYSTOLIC BLOOD PRESSURE: 145 MMHG | HEART RATE: 67 BPM | DIASTOLIC BLOOD PRESSURE: 46 MMHG | HEIGHT: 63 IN | RESPIRATION RATE: 24 BRPM | DIASTOLIC BLOOD PRESSURE: 46 MMHG | SYSTOLIC BLOOD PRESSURE: 125 MMHG | DIASTOLIC BLOOD PRESSURE: 51 MMHG | SYSTOLIC BLOOD PRESSURE: 79 MMHG | DIASTOLIC BLOOD PRESSURE: 72 MMHG | DIASTOLIC BLOOD PRESSURE: 53 MMHG | RESPIRATION RATE: 25 BRPM | HEART RATE: 64 BPM | SYSTOLIC BLOOD PRESSURE: 125 MMHG | SYSTOLIC BLOOD PRESSURE: 88 MMHG | DIASTOLIC BLOOD PRESSURE: 53 MMHG | SYSTOLIC BLOOD PRESSURE: 126 MMHG | SYSTOLIC BLOOD PRESSURE: 94 MMHG | DIASTOLIC BLOOD PRESSURE: 72 MMHG | RESPIRATION RATE: 15 BRPM | HEART RATE: 68 BPM | SYSTOLIC BLOOD PRESSURE: 87 MMHG | HEART RATE: 75 BPM | SYSTOLIC BLOOD PRESSURE: 94 MMHG | DIASTOLIC BLOOD PRESSURE: 79 MMHG | HEART RATE: 74 BPM | DIASTOLIC BLOOD PRESSURE: 79 MMHG | SYSTOLIC BLOOD PRESSURE: 136 MMHG | TEMPERATURE: 97.5 F | DIASTOLIC BLOOD PRESSURE: 51 MMHG | SYSTOLIC BLOOD PRESSURE: 126 MMHG | SYSTOLIC BLOOD PRESSURE: 90 MMHG | HEART RATE: 69 BPM | SYSTOLIC BLOOD PRESSURE: 126 MMHG | DIASTOLIC BLOOD PRESSURE: 53 MMHG | WEIGHT: 160 LBS | HEART RATE: 64 BPM | SYSTOLIC BLOOD PRESSURE: 94 MMHG | HEART RATE: 69 BPM | HEART RATE: 75 BPM | RESPIRATION RATE: 27 BRPM | WEIGHT: 160 LBS | OXYGEN SATURATION: 99 % | SYSTOLIC BLOOD PRESSURE: 94 MMHG | HEART RATE: 67 BPM | WEIGHT: 160 LBS | DIASTOLIC BLOOD PRESSURE: 46 MMHG | HEIGHT: 63 IN | HEART RATE: 71 BPM | DIASTOLIC BLOOD PRESSURE: 61 MMHG | SYSTOLIC BLOOD PRESSURE: 126 MMHG | OXYGEN SATURATION: 97 % | HEART RATE: 64 BPM | HEART RATE: 71 BPM | DIASTOLIC BLOOD PRESSURE: 49 MMHG | RESPIRATION RATE: 21 BRPM | DIASTOLIC BLOOD PRESSURE: 47 MMHG | RESPIRATION RATE: 27 BRPM | RESPIRATION RATE: 20 BRPM | DIASTOLIC BLOOD PRESSURE: 79 MMHG | HEART RATE: 75 BPM | OXYGEN SATURATION: 97 % | SYSTOLIC BLOOD PRESSURE: 88 MMHG | HEART RATE: 70 BPM | SYSTOLIC BLOOD PRESSURE: 87 MMHG | RESPIRATION RATE: 20 BRPM | DIASTOLIC BLOOD PRESSURE: 47 MMHG | DIASTOLIC BLOOD PRESSURE: 47 MMHG | RESPIRATION RATE: 21 BRPM | OXYGEN SATURATION: 100 % | SYSTOLIC BLOOD PRESSURE: 145 MMHG | HEART RATE: 71 BPM | RESPIRATION RATE: 16 BRPM | SYSTOLIC BLOOD PRESSURE: 145 MMHG | HEART RATE: 67 BPM | DIASTOLIC BLOOD PRESSURE: 61 MMHG | DIASTOLIC BLOOD PRESSURE: 49 MMHG | TEMPERATURE: 97.5 F | DIASTOLIC BLOOD PRESSURE: 49 MMHG | DIASTOLIC BLOOD PRESSURE: 56 MMHG | DIASTOLIC BLOOD PRESSURE: 79 MMHG | SYSTOLIC BLOOD PRESSURE: 136 MMHG | OXYGEN SATURATION: 97 % | DIASTOLIC BLOOD PRESSURE: 56 MMHG | HEART RATE: 69 BPM | OXYGEN SATURATION: 99 % | RESPIRATION RATE: 27 BRPM | HEART RATE: 78 BPM | TEMPERATURE: 97.5 F | RESPIRATION RATE: 15 BRPM | HEART RATE: 69 BPM | DIASTOLIC BLOOD PRESSURE: 70 MMHG | HEART RATE: 74 BPM | HEART RATE: 67 BPM | RESPIRATION RATE: 19 BRPM | OXYGEN SATURATION: 100 % | RESPIRATION RATE: 24 BRPM | DIASTOLIC BLOOD PRESSURE: 56 MMHG | RESPIRATION RATE: 19 BRPM | SYSTOLIC BLOOD PRESSURE: 145 MMHG | RESPIRATION RATE: 15 BRPM | SYSTOLIC BLOOD PRESSURE: 94 MMHG | OXYGEN SATURATION: 98 % | DIASTOLIC BLOOD PRESSURE: 70 MMHG | HEART RATE: 75 BPM | SYSTOLIC BLOOD PRESSURE: 79 MMHG | OXYGEN SATURATION: 99 % | OXYGEN SATURATION: 98 % | DIASTOLIC BLOOD PRESSURE: 54 MMHG | DIASTOLIC BLOOD PRESSURE: 53 MMHG | RESPIRATION RATE: 16 BRPM | RESPIRATION RATE: 24 BRPM | SYSTOLIC BLOOD PRESSURE: 88 MMHG | OXYGEN SATURATION: 97 % | SYSTOLIC BLOOD PRESSURE: 90 MMHG | RESPIRATION RATE: 24 BRPM | WEIGHT: 160 LBS | RESPIRATION RATE: 21 BRPM | HEART RATE: 68 BPM | HEART RATE: 70 BPM | DIASTOLIC BLOOD PRESSURE: 79 MMHG | HEART RATE: 74 BPM | HEART RATE: 64 BPM | SYSTOLIC BLOOD PRESSURE: 88 MMHG | DIASTOLIC BLOOD PRESSURE: 46 MMHG | DIASTOLIC BLOOD PRESSURE: 46 MMHG | DIASTOLIC BLOOD PRESSURE: 49 MMHG | HEART RATE: 70 BPM | SYSTOLIC BLOOD PRESSURE: 79 MMHG | DIASTOLIC BLOOD PRESSURE: 49 MMHG | OXYGEN SATURATION: 99 % | OXYGEN SATURATION: 97 % | HEART RATE: 68 BPM | RESPIRATION RATE: 15 BRPM | SYSTOLIC BLOOD PRESSURE: 87 MMHG | SYSTOLIC BLOOD PRESSURE: 136 MMHG | DIASTOLIC BLOOD PRESSURE: 72 MMHG | OXYGEN SATURATION: 97 % | HEART RATE: 74 BPM | DIASTOLIC BLOOD PRESSURE: 56 MMHG | SYSTOLIC BLOOD PRESSURE: 125 MMHG | RESPIRATION RATE: 20 BRPM | SYSTOLIC BLOOD PRESSURE: 79 MMHG | TEMPERATURE: 97.5 F | HEART RATE: 70 BPM | RESPIRATION RATE: 25 BRPM | RESPIRATION RATE: 21 BRPM | HEART RATE: 71 BPM | HEART RATE: 70 BPM | HEART RATE: 70 BPM | DIASTOLIC BLOOD PRESSURE: 51 MMHG | RESPIRATION RATE: 20 BRPM | SYSTOLIC BLOOD PRESSURE: 145 MMHG | OXYGEN SATURATION: 98 % | SYSTOLIC BLOOD PRESSURE: 125 MMHG | DIASTOLIC BLOOD PRESSURE: 51 MMHG | SYSTOLIC BLOOD PRESSURE: 136 MMHG | RESPIRATION RATE: 23 BRPM | RESPIRATION RATE: 16 BRPM | HEIGHT: 63 IN | DIASTOLIC BLOOD PRESSURE: 54 MMHG | WEIGHT: 160 LBS | HEART RATE: 68 BPM | OXYGEN SATURATION: 100 % | HEIGHT: 63 IN | RESPIRATION RATE: 24 BRPM | DIASTOLIC BLOOD PRESSURE: 54 MMHG | SYSTOLIC BLOOD PRESSURE: 94 MMHG | RESPIRATION RATE: 19 BRPM | DIASTOLIC BLOOD PRESSURE: 72 MMHG | DIASTOLIC BLOOD PRESSURE: 49 MMHG | RESPIRATION RATE: 23 BRPM | DIASTOLIC BLOOD PRESSURE: 54 MMHG | RESPIRATION RATE: 23 BRPM | HEART RATE: 78 BPM | SYSTOLIC BLOOD PRESSURE: 126 MMHG | SYSTOLIC BLOOD PRESSURE: 79 MMHG | SYSTOLIC BLOOD PRESSURE: 90 MMHG | RESPIRATION RATE: 19 BRPM | RESPIRATION RATE: 15 BRPM | RESPIRATION RATE: 21 BRPM | RESPIRATION RATE: 20 BRPM | SYSTOLIC BLOOD PRESSURE: 136 MMHG | RESPIRATION RATE: 24 BRPM | RESPIRATION RATE: 19 BRPM | HEIGHT: 63 IN | WEIGHT: 160 LBS | SYSTOLIC BLOOD PRESSURE: 87 MMHG | SYSTOLIC BLOOD PRESSURE: 94 MMHG | DIASTOLIC BLOOD PRESSURE: 61 MMHG | HEART RATE: 69 BPM | SYSTOLIC BLOOD PRESSURE: 88 MMHG | SYSTOLIC BLOOD PRESSURE: 145 MMHG | SYSTOLIC BLOOD PRESSURE: 88 MMHG | RESPIRATION RATE: 27 BRPM | SYSTOLIC BLOOD PRESSURE: 79 MMHG | DIASTOLIC BLOOD PRESSURE: 51 MMHG | RESPIRATION RATE: 19 BRPM | HEART RATE: 78 BPM | SYSTOLIC BLOOD PRESSURE: 126 MMHG | HEART RATE: 67 BPM | RESPIRATION RATE: 25 BRPM | DIASTOLIC BLOOD PRESSURE: 46 MMHG | RESPIRATION RATE: 23 BRPM | DIASTOLIC BLOOD PRESSURE: 56 MMHG | OXYGEN SATURATION: 97 % | DIASTOLIC BLOOD PRESSURE: 47 MMHG | TEMPERATURE: 97.5 F | DIASTOLIC BLOOD PRESSURE: 54 MMHG | RESPIRATION RATE: 15 BRPM | DIASTOLIC BLOOD PRESSURE: 72 MMHG | HEART RATE: 70 BPM | SYSTOLIC BLOOD PRESSURE: 125 MMHG | DIASTOLIC BLOOD PRESSURE: 70 MMHG | RESPIRATION RATE: 23 BRPM | WEIGHT: 160 LBS | HEART RATE: 67 BPM | RESPIRATION RATE: 27 BRPM | DIASTOLIC BLOOD PRESSURE: 47 MMHG | DIASTOLIC BLOOD PRESSURE: 56 MMHG | OXYGEN SATURATION: 98 % | DIASTOLIC BLOOD PRESSURE: 53 MMHG | SYSTOLIC BLOOD PRESSURE: 136 MMHG | DIASTOLIC BLOOD PRESSURE: 56 MMHG | DIASTOLIC BLOOD PRESSURE: 72 MMHG | SYSTOLIC BLOOD PRESSURE: 87 MMHG | HEIGHT: 63 IN | SYSTOLIC BLOOD PRESSURE: 90 MMHG | HEART RATE: 68 BPM | SYSTOLIC BLOOD PRESSURE: 88 MMHG

## 2024-09-13 ENCOUNTER — AMBULATORY SURGICAL CENTER (AMBULATORY)
Age: 64
End: 2024-09-13
Payer: COMMERCIAL

## 2024-09-13 ENCOUNTER — OFFICE (AMBULATORY)
Age: 64
End: 2024-09-13

## 2024-09-13 ENCOUNTER — AMBULATORY SURGICAL CENTER (AMBULATORY)
Dept: URBAN - METROPOLITAN AREA SURGERY 17 | Facility: SURGERY | Age: 64
End: 2024-09-13
Payer: COMMERCIAL

## 2024-09-13 ENCOUNTER — OFFICE (AMBULATORY)
Dept: URBAN - METROPOLITAN AREA PATHOLOGY 4 | Facility: PATHOLOGY | Age: 64
End: 2024-09-13

## 2024-09-13 DIAGNOSIS — K62.1 RECTAL POLYP: ICD-10-CM

## 2024-09-13 DIAGNOSIS — K63.5 POLYP OF COLON: ICD-10-CM

## 2024-09-13 DIAGNOSIS — Z09 ENCOUNTER FOR FOLLOW-UP EXAMINATION AFTER COMPLETED TREATMEN: ICD-10-CM

## 2024-09-13 DIAGNOSIS — Z86.010 PERSONAL HISTORY OF COLON POLYPS: ICD-10-CM

## 2024-09-13 LAB
GI HISTOLOGY: A. MID-SIGMOID COLON: (no result)
GI HISTOLOGY: B. RECTUM: (no result)
GI HISTOLOGY: PDF REPORT: (no result)

## 2024-09-13 PROCEDURE — 45385 COLONOSCOPY W/LESION REMOVAL: CPT | Mod: 33 | Performed by: INTERNAL MEDICINE

## 2024-09-13 PROCEDURE — 88305 TISSUE EXAM BY PATHOLOGIST: CPT | Performed by: PATHOLOGY

## 2024-10-01 ENCOUNTER — HOSPITAL ENCOUNTER (OUTPATIENT)
Facility: HOSPITAL | Age: 64
Discharge: HOME | End: 2024-10-01
Admitting: PHYSICIAN ASSISTANT
Payer: COMMERCIAL

## 2024-10-01 DIAGNOSIS — Z78.0 POST-MENOPAUSAL: ICD-10-CM

## 2024-10-01 PROCEDURE — 77080 DXA BONE DENSITY AXIAL: CPT

## 2024-10-08 ENCOUNTER — HOSPITAL ENCOUNTER (OUTPATIENT)
Dept: MAMMOGRAPHY | Facility: HOSPITAL | Age: 64
Discharge: HOME | End: 2024-10-08
Admitting: PHYSICIAN ASSISTANT
Payer: COMMERCIAL

## 2024-10-08 DIAGNOSIS — Z12.31 SCREENING MAMMOGRAM, ENCOUNTER FOR: ICD-10-CM

## 2024-10-08 PROCEDURE — 77067 SCR MAMMO BI INCL CAD: CPT

## 2024-10-08 PROCEDURE — 77063 BREAST TOMOSYNTHESIS BI: CPT

## 2024-11-21 ENCOUNTER — OFFICE VISIT (OUTPATIENT)
Dept: ENDOCRINOLOGY | Age: 64
End: 2024-11-21
Payer: COMMERCIAL

## 2024-11-21 VITALS
OXYGEN SATURATION: 96 % | SYSTOLIC BLOOD PRESSURE: 122 MMHG | BODY MASS INDEX: 27.85 KG/M2 | DIASTOLIC BLOOD PRESSURE: 76 MMHG | HEIGHT: 63 IN | WEIGHT: 157.2 LBS | TEMPERATURE: 98.4 F | HEART RATE: 78 BPM

## 2024-11-21 DIAGNOSIS — R73.09 ELEVATED HEMOGLOBIN A1C: ICD-10-CM

## 2024-11-21 DIAGNOSIS — E55.9 VITAMIN D DEFICIENCY: ICD-10-CM

## 2024-11-21 DIAGNOSIS — Z86.0100 HISTORY OF COLON POLYPS: ICD-10-CM

## 2024-11-21 DIAGNOSIS — E83.52 HYPOCALCIURIC HYPERCALCEMIA: Primary | ICD-10-CM

## 2024-11-21 DIAGNOSIS — I10 ESSENTIAL HYPERTENSION: ICD-10-CM

## 2024-11-21 DIAGNOSIS — R73.03 PREDIABETES: ICD-10-CM

## 2024-11-21 DIAGNOSIS — E78.2 MIXED HYPERLIPIDEMIA: ICD-10-CM

## 2024-11-21 NOTE — PROGRESS NOTES
Luis Kang is a 63 y.o. female.     History of Present Illness     Her serum calcium has ranged from 10.7-11.2 with the upper limit of normal of 10.5 mg per DL. She had a bone density done at [facility] in December 2015 which showed osteopenia.  Bone density done in December 2017 showed stable osteopenia compared to 2015.  Serum calcium was at 10.5 mg per DL in February 2021     Bone density done in September 2022 showed osteopenia without significant change since December 2019.   Bone density done in October 2022 showed osteopenia with 3.4% increase in the lumbar spine and is 6.9% decrease on the left hip.  10-year risk of major osteoporotic fracture is 4.7% and of hip fracture is 0.7%.       She has history of vitamin D deficiency and has been taking vitamin D 2000 units/day.  She has no previous history of kidney stones, peptic ulcer, or depression. She denies muscle weakness.  She does not exercise regularly.    There is no family history of hypercalcemia or nephrolithiasis.     Workup done in January 2017 are as follows: Serum calcium at 11.0 mg per DL.  Intact PTH is normal at 42 pg per mL.  Undetectable PTH RP.  25-hydroxy vitamin D is 28 ng per mL.  24 urine calcium is low at 31.2 mg     Repeat 24-hour urine collection done after vitamin D repletion in 4/18 showed low calcium at 57 mg per 24 hours.  Calcium to creatinine clearance ratio is low at 0.004.     She has elevated hemoglobin A1c since 2023.  She has no history of gestational diabetes.  Hemoglobin A1c done in April 2023 is 5.8% with an elevated fasting glucose of 108 mg per DL.  Her last meal was last night.      She has history of hypertension and has been on furosemide 20  mg once a day and valsartan 160 mg/day.  Lisinopril was discontinued because of a dry cough.  No chest pain, shortness of breath or pedal edema.      She has hyperlipidemia and is on Lipitor 40 mg once a day. She denies any myalgia.  She has lost 7  "pounds since May 2024.      She had an episode of transient visual loss on the right eye in July 2016 thought to be due to amaurosis fugax. Carotid ultrasound was normal. She is on aspirin and Lipitor.  She has no recurrence of visual loss since.  She had an eye examination in 7/24.  She has glaucoma and is on Xalatan.     She had colon polyps removed by Dr. Malone in April 2019.  She had colonoscopy in May 2020 and 20 polyps were removed by Dr. Malone.  She had colonoscopy in June 2021 and had multiple polyps removed.  She had 8 polyps removed by colonoscopy in June 2022 by Dr. Malone.  She had 2 hyperplastic polyps removed by Dr. Malone in September 2024.  She was advised repeat colonoscopy in 2027.  She denies bowel complaints     She is adopted and does not know her family history.  Her children are age 40 and younger and have not had a colonoscopy.  She denies bowel complaints.    She had flu vaccine this fall.     She has stopped walking for exercise.  She works as manager in a bank.      The following portions of the patient's history were reviewed and updated as appropriate: allergies, current medications, past family history, past medical history, past social history, past surgical history, and problem list.    Review of Systems   Eyes:  Negative for visual disturbance.   Respiratory:  Negative for shortness of breath and wheezing.    Cardiovascular:  Negative for chest pain and palpitations.   Gastrointestinal: Negative.    Genitourinary: Negative.    Musculoskeletal:  Negative for myalgias.   Neurological:  Negative for weakness.     Vitals:    11/21/24 0836   BP: 122/76   Pulse: 78   Temp: 98.4 °F (36.9 °C)   TempSrc: Oral   SpO2: 96%   Weight: 71.3 kg (157 lb 3.2 oz)   Height: 160 cm (62.99\")      Objective   Physical Exam  Constitutional:       General: She is not in acute distress.     Appearance: Normal appearance. She is not ill-appearing, toxic-appearing or diaphoretic.   Eyes:      " General: No scleral icterus.        Right eye: No discharge.         Left eye: No discharge.      Extraocular Movements: Extraocular movements intact.      Conjunctiva/sclera: Conjunctivae normal.   Cardiovascular:      Rate and Rhythm: Normal rate and regular rhythm.      Heart sounds: Normal heart sounds. No murmur heard.     No friction rub. No gallop.   Pulmonary:      Breath sounds: Normal breath sounds. No rales.   Chest:      Chest wall: No tenderness.   Abdominal:      General: Bowel sounds are normal.      Palpations: Abdomen is soft.      Tenderness: There is no right CVA tenderness or left CVA tenderness.   Musculoskeletal:      Right lower leg: No edema.      Left lower leg: No edema.   Neurological:      Mental Status: She is alert and oriented to person, place, and time.   Psychiatric:         Behavior: Behavior normal.       Office Visit on 05/21/2024   Component Date Value Ref Range Status    Glucose 05/21/2024 88  70 - 99 mg/dL Final    BUN 05/21/2024 9  8 - 27 mg/dL Final    Creatinine 05/21/2024 0.90  0.57 - 1.00 mg/dL Final    EGFR Result 05/21/2024 72  >59 mL/min/1.73 Final    BUN/Creatinine Ratio 05/21/2024 10 (L)  12 - 28 Final    Sodium 05/21/2024 144  134 - 144 mmol/L Final    Potassium 05/21/2024 4.3  3.5 - 5.2 mmol/L Final    Chloride 05/21/2024 107 (H)  96 - 106 mmol/L Final    Total CO2 05/21/2024 22  20 - 29 mmol/L Final    Calcium 05/21/2024 10.8 (H)  8.7 - 10.3 mg/dL Final    Total Protein 05/21/2024 7.1  6.0 - 8.5 g/dL Final    Albumin 05/21/2024 4.4  3.9 - 4.9 g/dL Final    Globulin 05/21/2024 2.7  1.5 - 4.5 g/dL Final    A/G Ratio 05/21/2024 1.6  1.2 - 2.2 Final    Total Bilirubin 05/21/2024 0.3  0.0 - 1.2 mg/dL Final    Alkaline Phosphatase 05/21/2024 100  44 - 121 IU/L Final    AST (SGOT) 05/21/2024 16  0 - 40 IU/L Final    ALT (SGPT) 05/21/2024 9  0 - 32 IU/L Final    PTH, Intact 05/21/2024 45  15 - 65 pg/mL Final    Phosphorus 05/21/2024 3.0  3.0 - 4.3 mg/dL Final    Ionized  Calcium 05/21/2024 5.6  4.5 - 5.6 mg/dL Final    25 Hydroxy, Vitamin D 05/21/2024 38.6  30.0 - 100.0 ng/mL Final    Comment: Vitamin D deficiency has been defined by the [place] of  [employer] and an Endocrine Society practice guideline as a  level of serum 25-OH vitamin D less than 20 ng/mL (1,2).  The Endocrine Society went on to further define vitamin D  insufficiency as a level between 21 and 29 ng/mL (2).  1. IOM ([place] [employer]). 2010. Dietary reference     intakes for calcium and D. [place]: The     National AcademStudio [employer].  2. Jerod MF, Rafa NC, Qi HILTON, et al.     Evaluation, treatment, and prevention of vitamin D     deficiency: an Endocrine Society clinical practice     guideline. JCEM. 2011 Jul; 96(7):1911-30.      Hemoglobin A1C 05/21/2024 5.8 (H)  4.8 - 5.6 % Final    Comment:          Prediabetes: 5.7 - 6.4           Diabetes: >6.4           Glycemic control for adults with diabetes: <7.0      Total Cholesterol 05/21/2024 133  100 - 199 mg/dL Final    Triglycerides 05/21/2024 133  0 - 149 mg/dL Final    HDL Cholesterol 05/21/2024 39 (L)  >39 mg/dL Final    VLDL Cholesterol Alexis 05/21/2024 24  5 - 40 mg/dL Final    LDL Chol Calc (NIH) 05/21/2024 70  0 - 99 mg/dL Final    TSH 05/21/2024 0.524  0.450 - 4.500 uIU/mL Final    Interpretation 05/21/2024 Note   Final    Supplemental report is available.     Assessment & Plan   Diagnoses and all orders for this visit:    1. Hypocalciuric hypercalcemia (Primary)  -     Comprehensive Metabolic Panel  -     PTH, Intact  -     Calcium, Ionized  -     Phosphorus    2. Vitamin D deficiency  -     Comprehensive Metabolic Panel  -     Vitamin D,25-Hydroxy    3. Elevated hemoglobin A1c  -     Comprehensive Metabolic Panel  -     Hemoglobin A1c    4. Prediabetes  -     Comprehensive Metabolic Panel  -     Hemoglobin A1c    5. Essential hypertension  -     Comprehensive Metabolic Panel    6. Mixed hyperlipidemia  -     Lipid Panel    7.  History of colon polyps      Patient has hypercalciuric hypocalcemia.  Continue vitamin D3 2000 units/day.  Patient advised to walk for 30 minutes 3-5 times a week.    Continue valsartan and furosemide.  Will defer blood pressure control to Joanne Maurice.    Continue Lipitor 40 mg/day.  Continue no concentrated sweet low-fat diet.    Follow-up with Dr. Malone as scheduled.    Copy of my note sent to [facility].

## 2024-11-22 LAB
25(OH)D3+25(OH)D2 SERPL-MCNC: 56.3 NG/ML (ref 30–100)
ALBUMIN SERPL-MCNC: 4.4 G/DL (ref 3.9–4.9)
ALP SERPL-CCNC: 115 IU/L (ref 44–121)
ALT SERPL-CCNC: 11 IU/L (ref 0–32)
AST SERPL-CCNC: 17 IU/L (ref 0–40)
BILIRUB SERPL-MCNC: 0.4 MG/DL (ref 0–1.2)
BUN SERPL-MCNC: 8 MG/DL (ref 8–27)
BUN/CREAT SERPL: 10 (ref 12–28)
CA-I SERPL ISE-MCNC: 5.8 MG/DL (ref 4.5–5.6)
CALCIUM SERPL-MCNC: 11.1 MG/DL (ref 8.7–10.3)
CHLORIDE SERPL-SCNC: 108 MMOL/L (ref 96–106)
CHOLEST SERPL-MCNC: 117 MG/DL (ref 100–199)
CO2 SERPL-SCNC: 23 MMOL/L (ref 20–29)
CREAT SERPL-MCNC: 0.82 MG/DL (ref 0.57–1)
EGFRCR SERPLBLD CKD-EPI 2021: 80 ML/MIN/1.73
GLOBULIN SER CALC-MCNC: 2.7 G/DL (ref 1.5–4.5)
GLUCOSE SERPL-MCNC: 89 MG/DL (ref 70–99)
HBA1C MFR BLD: 5.9 % (ref 4.8–5.6)
HDLC SERPL-MCNC: 42 MG/DL
IMP & REVIEW OF LAB RESULTS: NORMAL
LDLC SERPL CALC-MCNC: 58 MG/DL (ref 0–99)
PHOSPHATE SERPL-MCNC: 2.8 MG/DL (ref 3–4.3)
POTASSIUM SERPL-SCNC: 4.1 MMOL/L (ref 3.5–5.2)
PROT SERPL-MCNC: 7.1 G/DL (ref 6–8.5)
PTH-INTACT SERPL-MCNC: 35 PG/ML (ref 15–65)
SODIUM SERPL-SCNC: 144 MMOL/L (ref 134–144)
TRIGL SERPL-MCNC: 84 MG/DL (ref 0–149)
VLDLC SERPL CALC-MCNC: 17 MG/DL (ref 5–40)

## 2025-02-06 RX ORDER — VALSARTAN 80 MG/1
80 TABLET ORAL DAILY
Qty: 90 TABLET | Refills: 0 | Status: SHIPPED | OUTPATIENT
Start: 2025-02-06 | End: 2025-02-09 | Stop reason: SDUPTHER

## 2025-02-09 RX ORDER — VALSARTAN 80 MG/1
80 TABLET ORAL DAILY
Qty: 90 TABLET | Refills: 0 | Status: SHIPPED | OUTPATIENT
Start: 2025-02-09

## 2025-02-18 DIAGNOSIS — I10 ESSENTIAL HYPERTENSION: ICD-10-CM

## 2025-02-18 RX ORDER — FUROSEMIDE 20 MG/1
20 TABLET ORAL DAILY
Qty: 90 TABLET | Refills: 1 | Status: SHIPPED | OUTPATIENT
Start: 2025-02-18

## 2025-02-18 NOTE — TELEPHONE ENCOUNTER
Rx Refill Note  Requested Prescriptions     Pending Prescriptions Disp Refills    furosemide (LASIX) 20 MG tablet [Pharmacy Med Name: FUROSEMIDE 20 MG TABLET] 90 tablet 1     Sig: TAKE 1 TABLET BY MOUTH DAILY FOR HIGH BLOOD PRESSURE      Last office visit with prescribing clinician: 11/21/2024   Last telemedicine visit with prescribing clinician: Visit date not found   Next office visit with prescribing clinician: 5/27/2025                         Would you like a call back once the refill request has been completed: [] Yes [] No    If the office needs to give you a call back, can they leave a voicemail: [] Yes [] No    Yarely June MA  02/18/25, 07:29 EST

## 2025-03-10 ENCOUNTER — OFFICE VISIT (OUTPATIENT)
Dept: FAMILY MEDICINE CLINIC | Facility: CLINIC | Age: 65
End: 2025-03-10
Payer: COMMERCIAL

## 2025-03-10 VITALS
WEIGHT: 155 LBS | SYSTOLIC BLOOD PRESSURE: 136 MMHG | HEART RATE: 79 BPM | HEIGHT: 63 IN | DIASTOLIC BLOOD PRESSURE: 72 MMHG | OXYGEN SATURATION: 95 % | BODY MASS INDEX: 27.46 KG/M2 | RESPIRATION RATE: 16 BRPM | TEMPERATURE: 97.5 F

## 2025-03-10 DIAGNOSIS — E53.8 LOW SERUM VITAMIN B12: ICD-10-CM

## 2025-03-10 DIAGNOSIS — Z86.19 HX OF HEPATITIS C: ICD-10-CM

## 2025-03-10 DIAGNOSIS — E55.9 VITAMIN D DEFICIENCY: ICD-10-CM

## 2025-03-10 DIAGNOSIS — I10 ESSENTIAL HYPERTENSION: Primary | ICD-10-CM

## 2025-03-10 DIAGNOSIS — R73.01 IMPAIRED FASTING GLUCOSE: ICD-10-CM

## 2025-03-10 DIAGNOSIS — E78.2 MIXED HYPERLIPIDEMIA: ICD-10-CM

## 2025-03-10 DIAGNOSIS — G45.3 AMAUROSIS FUGAX OF RIGHT EYE: ICD-10-CM

## 2025-03-10 DIAGNOSIS — E83.52 HYPOCALCIURIC HYPERCALCEMIA: ICD-10-CM

## 2025-03-10 DIAGNOSIS — Z13.6 SCREENING FOR ISCHEMIC HEART DISEASE: ICD-10-CM

## 2025-03-10 DIAGNOSIS — E53.8 LOW FOLIC ACID: ICD-10-CM

## 2025-03-10 PROCEDURE — 99214 OFFICE O/P EST MOD 30 MIN: CPT | Performed by: PHYSICIAN ASSISTANT

## 2025-03-10 RX ORDER — VALSARTAN 80 MG/1
80 TABLET ORAL DAILY
Qty: 90 TABLET | Refills: 0 | Status: SHIPPED | OUTPATIENT
Start: 2025-03-10

## 2025-03-10 NOTE — PROGRESS NOTES
"Subjective   Kristina Kang is a 64 y.o. female.     Hypertension  Pertinent negatives include no blurred vision, chest pain, palpitations or shortness of breath.   Hyperlipidemia  Pertinent negatives include no chest pain or shortness of breath.       Since the last visit, she has overall felt well.  She has Primary Hypertension and well controlled on current medication, Impaired fasting glucose and will monitor labs to watch for DMII, Hyperlipidemia with goals met with current Rx, and Vitamin D deficiency and labs are at goal >30 ng/mL.  she has been compliant with current medications have reviewed them.  The patient denies medication side effects.  Will refill medications. /72   Pulse 79   Temp 97.5 °F (36.4 °C) (Temporal)   Resp 16   Ht 160 cm (62.99\")   Wt 70.3 kg (155 lb)   LMP  (LMP Unknown)   SpO2 95%   BMI 27.47 kg/m² .  BMI is >= 25 and <30. (Overweight) The following options were offered after discussion;: Great job with continue to work on diet exercise and weight loss    Weight is still coming down  She is on B12, folic acid  Results for orders placed or performed in visit on 11/21/24   Comprehensive Metabolic Panel    Collection Time: 11/21/24 10:07 AM    Specimen: Blood   Result Value Ref Range    Glucose 89 70 - 99 mg/dL    BUN 8 8 - 27 mg/dL    Creatinine 0.82 0.57 - 1.00 mg/dL    EGFR Result 80 >59 mL/min/1.73    BUN/Creatinine Ratio 10 (L) 12 - 28    Sodium 144 134 - 144 mmol/L    Potassium 4.1 3.5 - 5.2 mmol/L    Chloride 108 (H) 96 - 106 mmol/L    Total CO2 23 20 - 29 mmol/L    Calcium 11.1 (H) 8.7 - 10.3 mg/dL    Total Protein 7.1 6.0 - 8.5 g/dL    Albumin 4.4 3.9 - 4.9 g/dL    Globulin 2.7 1.5 - 4.5 g/dL    Total Bilirubin 0.4 0.0 - 1.2 mg/dL    Alkaline Phosphatase 115 44 - 121 IU/L    AST (SGOT) 17 0 - 40 IU/L    ALT (SGPT) 11 0 - 32 IU/L   PTH, Intact    Collection Time: 11/21/24 10:07 AM    Specimen: Blood   Result Value Ref Range    PTH, Intact 35 15 - 65 pg/mL   Calcium, " Ionized    Collection Time: 11/21/24 10:07 AM    Specimen: Blood   Result Value Ref Range    Ionized Calcium 5.8 (H) 4.5 - 5.6 mg/dL   Vitamin D,25-Hydroxy    Collection Time: 11/21/24 10:07 AM    Specimen: Blood   Result Value Ref Range    25 Hydroxy, Vitamin D 56.3 30.0 - 100.0 ng/mL   Phosphorus    Collection Time: 11/21/24 10:07 AM    Specimen: Blood   Result Value Ref Range    Phosphorus 2.8 (L) 3.0 - 4.3 mg/dL   Hemoglobin A1c    Collection Time: 11/21/24 10:07 AM    Specimen: Blood   Result Value Ref Range    Hemoglobin A1C 5.9 (H) 4.8 - 5.6 %   Lipid Panel    Collection Time: 11/21/24 10:07 AM    Specimen: Blood   Result Value Ref Range    Total Cholesterol 117 100 - 199 mg/dL    Triglycerides 84 0 - 149 mg/dL    HDL Cholesterol 42 >39 mg/dL    VLDL Cholesterol Alexis 17 5 - 40 mg/dL    LDL Chol Calc ([facility]) 58 0 - 99 mg/dL   Cardiovascular Risk Assessment    Collection Time: 11/21/24 10:07 AM   Result Value Ref Range    Interpretation Note        Calcium slightly higher at 11.1 mg per DL.  Ionized calcium mildly elevated at 5.8 mg per DL.  Check magnesium with next follow-up.  Intact PTH 35 pg/mL.  Normal 25-hydroxy vitamin D.  Continue vitamin D3 2000 units/day. ...   Written by Conor Avendaño MD on 11/27/2024   Lowered her valsartan dose last visit due to hypotension on 3/7/2024 visit  Has been to Dr. Carole Gee in the past for dermatology  Had vascular screening on 4/1/2024 and no plaque in the right or left carotid artery.  Some walking on treadmill, recumbent bike, bike, weights  Normal EKG on 8/29/2023  Saw neurosurgery  for Trigeminal Neuralgia----DR Carver--- last visit was 6/17/2024  she is s/p revision of a right sided retrosigmoid craniotomy for microvascular decompression  and CSF rhinorrhea repair 12/27/23   Still sees neurology Dr. Izaguirre yearly for the trigeminal neuralgia follow-up.  Hep C in remission---watch LFTs  Doing okay off Zoloft for anxiety     Smoke 10 yr ----5 pk yr hx;   quit 15 yrs ago  July 2016---Amaurosis Fugax---carotid doppler neg--neg work up---not reoccured.  I want her LDL <70 d/t this and on ASA 81mg    DR Malone colonoscopy was on 9/13/2024 did have hyperplastic polyp recall 3 years      10-1-24  10/1/2024  6:18 PM EDT       Bone density screening with osteopenia not quite osteoporosis yet.  Your left hip decreased 6.9% but your FRAX score risk of fracture, is still in acceptable range..... I do want to discuss this at your next appointment.  Keep taking vitamin D and do recommend weightbearing exercise     She has glaucoma and is on Xalatan.   Sees endocrine----DR Avendaño and has done a workup for her elevated calcium and does monitor this closely along with her PTH level and did additional studies to confirm    The following portions of the patient's history were reviewed and updated as appropriate: allergies, current medications, past family history, past medical history, past social history, past surgical history, and problem list.    Review of Systems   Constitutional:  Negative for diaphoresis.   HENT:  Negative for nosebleeds and trouble swallowing.    Eyes:  Negative for blurred vision and visual disturbance.   Respiratory:  Negative for choking and shortness of breath.    Cardiovascular:  Negative for chest pain and palpitations.   Gastrointestinal:  Negative for blood in stool.   Allergic/Immunologic: Negative for immunocompromised state.   Neurological:  Negative for facial asymmetry and speech difficulty.   Psychiatric/Behavioral:  Negative for self-injury and suicidal ideas.        Objective   Physical Exam  Vitals and nursing note reviewed.   Constitutional:       General: She is not in acute distress.     Appearance: Normal appearance. She is well-developed. She is not ill-appearing or toxic-appearing.   HENT:      Head: Normocephalic.      Right Ear: External ear normal.      Left Ear: External ear normal.      Nose: Nose normal.      Mouth/Throat:       Pharynx: Oropharynx is clear.   Eyes:      General: No scleral icterus.     Conjunctiva/sclera: Conjunctivae normal.      Pupils: Pupils are equal, round, and reactive to light.   Neck:      Thyroid: No thyromegaly.   Cardiovascular:      Rate and Rhythm: Normal rate and regular rhythm.      Heart sounds: Normal heart sounds. No murmur heard.  Pulmonary:      Effort: Pulmonary effort is normal. No respiratory distress.      Breath sounds: Normal breath sounds. No rales.   Musculoskeletal:         General: No deformity. Normal range of motion.      Cervical back: Normal range of motion and neck supple.      Right lower leg: No edema.      Left lower leg: No edema.   Skin:     General: Skin is warm and dry.      Findings: No rash.   Neurological:      General: No focal deficit present.      Mental Status: She is alert and oriented to person, place, and time. Mental status is at baseline.   Psychiatric:         Mood and Affect: Mood normal.         Behavior: Behavior normal.         Thought Content: Thought content normal.         Judgment: Judgment normal.           Assessment & Plan   Diagnoses and all orders for this visit:    1. Essential hypertension (Primary)  -     CBC & Differential  -     TSH  -     T4, Free  -     Vitamin B12  -     Folate  -     Urinalysis With Microscopic - Urine, Clean Catch    2. Mixed hyperlipidemia  -     CBC & Differential  -     TSH  -     T4, Free  -     Vitamin B12  -     Folate  -     Urinalysis With Microscopic - Urine, Clean Catch    3. Amaurosis fugax of right eye  Comments:  past hx  Orders:  -     CBC & Differential  -     TSH  -     T4, Free  -     Vitamin B12  -     Folate  -     Urinalysis With Microscopic - Urine, Clean Catch    4. Vitamin D deficiency  -     CBC & Differential  -     TSH  -     T4, Free  -     Vitamin B12  -     Folate  -     Urinalysis With Microscopic - Urine, Clean Catch    5. Low serum vitamin B12  -     CBC & Differential  -     TSH  -     T4,  Free  -     Vitamin B12  -     Folate  -     Urinalysis With Microscopic - Urine, Clean Catch    6. Low folic acid  -     CBC & Differential  -     TSH  -     T4, Free  -     Vitamin B12  -     Folate  -     Urinalysis With Microscopic - Urine, Clean Catch    7. Hypocalciuric hypercalcemia  Comments:  per DR Avendaño  Orders:  -     CBC & Differential  -     TSH  -     T4, Free  -     Vitamin B12  -     Folate  -     Urinalysis With Microscopic - Urine, Clean Catch    8. Hx of hepatitis C  Comments:  in remission  Orders:  -     CBC & Differential  -     TSH  -     T4, Free  -     Vitamin B12  -     Folate  -     Urinalysis With Microscopic - Urine, Clean Catch    9. Screening for ischemic heart disease  -     CT Cardiac Calcium Score Without Dye; Future  -     CBC & Differential  -     TSH  -     T4, Free  -     Vitamin B12  -     Folate  -     Urinalysis With Microscopic - Urine, Clean Catch    10. Impaired fasting glucose  -     CBC & Differential  -     TSH  -     T4, Free  -     Vitamin B12  -     Folate  -     Urinalysis With Microscopic - Urine, Clean Catch      Still sees neurology Dr. Izaguirre yearly for the trigeminal neuralgia follow-up.  Sees DR Avendaño Q 6 mos and he also follows her impaired fasting glucose and her hypocalciuric hyperkalemia---has her on Lasix  Hep C in remission---watch LFTs  Doing okay off Zoloft for anxiety  Mammo after Oct 9  She has glaucoma and is on Xalatan.    Consider [place] cardiac CT score  July 2016---Amaurosis Fugax---carotid doppler neg--neg work up---not reoccured.  I want her LDL <70 d/t this and on ASA 81mg  Sees Dr. GLNEN De La Garza ophthalmology for glaucoma management  Continue B12 and folic acid will check labs and update CBC and urine micro  Plan, Kristina Kang, was seen today.  she was seen for HTN and continue medication, Imparied fasting glucose and plan follow up labs, diet, and exercise, Hyperlipidemia and will continue current medication, and Vitamin D deficiency and  supplemented.

## 2025-03-11 ENCOUNTER — RESULTS FOLLOW-UP (OUTPATIENT)
Dept: FAMILY MEDICINE CLINIC | Facility: CLINIC | Age: 65
End: 2025-03-11
Payer: COMMERCIAL

## 2025-03-11 DIAGNOSIS — R79.89 ABNORMAL CBC: Primary | ICD-10-CM

## 2025-03-11 LAB
APPEARANCE UR: CLEAR
BACTERIA #/AREA URNS HPF: NORMAL /HPF
BASOPHILS # BLD AUTO: 0.03 10*3/MM3 (ref 0–0.2)
BASOPHILS NFR BLD AUTO: 0.3 % (ref 0–1.5)
BILIRUB UR QL STRIP: NEGATIVE
CASTS URNS MICRO: NORMAL
COLOR UR: YELLOW
EOSINOPHIL # BLD AUTO: 0.16 10*3/MM3 (ref 0–0.4)
EOSINOPHIL NFR BLD AUTO: 1.4 % (ref 0.3–6.2)
EPI CELLS #/AREA URNS HPF: NORMAL /HPF
ERYTHROCYTE [DISTWIDTH] IN BLOOD BY AUTOMATED COUNT: 11.8 % (ref 12.3–15.4)
FOLATE SERPL-MCNC: >20 NG/ML (ref 4.78–24.2)
GLUCOSE UR QL STRIP: NEGATIVE
HCT VFR BLD AUTO: 42.4 % (ref 34–46.6)
HGB BLD-MCNC: 13.7 G/DL (ref 12–15.9)
HGB UR QL STRIP: NEGATIVE
IMM GRANULOCYTES # BLD AUTO: 0.03 10*3/MM3 (ref 0–0.05)
IMM GRANULOCYTES NFR BLD AUTO: 0.3 % (ref 0–0.5)
KETONES UR QL STRIP: NEGATIVE
LEUKOCYTE ESTERASE UR QL STRIP: NEGATIVE
LYMPHOCYTES # BLD AUTO: 2.37 10*3/MM3 (ref 0.7–3.1)
LYMPHOCYTES NFR BLD AUTO: 20.7 % (ref 19.6–45.3)
MCH RBC QN AUTO: 29.6 PG (ref 26.6–33)
MCHC RBC AUTO-ENTMCNC: 32.3 G/DL (ref 31.5–35.7)
MCV RBC AUTO: 91.6 FL (ref 79–97)
MONOCYTES # BLD AUTO: 0.49 10*3/MM3 (ref 0.1–0.9)
MONOCYTES NFR BLD AUTO: 4.3 % (ref 5–12)
NEUTROPHILS # BLD AUTO: 8.38 10*3/MM3 (ref 1.7–7)
NEUTROPHILS NFR BLD AUTO: 73 % (ref 42.7–76)
NITRITE UR QL STRIP: NEGATIVE
NRBC BLD AUTO-RTO: 0 /100 WBC (ref 0–0.2)
PH UR STRIP: 6 [PH] (ref 5–8)
PLATELET # BLD AUTO: 252 10*3/MM3 (ref 140–450)
PROT UR QL STRIP: NEGATIVE
RBC # BLD AUTO: 4.63 10*6/MM3 (ref 3.77–5.28)
RBC #/AREA URNS HPF: NORMAL /HPF
SP GR UR STRIP: 1.01 (ref 1–1.03)
T4 FREE SERPL-MCNC: 1.15 NG/DL (ref 0.92–1.68)
TSH SERPL DL<=0.005 MIU/L-ACNC: 0.6 UIU/ML (ref 0.27–4.2)
UROBILINOGEN UR STRIP-MCNC: NORMAL MG/DL
VIT B12 SERPL-MCNC: 1483 PG/ML (ref 211–946)
WBC # BLD AUTO: 11.46 10*3/MM3 (ref 3.4–10.8)
WBC #/AREA URNS HPF: NORMAL /HPF

## 2025-04-01 ENCOUNTER — OFFICE VISIT (OUTPATIENT)
Dept: NEUROLOGY | Facility: CLINIC | Age: 65
End: 2025-04-01
Payer: COMMERCIAL

## 2025-04-01 VITALS
HEIGHT: 63 IN | SYSTOLIC BLOOD PRESSURE: 122 MMHG | DIASTOLIC BLOOD PRESSURE: 66 MMHG | BODY MASS INDEX: 27.11 KG/M2 | WEIGHT: 153 LBS

## 2025-04-01 DIAGNOSIS — R51.9 NONINTRACTABLE HEADACHE, UNSPECIFIED CHRONICITY PATTERN, UNSPECIFIED HEADACHE TYPE: Primary | ICD-10-CM

## 2025-04-01 DIAGNOSIS — G50.0 TRIGEMINAL NEURALGIA: ICD-10-CM

## 2025-04-01 PROCEDURE — 99213 OFFICE O/P EST LOW 20 MIN: CPT | Performed by: STUDENT IN AN ORGANIZED HEALTH CARE EDUCATION/TRAINING PROGRAM

## 2025-04-01 NOTE — PROGRESS NOTES
Chief Complaint   Patient presents with    Trigeminal neuralgia of right side of face       Patient ID: Kristina Kang is a 64 y.o. female.    HPI:    The following portions of the patient's history were reviewed and updated as appropriate: allergies, current medications, past family history, past medical history, past social history, past surgical history and problem list.    Interval history:  History of Present Illness  The patient presents to the neurology clinic for follow-up of trigeminal neuralgia on the right side of the face. She had surgery and was on Lyrica at her last appointment. She is accompanied by her fiance.    She reports a dull ache localized to the posterior aspect of her head, specifically behind the ear, which occurs intermittently once or twice a month. The duration of these episodes is approximately an hour, after which the pain subsides. She does not experience any associated neck pain. She recalls during surgery by Dr. Morse during which a sebaceous cyst was discovered and subsequently excised. It is not severe and different than trigemina neuralgia pain.. She has discontinued all other medications, including pregabalin. She expresses satisfaction with the surgical outcome, despite the residual dull ache. She refrains from using analgesics such as Tylenol unless the pain intensifies into a headache. Prior to the surgery, she would manage the pain with Aleve.    She has not experienced any recurrence of the fluttering sensation on the right side of her face.    MEDICATIONS  Discontinued: Pregabalin.    Review of Systems   Neurological:  Negative for dizziness, tremors, seizures, syncope, facial asymmetry, speech difficulty, weakness, light-headedness, numbness and headaches.           Vitals:    04/01/25 1008   BP: 122/66       Neurological Exam  Mental Status  Alert.    Cranial Nerves  CN II: Right normal visual field. Left normal visual field.  CN III, IV, VI: Extraocular movements  intact bilaterally. Pupils equal round and reactive to light bilaterally.  CN V:  Right: Facial sensation is normal.  Left: Facial sensation is normal on the left.  CN VII:  Left: There is no facial weakness.  CN IX, X:  Right: Palate is normal.  Left: Palate is normal.  CN XI:  Right: Trapezius strength is normal.  Left: Trapezius strength is normal.  CN XII: Tongue midline without atrophy or fasciculations.  Normal hearing to finger rub bilaterally. No occipital tenderness to palpation..    Motor                                               Right                     Left  Deltoid                                   5                          5   Biceps                                   5                          5   Triceps                                  5                          5   Iliopsoas                               5                          5   Quadriceps                           5                          5   Hamstring                             5                          5   Gastrocnemius                     5                           5   Anterior tibialis                      5                          5    Sensory  Light touch is normal in upper and lower extremities.     Reflexes                                            Right                      Left  Brachioradialis                    2+                         2+  Biceps                                 2+                         2+  Patellar                                2+                         2+  Achilles                                2+                         2+    Coordination  Right: Finger-to-nose normal. Heel-to-shin normal.Left: Finger-to-nose normal. Heel-to-shin normal.    Gait    Normal unstressed gait.      Physical Exam  Eyes:      Extraocular Movements: Extraocular movements intact.      Pupils: Pupils are equal, round, and reactive to light.   Neurological:      Mental Status: She is alert.      Deep Tendon Reflexes:       Reflex Scores:       Bicep reflexes are 2+ on the right side and 2+ on the left side.       Brachioradialis reflexes are 2+ on the right side and 2+ on the left side.       Patellar reflexes are 2+ on the right side and 2+ on the left side.       Achilles reflexes are 2+ on the right side and 2+ on the left side.      Procedures    Assessment/Plan:    Assessment & Plan  Cephalgia  She reports a dull ache in the back of her head, behind the ear, occurring once or twice a month and lasting about an hour. This ache is not associated with her trigeminal neuralgia, which is currently well-controlled post-surgery. The ache could be due to irritation, scar tissue, cyst, other causes. She could at the current frequency use over-the-counter pain creams like Aspercreme for relief or medications like Tylenol, taking care not to overuse medications. If the ache worsens or changes, she should contact the clinic for further evaluation. She should contact Dr. Morse's office in case he has additional thoughts on this.    2. Trigeminal neuralgia.  Her trigeminal neuralgia is currently well-managed post-surgery, and she is off all previous medications, including pregabalin. No additional labs or medications are required at this time.   Return if symptoms worsen or fail to improve.    I spent 20 minutes caring for this patient on this date of service. This time includes time spent by me in the following activities as necessary: preparing for the visit, reviewing tests, medical records and previous visits, obtaining and/or reviewing a separately obtained history, performing a medically appropriate exam and/or evaluation, counseling and educating the patient, and/or communicating with other healthcare professionals, documenting information in the medical record, independently interpreting results and communicating that information with the patient, and developing a medically appropriate treatment plan with consideration of other  conditions, medications, and treatments.       Patient or patient representative verbalized consent for the use of Ambient Listening during the visit with  Layton Izaguirre MD for chart documentation. 4/1/2025  10:57 EDT     Diagnoses and all orders for this visit:    1. Nonintractable headache, unspecified chronicity pattern, unspecified headache type (Primary)    2. Trigeminal neuralgia           Layton Izaguirre MD

## 2025-04-21 ENCOUNTER — TELEPHONE (OUTPATIENT)
Dept: FAMILY MEDICINE CLINIC | Facility: CLINIC | Age: 65
End: 2025-04-21
Payer: COMMERCIAL

## 2025-04-21 NOTE — TELEPHONE ENCOUNTER
Hub staff attempted to follow warm transfer process and was unsuccessful     Caller: Kristina Kang A    Relationship to patient: Self    Best call back number: [phone]    Patient is needing: PATIENT WAS CALLING TO RESCHEDULE LAB APPOINTMENT FOR TODAY 4/21/25. HUB WAS UNABLE TO WARM TRANSFER., PATIENT NEEDING CALLBACK FOR RESCHEDULING.

## 2025-04-24 LAB
BASOPHILS # BLD AUTO: 0.1 X10E3/UL (ref 0–0.2)
BASOPHILS NFR BLD AUTO: 1 %
EOSINOPHIL # BLD AUTO: 0.2 X10E3/UL (ref 0–0.4)
EOSINOPHIL NFR BLD AUTO: 3 %
ERYTHROCYTE [DISTWIDTH] IN BLOOD BY AUTOMATED COUNT: 11.9 % (ref 11.7–15.4)
HCT VFR BLD AUTO: 40 % (ref 34–46.6)
HGB BLD-MCNC: 12.9 G/DL (ref 11.1–15.9)
IMM GRANULOCYTES # BLD AUTO: 0 X10E3/UL (ref 0–0.1)
IMM GRANULOCYTES NFR BLD AUTO: 0 %
LYMPHOCYTES # BLD AUTO: 4.1 X10E3/UL (ref 0.7–3.1)
LYMPHOCYTES NFR BLD AUTO: 44 %
MCH RBC QN AUTO: 29.7 PG (ref 26.6–33)
MCHC RBC AUTO-ENTMCNC: 32.3 G/DL (ref 31.5–35.7)
MCV RBC AUTO: 92 FL (ref 79–97)
MONOCYTES # BLD AUTO: 0.5 X10E3/UL (ref 0.1–0.9)
MONOCYTES NFR BLD AUTO: 5 %
NEUTROPHILS # BLD AUTO: 4.5 X10E3/UL (ref 1.4–7)
NEUTROPHILS NFR BLD AUTO: 47 %
PLATELET # BLD AUTO: 233 X10E3/UL (ref 150–450)
RBC # BLD AUTO: 4.34 X10E6/UL (ref 3.77–5.28)
WBC # BLD AUTO: 9.3 X10E3/UL (ref 3.4–10.8)

## 2025-05-21 DIAGNOSIS — E78.2 MIXED HYPERLIPIDEMIA: ICD-10-CM

## 2025-05-21 RX ORDER — ATORVASTATIN CALCIUM 40 MG/1
40 TABLET, FILM COATED ORAL DAILY
Qty: 90 TABLET | Refills: 2 | Status: SHIPPED | OUTPATIENT
Start: 2025-05-21

## 2025-05-21 NOTE — TELEPHONE ENCOUNTER
Rx Refill Note  Requested Prescriptions     Pending Prescriptions Disp Refills    atorvastatin (LIPITOR) 40 MG tablet [Pharmacy Med Name: ATORVASTATIN 40 MG TABLET] 90 tablet 2     Sig: TAKE 1 TABLET BY MOUTH DAILY. FOR CHOLESTEROL INDICATIONS: HIGH AMOUNT OF FATS IN THE BLOOD      Last office visit with prescribing clinician: 11/21/2024   Last telemedicine visit with prescribing clinician: Visit date not found   Next office visit with prescribing clinician: 5/27/2025                         Would you like a call back once the refill request has been completed: [] Yes [] No    If the office needs to give you a call back, can they leave a voicemail: [] Yes [] No  Yarely June MA  5/21/2025  07:54 EDT

## 2025-07-03 ENCOUNTER — OFFICE VISIT (OUTPATIENT)
Dept: ENDOCRINOLOGY | Age: 65
End: 2025-07-03
Payer: COMMERCIAL

## 2025-07-03 VITALS
DIASTOLIC BLOOD PRESSURE: 64 MMHG | TEMPERATURE: 97.9 F | HEART RATE: 78 BPM | BODY MASS INDEX: 25.94 KG/M2 | HEIGHT: 63 IN | WEIGHT: 146.4 LBS | OXYGEN SATURATION: 97 % | SYSTOLIC BLOOD PRESSURE: 102 MMHG

## 2025-07-03 DIAGNOSIS — E83.52 HYPOCALCIURIC HYPERCALCEMIA: Primary | ICD-10-CM

## 2025-07-03 DIAGNOSIS — R73.03 PREDIABETES: ICD-10-CM

## 2025-07-03 DIAGNOSIS — M85.852 OSTEOPENIA OF BOTH HIPS: ICD-10-CM

## 2025-07-03 DIAGNOSIS — E55.9 VITAMIN D DEFICIENCY: ICD-10-CM

## 2025-07-03 DIAGNOSIS — E78.5 HYPERLIPIDEMIA, UNSPECIFIED HYPERLIPIDEMIA TYPE: ICD-10-CM

## 2025-07-03 DIAGNOSIS — Z86.0100 HISTORY OF COLON POLYPS: ICD-10-CM

## 2025-07-03 DIAGNOSIS — I10 ESSENTIAL HYPERTENSION: ICD-10-CM

## 2025-07-03 DIAGNOSIS — R73.09 ELEVATED HEMOGLOBIN A1C: ICD-10-CM

## 2025-07-03 DIAGNOSIS — M85.851 OSTEOPENIA OF BOTH HIPS: ICD-10-CM

## 2025-07-03 RX ORDER — FUROSEMIDE 20 MG/1
20 TABLET ORAL DAILY
Qty: 90 TABLET | Refills: 1 | Status: SHIPPED | OUTPATIENT
Start: 2025-07-03

## 2025-07-03 NOTE — PROGRESS NOTES
Luis Kang is a 64 y.o. female.     History of Present Illness     Her serum calcium has ranged from 10.7-11.2 with the upper limit of normal of 10.5 mg per DL. She had a bone density done at [facility] in December 2015 which showed osteopenia.  Bone density done in December 2017 showed stable osteopenia compared to 2015.  Serum calcium was at 10.5 mg per DL in February 2021     Bone density done in September 2022 showed osteopenia without significant change since December 2019.   Bone density done in October 2022 showed osteopenia with 3.4% increase in the lumbar spine and is 6.9% decrease on the left hip.    Bone density done in October 2024 showed osteopenia.  When compared to the prior exam of September 2022 there are mixed results with bone density at the lumbar spine increasing by 3.4% in the left hip decreasing by 6.9%.  10-year risk of major osteoporotic fracture is 4.7% and of hip fracture is 0.7%.     She has history of vitamin D deficiency and has been taking vitamin D 2000 units/day.  She has no previous history of kidney stones, peptic ulcer, or depression. She denies muscle weakness.  She exercises in the gym 3 times a week.  There is no family history of hypercalcemia or nephrolithiasis.     Workup done in January 2017 are as follows: Serum calcium at 11.0 mg per DL.  Intact PTH is normal at 42 pg per mL.  Undetectable PTH RP.  25-hydroxy vitamin D is 28 ng per mL.  24 urine calcium is low at 31.2 mg     Repeat 24-hour urine collection done after vitamin D repletion in 4/18 showed low calcium at 57 mg per 24 hours.  Calcium to creatinine clearance ratio is low at 0.004.     She has elevated hemoglobin A1c since 2023.  She has no history of gestational diabetes.  Hemoglobin A1c done in April 2023 is 5.8% with an elevated fasting glucose of 108 mg per DL.  She has intentionally lost 11 pounds since November 2024 with diet and exercise.  Her last meal was last night.      She  "has history of hypertension and has been on furosemide 20  mg once a day and valsartan 160 mg/day.  Lisinopril was discontinued because of a dry cough.  No chest pain, shortness of breath or pedal edema.      She has hyperlipidemia and is on Lipitor 40 mg once a day. She denies any myalgia.        She had an episode of transient visual loss on the right eye in July 2016 thought to be due to amaurosis fugax. Carotid ultrasound was normal. She is on aspirin and Lipitor.  She has no recurrence of visual loss since.  She had an eye examination in May 2025.  She has glaucoma and is on Xalatan.     She had colon polyps removed by Dr. Malone in April 2019.  She had colonoscopy in May 2020 and 20 polyps were removed by Dr. Malone.  She had colonoscopy in June 2021 and had multiple polyps removed.  She had 8 polyps removed by colonoscopy in June 2022 by Dr. Malone.  She had 2 hyperplastic polyps removed by Dr. Malone in September 2024.  She was advised repeat colonoscopy in 2027.  She denies bowel complaints     She is adopted and does not know her family history.  Her children are age 40 and younger and have not had a colonoscopy.  She denies bowel complaints.     She has stopped walking for exercise.  She works as manager in a bank.    The following portions of the patient's history were reviewed and updated as appropriate: allergies, current medications, past family history, past medical history, past social history, past surgical history, and problem list.    Review of Systems   Eyes:  Negative for visual disturbance.   Respiratory:  Negative for shortness of breath.    Cardiovascular:  Negative for chest pain and palpitations.   Genitourinary: Negative.    Musculoskeletal:  Negative for myalgias.   Neurological:  Negative for numbness.     Vitals:    07/03/25 1120   BP: 102/64   Pulse: 78   Temp: 97.9 °F (36.6 °C)   TempSrc: Oral   SpO2: 97%   Weight: 66.4 kg (146 lb 6.4 oz)   Height: 160 cm (62.99\")    "   Objective   Physical Exam  Constitutional:       General: She is not in acute distress.     Appearance: Normal appearance. She is not ill-appearing, toxic-appearing or diaphoretic.   HENT:      Mouth/Throat:      Pharynx: No oropharyngeal exudate or posterior oropharyngeal erythema.   Eyes:      General: No scleral icterus.        Right eye: No discharge.         Left eye: No discharge.      Extraocular Movements: Extraocular movements intact.      Conjunctiva/sclera: Conjunctivae normal.   Neck:      Vascular: No carotid bruit.   Cardiovascular:      Rate and Rhythm: Normal rate and regular rhythm.      Heart sounds: Normal heart sounds. No murmur heard.     No friction rub. No gallop.   Pulmonary:      Breath sounds: Normal breath sounds. No rales.   Chest:      Chest wall: No tenderness.   Abdominal:      General: Bowel sounds are normal.      Palpations: Abdomen is soft.      Tenderness: There is no right CVA tenderness or left CVA tenderness.   Musculoskeletal:      Right lower leg: No edema.      Left lower leg: No edema.   Lymphadenopathy:      Cervical: No cervical adenopathy.   Neurological:      Mental Status: She is alert and oriented to person, place, and time.       Results Follow-Up on 03/11/2025   Component Date Value Ref Range Status    WBC 04/23/2025 9.3  3.4 - 10.8 x10E3/uL Final    RBC 04/23/2025 4.34  3.77 - 5.28 x10E6/uL Final    Hemoglobin 04/23/2025 12.9  11.1 - 15.9 g/dL Final    Hematocrit 04/23/2025 40.0  34.0 - 46.6 % Final    MCV 04/23/2025 92  79 - 97 fL Final    MCH 04/23/2025 29.7  26.6 - 33.0 pg Final    MCHC 04/23/2025 32.3  31.5 - 35.7 g/dL Final    RDW 04/23/2025 11.9  11.7 - 15.4 % Final    Platelets 04/23/2025 233  150 - 450 x10E3/uL Final    Neutrophil Rel % 04/23/2025 47  Not Estab. % Final    Lymphocyte Rel % 04/23/2025 44  Not Estab. % Final    Monocyte Rel % 04/23/2025 5  Not Estab. % Final    Eosinophil Rel % 04/23/2025 3  Not Estab. % Final    Basophil Rel % 04/23/2025 1   Not Estab. % Final    Neutrophils Absolute 04/23/2025 4.5  1.4 - 7.0 x10E3/uL Final    Lymphocytes Absolute 04/23/2025 4.1 (H)  0.7 - 3.1 x10E3/uL Final    Monocytes Absolute 04/23/2025 0.5  0.1 - 0.9 x10E3/uL Final    Eosinophils Absolute 04/23/2025 0.2  0.0 - 0.4 x10E3/uL Final    Basophils Absolute 04/23/2025 0.1  0.0 - 0.2 x10E3/uL Final    Immature Granulocyte Rel % 04/23/2025 0  Not Estab. % Final    Immature Grans Absolute 04/23/2025 0.0  0.0 - 0.1 x10E3/uL Final     Assessment & Plan   Diagnoses and all orders for this visit:    1. Hypocalciuric hypercalcemia (Primary)  -     Comprehensive Metabolic Panel  -     PTH, Intact  -     Vitamin D,25-Hydroxy  -     Calcium, Ionized    2. Essential hypertension  -     furosemide (LASIX) 20 MG tablet; Take 1 tablet by mouth Daily. for high blood pressure  Indications: High Blood Pressure  Dispense: 90 tablet; Refill: 1  -     Comprehensive Metabolic Panel    3. Vitamin D deficiency    4. Hyperlipidemia, unspecified hyperlipidemia type  -     Lipid Panel    5. Elevated hemoglobin A1c    6. Prediabetes  -     Lipid Panel  -     Hemoglobin A1c    7. History of colon polyps    8. Osteopenia of both hips  -     Comprehensive Metabolic Panel  -     PTH, Intact  -     Vitamin D,25-Hydroxy  -     Calcium, Ionized      Patient has hypocalciuric hypercalcemia and osteopenia.  Continue vitamin D3 2000 units/day.  Continue regular exercise.  Repeat bone density in October 2026.    Continue no concentrated sweet low-salt low-fat diet.  Continue Lipitor 40 mg a day.    Continue furosemide and valsartan.  Will defer blood pressure management to [facility].    Copy of my note was sent to [facility].    RTC 6 mos.

## 2025-07-06 LAB
25(OH)D3+25(OH)D2 SERPL-MCNC: 49.5 NG/ML (ref 30–100)
ALBUMIN SERPL-MCNC: 4.7 G/DL (ref 3.9–4.9)
ALP SERPL-CCNC: 105 IU/L (ref 44–121)
ALT SERPL-CCNC: 13 IU/L (ref 0–32)
AST SERPL-CCNC: 20 IU/L (ref 0–40)
BILIRUB SERPL-MCNC: 0.4 MG/DL (ref 0–1.2)
BUN SERPL-MCNC: 14 MG/DL (ref 8–27)
BUN/CREAT SERPL: 16 (ref 12–28)
CA-I SERPL ISE-MCNC: 5.9 MG/DL (ref 4.5–5.6)
CALCIUM SERPL-MCNC: 11.4 MG/DL (ref 8.7–10.3)
CHLORIDE SERPL-SCNC: 105 MMOL/L (ref 96–106)
CHOLEST SERPL-MCNC: 104 MG/DL (ref 100–199)
CO2 SERPL-SCNC: 23 MMOL/L (ref 20–29)
CREAT SERPL-MCNC: 0.86 MG/DL (ref 0.57–1)
EGFRCR SERPLBLD CKD-EPI 2021: 75 ML/MIN/1.73
GLOBULIN SER CALC-MCNC: 2.6 G/DL (ref 1.5–4.5)
GLUCOSE SERPL-MCNC: 79 MG/DL (ref 70–99)
HBA1C MFR BLD: 5.8 % (ref 4.8–5.6)
HDLC SERPL-MCNC: 44 MG/DL
IMP & REVIEW OF LAB RESULTS: NORMAL
LDLC SERPL CALC-MCNC: 46 MG/DL (ref 0–99)
POTASSIUM SERPL-SCNC: 4.2 MMOL/L (ref 3.5–5.2)
PROT SERPL-MCNC: 7.3 G/DL (ref 6–8.5)
PTH-INTACT SERPL-MCNC: 45 PG/ML (ref 15–65)
SODIUM SERPL-SCNC: 142 MMOL/L (ref 134–144)
TRIGL SERPL-MCNC: 64 MG/DL (ref 0–149)
VLDLC SERPL CALC-MCNC: 14 MG/DL (ref 5–40)

## 2025-07-21 ENCOUNTER — LAB (OUTPATIENT)
Dept: ENDOCRINOLOGY | Age: 65
End: 2025-07-21
Payer: COMMERCIAL

## 2025-07-21 DIAGNOSIS — E83.52 HYPERCALCEMIA: ICD-10-CM

## 2025-07-21 DIAGNOSIS — M85.851 OSTEOPENIA OF BOTH HIPS: ICD-10-CM

## 2025-07-21 DIAGNOSIS — M85.852 OSTEOPENIA OF BOTH HIPS: ICD-10-CM

## 2025-07-21 DIAGNOSIS — E83.52 HYPOCALCIURIC HYPERCALCEMIA: ICD-10-CM

## 2025-07-29 LAB
ALBUMIN 24H MFR UR ELPH: 24.1 %
ALPHA1 GLOB 24H MFR UR ELPH: 11.5 %
ALPHA2 GLOB 24H MFR UR ELPH: 19.2 %
B-GLOBULIN MFR UR ELPH: 24.7 %
CALCIUM 24H UR-MCNC: 3.9 MG/DL
CALCIUM 24H UR-MRATE: 98 MG/24 HR (ref 0–320)
COLLAGEN NTX UR-SCNC: 151 NMOL BCE
COLLAGEN NTX/CREAT UR-SRTO: 33 NM BCE/MM CR (ref 0–89)
CREAT 24H UR-MRATE: 1278 MG/24 HR (ref 800–1800)
CREAT UR-MCNC: 51.1 MG/DL
GAMMA GLOB 24H MFR UR ELPH: 20.4 %
INTERPRETATION UR IFE-IMP: NORMAL
INTERPRETIVE GUIDE:: NORMAL
M PROTEIN 24H MFR UR ELPH: NORMAL %
PHOSPHATE 24H UR-MRATE: 685 MG/24 HR (ref 261–1078)
PHOSPHATE UR-MCNC: 27.4 MG/DL
PROT UR-MCNC: 4.4 MG/DL
SERVICE CMNT-IMP: NORMAL

## 2025-08-05 RX ORDER — VALSARTAN 80 MG/1
80 TABLET ORAL DAILY
Qty: 90 TABLET | Refills: 0 | OUTPATIENT
Start: 2025-08-05

## (undated) DEVICE — ABSORBABLE HEMOSTAT (OXIDIZED REGENERATED CELLULOSE, U.S.P.)
Type: IMPLANTABLE DEVICE | Site: BRAIN | Status: NON-FUNCTIONAL
Brand: SURGICEL FIBRILLAR

## (undated) DEVICE — ELECTRD BLD EZ CLN MOD XLNG 2.75IN

## (undated) DEVICE — SUT MNCRYL PLS ANTIB UD 4/0 PS2 18IN

## (undated) DEVICE — SMOKE EVACUATION TUBING WITH 7/8 IN TO 1/4 IN REDUCER: Brand: BUFFALO FILTER

## (undated) DEVICE — GLV SURG SENSICARE PI PF LF 8.5 GRN STRL

## (undated) DEVICE — CONN TBG Y 5 IN 1 LF STRL

## (undated) DEVICE — GLV SURG BIOGEL LTX PF 7 1/2

## (undated) DEVICE — CODMAN® SURGICAL PATTIES 1/4" X 1/4" (0.64CM X 0.64CM): Brand: CODMAN®

## (undated) DEVICE — TOOL MR8-F2/7TA23 MR8 F2/7CM TAPER 2.3MM: Brand: MIDAS REX MR8

## (undated) DEVICE — MAYFIELD® DISPOSABLE ADULT SKULL PIN (PLASTIC BASE): Brand: MAYFIELD®

## (undated) DEVICE — Device

## (undated) DEVICE — PENCL ES MEGADINE EZ/CLEAN BUTN W/HOLSTR 10FT

## (undated) DEVICE — SUT NUROLON 4/0 TF18 CR8 I8IN C584D

## (undated) DEVICE — SPNG GZ WOVN 4X4IN 12PLY 10/BX STRL

## (undated) DEVICE — RANEY SCALP CLIP STERILE: Brand: AESCULAP

## (undated) DEVICE — TOOL MR8-9AC60M MR8 9CM ACORN 6MM 2FLT: Brand: MIDAS REX MR8

## (undated) DEVICE — GLV SURG BIOGEL LTX PF 8

## (undated) DEVICE — STPLR SKIN VISISTAT WD 35CT

## (undated) DEVICE — DRP MICROSCP LEICA 137X381CM

## (undated) DEVICE — PATIENT RETURN ELECTRODE, SINGLE-USE, CONTACT QUALITY MONITORING, ADULT, WITH 9FT CORD, FOR PATIENTS WEIGING OVER 33LBS. (15KG): Brand: MEGADYNE

## (undated) DEVICE — LABEL SHEET CUSTOM 2X2 YELLOW: Brand: MEDLINE INDUSTRIES, INC.

## (undated) DEVICE — PERFORATOR CDM WO/ DURAGUARD 14/11

## (undated) DEVICE — ANTIBACTERIAL UNDYED BRAIDED (POLYGLACTIN 910), SYNTHETIC ABSORBABLE SUTURE: Brand: COATED VICRYL

## (undated) DEVICE — BATRY PK LEVELONE ALK STRL

## (undated) DEVICE — APPL CHLORAPREP HI/LITE 26ML ORNG

## (undated) DEVICE — SOL ISO/ALC 70PCT 4OZ

## (undated) DEVICE — WIPE 2MM

## (undated) DEVICE — TUBING, SUCTION, 1/4" X 20', STRAIGHT: Brand: MEDLINE INDUSTRIES, INC.

## (undated) DEVICE — RETR STAY HK ELAS BLNT 12MM 50PK

## (undated) DEVICE — GLV SURG SENSICARE PI LF PF 7.5 GRN STRL

## (undated) DEVICE — SEALANT FIBRIN HUMN ARTISS PREFIL/SYR FZ 10ML

## (undated) DEVICE — MARKR SKIN W/RULR AND LBL

## (undated) DEVICE — DISPOSABLE STANDARD BIPOLAR FORCEPS, NON-STICK,: Brand: SPETZLER-MALIS

## (undated) DEVICE — DISPOSABLE BIPOLAR CABLE 12FT. (3.6M): Brand: KIRWAN

## (undated) DEVICE — FLOSEAL WITH RECOTHROM - 10ML.
Type: IMPLANTABLE DEVICE | Site: BRAIN | Status: NON-FUNCTIONAL
Brand: FLOSEAL HEMOSTATIC MATRIX

## (undated) DEVICE — CVR PROB 96IN LF STRL

## (undated) DEVICE — SPONGE,NEURO,0.5"X0.5",XR,STRL,10/PK: Brand: MEDLINE

## (undated) DEVICE — PK CRANI 40

## (undated) DEVICE — DRSNG TELFA PAD NONADH STR 1S 3X8IN

## (undated) DEVICE — DRP SLUSH WARMR MACH CIR 44X44IN